# Patient Record
Sex: MALE | Race: BLACK OR AFRICAN AMERICAN | NOT HISPANIC OR LATINO | Employment: UNEMPLOYED | URBAN - METROPOLITAN AREA
[De-identification: names, ages, dates, MRNs, and addresses within clinical notes are randomized per-mention and may not be internally consistent; named-entity substitution may affect disease eponyms.]

---

## 2024-02-05 ENCOUNTER — OFFICE VISIT (OUTPATIENT)
Dept: PRIMARY CARE CLINIC | Facility: CLINIC | Age: 44
End: 2024-02-05
Payer: COMMERCIAL

## 2024-02-05 VITALS
HEIGHT: 69 IN | OXYGEN SATURATION: 98 % | DIASTOLIC BLOOD PRESSURE: 89 MMHG | BODY MASS INDEX: 30.36 KG/M2 | SYSTOLIC BLOOD PRESSURE: 131 MMHG | HEART RATE: 50 BPM | WEIGHT: 205 LBS

## 2024-02-05 DIAGNOSIS — I50.9 CONGESTIVE HEART FAILURE, UNSPECIFIED HF CHRONICITY, UNSPECIFIED HEART FAILURE TYPE: ICD-10-CM

## 2024-02-05 DIAGNOSIS — Z01.89 ROUTINE LAB DRAW: Primary | ICD-10-CM

## 2024-02-05 DIAGNOSIS — E83.42 HYPOMAGNESEMIA: Primary | ICD-10-CM

## 2024-02-05 LAB
ALBUMIN SERPL BCP-MCNC: 3.5 G/DL (ref 3.4–5)
ALP SERPL-CCNC: 88 U/L (ref 45–117)
ALT SERPL W P-5'-P-CCNC: 25 U/L (ref 16–61)
ANION GAP SERPL CALC-SCNC: 3 MMOL/L (ref 3–11)
AST SERPL-CCNC: 22 U/L (ref 15–37)
BILIRUB SERPL-MCNC: 0.4 MG/DL (ref 0.2–1)
BUN SERPL-MCNC: 11 MG/DL (ref 7–18)
BUN/CREAT SERPL: 6.17 RATIO
CALCIUM SERPL-MCNC: 8.6 MG/DL (ref 8.5–10.1)
CHLORIDE SERPL-SCNC: 110 MMOL/L (ref 98–107)
CO2 SERPL-SCNC: 27 MMOL/L (ref 21–32)
CREAT SERPL-MCNC: 1.78 MG/DL (ref 0.7–1.3)
GFR ESTIMATION: 51
GLUCOSE SERPL-MCNC: 93 MG/DL (ref 74–106)
POTASSIUM SERPL-SCNC: 4.3 MMOL/L (ref 3.5–5.1)
PROT SERPL-MCNC: 6.8 G/DL (ref 6.4–8.2)
SODIUM BLD-SCNC: 140 MMOL/L (ref 131–143)

## 2024-02-05 PROCEDURE — 3079F DIAST BP 80-89 MM HG: CPT | Mod: CPTII,S$GLB,, | Performed by: INTERNAL MEDICINE

## 2024-02-05 PROCEDURE — 3008F BODY MASS INDEX DOCD: CPT | Mod: CPTII,S$GLB,, | Performed by: INTERNAL MEDICINE

## 2024-02-05 PROCEDURE — 36415 COLL VENOUS BLD VENIPUNCTURE: CPT | Mod: S$GLB,,, | Performed by: INTERNAL MEDICINE

## 2024-02-05 PROCEDURE — 3075F SYST BP GE 130 - 139MM HG: CPT | Mod: CPTII,S$GLB,, | Performed by: INTERNAL MEDICINE

## 2024-02-05 PROCEDURE — 1159F MED LIST DOCD IN RCRD: CPT | Mod: CPTII,S$GLB,, | Performed by: INTERNAL MEDICINE

## 2024-02-05 PROCEDURE — 99204 OFFICE O/P NEW MOD 45 MIN: CPT | Mod: S$GLB,,, | Performed by: INTERNAL MEDICINE

## 2024-02-05 RX ORDER — HYDRALAZINE HYDROCHLORIDE 25 MG/1
25 TABLET, FILM COATED ORAL 2 TIMES DAILY
COMMUNITY
Start: 2024-01-31 | End: 2024-03-14 | Stop reason: ALTCHOICE

## 2024-02-05 RX ORDER — ASPIRIN 81 MG/1
81 TABLET ORAL DAILY
COMMUNITY
End: 2024-03-14 | Stop reason: ALTCHOICE

## 2024-02-05 RX ORDER — METOPROLOL SUCCINATE 25 MG/1
25 TABLET, EXTENDED RELEASE ORAL DAILY
COMMUNITY
Start: 2024-01-31 | End: 2024-03-14 | Stop reason: ALTCHOICE

## 2024-02-05 NOTE — PROGRESS NOTES
Subjective:      Patient ID: Ayad Edmond is a 43 y.o. male.    Chief Complaint: Establish Care and Follow-up ([facility] and [facility](01/29/24).)    HPI      Past Medical History:   Diagnosis Date    Acute heart failure     AUDELIA (acute kidney injury)     Cardiomyopathy     Elevated troponin     HTN (hypertension)     Hypokalemia     Hypomagnesemia     Renal insufficiency      Patient with above medical problems recently was in the hospital for SOB and was found to have an EF of 30-35%. He was diuresed with lasix and his lisinopril was discontinued due to AUDELIA perhaps. Pulse rate was high in the hospital so LHC was deferred. He is wearing a life vest and his appointment with Dr Garcia is on the 15th of Feb  He wants to be cleared for work. He does not have to climb any stairs and there is no exertion involved in his job either   He is a  and was taking an anabolic supplement with creatine which he has stopped 2 weeks ago    Hospitalization notes as below     History of Present Illness  43-year-old male with a history of hypertension with medication nonadherence presents as transfer from [facility] for cardiology services.  He presented to the facility on 1/29 with complaint of 3-day history of shortness of breath at rest and with minimal exertion.  He also has orthopnea and PND and abdominal fullness with early satiety.  He does not have pedal edema.  He denies chest pain, palpitations or postural dizziness.  Initial vitals at that facility significant for diastolic hypertension.  CBC without leukocytosis.  CMP with hypokalemia.  proBNP and troponin were elevated.  SARS-CoV-2 was negative.  D-dimer was high and follow-up CTA chest was negative for PE, there were findings of right-sided volume overloaded state and pulmonary edema.  He received Solu-Medrol, DuoNeb, Toradol, weight-based Lovenox, full dose aspirin, Lasix.  He also received Rocephin.      H/o smoking, no h/o drug or alcohol  "use           Review of Systems   Constitutional:  Negative for chills and fever.   HENT:  Negative for hearing loss.    Eyes:  Negative for blurred vision.   Respiratory:  Negative for cough, shortness of breath and wheezing.    Cardiovascular:  Negative for chest pain, palpitations and leg swelling.   Gastrointestinal:  Negative for abdominal pain, blood in stool, constipation, diarrhea, melena, nausea and vomiting.   Genitourinary:  Negative for dysuria, frequency and urgency.   Musculoskeletal:  Negative for falls.   Skin:  Negative for rash.   Neurological:  Negative for dizziness and headaches.   Endo/Heme/Allergies:  Does not bruise/bleed easily.   Psychiatric/Behavioral:  Negative for depression. The patient is not nervous/anxious.      Objective:     Physical Exam  Vitals reviewed.   Constitutional:       Appearance: Normal appearance.   HENT:      Head: Normocephalic.      Mouth/Throat:      Mouth: Mucous membranes are moist.      Pharynx: Oropharynx is clear.   Eyes:      Extraocular Movements: Extraocular movements intact.      Conjunctiva/sclera: Conjunctivae normal.      Pupils: Pupils are equal, round, and reactive to light.   Cardiovascular:      Rate and Rhythm: Normal rate and regular rhythm.   Pulmonary:      Effort: Pulmonary effort is normal.      Breath sounds: Normal breath sounds.   Abdominal:      General: Bowel sounds are normal.   Musculoskeletal:      Right lower leg: No edema.      Left lower leg: No edema.   Skin:     General: Skin is warm.      Capillary Refill: Capillary refill takes less than 2 seconds.   Neurological:      Mental Status: He is alert and oriented to person, place, and time.   Psychiatric:         Mood and Affect: Mood normal.       /89 (BP Location: Left arm, Patient Position: Sitting, BP Method: X-Large (Automatic))   Pulse (!) 50   Ht 5' 9" (1.753 m)   Wt 93 kg (205 lb)   SpO2 98%   BMI 30.27 kg/m²     Assessment:       ICD-10-CM ICD-9-CM   1. " Hypomagnesemia  E83.42 275.2   2. Congestive heart failure, unspecified HF chronicity, unspecified heart failure type  I50.9 428.0       Plan:     Medication List with Changes/Refills   Current Medications    ASPIRIN (ECOTRIN) 81 MG EC TABLET    Take 81 mg by mouth once daily.    HYDRALAZINE (APRESOLINE) 25 MG TABLET    Take 25 mg by mouth 2 (two) times a day.    METOPROLOL SUCCINATE (TOPROL-XL) 25 MG 24 HR TABLET    Take 25 mg by mouth once daily.        1. Hypomagnesemia  -     Magnesium; Future; Expected date: 02/05/2024    2. Congestive heart failure, unspecified HF chronicity, unspecified heart failure type  -     Comprehensive Metabolic Panel; Future; Expected date: 02/05/2024       Future Appointments   Date Time Provider Department Center   2/5/2024  1:45 PM LAB, [facility] OB [address] [facility] [id number] [facility]   5/6/2024  9:20 AM Zehra Davies MD [facility] PRICG5 [facility]         Clearance provided, he wants to be cleared to go to the gym but I advised he needs to discuss that with Dr Garcia     Lipid panel was done in the hospital

## 2024-02-05 NOTE — LETTER
February 5, 2024      [facility]  [address] [place] [address], [address]  [facility] [address]  Phone: [phone]  Fax: [phone]       Patient: Ayad Edmond   YOB: 1980  Date of Visit: 02/05/2024    To Whom It May Concern:    Riddhi Edmond  was at [facility] on 02/05/2024. The patient may return to work/school on 02/06/24. If you have any questions or concerns, or if I can be of further assistance, please do not hesitate to contact me.    Sincerely,  Zehra Davies MD

## 2024-02-26 ENCOUNTER — PATIENT MESSAGE (OUTPATIENT)
Dept: PRIMARY CARE CLINIC | Facility: CLINIC | Age: 44
End: 2024-02-26
Payer: COMMERCIAL

## 2024-03-14 ENCOUNTER — OFFICE VISIT (OUTPATIENT)
Dept: PRIMARY CARE CLINIC | Facility: CLINIC | Age: 44
End: 2024-03-14
Payer: COMMERCIAL

## 2024-03-14 VITALS
OXYGEN SATURATION: 97 % | DIASTOLIC BLOOD PRESSURE: 84 MMHG | WEIGHT: 188 LBS | HEIGHT: 69 IN | SYSTOLIC BLOOD PRESSURE: 114 MMHG | BODY MASS INDEX: 27.85 KG/M2 | HEART RATE: 102 BPM

## 2024-03-14 DIAGNOSIS — I50.9 CONGESTIVE HEART FAILURE, UNSPECIFIED HF CHRONICITY, UNSPECIFIED HEART FAILURE TYPE: ICD-10-CM

## 2024-03-14 DIAGNOSIS — R11.0 NAUSEA: Primary | ICD-10-CM

## 2024-03-14 PROCEDURE — 3074F SYST BP LT 130 MM HG: CPT | Mod: CPTII,S$GLB,, | Performed by: INTERNAL MEDICINE

## 2024-03-14 PROCEDURE — 99214 OFFICE O/P EST MOD 30 MIN: CPT | Mod: S$GLB,,, | Performed by: INTERNAL MEDICINE

## 2024-03-14 PROCEDURE — 1159F MED LIST DOCD IN RCRD: CPT | Mod: CPTII,S$GLB,, | Performed by: INTERNAL MEDICINE

## 2024-03-14 PROCEDURE — 3079F DIAST BP 80-89 MM HG: CPT | Mod: CPTII,S$GLB,, | Performed by: INTERNAL MEDICINE

## 2024-03-14 PROCEDURE — 3008F BODY MASS INDEX DOCD: CPT | Mod: CPTII,S$GLB,, | Performed by: INTERNAL MEDICINE

## 2024-03-14 RX ORDER — PROMETHAZINE HYDROCHLORIDE 25 MG/1
25 TABLET ORAL EVERY 8 HOURS PRN
Qty: 30 TABLET | Refills: 0 | Status: SHIPPED | OUTPATIENT
Start: 2024-03-14 | End: 2024-03-19 | Stop reason: ALTCHOICE

## 2024-03-14 RX ORDER — ALPRAZOLAM 0.25 MG/1
0.25 TABLET ORAL 2 TIMES DAILY PRN
Qty: 30 TABLET | Refills: 0 | Status: ON HOLD | OUTPATIENT
Start: 2024-03-14 | End: 2024-06-06 | Stop reason: HOSPADM

## 2024-03-14 RX ORDER — TORSEMIDE 20 MG/1
20 TABLET ORAL DAILY
Status: ON HOLD | COMMUNITY
Start: 2024-02-28 | End: 2024-06-06 | Stop reason: HOSPADM

## 2024-03-14 RX ORDER — CARVEDILOL 6.25 MG/1
6.25 TABLET ORAL 2 TIMES DAILY
Status: ON HOLD | COMMUNITY
Start: 2024-02-28 | End: 2024-05-15 | Stop reason: HOSPADM

## 2024-03-14 RX ORDER — POTASSIUM CHLORIDE 1500 MG/1
20 TABLET, EXTENDED RELEASE ORAL 2 TIMES DAILY
COMMUNITY
Start: 2024-02-28 | End: 2024-05-10

## 2024-03-14 RX ORDER — ATORVASTATIN CALCIUM 40 MG/1
40 TABLET, FILM COATED ORAL DAILY
COMMUNITY
Start: 2024-01-31 | End: 2024-03-14 | Stop reason: ALTCHOICE

## 2024-03-14 NOTE — PROGRESS NOTES
Subjective:      Patient ID: Ayad Edmond is a 43 y.o. male.    Chief Complaint: Follow-up    HPI    Past Medical History:   Diagnosis Date    Acute heart failure     AUDELIA (acute kidney injury)     Cardiomyopathy     Elevated troponin     HTN (hypertension)     Hypokalemia     Hypomagnesemia     Renal insufficiency          Patient here for hospital follow up    Hospital Course   43-year-old male with past medical history of HFrEF EF 10%, CKD, HTN who was discharged about 10 days ago where he was treated for CHF presented to [facility] after he passed out at home.  Patient stated his Coreg was increased was increasing his outpatient visit which made him nauseous dizzy and he passed out from a standing position.He denies shortness of breath, chest pain, PND, lower extremity swelling.In the ER patient was found to be hemodynamically stable.  CBC unremarkable.  CMP consistent with CKD.  BNP elevated at 1760.  Troponin 167.  EKG showed no acute ST segment ischemic changes.    Admitted to [facility] with syncope in the setting of HFrEF EF 10%.  Left heart cath done on prior admission was negative for obstructive CAD. Discharged with reduced dose of Coreg and continue his diuretic. Needs to be evaluated for transplant/CHF clinic. Patient refuses this at this time and also reufses ICD. Follow up in 1 week for labs and close monitoring with Dr Garcia.      Patient is here for nausea medication and anxiety medication    Review of Systems   Constitutional:  Negative for chills and fever.   HENT:  Negative for hearing loss.    Eyes:  Negative for blurred vision.   Respiratory:  Negative for cough, shortness of breath and wheezing.    Cardiovascular:  Negative for chest pain, palpitations and leg swelling.   Gastrointestinal:  Positive for nausea. Negative for abdominal pain, blood in stool, constipation, diarrhea, melena and vomiting.   Genitourinary:  Negative for dysuria, frequency and urgency.  "  Musculoskeletal:  Negative for falls.   Skin:  Negative for rash.   Neurological:  Negative for dizziness and headaches.   Endo/Heme/Allergies:  Does not bruise/bleed easily.   Psychiatric/Behavioral:  Negative for depression. The patient is nervous/anxious and has insomnia.      Objective:     Physical Exam  Vitals reviewed.   Constitutional:       Appearance: Normal appearance.   HENT:      Head: Normocephalic.      Mouth/Throat:      Mouth: Mucous membranes are moist.      Pharynx: Oropharynx is clear.   Eyes:      Extraocular Movements: Extraocular movements intact.      Conjunctiva/sclera: Conjunctivae normal.      Pupils: Pupils are equal, round, and reactive to light.   Cardiovascular:      Rate and Rhythm: Normal rate and regular rhythm.   Pulmonary:      Effort: Pulmonary effort is normal.      Breath sounds: Normal breath sounds.   Abdominal:      General: Bowel sounds are normal.   Musculoskeletal:      Right lower leg: No edema.      Left lower leg: No edema.   Skin:     General: Skin is warm.      Capillary Refill: Capillary refill takes less than 2 seconds.   Neurological:      General: No focal deficit present.      Mental Status: He is alert.   Psychiatric:         Mood and Affect: Mood normal.       /84 (BP Location: Left arm, Patient Position: Sitting, BP Method: X-Large (Automatic))   Pulse 102   Ht 5' 9" (1.753 m)   Wt 85.3 kg (188 lb)   SpO2 97%   BMI 27.76 kg/m²     Assessment:       ICD-10-CM ICD-9-CM   1. Nausea  R11.0 787.02   2. Congestive heart failure, unspecified HF chronicity, unspecified heart failure type  I50.9 428.0       Plan:     Medication List with Changes/Refills   New Medications    ALPRAZOLAM (XANAX) 0.25 MG TABLET    Take 1 tablet (0.25 mg total) by mouth 2 (two) times daily as needed for Anxiety.    PROMETHAZINE (PHENERGAN) 25 MG TABLET    Take 1 tablet (25 mg total) by mouth every 8 (eight) hours as needed for Nausea.   Current Medications    CARVEDILOL " (COREG) 6.25 MG TABLET    Take 6.25 mg by mouth 2 (two) times daily.    POTASSIUM CHLORIDE (K-TAB) 20 MEQ    Take 20 mEq by mouth 2 (two) times a day.    TORSEMIDE (DEMADEX) 20 MG TAB    Take 20 mg by mouth once daily.   Discontinued Medications    ASPIRIN (ECOTRIN) 81 MG EC TABLET    Take 81 mg by mouth once daily.    ATORVASTATIN (LIPITOR) 40 MG TABLET    Take 40 mg by mouth once daily.    HYDRALAZINE (APRESOLINE) 25 MG TABLET    Take 25 mg by mouth 2 (two) times a day.    METOPROLOL SUCCINATE (TOPROL-XL) 25 MG 24 HR TABLET    Take 25 mg by mouth once daily.        1. Nausea  -     promethazine (PHENERGAN) 25 MG tablet; Take 1 tablet (25 mg total) by mouth every 8 (eight) hours as needed for Nausea.  Dispense: 30 tablet; Refill: 0  -     ALPRAZolam (XANAX) 0.25 MG tablet; Take 1 tablet (0.25 mg total) by mouth 2 (two) times daily as needed for Anxiety.  Dispense: 30 tablet; Refill: 0    2. Congestive heart failure, unspecified HF chronicity, unspecified heart failure type  -     Basic Metabolic Panel; Future; Expected date: 03/14/2024       He has an upcoming f/u with Dr Garcia, he had labs at the ER yesterday, I have ordered another bmp, advised to get it done before he sees Dr Garcia         Future Appointments   Date Time Provider Department Center   5/6/2024  9:20 AM Zehra Daives MD [facility] [id number] [facility]

## 2024-03-18 ENCOUNTER — PATIENT MESSAGE (OUTPATIENT)
Dept: PRIMARY CARE CLINIC | Facility: CLINIC | Age: 44
End: 2024-03-18

## 2024-03-18 ENCOUNTER — TELEPHONE (OUTPATIENT)
Dept: PRIMARY CARE CLINIC | Facility: CLINIC | Age: 44
End: 2024-03-18

## 2024-03-18 NOTE — TELEPHONE ENCOUNTER
----- Message from Tatiana Dias sent at 3/18/2024  9:46 AM CDT -----  Contact: self  Patient requesting a call back regarding getting medication called out for nausea. Patient states that medication that was called out is not working. Please call back @ [phone]

## 2024-03-19 RX ORDER — DIMENHYDRINATE 25 MG
1 TABLET,CHEWABLE ORAL DAILY PRN
Qty: 30 TABLET | Refills: 0 | Status: ON HOLD | OUTPATIENT
Start: 2024-03-19 | End: 2024-06-06 | Stop reason: HOSPADM

## 2024-04-11 ENCOUNTER — OFFICE VISIT (OUTPATIENT)
Dept: PRIMARY CARE CLINIC | Facility: CLINIC | Age: 44
End: 2024-04-11
Payer: COMMERCIAL

## 2024-04-11 VITALS
HEART RATE: 54 BPM | WEIGHT: 189 LBS | DIASTOLIC BLOOD PRESSURE: 92 MMHG | HEIGHT: 69 IN | SYSTOLIC BLOOD PRESSURE: 132 MMHG | OXYGEN SATURATION: 97 % | BODY MASS INDEX: 27.99 KG/M2

## 2024-04-11 DIAGNOSIS — R11.0 NAUSEA: ICD-10-CM

## 2024-04-11 DIAGNOSIS — I50.9 CONGESTIVE HEART FAILURE, UNSPECIFIED HF CHRONICITY, UNSPECIFIED HEART FAILURE TYPE: ICD-10-CM

## 2024-04-11 DIAGNOSIS — R14.0 BLOATING: Primary | ICD-10-CM

## 2024-04-11 PROCEDURE — 99213 OFFICE O/P EST LOW 20 MIN: CPT | Mod: S$GLB,,, | Performed by: INTERNAL MEDICINE

## 2024-04-11 PROCEDURE — 3075F SYST BP GE 130 - 139MM HG: CPT | Mod: CPTII,S$GLB,, | Performed by: INTERNAL MEDICINE

## 2024-04-11 PROCEDURE — 3008F BODY MASS INDEX DOCD: CPT | Mod: CPTII,S$GLB,, | Performed by: INTERNAL MEDICINE

## 2024-04-11 PROCEDURE — 1159F MED LIST DOCD IN RCRD: CPT | Mod: CPTII,S$GLB,, | Performed by: INTERNAL MEDICINE

## 2024-04-11 PROCEDURE — 3080F DIAST BP >= 90 MM HG: CPT | Mod: CPTII,S$GLB,, | Performed by: INTERNAL MEDICINE

## 2024-04-11 RX ORDER — PANTOPRAZOLE SODIUM 20 MG/1
20 TABLET, DELAYED RELEASE ORAL DAILY
Qty: 90 TABLET | Refills: 3 | Status: SHIPPED | OUTPATIENT
Start: 2024-04-11 | End: 2025-04-11

## 2024-04-11 NOTE — PROGRESS NOTES
"Subjective:      Patient ID: Ayad Edmond is a 44 y.o. male.    Chief Complaint: Hospital Follow Up (The patient has not taken his meds this am. ER 04/08/24. Report under media.) and Bloated (He states to his abd. "Causes me to be short of breath and it starts at the start of the morning to the night." )    HPI    Past Medical History:   Diagnosis Date    Acute heart failure     AUDELIA (acute kidney injury)     Cardiomyopathy     Elevated troponin     HTN (hypertension)     Hypokalemia     Hypomagnesemia     Renal insufficiency        ER report as below    44-year-old male with history of severe nonischemic cardiomyopathy, EF 10%, CKD presents to the ED complaining of shortness of breath.  For the past several days he has had mild dyspnea on exertion and dyspnea when he lays on his side at night.  Complains of mild nausea.  Denies any chest pain, fevers, chills  Patient presents with shortness of breath.  On exam he is nontoxic-appearing but afebrile and vital stable.  Basic lab work significant for mild AUDELIA as well as elevated BNP.  Troponin normal EKG unremarkable and chest x-ray shows small pleural effusion.  Recommended admission for diuresis and cardiology consultation, however he is refusing admission at this time.  Will treat with low-dose IV Lasix, antiemetics and discharged home with cardiology follow-up strict return precautions were discussed    He has a follow up with Dr Garcia next month    He was prescribed phenergan for nausea which he stated didn't help, it was refilled by the ER, he was then sent Dramamine which it otc as he was concerned about zofran causing Qtc prolongation    He was also send xanax for anxiety and states it does not help with his sleep. He states he has problems with sleep because he has shortness of breath lying flat and I'm not sure how medications for sleep will help that and he needs treatment for his CHF    Today he reports bloating and states the Torsemide doesn't work. He " has had a CT abdomen pelvis which did not show any acute pathology         Review of Systems   Constitutional:  Negative for chills and fever.   HENT:  Negative for hearing loss.    Eyes:  Negative for blurred vision.   Respiratory:  Positive for shortness of breath. Negative for cough and wheezing.    Cardiovascular:  Positive for orthopnea. Negative for chest pain, palpitations and leg swelling.   Gastrointestinal:  Positive for nausea. Negative for abdominal pain, blood in stool, constipation, diarrhea, melena and vomiting.   Genitourinary:  Negative for dysuria, frequency and urgency.   Musculoskeletal:  Negative for falls.   Skin:  Negative for rash.   Neurological:  Negative for dizziness and headaches.   Endo/Heme/Allergies:  Does not bruise/bleed easily.   Psychiatric/Behavioral:  Negative for depression. The patient is nervous/anxious and has insomnia.      Objective:     Physical Exam  Vitals reviewed.   Constitutional:       Appearance: Normal appearance.   HENT:      Head: Normocephalic.      Mouth/Throat:      Mouth: Mucous membranes are moist.      Pharynx: Oropharynx is clear.   Eyes:      Extraocular Movements: Extraocular movements intact.      Conjunctiva/sclera: Conjunctivae normal.      Pupils: Pupils are equal, round, and reactive to light.   Cardiovascular:      Rate and Rhythm: Normal rate and regular rhythm.   Pulmonary:      Effort: Pulmonary effort is normal.      Breath sounds: Normal breath sounds.   Abdominal:      General: Bowel sounds are normal.   Musculoskeletal:      Right lower leg: No edema.      Left lower leg: No edema.   Skin:     General: Skin is warm.      Capillary Refill: Capillary refill takes less than 2 seconds.   Neurological:      Mental Status: He is alert and oriented to person, place, and time.   Psychiatric:         Mood and Affect: Mood normal.     BP (!) 132/92 (BP Location: Right arm, Patient Position: Sitting, BP Method: X-Large (Automatic))   Pulse (!) 54    "Ht 5' 9" (1.753 m)   Wt 85.7 kg (189 lb)   SpO2 97%   BMI 27.91 kg/m²     Assessment:       ICD-10-CM ICD-9-CM   1. Bloating  R14.0 787.3   2. Congestive heart failure, unspecified HF chronicity, unspecified heart failure type  I50.9 428.0   3. Nausea  R11.0 787.02       Plan:     Medication List with Changes/Refills   New Medications    PANTOPRAZOLE (PROTONIX) 20 MG TABLET    Take 1 tablet (20 mg total) by mouth once daily.   Current Medications    ALPRAZOLAM (XANAX) 0.25 MG TABLET    Take 1 tablet (0.25 mg total) by mouth 2 (two) times daily as needed for Anxiety.    CARVEDILOL (COREG) 6.25 MG TABLET    Take 6.25 mg by mouth 2 (two) times daily.    DIMENHYDRINATE (DRAMAMINE) 25 MG CHEW    Take 1 tablet by mouth daily as needed (nausea).    POTASSIUM CHLORIDE (K-TAB) 20 MEQ    Take 20 mEq by mouth 2 (two) times a day.    TORSEMIDE (DEMADEX) 20 MG TAB    Take 20 mg by mouth once daily.        1. Bloating  -     pantoprazole (PROTONIX) 20 MG tablet; Take 1 tablet (20 mg total) by mouth once daily.  Dispense: 90 tablet; Refill: 3    2. Congestive heart failure, unspecified HF chronicity, unspecified heart failure type    3. Nausea         Future Appointments   Date Time Provider Department Center   8/12/2024  9:20 AM Zehra Davies MD [facility] [id number] [facility]                    "

## 2024-04-12 ENCOUNTER — TELEPHONE (OUTPATIENT)
Dept: CARDIOTHORACIC SURGERY | Facility: CLINIC | Age: 44
End: 2024-04-12
Payer: MEDICAID

## 2024-04-12 NOTE — TELEPHONE ENCOUNTER
----- Message from Nery Moss sent at 4/12/2024 12:50 PM CDT -----  Regarding: Appt  Contact: pt @ [phone]  Pt has congestive heart failure, asking for a call back to make appt

## 2024-04-19 ENCOUNTER — TELEPHONE (OUTPATIENT)
Dept: TRANSPLANT | Facility: CLINIC | Age: 44
End: 2024-04-19
Payer: MEDICAID

## 2024-04-19 DIAGNOSIS — I50.9 CONGESTIVE HEART FAILURE, UNSPECIFIED HF CHRONICITY, UNSPECIFIED HEART FAILURE TYPE: Primary | ICD-10-CM

## 2024-04-22 ENCOUNTER — TELEPHONE (OUTPATIENT)
Dept: PRIMARY CARE CLINIC | Facility: CLINIC | Age: 44
End: 2024-04-22
Payer: MEDICAID

## 2024-04-22 ENCOUNTER — PATIENT MESSAGE (OUTPATIENT)
Dept: PRIMARY CARE CLINIC | Facility: CLINIC | Age: 44
End: 2024-04-22
Payer: MEDICAID

## 2024-04-22 NOTE — TELEPHONE ENCOUNTER
"[employer] billy has been sent to the patient to advise him. I do not have the "spouse" or anyone signed on an "involvement of care" form.     ----- Message from Angie Cosme sent at 4/22/2024  3:31 PM CDT -----  Contact: Sia (spouse)  Patient is requesting a call back regarding wanting to know  if paperwork he dropped off Friday is filled out and ready for pickup. Please call back at [phone]  "

## 2024-04-22 NOTE — TELEPHONE ENCOUNTER
----- Message from Zehra Davies MD sent at 4/22/2024 11:53 AM CDT -----  Regarding: RE: paperwork  Contact: pt  Not yet  ----- Message -----  From: Yaneth Rai MA  Sent: 4/22/2024  11:31 AM CDT  To: Zehra Davies MD  Subject: FW: paperwork                                      ----- Message -----  From: Tierra Menjivar  Sent: 4/22/2024  11:25 AM CDT  To: Samson Shahid Staff  Subject: paperwork                                        Pt calling about paper work wanted to know if it is ready for  and he can b  reached at [phone]

## 2024-04-23 ENCOUNTER — TELEPHONE (OUTPATIENT)
Dept: PRIMARY CARE CLINIC | Facility: CLINIC | Age: 44
End: 2024-04-23
Payer: MEDICAID

## 2024-04-23 NOTE — TELEPHONE ENCOUNTER
Gabriela says it has to be completed by this Friday.     ----- Message from Mansi Yoder sent at 4/23/2024  2:07 PM CDT -----  Contact: Sia/Spouse  Patient's spouse is calling to speak with a nurse regarding form. Patient's spouse request to confirm if form for patient has been completed. Please give Mrs. Stovall a call back at [phone] when possible.  Thank you,  GH

## 2024-04-24 ENCOUNTER — PATIENT MESSAGE (OUTPATIENT)
Dept: PRIMARY CARE CLINIC | Facility: CLINIC | Age: 44
End: 2024-04-24
Payer: MEDICAID

## 2024-05-10 ENCOUNTER — HOSPITAL ENCOUNTER (INPATIENT)
Facility: HOSPITAL | Age: 44
LOS: 8 days | Discharge: HOME | DRG: 286 | End: 2024-05-18
Attending: STUDENT IN AN ORGANIZED HEALTH CARE EDUCATION/TRAINING PROGRAM | Admitting: INTERNAL MEDICINE
Payer: MEDICAID

## 2024-05-10 ENCOUNTER — HOSPITAL ENCOUNTER (OUTPATIENT)
Dept: PULMONOLOGY | Facility: CLINIC | Age: 44
Discharge: HOME | DRG: 286 | End: 2024-05-10
Payer: MEDICAID

## 2024-05-10 ENCOUNTER — OFFICE VISIT (OUTPATIENT)
Dept: TRANSPLANT | Facility: CLINIC | Age: 44
DRG: 286 | End: 2024-05-10
Payer: MEDICAID

## 2024-05-10 VITALS — HEIGHT: 69 IN | WEIGHT: 190 LBS | BODY MASS INDEX: 28.14 KG/M2

## 2024-05-10 VITALS
BODY MASS INDEX: 28.71 KG/M2 | DIASTOLIC BLOOD PRESSURE: 85 MMHG | WEIGHT: 193.81 LBS | OXYGEN SATURATION: 100 % | SYSTOLIC BLOOD PRESSURE: 131 MMHG | HEIGHT: 69 IN | HEART RATE: 90 BPM

## 2024-05-10 DIAGNOSIS — I50.9 CONGESTIVE HEART FAILURE, UNSPECIFIED HF CHRONICITY, UNSPECIFIED HEART FAILURE TYPE: Primary | ICD-10-CM

## 2024-05-10 DIAGNOSIS — I50.9 ACUTE DECOMPENSATED HEART FAILURE: ICD-10-CM

## 2024-05-10 DIAGNOSIS — I50.43 ACUTE ON CHRONIC COMBINED SYSTOLIC AND DIASTOLIC HEART FAILURE: ICD-10-CM

## 2024-05-10 DIAGNOSIS — R06.02 SHORTNESS OF BREATH: ICD-10-CM

## 2024-05-10 DIAGNOSIS — Z91.89 AT RISK FOR PROLONGED QT INTERVAL SYNDROME: ICD-10-CM

## 2024-05-10 DIAGNOSIS — I50.9 CONGESTIVE HEART FAILURE, UNSPECIFIED HF CHRONICITY, UNSPECIFIED HEART FAILURE TYPE: ICD-10-CM

## 2024-05-10 DIAGNOSIS — R57.0 CARDIOGENIC SHOCK: Primary | ICD-10-CM

## 2024-05-10 DIAGNOSIS — R06.02 SOB (SHORTNESS OF BREATH): ICD-10-CM

## 2024-05-10 DIAGNOSIS — I10 PRIMARY HYPERTENSION: ICD-10-CM

## 2024-05-10 DIAGNOSIS — I50.9 DECOMPENSATED HEART FAILURE: ICD-10-CM

## 2024-05-10 DIAGNOSIS — N17.9 AKI (ACUTE KIDNEY INJURY): ICD-10-CM

## 2024-05-10 LAB
ALBUMIN SERPL BCP-MCNC: 3.2 G/DL (ref 3.5–5.2)
ALBUMIN SERPL BCP-MCNC: 3.5 G/DL (ref 3.5–5.2)
ALLENS TEST: ABNORMAL
ALLENS TEST: NORMAL
ALP SERPL-CCNC: 113 U/L (ref 55–135)
ALP SERPL-CCNC: 94 U/L (ref 55–135)
ALT SERPL W/O P-5'-P-CCNC: 58 U/L (ref 10–44)
ALT SERPL W/O P-5'-P-CCNC: 69 U/L (ref 10–44)
ANION GAP SERPL CALC-SCNC: 10 MMOL/L (ref 8–16)
ANION GAP SERPL CALC-SCNC: 11 MMOL/L (ref 8–16)
AST SERPL-CCNC: 45 U/L (ref 10–40)
AST SERPL-CCNC: 46 U/L (ref 10–40)
BASOPHILS # BLD AUTO: 0.05 K/UL (ref 0–0.2)
BASOPHILS NFR BLD: 0.9 % (ref 0–1.9)
BILIRUB SERPL-MCNC: 1 MG/DL (ref 0.1–1)
BILIRUB SERPL-MCNC: 1.1 MG/DL (ref 0.1–1)
BNP SERPL-MCNC: 1991 PG/ML (ref 0–99)
BUN SERPL-MCNC: 23 MG/DL (ref 6–20)
BUN SERPL-MCNC: 25 MG/DL (ref 6–20)
CALCIUM SERPL-MCNC: 8.9 MG/DL (ref 8.7–10.5)
CALCIUM SERPL-MCNC: 8.9 MG/DL (ref 8.7–10.5)
CHLORIDE SERPL-SCNC: 102 MMOL/L (ref 95–110)
CHLORIDE SERPL-SCNC: 105 MMOL/L (ref 95–110)
CO2 SERPL-SCNC: 21 MMOL/L (ref 23–29)
CO2 SERPL-SCNC: 25 MMOL/L (ref 23–29)
CREAT SERPL-MCNC: 2.1 MG/DL (ref 0.5–1.4)
CREAT SERPL-MCNC: 2.1 MG/DL (ref 0.5–1.4)
DELSYS: ABNORMAL
DELSYS: ABNORMAL
DIFFERENTIAL METHOD BLD: ABNORMAL
EOSINOPHIL # BLD AUTO: 0 K/UL (ref 0–0.5)
EOSINOPHIL NFR BLD: 0.4 % (ref 0–8)
ERYTHROCYTE [DISTWIDTH] IN BLOOD BY AUTOMATED COUNT: 16.9 % (ref 11.5–14.5)
EST. GFR  (NO RACE VARIABLE): 39.6 ML/MIN/1.73 M^2
EST. GFR  (NO RACE VARIABLE): 39.6 ML/MIN/1.73 M^2
GLUCOSE SERPL-MCNC: 109 MG/DL (ref 70–110)
GLUCOSE SERPL-MCNC: 85 MG/DL (ref 70–110)
HCO3 UR-SCNC: 24.6 MMOL/L (ref 24–28)
HCO3 UR-SCNC: 24.8 MMOL/L (ref 24–28)
HCO3 UR-SCNC: 26.6 MMOL/L (ref 24–28)
HCT VFR BLD AUTO: 37.3 % (ref 40–54)
HCV AB SERPL QL IA: NORMAL
HGB BLD-MCNC: 11 G/DL (ref 14–18)
HIV 1+2 AB+HIV1 P24 AG SERPL QL IA: NORMAL
IMM GRANULOCYTES # BLD AUTO: 0.01 K/UL (ref 0–0.04)
IMM GRANULOCYTES NFR BLD AUTO: 0.2 % (ref 0–0.5)
LACTATE SERPL-SCNC: 1.1 MMOL/L (ref 0.5–2.2)
LDH SERPL L TO P-CCNC: 1.29 MMOL/L (ref 0.5–2.2)
LYMPHOCYTES # BLD AUTO: 2.1 K/UL (ref 1–4.8)
LYMPHOCYTES NFR BLD: 39.2 % (ref 18–48)
MAGNESIUM SERPL-MCNC: 2.3 MG/DL (ref 1.6–2.6)
MCH RBC QN AUTO: 23.1 PG (ref 27–31)
MCHC RBC AUTO-ENTMCNC: 29.5 G/DL (ref 32–36)
MCV RBC AUTO: 78 FL (ref 82–98)
MONOCYTES # BLD AUTO: 0.4 K/UL (ref 0.3–1)
MONOCYTES NFR BLD: 8.3 % (ref 4–15)
NEUTROPHILS # BLD AUTO: 2.7 K/UL (ref 1.8–7.7)
NEUTROPHILS NFR BLD: 51 % (ref 38–73)
NRBC BLD-RTO: 0 /100 WBC
OHS QRS DURATION: 92 MS
OHS QTC CALCULATION: 462 MS
PCO2 BLDA: 40.3 MMHG (ref 35–45)
PCO2 BLDA: 42 MMHG (ref 35–45)
PCO2 BLDA: 44.3 MMHG (ref 35–45)
PH SMN: 7.38 [PH] (ref 7.35–7.45)
PH SMN: 7.39 [PH] (ref 7.35–7.45)
PH SMN: 7.39 [PH] (ref 7.35–7.45)
PHOSPHATE SERPL-MCNC: 4.8 MG/DL (ref 2.7–4.5)
PLATELET # BLD AUTO: 463 K/UL (ref 150–450)
PMV BLD AUTO: 8.9 FL (ref 9.2–12.9)
PO2 BLDA: 24 MMHG (ref 40–60)
PO2 BLDA: 27 MMHG (ref 40–60)
PO2 BLDA: 30 MMHG (ref 40–60)
POC BE: 0 MMOL/L
POC BE: 0 MMOL/L
POC BE: 2 MMOL/L
POC SATURATED O2: 41 % (ref 95–100)
POC SATURATED O2: 50 % (ref 95–100)
POC SATURATED O2: 56 % (ref 95–100)
POC TCO2: 26 MMOL/L (ref 24–29)
POC TCO2: 26 MMOL/L (ref 24–29)
POC TCO2: 28 MMOL/L (ref 24–29)
POTASSIUM SERPL-SCNC: 4.1 MMOL/L (ref 3.5–5.1)
POTASSIUM SERPL-SCNC: 4.7 MMOL/L (ref 3.5–5.1)
PROT SERPL-MCNC: 6.2 G/DL (ref 6–8.4)
PROT SERPL-MCNC: 7.1 G/DL (ref 6–8.4)
RBC # BLD AUTO: 4.76 M/UL (ref 4.6–6.2)
SAMPLE: ABNORMAL
SAMPLE: NORMAL
SITE: ABNORMAL
SITE: NORMAL
SODIUM SERPL-SCNC: 137 MMOL/L (ref 136–145)
SODIUM SERPL-SCNC: 137 MMOL/L (ref 136–145)
TROPONIN I SERPL DL<=0.01 NG/ML-MCNC: 0.03 NG/ML (ref 0–0.03)
WBC # BLD AUTO: 5.31 K/UL (ref 3.9–12.7)

## 2024-05-10 PROCEDURE — 85025 COMPLETE CBC W/AUTO DIFF WBC: CPT | Mod: 91,NTX | Performed by: EMERGENCY MEDICINE

## 2024-05-10 PROCEDURE — 94618 PULMONARY STRESS TESTING: CPT | Mod: PBBFAC,NTX | Performed by: INTERNAL MEDICINE

## 2024-05-10 PROCEDURE — 82803 BLOOD GASES ANY COMBINATION: CPT | Mod: NTX

## 2024-05-10 PROCEDURE — 83605 ASSAY OF LACTIC ACID: CPT | Mod: NTX

## 2024-05-10 PROCEDURE — 25000003 PHARM REV CODE 250: Mod: NTX | Performed by: STUDENT IN AN ORGANIZED HEALTH CARE EDUCATION/TRAINING PROGRAM

## 2024-05-10 PROCEDURE — 99213 OFFICE O/P EST LOW 20 MIN: CPT | Mod: PBBFAC,TXP,25 | Performed by: INTERNAL MEDICINE

## 2024-05-10 PROCEDURE — 20000000 HC ICU ROOM: Mod: NTX

## 2024-05-10 PROCEDURE — 99205 OFFICE O/P NEW HI 60 MIN: CPT | Mod: S$PBB,TXP,, | Performed by: INTERNAL MEDICINE

## 2024-05-10 PROCEDURE — 84100 ASSAY OF PHOSPHORUS: CPT | Mod: NTX | Performed by: EMERGENCY MEDICINE

## 2024-05-10 PROCEDURE — 84484 ASSAY OF TROPONIN QUANT: CPT | Mod: NTX | Performed by: EMERGENCY MEDICINE

## 2024-05-10 PROCEDURE — 83735 ASSAY OF MAGNESIUM: CPT | Mod: NTX | Performed by: EMERGENCY MEDICINE

## 2024-05-10 PROCEDURE — 99285 EMERGENCY DEPT VISIT HI MDM: CPT | Mod: 25,27,NTX

## 2024-05-10 PROCEDURE — 94761 N-INVAS EAR/PLS OXIMETRY MLT: CPT | Mod: NTX,XB

## 2024-05-10 PROCEDURE — 99291 CRITICAL CARE FIRST HOUR: CPT | Mod: NTX,,, | Performed by: INTERNAL MEDICINE

## 2024-05-10 PROCEDURE — 94618 PULMONARY STRESS TESTING: CPT | Mod: 26,S$PBB,NTX, | Performed by: INTERNAL MEDICINE

## 2024-05-10 PROCEDURE — 99900035 HC TECH TIME PER 15 MIN (STAT): Mod: NTX

## 2024-05-10 PROCEDURE — 80053 COMPREHEN METABOLIC PANEL: CPT | Mod: 91,NTX | Performed by: STUDENT IN AN ORGANIZED HEALTH CARE EDUCATION/TRAINING PROGRAM

## 2024-05-10 PROCEDURE — 02HV33Z INSERTION OF INFUSION DEVICE INTO SUPERIOR VENA CAVA, PERCUTANEOUS APPROACH: ICD-10-PCS | Performed by: INTERNAL MEDICINE

## 2024-05-10 PROCEDURE — 4010F ACE/ARB THERAPY RXD/TAKEN: CPT | Mod: TXP,,, | Performed by: INTERNAL MEDICINE

## 2024-05-10 PROCEDURE — 99999 PR PBB SHADOW E&M-EST. PATIENT-LVL III: CPT | Mod: PBBFAC,TXP,, | Performed by: INTERNAL MEDICINE

## 2024-05-10 PROCEDURE — 63600175 PHARM REV CODE 636 W HCPCS: Mod: NTX | Performed by: STUDENT IN AN ORGANIZED HEALTH CARE EDUCATION/TRAINING PROGRAM

## 2024-05-10 PROCEDURE — 80053 COMPREHEN METABOLIC PANEL: CPT | Mod: 91,NTX | Performed by: EMERGENCY MEDICINE

## 2024-05-10 PROCEDURE — 83605 ASSAY OF LACTIC ACID: CPT | Mod: NTX | Performed by: STUDENT IN AN ORGANIZED HEALTH CARE EDUCATION/TRAINING PROGRAM

## 2024-05-10 PROCEDURE — 87389 HIV-1 AG W/HIV-1&-2 AB AG IA: CPT | Mod: NTX | Performed by: PHYSICIAN ASSISTANT

## 2024-05-10 PROCEDURE — 25000003 PHARM REV CODE 250: Mod: NTX | Performed by: INTERNAL MEDICINE

## 2024-05-10 PROCEDURE — 96374 THER/PROPH/DIAG INJ IV PUSH: CPT | Mod: NTX

## 2024-05-10 PROCEDURE — C1751 CATH, INF, PER/CENT/MIDLINE: HCPCS | Mod: NTX

## 2024-05-10 PROCEDURE — 86803 HEPATITIS C AB TEST: CPT | Mod: NTX | Performed by: PHYSICIAN ASSISTANT

## 2024-05-10 PROCEDURE — 83880 ASSAY OF NATRIURETIC PEPTIDE: CPT | Mod: 91,NTX | Performed by: EMERGENCY MEDICINE

## 2024-05-10 RX ORDER — ISOSORBIDE DINITRATE 10 MG/1
10 TABLET ORAL 3 TIMES DAILY
Status: DISCONTINUED | OUTPATIENT
Start: 2024-05-10 | End: 2024-05-11

## 2024-05-10 RX ORDER — PANTOPRAZOLE SODIUM 20 MG/1
20 TABLET, DELAYED RELEASE ORAL DAILY
Status: DISCONTINUED | OUTPATIENT
Start: 2024-05-11 | End: 2024-05-18 | Stop reason: HOSPADM

## 2024-05-10 RX ORDER — LOSARTAN POTASSIUM 25 MG/1
25 TABLET ORAL DAILY
Status: DISCONTINUED | OUTPATIENT
Start: 2024-05-11 | End: 2024-05-13

## 2024-05-10 RX ORDER — ATORVASTATIN CALCIUM 40 MG/1
40 TABLET, FILM COATED ORAL DAILY
COMMUNITY
Start: 2024-04-16

## 2024-05-10 RX ORDER — ALUMINUM HYDROXIDE, MAGNESIUM HYDROXIDE, AND SIMETHICONE 1200; 120; 1200 MG/30ML; MG/30ML; MG/30ML
30 SUSPENSION ORAL
Status: DISCONTINUED | OUTPATIENT
Start: 2024-05-10 | End: 2024-05-14

## 2024-05-10 RX ORDER — HYDRALAZINE HYDROCHLORIDE 25 MG/1
25 TABLET, FILM COATED ORAL EVERY 8 HOURS
Status: DISCONTINUED | OUTPATIENT
Start: 2024-05-10 | End: 2024-05-11

## 2024-05-10 RX ORDER — FUROSEMIDE 10 MG/ML
80 INJECTION INTRAMUSCULAR; INTRAVENOUS
Status: COMPLETED | OUTPATIENT
Start: 2024-05-10 | End: 2024-05-10

## 2024-05-10 RX ORDER — MUPIROCIN 20 MG/G
OINTMENT TOPICAL 2 TIMES DAILY
Status: COMPLETED | OUTPATIENT
Start: 2024-05-10 | End: 2024-05-15

## 2024-05-10 RX ORDER — FUROSEMIDE 20 MG/1
20 TABLET ORAL EVERY MORNING
Status: ON HOLD | COMMUNITY
Start: 2024-02-23 | End: 2024-06-06 | Stop reason: HOSPADM

## 2024-05-10 RX ORDER — ATORVASTATIN CALCIUM 40 MG/1
40 TABLET, FILM COATED ORAL DAILY
Status: DISCONTINUED | OUTPATIENT
Start: 2024-05-11 | End: 2024-05-18 | Stop reason: HOSPADM

## 2024-05-10 RX ORDER — SUCRALFATE 1 G/10ML
1 SUSPENSION ORAL EVERY 6 HOURS
Status: DISCONTINUED | OUTPATIENT
Start: 2024-05-10 | End: 2024-05-14

## 2024-05-10 RX ORDER — SPIRONOLACTONE 25 MG/1
25 TABLET ORAL DAILY
COMMUNITY
Start: 2024-04-15

## 2024-05-10 RX ORDER — ALPRAZOLAM 0.5 MG/1
0.5 TABLET ORAL NIGHTLY PRN
Status: DISCONTINUED | OUTPATIENT
Start: 2024-05-10 | End: 2024-05-18 | Stop reason: HOSPADM

## 2024-05-10 RX ORDER — SODIUM CHLORIDE 0.9 % (FLUSH) 0.9 %
10 SYRINGE (ML) INJECTION
Status: DISCONTINUED | OUTPATIENT
Start: 2024-05-10 | End: 2024-05-18 | Stop reason: HOSPADM

## 2024-05-10 RX ORDER — SPIRONOLACTONE 25 MG/1
25 TABLET ORAL DAILY
Status: DISCONTINUED | OUTPATIENT
Start: 2024-05-11 | End: 2024-05-18 | Stop reason: HOSPADM

## 2024-05-10 RX ORDER — OXYCODONE AND ACETAMINOPHEN 5; 325 MG/1; MG/1
1 TABLET ORAL EVERY 4 HOURS PRN
Status: DISCONTINUED | OUTPATIENT
Start: 2024-05-10 | End: 2024-05-12

## 2024-05-10 RX ORDER — LOSARTAN POTASSIUM 25 MG/1
25 TABLET ORAL DAILY
Status: ON HOLD | COMMUNITY
Start: 2024-04-15 | End: 2024-05-15 | Stop reason: HOSPADM

## 2024-05-10 RX ORDER — TALC
3 POWDER (GRAM) TOPICAL NIGHTLY PRN
Status: DISCONTINUED | OUTPATIENT
Start: 2024-05-10 | End: 2024-05-18 | Stop reason: HOSPADM

## 2024-05-10 RX ADMIN — MUPIROCIN: 20 OINTMENT TOPICAL at 09:05

## 2024-05-10 RX ADMIN — HYDRALAZINE HYDROCHLORIDE 25 MG: 25 TABLET ORAL at 07:05

## 2024-05-10 RX ADMIN — SUCRALFATE 1 G: 1 SUSPENSION ORAL at 07:05

## 2024-05-10 RX ADMIN — FUROSEMIDE 20 MG/HR: 10 INJECTION, SOLUTION INTRAMUSCULAR; INTRAVENOUS at 05:05

## 2024-05-10 RX ADMIN — ALPRAZOLAM 0.5 MG: 0.5 TABLET ORAL at 10:05

## 2024-05-10 RX ADMIN — ISOSORBIDE DINITRATE 10 MG: 10 TABLET ORAL at 07:05

## 2024-05-10 RX ADMIN — OXYCODONE HYDROCHLORIDE AND ACETAMINOPHEN 1 TABLET: 5; 325 TABLET ORAL at 11:05

## 2024-05-10 RX ADMIN — ALUMINUM HYDROXIDE, MAGNESIUM HYDROXIDE, AND SIMETHICONE 30 ML: 1200; 120; 1200 SUSPENSION ORAL at 09:05

## 2024-05-10 RX ADMIN — OXYCODONE HYDROCHLORIDE AND ACETAMINOPHEN 1 TABLET: 5; 325 TABLET ORAL at 07:05

## 2024-05-10 RX ADMIN — SUCRALFATE 1 G: 1 SUSPENSION ORAL at 11:05

## 2024-05-10 RX ADMIN — FUROSEMIDE 80 MG: 10 INJECTION, SOLUTION INTRAVENOUS at 05:05

## 2024-05-10 NOTE — H&P
"[facility] - Emergency Dept  Heart Transplant  H&P    Patient Name: Ayad Edmond  MRN: [id number]  Admission Date: 5/10/2024  Attending Physician: Nicole Chua MD  Primary Care Provider: Zehra Davies MD  Principal Problem:<principal problem not specified>    Subjective:     History of Present Illness:  Mr. Ayad Edmond is a 44 year old male with past medical history of:  NICMP, EF 30-35% on prior Echo (thought to be 10-15% on LV gram during [facility])  HTN  CKD    Who presented to [facility] ER on 5/10/24 at the behest of his heart failure specialist Dr. Mack due to shortness of breath for the past several days and cool extremities on exam.     Per Dr. Mack's H&P: "When talking with him, he says that his cardiac history goes back to approximately 2012.  At that time he was told that he had hypertension, and he was on regular antihypertensive therapy.  However had no major cardiac issues until January of this year when he presented with shortness of breath and was found to have new heart failure with reduced ejection fraction.  As noted above, he had a coronary angiogram done which demonstrated nonobstructive coronary artery disease, but the LV gram demonstrated a further drop in his ejection fraction at 10%.  He had a combined left and right heart catheterization, number as noted below.     At present he has not employed, previously working as a   which was a job that demanded him to be quite active. Not working now due to CHF symptoms. At present unfortunately he is quite short of breath with even minimal amounts of exertion.  He says that he can walk approximately 15-20 feet before he has to pause because of shortness of breath.  Denies any chest discomfort, but has been noticing worsening leg swelling over the past few days to weeks.  Has 3-4 pillow PND/orthopnea which recently has progressed to where he can only sleep upright in his truck.  Has episodes of palpitations, did have an episode of " "syncope in March of this past year.  Endorses recent abdominal swelling associated with early satiety, and poor oral intake.  Reduced appetite."    On interview in the ER, the patient confirmed this history. In ER, he was  hemodynamically stable with normal lactic acid. Cr 2.1 (baseline unclear but prior value of 1.7), mild elevation of AST/ALT. CXR with cardiomegaly and mild bilateral interstitial infiltrates. Bedside echo showed dilated LV, EF 10-15%, trivial circumferential pericardial effusion, moderate-severe TR, mild-moderate MR, mildly reduced RV systolic function, CVP 15, PASP 48 mmHg.     Past Medical History:   Diagnosis Date    Acute heart failure     AUDELIA (acute kidney injury)     Cardiomyopathy     Elevated troponin     HTN (hypertension)     Hypokalemia     Hypomagnesemia     Renal insufficiency        Past Surgical History:   Procedure Laterality Date    broken foot Left        Review of patient's allergies indicates:  No Known Allergies    Current Facility-Administered Medications   Medication    furosemide (Lasix) 500 mg in 50 mL infusion (conc: 10 mg/mL)    furosemide injection 80 mg    sodium chloride 0.9% flush 10 mL     Current Outpatient Medications   Medication Sig    ALPRAZolam (XANAX) 0.25 MG tablet Take 1 tablet (0.25 mg total) by mouth 2 (two) times daily as needed for Anxiety.    atorvastatin (LIPITOR) 40 MG tablet Take 40 mg by mouth once daily.    carvediloL (COREG) 6.25 MG tablet Take 6.25 mg by mouth 2 (two) times daily.    dimenhyDRINATE (DRAMAMINE) 25 mg Chew Take 1 tablet by mouth daily as needed (nausea).    furosemide (LASIX) 20 MG tablet Take 20 mg by mouth every morning.    losartan (COZAAR) 25 MG tablet Take 25 mg by mouth once daily.    pantoprazole (PROTONIX) 20 MG tablet Take 1 tablet (20 mg total) by mouth once daily.    spironolactone (ALDACTONE) 25 MG tablet Take 25 mg by mouth once daily.    torsemide (DEMADEX) 20 MG Tab Take 20 mg by mouth once daily.     Family History " "      Problem Relation (Age of Onset)    Heart failure Mother    No Known Problems Father          Tobacco Use    Smoking status: Former     Types: Cigarettes     Start date: 2023     Quit date: 1998     Years since quittin.0     Passive exposure: Past    Smokeless tobacco: Never   Substance and Sexual Activity    Alcohol use: Yes     Comment: "very seldom."    Drug use: Never    Sexual activity: Not on file     Review of Systems   Respiratory:  Positive for shortness of breath.    Cardiovascular:  Positive for leg swelling.   All other systems reviewed and are negative.    Objective:     Vital Signs (Most Recent):  Temp: 98.4 °F (36.9 °C) (05/10/24 1347)  Pulse: 96 (05/10/24 1556)  Resp: 20 (05/10/24 1347)  BP: (!) 140/106 (05/10/24 1556)  SpO2: 100 % (05/10/24 1556) Vital Signs (24h Range):  Temp:  [98.4 °F (36.9 °C)] 98.4 °F (36.9 °C)  Pulse:  [] 96  Resp:  [20] 20  SpO2:  [99 %-100 %] 100 %  BP: (110-140)/() 140/106     Patient Vitals for the past 72 hrs (Last 3 readings):   Weight   05/10/24 1347 86.2 kg (190 lb)     Body mass index is 28.06 kg/m².    No intake or output data in the 24 hours ending 05/10/24 1628       Physical Exam  Vitals reviewed.   Constitutional:       Appearance: Normal appearance.   HENT:      Head: Normocephalic and atraumatic.   Eyes:      Extraocular Movements: Extraocular movements intact.      Pupils: Pupils are equal, round, and reactive to light.   Cardiovascular:      Rate and Rhythm: Normal rate and regular rhythm.      Pulses: Normal pulses.      Heart sounds: Normal heart sounds.      Comments: JVD to angle of the mandible with patient sitting at 45 degrees  Pulmonary:      Effort: Pulmonary effort is normal.      Breath sounds: Normal breath sounds.   Abdominal:      General: Abdomen is flat. Bowel sounds are normal. There is no distension.      Palpations: Abdomen is soft.   Musculoskeletal:         General: Normal range of motion.      Cervical back: " "Normal range of motion and neck supple.   Skin:     Comments: Cool extremities with 2+ pitting edema b/l   Neurological:      General: No focal deficit present.      Mental Status: He is alert and oriented to person, place, and time.            Significant Labs:  CBC:  Recent Labs   Lab 05/10/24  0957 05/10/24  1510   WBC 5.62 5.31   RBC 4.76 4.76   HGB 11.0* 11.0*   HCT 37.0* 37.3*   * 463*   MCV 78* 78*   MCH 23.1* 23.1*   MCHC 29.7* 29.5*     BNP:  Recent Labs   Lab 05/10/24  0957 05/10/24  1510   BNP 2,065* 1,991*     CMP:  Recent Labs   Lab 05/10/24  0957 05/10/24  1510   GLU 74 85   CALCIUM 9.2 8.9   ALBUMIN 3.4* 3.5   PROT 6.7 7.1    137   K 4.1 4.7   CO2 25 21*    105   BUN 25* 25*   CREATININE 2.2* 2.1*   ALKPHOS 104 113   ALT 66* 69*   AST 43* 46*   BILITOT 0.9 1.0      Coagulation:   No results for input(s): "PT", "INR", "APTT" in the last 168 hours.  LDH:  No results for input(s): "LDH" in the last 72 hours.  Microbiology:  Microbiology Results (last 7 days)       ** No results found for the last 168 hours. **              Assessment/Plan:     Acute on chronic combined systolic and diastolic heart failure    -Normal lactic but with cool extremities, AUDELIA, mildly elevated transaminases  -SCAI shock stage B, currently normotensive  -BNP 2,000  -Admit to ICU for central line and evaluation of hemodynamics  -Start IV Lasix 80 x1 then Lasix gtt at 20  -Strict intake/output  -Bedside echo with EF 10-15%, mod-severe TR, PASP 48 mmHg, CVP 15  -Formal echo pending  -Will need work-up for advance options    AUDELIA (acute kidney injury)  Likely cardiorenal  Diurese as above      Case discussed with on-call [facility] staff.     Gopi Quiroz MD  Heart Transplant  [facility] - Emergency Dept  "

## 2024-05-10 NOTE — PROCEDURES
05/10/2024  5:41 PM    Procedure:  Central line  Indication:  Hemodynamic monitoring      Risks, benefits, and indications for the procedure were reviewed with patient. Consent was signed and placed in chart. Pt prepped and draped in sterile fashion. The following sterile precautions were followed: cap and mask, hand hygiene, sterile gown and sterile gloves, large sterile sheet and sterile field and 2% Chlorhexidine for cutaneous antisepsis. Time out was performed with nursing staff. Lidocaine 1% injected at site. Ultrasound guidance utilized to visualize anatomy right IJ. Access obtained without difficulty and blood flow was non-pulsatile. Manometry showed an elevated CVP. Wire threaded smoothly and US confirmed appropriate venous placement of wire. Catheter advanced without resistance. Pt tolerated procedure well. No evidence of hematoma at vascular access site. CXR has been ordered to confirm appropriate placement.     Sandro Ramirez   Department of Cardiovascular Disease, PGY-6   [facility]

## 2024-05-10 NOTE — ED NOTES
Ayad Edmond, a 44 y.o. male presents to the ED w/ complaint of leg swelling and sob    Triage note:  Chief Complaint   Patient presents with    Shortness of Breath     Sent from cards clinic poss cardiogenic shock per nursing report     Review of patient's allergies indicates:  No Known Allergies  Past Medical History:   Diagnosis Date    Acute heart failure     AUDELIA (acute kidney injury)     Cardiomyopathy     Elevated troponin     HTN (hypertension)     Hypokalemia     Hypomagnesemia     Renal insufficiency      LOC: Patient name and date of birth verified. The patient is awake, alert and aware of environment with an appropriate affect, the patient is oriented x 3 and speaking appropriately.   APPEARANCE: Patient resting comfortably, patient is clean and well groomed, patient's clothing is properly fastened.  SKIN: The skin is warm and dry, color consistent with ethnicity, patient has normal skin turgor and moist mucus membranes, skin intact, no breakdown or bruising noted.  MUSCULOSKELETAL: Patient moving all extremities well, no obvious swelling or deformities noted.   RESPIRATORY: Respirations are spontaneous, patient has a normal effort and rate, no accessory muscle use noted.  CARDIAC: Patient has a normal rate and rhythm, no periphreal edema noted, capillary refill < 3 seconds.  ABDOMEN: Soft and non tender to palpation, no distention noted. Bowel sounds present in all four quadrants.  NEUROLOGIC: Eyes open spontaneously, behavior appropriate to situation, follows commands, facial expression symmetrical, bilateral hand grasp equal and even, purposeful motor response noted, normal sensation in all extremities when touched with a finger.

## 2024-05-10 NOTE — LETTER
May 10, 2024        Aba Garcia  [address]  [facility] [address]  Phone: [phone]  Fax: [phone]             [id number]  [address]  [facility] [address]  Phone: [phone]   Patient: Ayad Edmond   MR Number: [id number]   YOB: 1980   Date of Visit: 5/10/2024       Dear Dr. Aba Garcia    Thank you for referring Ayad Edmond to me for evaluation. Attached you will find relevant portions of my assessment and plan of care.    If you have questions, please do not hesitate to call me. I look forward to following Ayad Edmond along with you.    Sincerely,    Pradeep Mack MD    Enclosure    If you would like to receive this communication electronically, please contact [email or web address] or [phone] to request [employer] access.    [employer] is a tool which provides read-only access to select patient information with whom you have a relationship. Its easy to use and provides real time access to review your patients record including encounter summaries, notes, results, and demographic information.    If you feel you have received this communication in error or would no longer like to receive these types of communications, please e-mail [email or web address]

## 2024-05-10 NOTE — HPI
"Mr. Ayad Edmond is a 44 year old male with past medical history of:  NICMP, EF 30-35% on prior Echo (thought to be 10-15% on LV gram during [facility])  HTN  CKD    Who presented to [facility] ER on 5/10/24 at the behest of his heart failure specialist Dr. Mack due to shortness of breath for the past several days and cool extremities on exam.     Per Dr. Mack's H&P: "When talking with him, he says that his cardiac history goes back to approximately 2012.  At that time he was told that he had hypertension, and he was on regular antihypertensive therapy.  However had no major cardiac issues until January of this year when he presented with shortness of breath and was found to have new heart failure with reduced ejection fraction.  As noted above, he had a coronary angiogram done which demonstrated nonobstructive coronary artery disease, but the LV gram demonstrated a further drop in his ejection fraction at 10%.  He had a combined left and right heart catheterization, number as noted below.     At present he has not employed, previously working as a   which was a job that demanded him to be quite active. Not working now due to CHF symptoms. At present unfortunately he is quite short of breath with even minimal amounts of exertion.  He says that he can walk approximately 15-20 feet before he has to pause because of shortness of breath.  Denies any chest discomfort, but has been noticing worsening leg swelling over the past few days to weeks.  Has 3-4 pillow PND/orthopnea which recently has progressed to where he can only sleep upright in his truck.  Has episodes of palpitations, did have an episode of syncope in March of this past year.  Endorses recent abdominal swelling associated with early satiety, and poor oral intake.  Reduced appetite."    On interview in the ER, the patient confirmed this history. In ER, he was  hemodynamically stable with normal lactic acid. Cr 2.1 (baseline unclear but prior value of " 1.7), mild elevation of AST/ALT. CXR with cardiomegaly and mild bilateral interstitial infiltrates. Bedside echo showed dilated LV, EF 10-15%, trivial circumferential pericardial effusion, moderate-severe TR, mild-moderate MR, mildly reduced RV systolic function, CVP 15, PASP 48 mmHg.

## 2024-05-10 NOTE — H&P (VIEW-ONLY)
Subjective:   Initial evaluation of heart transplant candidacy.    HPI:  Mr. Edmond is a 44 y.o. year old Black or  male who has presents to be considered for advanced surgical options (LVAD/OHT).      45 YO M w/ NICM, HFrEF, LVEF 10%, HTN, CKD who presents for evaluation.    Sees Dr. Aba Garcia MD locally for his cardiomyopathy. Prior records are available under scanned media and reviewed prior to today's visit.    Initially diagnosed 1/2024 with HFrEF, LVEF 30-35%. During a hospitalization in February, found to have further drop in EF to 10% on LV gram as part of combined LHC/RHC.    When talking with him, he says that his cardiac history goes back to approximately 2012.  At that time he was told that he had hypertension, and he was on regular antihypertensive therapy.  However had no major cardiac issues until January of this year when he presented with shortness of breath and was found to have new heart failure with reduced ejection fraction.  As noted above, he had a coronary angiogram done which demonstrated nonobstructive coronary artery disease, but the LV gram demonstrated a further drop in his ejection fraction at 10%.  He had a combined left and right heart catheterization, number as noted below.    At present he has not employed, previously working as a   which was a job that demanded him to be quite active. Not working now due to CHF symptoms. At present unfortunately he is quite short of breath with even minimal amounts of exertion.  He says that he can walk approximately 15-20 feet before he has to pause because of shortness of breath.  Denies any chest discomfort, but has been noticing worsening leg swelling over the past few days to weeks.  Has 3-4 pillow PND/orthopnea which recently has progressed to where he can only sleep upright in his truck.  Has episodes of palpitations, did have an episode of syncope in March of this past year.  Endorses recent abdominal  swelling associated with early satiety, and poor oral intake.  Reduced appetite.    PMH/FH/SH  Past medical history significant for hypertension and cardiomyopathy as noted above.  No other significant medical issues.    He had a torn tendon in his left foot which required surgery in the past, but no other significant surgeries.    His family history is significant for cardiomyopathy multiple family members on his mother's side.  His mom has congestive heart failure and a defibrillator.  He also mentions that is maternal grandmother, maternal aunt, and multiple cousins on that side of the family also have heart failure, he thinks around 5-6 family members.    Denies tobacco use, alcohol use, or recreational drug use.    TTE 1/31/24 (External)  LVEF 30-35%    LHC/RHC 2/27/24 (External)  RA 15, PA 47/23, mean 35, PCWP 28  CI 2.1, CO 4.2  LVEF 10%  Normal coronary arteries    Past Medical History:   Diagnosis Date    Acute heart failure     AUDELIA (acute kidney injury)     Cardiomyopathy     Elevated troponin     HTN (hypertension)     Hypokalemia     Hypomagnesemia     Renal insufficiency      Past Surgical History:   Procedure Laterality Date    broken foot Left        Family History   Problem Relation Name Age of Onset    Heart failure Mother      No Known Problems Father         Review of Systems   Constitutional: Positive for decreased appetite, malaise/fatigue and weight gain. Negative for chills and fever.   HENT:  Negative for hearing loss.    Eyes:  Negative for visual disturbance.   Cardiovascular:  Positive for dyspnea on exertion, irregular heartbeat, leg swelling, orthopnea, palpitations, paroxysmal nocturnal dyspnea and syncope. Negative for chest pain.   Respiratory:  Positive for shortness of breath. Negative for cough.    Musculoskeletal:  Negative for muscle weakness.   Gastrointestinal:  Positive for bloating, abdominal pain, anorexia and nausea. Negative for diarrhea and vomiting.   Neurological:   "Negative for focal weakness.   Psychiatric/Behavioral:  Negative for memory loss.        Objective:   Blood pressure 131/85, pulse 90, height 5' 9" (1.753 m), weight 87.9 kg (193 lb 12.6 oz), SpO2 100%.body mass index is 28.62 kg/m².    Physical Exam  Constitutional:       Appearance: He is ill-appearing.      Comments: Sitting upright.   HENT:      Head: Normocephalic and atraumatic.   Eyes:      Extraocular Movements: Extraocular movements intact.   Cardiovascular:      Rate and Rhythm: Regular rhythm. Tachycardia present.      Pulses: Normal pulses.      Heart sounds: Normal heart sounds.      Comments: JVP to angle of jaw sitting upright.  Pulmonary:      Breath sounds: Normal breath sounds.   Abdominal:      General: There is distension.      Tenderness: There is abdominal tenderness (RUQ).   Musculoskeletal:      Right lower leg: Edema present.      Left lower leg: Edema present.      Comments: Upper and lower extremities cool to touch   Neurological:      General: No focal deficit present.      Mental Status: He is alert and oriented to person, place, and time.         Labs:      Chemistry        Component Value Date/Time     05/10/2024 0957    K 4.1 05/10/2024 0957     05/10/2024 0957    CO2 25 05/10/2024 0957    BUN 25 (H) 05/10/2024 0957    CREATININE 2.2 (H) 05/10/2024 0957    GLU 74 05/10/2024 0957        Component Value Date/Time    CALCIUM 9.2 05/10/2024 0957    ALKPHOS 104 05/10/2024 0957    AST 43 (H) 05/10/2024 0957    ALT 66 (H) 05/10/2024 0957    BILITOT 0.9 05/10/2024 0957          No results found for: "MG"  Lab Results   Component Value Date    WBC 5.62 05/10/2024    HGB 11.0 (L) 05/10/2024    HCT 37.0 (L) 05/10/2024    MCV 78 (L) 05/10/2024     (H) 05/10/2024     BNP   Date Value Ref Range Status   05/10/2024 2,065 (H) 0 - 99 pg/mL Final     Comment:     Values of less than 100 pg/ml are consistent with non-CHF populations.     No results found for this or any previous " visit.      Labs were reviewed with the patient.    Assessment:      1. Cardiogenic shock    2. Primary hypertension    3. Congestive heart failure, unspecified HF chronicity, unspecified heart failure type    4. Acute on chronic combined systolic and diastolic heart failure        Plan:     - presents today for evaluation of advanced heart failure therapies.  - he is only guideline directed medical therapy is Coreg 6.25 mg b.i.d..  Has been intolerant of other GDM T in the past.  - on exam, as noted above, he is volume up with elevated JVP, bilateral pitting pedal edema, abdominal distention, right upper quadrant tenderness to palpation, and has cool extremities.  While he has decompensated heart failure, I am also worried that he may be developing early (SCAI stage B to C) shock.  - upon completion of clinic visit, he was taken to the emergency room for further evaluation.  Inpatient team was also notified so they can evaluate him while he is there  - we will recommend getting a serum lactate, and if elevated we will need a central line for further hemodynamic assessment.  Would also recommend stat echocardiogram.  May need initiation of IV inotrope therapy and/or mechanical circulatory support.  Will need evaluation for advanced heart failure therapies once admitted.    UNOS Patient Status  Functional Status: 20% - Very sick, hospitalization necessary: active treatment necessary  Physical Capacity: No Limitations  Working for Income: No  If no, reason not working: Demands of Treatment    Advance Care Planning     Date: 05/10/2024    Power of   I initiated the process of voluntary advance care planning today and explained the importance of this process to the patient.  I introduced the concept of advance directives to the patient, as well. Then the patient received detailed information about the importance of designating a Health Care Power of  (HCPOA). He was also instructed to communicate with  this person about their wishes for future healthcare, should he become sick and lose decision-making capacity. The patient has not previously appointed a HCPOA. After our discussion, the patient has decided to complete a HCPOA. I spent a total time of 15 minutes discussing this issue with the patient.            Pradeep Mack MD

## 2024-05-10 NOTE — SUBJECTIVE & OBJECTIVE
"Past Medical History:   Diagnosis Date    Acute heart failure     AUDELIA (acute kidney injury)     Cardiomyopathy     Elevated troponin     HTN (hypertension)     Hypokalemia     Hypomagnesemia     Renal insufficiency        Past Surgical History:   Procedure Laterality Date    broken foot Left        Review of patient's allergies indicates:  No Known Allergies    Current Facility-Administered Medications   Medication    furosemide (Lasix) 500 mg in 50 mL infusion (conc: 10 mg/mL)    furosemide injection 80 mg    sodium chloride 0.9% flush 10 mL     Current Outpatient Medications   Medication Sig    ALPRAZolam (XANAX) 0.25 MG tablet Take 1 tablet (0.25 mg total) by mouth 2 (two) times daily as needed for Anxiety.    atorvastatin (LIPITOR) 40 MG tablet Take 40 mg by mouth once daily.    carvediloL (COREG) 6.25 MG tablet Take 6.25 mg by mouth 2 (two) times daily.    dimenhyDRINATE (DRAMAMINE) 25 mg Chew Take 1 tablet by mouth daily as needed (nausea).    furosemide (LASIX) 20 MG tablet Take 20 mg by mouth every morning.    losartan (COZAAR) 25 MG tablet Take 25 mg by mouth once daily.    pantoprazole (PROTONIX) 20 MG tablet Take 1 tablet (20 mg total) by mouth once daily.    spironolactone (ALDACTONE) 25 MG tablet Take 25 mg by mouth once daily.    torsemide (DEMADEX) 20 MG Tab Take 20 mg by mouth once daily.     Family History       Problem Relation (Age of Onset)    Heart failure Mother    No Known Problems Father          Tobacco Use    Smoking status: Former     Types: Cigarettes     Start date: 2023     Quit date: 1998     Years since quittin.0     Passive exposure: Past    Smokeless tobacco: Never   Substance and Sexual Activity    Alcohol use: Yes     Comment: "very seldom."    Drug use: Never    Sexual activity: Not on file     Review of Systems   Respiratory:  Positive for shortness of breath.    Cardiovascular:  Positive for leg swelling.   All other systems reviewed and are negative.    Objective: "     Vital Signs (Most Recent):  Temp: 98.4 °F (36.9 °C) (05/10/24 1347)  Pulse: 96 (05/10/24 1556)  Resp: 20 (05/10/24 1347)  BP: (!) 140/106 (05/10/24 1556)  SpO2: 100 % (05/10/24 1556) Vital Signs (24h Range):  Temp:  [98.4 °F (36.9 °C)] 98.4 °F (36.9 °C)  Pulse:  [] 96  Resp:  [20] 20  SpO2:  [99 %-100 %] 100 %  BP: (110-140)/() 140/106     Patient Vitals for the past 72 hrs (Last 3 readings):   Weight   05/10/24 1347 86.2 kg (190 lb)     Body mass index is 28.06 kg/m².    No intake or output data in the 24 hours ending 05/10/24 1628       Physical Exam  Vitals reviewed.   Constitutional:       Appearance: Normal appearance.   HENT:      Head: Normocephalic and atraumatic.   Eyes:      Extraocular Movements: Extraocular movements intact.      Pupils: Pupils are equal, round, and reactive to light.   Cardiovascular:      Rate and Rhythm: Normal rate and regular rhythm.      Pulses: Normal pulses.      Heart sounds: Normal heart sounds.      Comments: JVD to angle of the mandible with patient sitting at 45 degrees  Pulmonary:      Effort: Pulmonary effort is normal.      Breath sounds: Normal breath sounds.   Abdominal:      General: Abdomen is flat. Bowel sounds are normal. There is no distension.      Palpations: Abdomen is soft.   Musculoskeletal:         General: Normal range of motion.      Cervical back: Normal range of motion and neck supple.   Skin:     Comments: Cool extremities with 2+ pitting edema b/l   Neurological:      General: No focal deficit present.      Mental Status: He is alert and oriented to person, place, and time.            Significant Labs:  CBC:  Recent Labs   Lab 05/10/24  0957 05/10/24  1510   WBC 5.62 5.31   RBC 4.76 4.76   HGB 11.0* 11.0*   HCT 37.0* 37.3*   * 463*   MCV 78* 78*   MCH 23.1* 23.1*   MCHC 29.7* 29.5*     BNP:  Recent Labs   Lab 05/10/24  0957 05/10/24  1510   BNP 2,065* 1,991*     CMP:  Recent Labs   Lab 05/10/24  0957 05/10/24  1510   GLU 74 85  "  CALCIUM 9.2 8.9   ALBUMIN 3.4* 3.5   PROT 6.7 7.1    137   K 4.1 4.7   CO2 25 21*    105   BUN 25* 25*   CREATININE 2.2* 2.1*   ALKPHOS 104 113   ALT 66* 69*   AST 43* 46*   BILITOT 0.9 1.0      Coagulation:   No results for input(s): "PT", "INR", "APTT" in the last 168 hours.  LDH:  No results for input(s): "LDH" in the last 72 hours.  Microbiology:  Microbiology Results (last 7 days)       ** No results found for the last 168 hours. **              "

## 2024-05-10 NOTE — ASSESSMENT & PLAN NOTE
Likely cardiorenal  Diurese as above   (1) other risk factor (includes escalating BMI, pack-years of smoking, diabetes requiring insulin, chemotherapy, female gender and length of surgery)

## 2024-05-10 NOTE — Clinical Note
The PA catheter was repositioned to the main pulmonary artery. Hemodynamics were performed. O2 saturation was measured at 48%. CO = 3.45   CI = 1.80

## 2024-05-10 NOTE — PROCEDURES
Ayad Edmond is a 44 y.o.  male patient, who presents for a 6 minute walk test ordered by MD Frederick.  The diagnosis is Pre Heart Transplant; Cardiomyopathy.  The patient's BMI is 28 kg/m2.  Predicted distance (lower limit of normal) is 538.44 meters.      Test Results:    The test was completed with stops.  The patient stopped 1 time for a total of 60 seconds.  The total time walked was 300 seconds.  During walking, the patient reported:  Dyspnea.  The patient used no assistive devices during testing.     05/10/2024---------Distance: 243.84 meters (800 feet)     O2 Sat % Supplemental Oxygen Heart Rate Blood Pressure Katie Scale   Pre-exercise  (Resting) 99 % Room Air 51 bpm 111/79 mmHg 3   During Exercise 92 % Room Air 91 bpm 133/76 mmHg 5-6   Post-exercise  (Recovery) 98 % Room Air  48 bpm       Recovery Time: 122 seconds    Performing nurse/tech: [employer] POE      PREVIOUS STUDY:   The patient has not had a previous study.      CLINICAL INTERPRETATION:  Six minute walk distance is 243.84 meters (800 feet) with heavy dyspnea.  During exercise, there was significant desaturation while breathing room air.  Blood pressure remained stable and Heart rate increased significantly with walking.  Bradycardia was present prior to exercise.  The patient did not report non-pulmonary symptoms during exercise.  Significant exercise impairment is likely due to subjective symptoms.  The patient did complete the study, walking 300 seconds of the 360 second test.  No previous study performed.  Based upon age and body mass index, exercise capacity is less than predicted.

## 2024-05-10 NOTE — ASSESSMENT & PLAN NOTE
-Normal lactic but with cool extremities, AUDELIA, mildly elevated transaminases  -SCAI shock stage B, currently normotensive  -BNP 2,000  -Admit to ICU for central line and evaluation of hemodynamics  -Start IV Lasix 80 x1 then Lasix gtt at 20  -Strict intake/output  -Bedside echo with EF 10-15%, mod-severe TR, PASP 48 mmHg, CVP 15  -Formal echo pending  -Will need work-up for advance options

## 2024-05-10 NOTE — PLAN OF CARE
Heart Transplant Care Update Note:    Hemodynamics:  CVP:13  SVO2: 41  CO: 3.56  CI: 1.73  SVR: 2,314    Wt: 85.5kg, BSA: 2.04 , Hb:  85.5    Plan:  - Will start Hydralazine/Isordil 25/10 TID for afterload reduction  - Repeat Hemodynamics at 12    Discussed with HTS Attending      Radha Ledbetter MD  Cardiovascular Disease PGY IV  [facility]

## 2024-05-10 NOTE — ED PROVIDER NOTES
"Encounter Date: 5/10/2024       History     Chief Complaint   Patient presents with    Shortness of Breath     Sent from cards clinic poss cardiogenic shock per nursing report     HPI  Patient is a 44-year-old male with history of CHF with EF 10%, hypertension who was sent in from cardiology clinic for shortness of breath and concerns for decompensated heart failure.  Patient reports that over the last few weeks he has been more short of breath.  Symptoms have been progressively worsening.  Symptoms are worse with exertion.  He has difficulty even walking a few feet without becoming short of breath.  He also reports orthopnea and PND.  He states that he has been sleeping sitting up in his work truck with the air on to feel comfortable.  He reports increased swelling in his lower extremities over the last 10 days.  He states he has been compliant with all his home medications and taking his diuretics but is still having increased swelling.  No chest discomfort.  No abdominal pain.  No cough.  No fevers or chills.  No urinary symptoms.    Review of patient's allergies indicates:  No Known Allergies  Past Medical History:   Diagnosis Date    Acute heart failure     AUDELIA (acute kidney injury)     Cardiomyopathy     Elevated troponin     HTN (hypertension)     Hypokalemia     Hypomagnesemia     Renal insufficiency      Past Surgical History:   Procedure Laterality Date    broken foot Left      Family History   Problem Relation Name Age of Onset    Heart failure Mother      No Known Problems Father       Social History     Tobacco Use    Smoking status: Former     Types: Cigarettes     Start date: 2023     Quit date: 1998     Years since quittin.0     Passive exposure: Past    Smokeless tobacco: Never   Substance Use Topics    Alcohol use: Yes     Comment: "very seldom."    Drug use: Never     Review of Systems  A full review of systems was obtained, see HPI for pertinent positives.    Physical Exam "     Initial Vitals [05/10/24 1347]   BP Pulse Resp Temp SpO2   110/86 104 20 98.4 °F (36.9 °C) 99 %      MAP       --         Physical Exam  Constitutional: No acute distress, well-appearing, nontoxic  HENT: Normocephalic, atraumatic  Neck: Supple, normal range of motion  Respiratory: Non-labored, lungs clear  Cardiovascular: Well perfused, 2+ pitting edema in the lower extremities, normal rate, regular rhythm, positive JVD  Gastrointestinal: Soft, non-tender, non-distended  Integumentary: Warm and dry  Musculoskeletal: No deformity, no unilateral leg swelling, warmth or erythema  Neurological: Awake and alert  Psychiatric: Cooperative     ED Course   Procedures  Labs Reviewed   HIV 1 / 2 ANTIBODY   HEPATITIS C ANTIBODY   CBC W/ AUTO DIFFERENTIAL   COMPREHENSIVE METABOLIC PANEL   TROPONIN I   B-TYPE NATRIURETIC PEPTIDE   ISTAT LACTATE          Imaging Results    None          Medications   furosemide injection 80 mg (has no administration in time range)   furosemide (Lasix) 500 mg in 50 mL infusion (conc: 10 mg/mL) (has no administration in time range)     Medical Decision Making  Patient is a 44-year-old male established with Cardiology for heart failure with EF 10% and hypertension.  He was seen in clinic earlier today and there was concerns for decompensated heart failure and concerns for early cardiogenic shock.  He was sent to the emergency department for further evaluation.  Cardiac workup including point of care lactic acid ordered to evaluate for potential cardiogenic shock.  Patient was borderline tachycardic but otherwise hemodynamically stable here.  He was not in any respiratory distress.  Lungs clear but he does have evidence of volume overload with 2+ pitting edema in his lower extremities and JVD.  Cardiology consulted.  Plan to follow up labs and imaging and final Cardiology recommendations.    Lactate normal.  Cardiology able to evaluate the patient.  They plan to admit him to the CCU.  IV Lasix  ordered per recommendations.  Cardiology aware that the rest of the patient's labs and chest x-ray still pending at time of admission and they plan to follow up the rest of his workup.    Amount and/or Complexity of Data Reviewed  Labs:  Decision-making details documented in ED Course.    Risk  Prescription drug management.               ED Course as of 05/10/24 4615   Fri May 10, 2024   1445 TTE 1/31/24 (External)  LVEF 30-35%     LHC/RHC 2/27/24 (External)  RA 15, PA 47/23, mean 35, PCWP 28  CI 2.1, CO 4.2  LVEF 10%  Normal coronary arteries   [NN]   1455 EKG with normal sinus rhythm, rate 99, no STEMI [NN]   1523 POC Lactate: 1.29 [NN]   1554 Cardiology able to evaluate the patient at bedside.  They plan to admit him to the CCU.  They recommend going head and giving him 80 mg IV Lasix now in ordering a Lasix infusion at 20 milligrams/hour which was ordered.  Cardiology aware that x-ray and labs still pending at time of admission.  They plan to follow up his workup started here in the ED. [NN]      ED Course User Index  [NN] Kylee Colmenares MD                             Clinical Impression:  Final diagnoses:  [R06.02] SOB (shortness of breath)  [R06.02] Shortness of breath  [I50.9] Congestive heart failure, unspecified HF chronicity, unspecified heart failure type (Primary)  [I50.9] Decompensated heart failure                 Kylee Colmenares MD  05/10/24 1271

## 2024-05-11 LAB
ALLENS TEST: ABNORMAL
ANION GAP SERPL CALC-SCNC: 12 MMOL/L (ref 8–16)
ANION GAP SERPL CALC-SCNC: 13 MMOL/L (ref 8–16)
BASOPHILS # BLD AUTO: 0.04 K/UL (ref 0–0.2)
BASOPHILS NFR BLD: 0.7 % (ref 0–1.9)
BUN SERPL-MCNC: 17 MG/DL (ref 6–20)
BUN SERPL-MCNC: 17 MG/DL (ref 6–20)
BUN SERPL-MCNC: 19 MG/DL (ref 6–20)
BUN SERPL-MCNC: 20 MG/DL (ref 6–20)
CALCIUM SERPL-MCNC: 9 MG/DL (ref 8.7–10.5)
CALCIUM SERPL-MCNC: 9.4 MG/DL (ref 8.7–10.5)
CALCIUM SERPL-MCNC: 9.4 MG/DL (ref 8.7–10.5)
CALCIUM SERPL-MCNC: 9.5 MG/DL (ref 8.7–10.5)
CHLORIDE SERPL-SCNC: 102 MMOL/L (ref 95–110)
CHLORIDE SERPL-SCNC: 102 MMOL/L (ref 95–110)
CHLORIDE SERPL-SCNC: 104 MMOL/L (ref 95–110)
CHLORIDE SERPL-SCNC: 99 MMOL/L (ref 95–110)
CO2 SERPL-SCNC: 24 MMOL/L (ref 23–29)
CO2 SERPL-SCNC: 27 MMOL/L (ref 23–29)
CO2 SERPL-SCNC: 27 MMOL/L (ref 23–29)
CO2 SERPL-SCNC: 28 MMOL/L (ref 23–29)
CREAT SERPL-MCNC: 1.8 MG/DL (ref 0.5–1.4)
CREAT SERPL-MCNC: 1.9 MG/DL (ref 0.5–1.4)
CREAT SERPL-MCNC: 2 MG/DL (ref 0.5–1.4)
CREAT SERPL-MCNC: 2 MG/DL (ref 0.5–1.4)
DELSYS: ABNORMAL
DIFFERENTIAL METHOD BLD: ABNORMAL
EOSINOPHIL # BLD AUTO: 0.1 K/UL (ref 0–0.5)
EOSINOPHIL NFR BLD: 1.8 % (ref 0–8)
ERYTHROCYTE [DISTWIDTH] IN BLOOD BY AUTOMATED COUNT: 16.6 % (ref 11.5–14.5)
ERYTHROCYTE [SEDIMENTATION RATE] IN BLOOD BY WESTERGREN METHOD: 17 MM/H
ERYTHROCYTE [SEDIMENTATION RATE] IN BLOOD BY WESTERGREN METHOD: 17 MM/H
EST. GFR  (NO RACE VARIABLE): 41.9 ML/MIN/1.73 M^2
EST. GFR  (NO RACE VARIABLE): 41.9 ML/MIN/1.73 M^2
EST. GFR  (NO RACE VARIABLE): 44.6 ML/MIN/1.73 M^2
EST. GFR  (NO RACE VARIABLE): 47.6 ML/MIN/1.73 M^2
FIO2: 21
FIO2: 21
GLUCOSE SERPL-MCNC: 114 MG/DL (ref 70–110)
GLUCOSE SERPL-MCNC: 135 MG/DL (ref 70–110)
GLUCOSE SERPL-MCNC: 88 MG/DL (ref 70–110)
GLUCOSE SERPL-MCNC: 94 MG/DL (ref 70–110)
HCT VFR BLD AUTO: 32.3 % (ref 40–54)
HGB BLD-MCNC: 9.7 G/DL (ref 14–18)
IMM GRANULOCYTES # BLD AUTO: 0.01 K/UL (ref 0–0.04)
IMM GRANULOCYTES NFR BLD AUTO: 0.2 % (ref 0–0.5)
LYMPHOCYTES # BLD AUTO: 2.4 K/UL (ref 1–4.8)
LYMPHOCYTES NFR BLD: 40.5 % (ref 18–48)
MAGNESIUM SERPL-MCNC: 2 MG/DL (ref 1.6–2.6)
MAGNESIUM SERPL-MCNC: 2.2 MG/DL (ref 1.6–2.6)
MAGNESIUM SERPL-MCNC: 2.2 MG/DL (ref 1.6–2.6)
MCH RBC QN AUTO: 23.2 PG (ref 27–31)
MCHC RBC AUTO-ENTMCNC: 30 G/DL (ref 32–36)
MCV RBC AUTO: 77 FL (ref 82–98)
MODE: ABNORMAL
MODE: ABNORMAL
MONOCYTES # BLD AUTO: 0.4 K/UL (ref 0.3–1)
MONOCYTES NFR BLD: 7.4 % (ref 4–15)
NEUTROPHILS # BLD AUTO: 3 K/UL (ref 1.8–7.7)
NEUTROPHILS NFR BLD: 49.4 % (ref 38–73)
NRBC BLD-RTO: 0 /100 WBC
PHOSPHATE SERPL-MCNC: 4.5 MG/DL (ref 2.7–4.5)
PLATELET # BLD AUTO: 411 K/UL (ref 150–450)
PMV BLD AUTO: 8.7 FL (ref 9.2–12.9)
PO2 BLDA: 32 MMHG (ref 40–60)
PO2 BLDA: 32 MMHG (ref 40–60)
PO2 BLDA: 34 MMHG (ref 40–60)
POC SATURATED O2: 62 % (ref 95–100)
POC SATURATED O2: 63 % (ref 95–100)
POC SATURATED O2: 66 % (ref 95–100)
POTASSIUM SERPL-SCNC: 3.5 MMOL/L (ref 3.5–5.1)
POTASSIUM SERPL-SCNC: 3.6 MMOL/L (ref 3.5–5.1)
POTASSIUM SERPL-SCNC: 3.8 MMOL/L (ref 3.5–5.1)
POTASSIUM SERPL-SCNC: 3.8 MMOL/L (ref 3.5–5.1)
RBC # BLD AUTO: 4.18 M/UL (ref 4.6–6.2)
SAMPLE: ABNORMAL
SITE: ABNORMAL
SODIUM SERPL-SCNC: 138 MMOL/L (ref 136–145)
SODIUM SERPL-SCNC: 141 MMOL/L (ref 136–145)
SODIUM SERPL-SCNC: 141 MMOL/L (ref 136–145)
SODIUM SERPL-SCNC: 142 MMOL/L (ref 136–145)
SP02: 94
SP02: 99
WBC # BLD AUTO: 5.98 K/UL (ref 3.9–12.7)

## 2024-05-11 PROCEDURE — 99291 CRITICAL CARE FIRST HOUR: CPT | Mod: NTX,,, | Performed by: INTERNAL MEDICINE

## 2024-05-11 PROCEDURE — 25000003 PHARM REV CODE 250: Mod: NTX | Performed by: STUDENT IN AN ORGANIZED HEALTH CARE EDUCATION/TRAINING PROGRAM

## 2024-05-11 PROCEDURE — 84100 ASSAY OF PHOSPHORUS: CPT | Mod: NTX | Performed by: INTERNAL MEDICINE

## 2024-05-11 PROCEDURE — 63600175 PHARM REV CODE 636 W HCPCS: Mod: NTX | Performed by: STUDENT IN AN ORGANIZED HEALTH CARE EDUCATION/TRAINING PROGRAM

## 2024-05-11 PROCEDURE — 83735 ASSAY OF MAGNESIUM: CPT | Mod: 91,NTX | Performed by: STUDENT IN AN ORGANIZED HEALTH CARE EDUCATION/TRAINING PROGRAM

## 2024-05-11 PROCEDURE — 99900035 HC TECH TIME PER 15 MIN (STAT): Mod: NTX

## 2024-05-11 PROCEDURE — 82803 BLOOD GASES ANY COMBINATION: CPT | Mod: NTX

## 2024-05-11 PROCEDURE — 80048 BASIC METABOLIC PNL TOTAL CA: CPT | Mod: NTX | Performed by: STUDENT IN AN ORGANIZED HEALTH CARE EDUCATION/TRAINING PROGRAM

## 2024-05-11 PROCEDURE — 80048 BASIC METABOLIC PNL TOTAL CA: CPT | Mod: 91,NTX | Performed by: STUDENT IN AN ORGANIZED HEALTH CARE EDUCATION/TRAINING PROGRAM

## 2024-05-11 PROCEDURE — 83735 ASSAY OF MAGNESIUM: CPT | Mod: NTX | Performed by: INTERNAL MEDICINE

## 2024-05-11 PROCEDURE — 94761 N-INVAS EAR/PLS OXIMETRY MLT: CPT | Mod: NTX,XB

## 2024-05-11 PROCEDURE — 20000000 HC ICU ROOM: Mod: NTX

## 2024-05-11 PROCEDURE — 85025 COMPLETE CBC W/AUTO DIFF WBC: CPT | Mod: NTX | Performed by: INTERNAL MEDICINE

## 2024-05-11 RX ORDER — ACETAMINOPHEN 325 MG/1
650 TABLET ORAL EVERY 6 HOURS PRN
Status: DISCONTINUED | OUTPATIENT
Start: 2024-05-11 | End: 2024-05-12

## 2024-05-11 RX ORDER — POTASSIUM CHLORIDE 20 MEQ/1
40 TABLET, EXTENDED RELEASE ORAL ONCE
Status: COMPLETED | OUTPATIENT
Start: 2024-05-11 | End: 2024-05-11

## 2024-05-11 RX ORDER — HYDRALAZINE HYDROCHLORIDE 50 MG/1
50 TABLET, FILM COATED ORAL EVERY 8 HOURS
Status: DISCONTINUED | OUTPATIENT
Start: 2024-05-11 | End: 2024-05-15

## 2024-05-11 RX ORDER — ISOSORBIDE DINITRATE 20 MG/1
20 TABLET ORAL 3 TIMES DAILY
Status: DISCONTINUED | OUTPATIENT
Start: 2024-05-11 | End: 2024-05-18 | Stop reason: HOSPADM

## 2024-05-11 RX ORDER — POTASSIUM CHLORIDE 20 MEQ/1
20 TABLET, EXTENDED RELEASE ORAL ONCE
Status: COMPLETED | OUTPATIENT
Start: 2024-05-11 | End: 2024-05-11

## 2024-05-11 RX ADMIN — ISOSORBIDE DINITRATE 10 MG: 10 TABLET ORAL at 08:05

## 2024-05-11 RX ADMIN — OXYCODONE HYDROCHLORIDE AND ACETAMINOPHEN 1 TABLET: 5; 325 TABLET ORAL at 11:05

## 2024-05-11 RX ADMIN — SUCRALFATE 1 G: 1 SUSPENSION ORAL at 05:05

## 2024-05-11 RX ADMIN — SUCRALFATE 1 G: 1 SUSPENSION ORAL at 11:05

## 2024-05-11 RX ADMIN — ACETAMINOPHEN 650 MG: 325 TABLET ORAL at 06:05

## 2024-05-11 RX ADMIN — HYDRALAZINE HYDROCHLORIDE 25 MG: 25 TABLET ORAL at 05:05

## 2024-05-11 RX ADMIN — POTASSIUM CHLORIDE 20 MEQ: 1500 TABLET, EXTENDED RELEASE ORAL at 05:05

## 2024-05-11 RX ADMIN — ISOSORBIDE DINITRATE 20 MG: 20 TABLET ORAL at 09:05

## 2024-05-11 RX ADMIN — PANTOPRAZOLE SODIUM 20 MG: 20 TABLET, DELAYED RELEASE ORAL at 08:05

## 2024-05-11 RX ADMIN — MUPIROCIN: 20 OINTMENT TOPICAL at 09:05

## 2024-05-11 RX ADMIN — LOSARTAN POTASSIUM 25 MG: 25 TABLET, FILM COATED ORAL at 08:05

## 2024-05-11 RX ADMIN — ISOSORBIDE DINITRATE 20 MG: 20 TABLET ORAL at 03:05

## 2024-05-11 RX ADMIN — OXYCODONE HYDROCHLORIDE AND ACETAMINOPHEN 1 TABLET: 5; 325 TABLET ORAL at 07:05

## 2024-05-11 RX ADMIN — ALUMINUM HYDROXIDE, MAGNESIUM HYDROXIDE, AND SIMETHICONE 30 ML: 1200; 120; 1200 SUSPENSION ORAL at 11:05

## 2024-05-11 RX ADMIN — ALPRAZOLAM 0.5 MG: 0.5 TABLET ORAL at 09:05

## 2024-05-11 RX ADMIN — MUPIROCIN: 20 OINTMENT TOPICAL at 08:05

## 2024-05-11 RX ADMIN — HYDRALAZINE HYDROCHLORIDE 50 MG: 50 TABLET ORAL at 03:05

## 2024-05-11 RX ADMIN — ALUMINUM HYDROXIDE, MAGNESIUM HYDROXIDE, AND SIMETHICONE 30 ML: 1200; 120; 1200 SUSPENSION ORAL at 05:05

## 2024-05-11 RX ADMIN — HYDRALAZINE HYDROCHLORIDE 50 MG: 50 TABLET ORAL at 09:05

## 2024-05-11 RX ADMIN — POTASSIUM CHLORIDE 40 MEQ: 1500 TABLET, EXTENDED RELEASE ORAL at 03:05

## 2024-05-11 RX ADMIN — ATORVASTATIN CALCIUM 40 MG: 40 TABLET, FILM COATED ORAL at 08:05

## 2024-05-11 RX ADMIN — FUROSEMIDE 20 MG/HR: 10 INJECTION, SOLUTION INTRAMUSCULAR; INTRAVENOUS at 03:05

## 2024-05-11 RX ADMIN — ACETAMINOPHEN 650 MG: 325 TABLET ORAL at 09:05

## 2024-05-11 RX ADMIN — Medication 3 MG: at 09:05

## 2024-05-11 RX ADMIN — SPIRONOLACTONE 25 MG: 25 TABLET ORAL at 08:05

## 2024-05-11 NOTE — SUBJECTIVE & OBJECTIVE
Interval History:   No acute overnight events. Patient diuresed well overnight ~7.8L. Started on Hydralazine/Isordil. Will up-titrate this morning and continue with diuresis.     Hemodynamic Parameters:  CVP: 14, SVO2: 63, CO: 6.34, CI: 3.10, SVR: 1,186    Continuous Infusions:   furosemide (Lasix) 500 mg in 50 mL infusion (conc: 10 mg/mL)  20 mg/hr Intravenous Continuous 2 mL/hr at 05/11/24 0600 20 mg/hr at 05/11/24 0600     Scheduled Meds:   aluminum-magnesium hydroxide-simethicone  30 mL Oral QID (AC & HS)    atorvastatin  40 mg Oral Daily    hydrALAZINE  50 mg Oral Q8H    isosorbide dinitrate  20 mg Oral TID    losartan  25 mg Oral Daily    mupirocin   Nasal BID    pantoprazole  20 mg Oral Daily    spironolactone  25 mg Oral Daily    sucralfate  1 g Oral Q6H     PRN Meds:  Current Facility-Administered Medications:     acetaminophen, 650 mg, Oral, Q6H PRN    ALPRAZolam, 0.5 mg, Oral, Nightly PRN    melatonin, 3 mg, Oral, Nightly PRN    oxyCODONE-acetaminophen, 1 tablet, Oral, Q4H PRN    sodium chloride 0.9%, 10 mL, Intravenous, PRN    Review of patient's allergies indicates:  No Known Allergies  Objective:     Vital Signs (Most Recent):  Temp: 98 °F (36.7 °C) (05/11/24 1200)  Pulse: 100 (05/11/24 1200)  Resp: (!) 21 (05/11/24 1200)  BP: 122/83 (05/11/24 1200)  SpO2: 95 % (05/11/24 1200) Vital Signs (24h Range):  Temp:  [97.6 °F (36.4 °C)-98 °F (36.7 °C)] 98 °F (36.7 °C)  Pulse:  [] 100  Resp:  [12-34] 21  SpO2:  [84 %-100 %] 95 %  BP: (122-152)/() 122/83     Patient Vitals for the past 72 hrs (Last 3 readings):   Weight   05/10/24 1820 85.5 kg (188 lb 7.9 oz)   05/10/24 1347 86.2 kg (190 lb)     Body mass index is 27.84 kg/m².      Intake/Output Summary (Last 24 hours) at 5/11/2024 1436  Last data filed at 5/11/2024 0626  Gross per 24 hour   Intake 626 ml   Output 7800 ml   Net -7174 ml            Physical Exam  Constitutional:       Appearance: Normal appearance.   HENT:      Head: Atraumatic.  "  Eyes:      Conjunctiva/sclera: Conjunctivae normal.   Cardiovascular:      Rate and Rhythm: Normal rate and regular rhythm.      Heart sounds: No murmur heard.  Pulmonary:      Effort: Pulmonary effort is normal.      Breath sounds: Normal breath sounds. No wheezing.   Abdominal:      General: There is no distension.      Palpations: Abdomen is soft.      Tenderness: There is no abdominal tenderness.   Skin:     Comments: Cool lower extremities   Neurological:      General: No focal deficit present.      Mental Status: He is alert.            Significant Labs:  CBC:  Recent Labs   Lab 05/10/24  0957 05/10/24  1510 05/11/24  0406   WBC 5.62 5.31 5.98   RBC 4.76 4.76 4.18*   HGB 11.0* 11.0* 9.7*   HCT 37.0* 37.3* 32.3*   * 463* 411   MCV 78* 78* 77*   MCH 23.1* 23.1* 23.2*   MCHC 29.7* 29.5* 30.0*     BNP:  Recent Labs   Lab 05/10/24  0957 05/10/24  1510   BNP 2,065* 1,991*     CMP:  Recent Labs   Lab 05/10/24  0957 05/10/24  1510 05/10/24  2308 05/11/24  0406 05/11/24  1125   GLU 74 85 109 88 114*   CALCIUM 9.2 8.9 8.9 9.5 9.4   ALBUMIN 3.4* 3.5 3.2*  --   --    PROT 6.7 7.1 6.2  --   --     137 137 141 141   K 4.1 4.7 4.1 3.8 3.8   CO2 25 21* 25 24 27    105 102 104 102   BUN 25* 25* 23* 20 19   CREATININE 2.2* 2.1* 2.1* 2.0* 2.0*   ALKPHOS 104 113 94  --   --    ALT 66* 69* 58*  --   --    AST 43* 46* 45*  --   --    BILITOT 0.9 1.0 1.1*  --   --       Coagulation:   No results for input(s): "PT", "INR", "APTT" in the last 168 hours.  LDH:  No results for input(s): "LDH" in the last 72 hours.  Microbiology:  Microbiology Results (last 7 days)       ** No results found for the last 168 hours. **            I have reviewed all pertinent labs within the past 24 hours.    Estimated Creatinine Clearance: 52.1 mL/min (A) (based on SCr of 2 mg/dL (H)).    "

## 2024-05-11 NOTE — PROGRESS NOTES
[facility] - Cardiac Intensive Care  Heart Transplant  Progress Note    Patient Name: Ayad Edmond  MRN: [id number]  Admission Date: 5/10/2024  Hospital Length of Stay: 1 days  Attending Physician: Nicole Chua MD  Primary Care Provider: eZhra Davies MD  Principal Problem:Acute on chronic combined systolic and diastolic heart failure    Subjective:   Interval History:   No acute overnight events. Patient diuresed well overnight ~7.8L. Started on Hydralazine/Isordil. Will up-titrate this morning and continue with diuresis.     Hemodynamic Parameters:  CVP: 14, SVO2: 63, CO: 6.34, CI: 3.10, SVR: 1,186    Continuous Infusions:   furosemide (Lasix) 500 mg in 50 mL infusion (conc: 10 mg/mL)  20 mg/hr Intravenous Continuous 2 mL/hr at 05/11/24 0600 20 mg/hr at 05/11/24 0600     Scheduled Meds:   aluminum-magnesium hydroxide-simethicone  30 mL Oral QID (AC & HS)    atorvastatin  40 mg Oral Daily    hydrALAZINE  50 mg Oral Q8H    isosorbide dinitrate  20 mg Oral TID    losartan  25 mg Oral Daily    mupirocin   Nasal BID    pantoprazole  20 mg Oral Daily    spironolactone  25 mg Oral Daily    sucralfate  1 g Oral Q6H     PRN Meds:  Current Facility-Administered Medications:     acetaminophen, 650 mg, Oral, Q6H PRN    ALPRAZolam, 0.5 mg, Oral, Nightly PRN    melatonin, 3 mg, Oral, Nightly PRN    oxyCODONE-acetaminophen, 1 tablet, Oral, Q4H PRN    sodium chloride 0.9%, 10 mL, Intravenous, PRN    Review of patient's allergies indicates:  No Known Allergies  Objective:     Vital Signs (Most Recent):  Temp: 98 °F (36.7 °C) (05/11/24 1200)  Pulse: 100 (05/11/24 1200)  Resp: (!) 21 (05/11/24 1200)  BP: 122/83 (05/11/24 1200)  SpO2: 95 % (05/11/24 1200) Vital Signs (24h Range):  Temp:  [97.6 °F (36.4 °C)-98 °F (36.7 °C)] 98 °F (36.7 °C)  Pulse:  [] 100  Resp:  [12-34] 21  SpO2:  [84 %-100 %] 95 %  BP: (122-152)/() 122/83     Patient Vitals for the past 72 hrs (Last 3 readings):   Weight   05/10/24 1820 85.5 kg  "(188 lb 7.9 oz)   05/10/24 1347 86.2 kg (190 lb)     Body mass index is 27.84 kg/m².      Intake/Output Summary (Last 24 hours) at 5/11/2024 1436  Last data filed at 5/11/2024 0626  Gross per 24 hour   Intake 626 ml   Output 7800 ml   Net -7174 ml            Physical Exam  Constitutional:       Appearance: Normal appearance.   HENT:      Head: Atraumatic.   Eyes:      Conjunctiva/sclera: Conjunctivae normal.   Cardiovascular:      Rate and Rhythm: Normal rate and regular rhythm.      Heart sounds: No murmur heard.  Pulmonary:      Effort: Pulmonary effort is normal.      Breath sounds: Normal breath sounds. No wheezing.   Abdominal:      General: There is no distension.      Palpations: Abdomen is soft.      Tenderness: There is no abdominal tenderness.   Skin:     Comments: Cool lower extremities   Neurological:      General: No focal deficit present.      Mental Status: He is alert.            Significant Labs:  CBC:  Recent Labs   Lab 05/10/24  0957 05/10/24  1510 05/11/24  0406   WBC 5.62 5.31 5.98   RBC 4.76 4.76 4.18*   HGB 11.0* 11.0* 9.7*   HCT 37.0* 37.3* 32.3*   * 463* 411   MCV 78* 78* 77*   MCH 23.1* 23.1* 23.2*   MCHC 29.7* 29.5* 30.0*     BNP:  Recent Labs   Lab 05/10/24  0957 05/10/24  1510   BNP 2,065* 1,991*     CMP:  Recent Labs   Lab 05/10/24  0957 05/10/24  1510 05/10/24  2308 05/11/24  0406 05/11/24  1125   GLU 74 85 109 88 114*   CALCIUM 9.2 8.9 8.9 9.5 9.4   ALBUMIN 3.4* 3.5 3.2*  --   --    PROT 6.7 7.1 6.2  --   --     137 137 141 141   K 4.1 4.7 4.1 3.8 3.8   CO2 25 21* 25 24 27    105 102 104 102   BUN 25* 25* 23* 20 19   CREATININE 2.2* 2.1* 2.1* 2.0* 2.0*   ALKPHOS 104 113 94  --   --    ALT 66* 69* 58*  --   --    AST 43* 46* 45*  --   --    BILITOT 0.9 1.0 1.1*  --   --       Coagulation:   No results for input(s): "PT", "INR", "APTT" in the last 168 hours.  LDH:  No results for input(s): "LDH" in the last 72 hours.  Microbiology:  Microbiology Results (last 7 days)  "      ** No results found for the last 168 hours. **            I have reviewed all pertinent labs within the past 24 hours.    Estimated Creatinine Clearance: 52.1 mL/min (A) (based on SCr of 2 mg/dL (H)).    Assessment and Plan:       * Acute on chronic combined systolic and diastolic heart failure  -Normal lactic but with cool extremities, AUDELIA, mildly elevated transaminases  -SCAI shock stage B, currently hypertensive, ,will start afterload reduction with Bidil based on above hemodynamics  -BNP 2,000  -Continue wtih Lasix gtt at 20, will decrease to 10 today if CVP drops  -Strict intake/output  -Bedside echo with EF 10-15%, mod-severe TR, PASP 48 mmHg, CVP 15  -Formal echo pending  -Will need work-up for advance options    AUDELIA (acute kidney injury)  Likely cardiorenal  Diurese as above        Radha Ledbetter MD  Heart Transplant  [facility] - Cardiac Intensive Care

## 2024-05-11 NOTE — PLAN OF CARE
Problem: Adult Inpatient Plan of Care  Goal: Plan of Care Review  Outcome: Progressing  Goal: Patient-Specific Goal (Individualized)  Outcome: Progressing  Goal: Absence of Hospital-Acquired Illness or Injury  Outcome: Progressing  Goal: Optimal Comfort and Wellbeing  Outcome: Progressing  Goal: Readiness for Transition of Care  Outcome: Progressing     Problem: Acute Kidney Injury/Impairment  Goal: Fluid and Electrolyte Balance  Outcome: Progressing  Goal: Improved Oral Intake  Outcome: Progressing  Goal: Effective Renal Function  Outcome: Progressing     Problem: Infection  Goal: Absence of Infection Signs and Symptoms  Outcome: Progressing

## 2024-05-11 NOTE — PLAN OF CARE
Heart Transplant Care Update Note:    Hemodynamics: (Lasix 20 mg/hr)  CVP: 20  SVO2: 50  CO: 4.23  CI: 2.07  SVR: 1,565    Wt: 85.5kg, BSA: 2.04 , Hb:  85.5     I/O's (12h-shift):  - Total: 1100 (since 7 pm)    Plan:  - No urine output in the past hour will obtain bladder scan +/- [place]. Discussed with patient  - Renal function stable and Lactic normal  - Will repeat CVP    Discussed with HTS Attending      Radha Ledbetter MD  Cardiovascular Disease PGY IV  [facility]

## 2024-05-11 NOTE — EICU
Intervention Initiated From:  COR / [facility]    Donny intervened regarding:  Rounding (Video assessment)    Nurse Notified:  Yes    Doctor Notified:  No    Comments: During rounds, patient requesting med for anxiety. Message sent to  RN

## 2024-05-11 NOTE — PLAN OF CARE
"Cardiac ICU Care Plan    POC reviewed with Ayad Edmond and family.  See below and flowsheets for full assessment and VS info.   CVPs: 17, 20, 15, 14  Sv02: 56, 50  CI : 2.04  2L nc  Gtts: Lasix 20    Neuro:  Akhil Coma Scale  Best Eye Response: 4-->(E4) spontaneous  Best Motor Response: 6-->(M6) obeys commands  Best Verbal Response: 5-->(V5) oriented  Akhil Coma Scale Score: 15  Assessment Qualifiers: patient not sedated/intubated, no eye obstruction present  Pupil PERRLA: yes    24 hr Temp:  [97.7 °F (36.5 °C)-98.4 °F (36.9 °C)]      CV:  Rhythm: sinus tachycardia   DVT prophylaxis: VTE Required Core Measure: Pharmacological prophylaxis initiated/maintained    CVP (mean): 14 mmHg (05/11/24 0300)       SVO2 (%): 56 % (05/10/24 1901)               Pulses  Right Radial Pulse: 2+ (normal)  Left Radial Pulse: 2+ (normal)  Right Dorsalis Pedis Pulse: 2+ (normal)  Left Dorsalis Pedis Pulse: 2+ (normal)    Resp:  Flow (L/min) (Oxygen Therapy): 2       GI/:  GI prophylaxis: yes  Diet/Nutrition Received: low saturated fat/low cholesterol, 2 gram sodium, restrict fluids (1500cc FR)  Last Bowel Movement: 05/10/24      Intake/Output Summary (Last 24 hours) at 5/11/2024 0500  Last data filed at 5/11/2024 0400  Gross per 24 hour   Intake 22 ml   Output 5700 ml   Net -5678 ml        Nutritional Supplement Intake: Quantity 0, Type:  n/a    Labs/Accuchecks:  Recent Labs   Lab 05/10/24  0957 05/10/24  1510 05/11/24  0406   WBC 5.62 5.31 5.98   RBC 4.76 4.76 4.18*   HGB 11.0* 11.0* 9.7*   HCT 37.0* 37.3* 32.3*   * 463* 411    No results for input(s): "PT", "INR", "APTT" in the last 168 hours.   Recent Labs     05/10/24  2308      K 4.1   CO2 25      BUN 23*   CREATININE 2.1*   ALKPHOS 94   ALT 58*   AST 45*   BILITOT 1.1*       Recent Labs   Lab 05/10/24  1510   TROPONINI 0.032*      Recent Labs     05/10/24  1725 05/10/24  2008 05/10/24  2309   PH 7.380 7.392 7.386   PCO2 42.0 40.3 44.3   PO2 24* 30* 27* "   HCO3 24.8 24.6 26.6   POCSATURATED 41 56 50   BE 0 0 2       Electrolytes: N/A - electrolytes WDL  Accuchecks: none    Gtts/LDAs:   furosemide (Lasix) 500 mg in 50 mL infusion (conc: 10 mg/mL)  20 mg/hr Intravenous Continuous 2 mL/hr at 05/11/24 0400 20 mg/hr at 05/11/24 0400       Lines/Drains/Airways       Central Venous Catheter Line  Duration             Percutaneous Central Line - Triple Lumen  05/10/24 1700 Internal Jugular Right <1 day              Peripheral Intravenous Line  Duration                  Peripheral IV - Single Lumen 05/10/24 1511 20 G Right Antecubital <1 day                    Skin/Wounds     Wounds: No  Wound care consulted: No

## 2024-05-11 NOTE — ASSESSMENT & PLAN NOTE
-Normal lactic but with cool extremities, AUDELIA, mildly elevated transaminases  -SCAI shock stage B, currently hypertensive, ,will start afterload reduction with Bidil based on above hemodynamics  -BNP 2,000  -Continue wtih Lasix gtt at 20, will decrease to 10 today if CVP drops  -Strict intake/output  -Bedside echo with EF 10-15%, mod-severe TR, PASP 48 mmHg, CVP 15  -Formal echo pending  -Will need work-up for advance options

## 2024-05-12 LAB
ALLENS TEST: ABNORMAL
ALLENS TEST: ABNORMAL
ANION GAP SERPL CALC-SCNC: 10 MMOL/L (ref 8–16)
ANION GAP SERPL CALC-SCNC: 11 MMOL/L (ref 8–16)
BASOPHILS # BLD AUTO: 0.04 K/UL (ref 0–0.2)
BASOPHILS NFR BLD: 0.6 % (ref 0–1.9)
BUN SERPL-MCNC: 14 MG/DL (ref 6–20)
BUN SERPL-MCNC: 16 MG/DL (ref 6–20)
CALCIUM SERPL-MCNC: 9.4 MG/DL (ref 8.7–10.5)
CALCIUM SERPL-MCNC: 9.8 MG/DL (ref 8.7–10.5)
CHLORIDE SERPL-SCNC: 99 MMOL/L (ref 95–110)
CHLORIDE SERPL-SCNC: 99 MMOL/L (ref 95–110)
CO2 SERPL-SCNC: 29 MMOL/L (ref 23–29)
CO2 SERPL-SCNC: 29 MMOL/L (ref 23–29)
CREAT SERPL-MCNC: 1.8 MG/DL (ref 0.5–1.4)
CREAT SERPL-MCNC: 2 MG/DL (ref 0.5–1.4)
DELSYS: ABNORMAL
DELSYS: ABNORMAL
DIFFERENTIAL METHOD BLD: ABNORMAL
EOSINOPHIL # BLD AUTO: 0.1 K/UL (ref 0–0.5)
EOSINOPHIL NFR BLD: 1 % (ref 0–8)
ERYTHROCYTE [DISTWIDTH] IN BLOOD BY AUTOMATED COUNT: 16.6 % (ref 11.5–14.5)
ERYTHROCYTE [SEDIMENTATION RATE] IN BLOOD BY WESTERGREN METHOD: 14 MM/H
EST. GFR  (NO RACE VARIABLE): 41.9 ML/MIN/1.73 M^2
EST. GFR  (NO RACE VARIABLE): 47.6 ML/MIN/1.73 M^2
FIO2: 21
GLUCOSE SERPL-MCNC: 102 MG/DL (ref 70–110)
GLUCOSE SERPL-MCNC: 99 MG/DL (ref 70–110)
HCT VFR BLD AUTO: 35.8 % (ref 40–54)
HGB BLD-MCNC: 10.9 G/DL (ref 14–18)
IMM GRANULOCYTES # BLD AUTO: 0.01 K/UL (ref 0–0.04)
IMM GRANULOCYTES NFR BLD AUTO: 0.1 % (ref 0–0.5)
LYMPHOCYTES # BLD AUTO: 1.8 K/UL (ref 1–4.8)
LYMPHOCYTES NFR BLD: 26.9 % (ref 18–48)
MAGNESIUM SERPL-MCNC: 2 MG/DL (ref 1.6–2.6)
MAGNESIUM SERPL-MCNC: 2.1 MG/DL (ref 1.6–2.6)
MCH RBC QN AUTO: 23.2 PG (ref 27–31)
MCHC RBC AUTO-ENTMCNC: 30.4 G/DL (ref 32–36)
MCV RBC AUTO: 76 FL (ref 82–98)
MODE: ABNORMAL
MONOCYTES # BLD AUTO: 0.7 K/UL (ref 0.3–1)
MONOCYTES NFR BLD: 10.5 % (ref 4–15)
NEUTROPHILS # BLD AUTO: 4.1 K/UL (ref 1.8–7.7)
NEUTROPHILS NFR BLD: 60.9 % (ref 38–73)
NRBC BLD-RTO: 0 /100 WBC
OHS QRS DURATION: 96 MS
OHS QTC CALCULATION: 486 MS
PHOSPHATE SERPL-MCNC: 4.4 MG/DL (ref 2.7–4.5)
PLATELET # BLD AUTO: 437 K/UL (ref 150–450)
PMV BLD AUTO: 8.6 FL (ref 9.2–12.9)
PO2 BLDA: 32 MMHG (ref 40–60)
PO2 BLDA: 34 MMHG (ref 40–60)
POC SATURATED O2: 62 % (ref 95–100)
POC SATURATED O2: 66 % (ref 95–100)
POTASSIUM SERPL-SCNC: 3.6 MMOL/L (ref 3.5–5.1)
POTASSIUM SERPL-SCNC: 3.9 MMOL/L (ref 3.5–5.1)
RBC # BLD AUTO: 4.69 M/UL (ref 4.6–6.2)
SAMPLE: ABNORMAL
SAMPLE: ABNORMAL
SITE: ABNORMAL
SITE: ABNORMAL
SODIUM SERPL-SCNC: 138 MMOL/L (ref 136–145)
SODIUM SERPL-SCNC: 139 MMOL/L (ref 136–145)
SP02: 95
WBC # BLD AUTO: 6.76 K/UL (ref 3.9–12.7)

## 2024-05-12 PROCEDURE — 25000003 PHARM REV CODE 250: Mod: NTX | Performed by: STUDENT IN AN ORGANIZED HEALTH CARE EDUCATION/TRAINING PROGRAM

## 2024-05-12 PROCEDURE — 84100 ASSAY OF PHOSPHORUS: CPT | Mod: NTX | Performed by: INTERNAL MEDICINE

## 2024-05-12 PROCEDURE — 20000000 HC ICU ROOM: Mod: NTX

## 2024-05-12 PROCEDURE — 99900035 HC TECH TIME PER 15 MIN (STAT): Mod: NTX

## 2024-05-12 PROCEDURE — 85025 COMPLETE CBC W/AUTO DIFF WBC: CPT | Mod: NTX | Performed by: INTERNAL MEDICINE

## 2024-05-12 PROCEDURE — 80048 BASIC METABOLIC PNL TOTAL CA: CPT | Mod: 91,NTX | Performed by: STUDENT IN AN ORGANIZED HEALTH CARE EDUCATION/TRAINING PROGRAM

## 2024-05-12 PROCEDURE — 80048 BASIC METABOLIC PNL TOTAL CA: CPT | Mod: NTX | Performed by: STUDENT IN AN ORGANIZED HEALTH CARE EDUCATION/TRAINING PROGRAM

## 2024-05-12 PROCEDURE — 25000003 PHARM REV CODE 250: Mod: NTX | Performed by: INTERNAL MEDICINE

## 2024-05-12 PROCEDURE — 93010 ELECTROCARDIOGRAM REPORT: CPT | Mod: NTX,,, | Performed by: INTERNAL MEDICINE

## 2024-05-12 PROCEDURE — 99233 SBSQ HOSP IP/OBS HIGH 50: CPT | Mod: NTX,,, | Performed by: INTERNAL MEDICINE

## 2024-05-12 PROCEDURE — 83735 ASSAY OF MAGNESIUM: CPT | Mod: 91,NTX | Performed by: STUDENT IN AN ORGANIZED HEALTH CARE EDUCATION/TRAINING PROGRAM

## 2024-05-12 PROCEDURE — 63600175 PHARM REV CODE 636 W HCPCS: Mod: NTX

## 2024-05-12 PROCEDURE — 83735 ASSAY OF MAGNESIUM: CPT | Mod: NTX | Performed by: INTERNAL MEDICINE

## 2024-05-12 PROCEDURE — 82803 BLOOD GASES ANY COMBINATION: CPT | Mod: NTX

## 2024-05-12 PROCEDURE — 93005 ELECTROCARDIOGRAM TRACING: CPT | Mod: NTX

## 2024-05-12 PROCEDURE — 94761 N-INVAS EAR/PLS OXIMETRY MLT: CPT | Mod: NTX,XB

## 2024-05-12 RX ORDER — POTASSIUM CHLORIDE 20 MEQ/1
40 TABLET, EXTENDED RELEASE ORAL EVERY 4 HOURS
Status: DISCONTINUED | OUTPATIENT
Start: 2024-05-12 | End: 2024-05-12

## 2024-05-12 RX ORDER — OXYCODONE HYDROCHLORIDE 10 MG/1
10 TABLET ORAL EVERY 6 HOURS PRN
Status: DISCONTINUED | OUTPATIENT
Start: 2024-05-12 | End: 2024-05-18 | Stop reason: HOSPADM

## 2024-05-12 RX ORDER — POTASSIUM CHLORIDE 20 MEQ/1
40 TABLET, EXTENDED RELEASE ORAL ONCE
Status: COMPLETED | OUTPATIENT
Start: 2024-05-12 | End: 2024-05-12

## 2024-05-12 RX ORDER — BUTALBITAL, ACETAMINOPHEN AND CAFFEINE 50; 325; 40 MG/1; MG/1; MG/1
1 TABLET ORAL EVERY 4 HOURS PRN
Status: DISCONTINUED | OUTPATIENT
Start: 2024-05-12 | End: 2024-05-18 | Stop reason: HOSPADM

## 2024-05-12 RX ORDER — ONDANSETRON HYDROCHLORIDE 2 MG/ML
INJECTION, SOLUTION INTRAVENOUS
Status: COMPLETED
Start: 2024-05-12 | End: 2024-05-12

## 2024-05-12 RX ORDER — METHOCARBAMOL 500 MG/1
500 TABLET, FILM COATED ORAL 3 TIMES DAILY
Status: DISCONTINUED | OUTPATIENT
Start: 2024-05-12 | End: 2024-05-14

## 2024-05-12 RX ORDER — POTASSIUM CHLORIDE 20 MEQ/1
40 TABLET, EXTENDED RELEASE ORAL 3 TIMES DAILY
Status: DISCONTINUED | OUTPATIENT
Start: 2024-05-12 | End: 2024-05-14

## 2024-05-12 RX ADMIN — SPIRONOLACTONE 25 MG: 25 TABLET ORAL at 08:05

## 2024-05-12 RX ADMIN — OXYCODONE HYDROCHLORIDE 10 MG: 10 TABLET ORAL at 03:05

## 2024-05-12 RX ADMIN — SUCRALFATE 1 G: 1 SUSPENSION ORAL at 05:05

## 2024-05-12 RX ADMIN — POTASSIUM CHLORIDE 40 MEQ: 1500 TABLET, EXTENDED RELEASE ORAL at 09:05

## 2024-05-12 RX ADMIN — ALUMINUM HYDROXIDE, MAGNESIUM HYDROXIDE, AND SIMETHICONE 30 ML: 1200; 120; 1200 SUSPENSION ORAL at 05:05

## 2024-05-12 RX ADMIN — HYDRALAZINE HYDROCHLORIDE 50 MG: 50 TABLET ORAL at 03:05

## 2024-05-12 RX ADMIN — LOSARTAN POTASSIUM 25 MG: 25 TABLET, FILM COATED ORAL at 08:05

## 2024-05-12 RX ADMIN — POTASSIUM CHLORIDE 40 MEQ: 1500 TABLET, EXTENDED RELEASE ORAL at 12:05

## 2024-05-12 RX ADMIN — BUTALBITAL, ACETAMINOPHEN, AND CAFFEINE 1 TABLET: 50; 325; 40 TABLET ORAL at 10:05

## 2024-05-12 RX ADMIN — METHOCARBAMOL 500 MG: 500 TABLET ORAL at 09:05

## 2024-05-12 RX ADMIN — PANTOPRAZOLE SODIUM 20 MG: 20 TABLET, DELAYED RELEASE ORAL at 08:05

## 2024-05-12 RX ADMIN — ALUMINUM HYDROXIDE, MAGNESIUM HYDROXIDE, AND SIMETHICONE 30 ML: 1200; 120; 1200 SUSPENSION ORAL at 11:05

## 2024-05-12 RX ADMIN — OXYCODONE HYDROCHLORIDE AND ACETAMINOPHEN 1 TABLET: 5; 325 TABLET ORAL at 05:05

## 2024-05-12 RX ADMIN — ISOSORBIDE DINITRATE 20 MG: 20 TABLET ORAL at 08:05

## 2024-05-12 RX ADMIN — METHOCARBAMOL 500 MG: 500 TABLET ORAL at 03:05

## 2024-05-12 RX ADMIN — SUCRALFATE 1 G: 1 SUSPENSION ORAL at 11:05

## 2024-05-12 RX ADMIN — MUPIROCIN: 20 OINTMENT TOPICAL at 09:05

## 2024-05-12 RX ADMIN — ISOSORBIDE DINITRATE 20 MG: 20 TABLET ORAL at 09:05

## 2024-05-12 RX ADMIN — POTASSIUM CHLORIDE 40 MEQ: 1500 TABLET, EXTENDED RELEASE ORAL at 03:05

## 2024-05-12 RX ADMIN — MUPIROCIN: 20 OINTMENT TOPICAL at 08:05

## 2024-05-12 RX ADMIN — ONDANSETRON 4 MG: 2 INJECTION INTRAMUSCULAR; INTRAVENOUS at 09:05

## 2024-05-12 RX ADMIN — ISOSORBIDE DINITRATE 20 MG: 20 TABLET ORAL at 03:05

## 2024-05-12 RX ADMIN — ALUMINUM HYDROXIDE, MAGNESIUM HYDROXIDE, AND SIMETHICONE 30 ML: 1200; 120; 1200 SUSPENSION ORAL at 09:05

## 2024-05-12 RX ADMIN — ALPRAZOLAM 0.5 MG: 0.5 TABLET ORAL at 09:05

## 2024-05-12 RX ADMIN — HYDRALAZINE HYDROCHLORIDE 50 MG: 50 TABLET ORAL at 05:05

## 2024-05-12 RX ADMIN — BUTALBITAL, ACETAMINOPHEN, AND CAFFEINE 1 TABLET: 50; 325; 40 TABLET ORAL at 03:05

## 2024-05-12 RX ADMIN — HYDRALAZINE HYDROCHLORIDE 50 MG: 50 TABLET ORAL at 09:05

## 2024-05-12 RX ADMIN — ATORVASTATIN CALCIUM 40 MG: 40 TABLET, FILM COATED ORAL at 08:05

## 2024-05-12 RX ADMIN — BUTALBITAL, ACETAMINOPHEN, AND CAFFEINE 1 TABLET: 50; 325; 40 TABLET ORAL at 09:05

## 2024-05-12 NOTE — PLAN OF CARE
ICU Daily Nursing Care Plan    Patient's Room: [id number]/[id number] A  ICU Length of Stay: 1d 21h  Primary Diagnosis: Acute on chronic combined systolic and diastolic heart failure    SHIFT EVENTS:   Lasix gtt off. Migraine and severe cramps, treated with pain medications and robaxin. Plan of care reviewed with Ayad Edmond. Questions/concerns addressed. See flowsheet documentation for details.    NEURO:  AAOx4, Follows commands , and Moves all extremities spontaneously     CARDIOVASCULAR:  Rhythm: sinus tachycardia  Pulse Readings from Last 1 Encounters:   05/12/24 103      BP Readings from Last 1 Encounters:   05/12/24 109/80     RESPIRATORY:  SpO2 Readings from Last 1 Encounters:   05/12/24 99%   Room Air    GI/:  Last Bowel Movement: 05/10/24  Fluid restriction 1500ml, cardiac diet   I/O this shift:  In: 732.3 [P.O.:720; I.V.:12.3]  Out: 1800 [Urine:1800]    LABS:       Lab   05/12/24  1103   NA   138   K   3.9   CO2   29   CL   99   BUN   14   CREATININE   1.8*     ELECTROLYTES: Electrolytes replaced, K replacements as scheduled     GTTS:  lasix gtt off @ 1500

## 2024-05-12 NOTE — ASSESSMENT & PLAN NOTE
- Unclear baseline renal function  - Has not improved with diuresis  - Continue to avoid any nephrotoxic agents  - Monitor urine output and renal function  - Has been on lasix gtt may be difficult to interpert urine studies. Check renal U/S if no improvement

## 2024-05-12 NOTE — SUBJECTIVE & OBJECTIVE
Interval History:   No acute overnight events. Patient diuresed well overnight ~ 8L, Net: -7L on Lasix 20 mg/hr. Cr remains stable at 2.0; unclear baseline. This morning was experiencing cramps. Electrolytes were replaced and started on Fioricet for migraine headache.  Will decrease to Lasix 10 mg/hr. Formal Echo pending.      Hemodynamic Parameters: (Lasix 20 mg/hr)  CVP: 7, SVO2: 66, CO: 6.17, CI: 3.02, SVR: 1,089    Telemetry:  Sinus tachycardia, ~110; No events overnight or ectopy noted.     Lines:  - R.IJ (5/10--)    Continuous Infusions:   furosemide (Lasix) 500 mg in 50 mL infusion (conc: 10 mg/mL)  10 mg/hr Intravenous Continuous 1 mL/hr at 05/12/24 1200 10 mg/hr at 05/12/24 1200     Scheduled Meds:   aluminum-magnesium hydroxide-simethicone  30 mL Oral QID (AC & HS)    atorvastatin  40 mg Oral Daily    hydrALAZINE  50 mg Oral Q8H    isosorbide dinitrate  20 mg Oral TID    losartan  25 mg Oral Daily    methocarbamoL  500 mg Oral TID    mupirocin   Nasal BID    pantoprazole  20 mg Oral Daily    potassium chloride  40 mEq Oral TID    spironolactone  25 mg Oral Daily    sucralfate  1 g Oral Q6H     PRN Meds:  Current Facility-Administered Medications:     ALPRAZolam, 0.5 mg, Oral, Nightly PRN    butalbital-acetaminophen-caffeine -40 mg, 1 tablet, Oral, Q4H PRN    melatonin, 3 mg, Oral, Nightly PRN    oxyCODONE-acetaminophen, 1 tablet, Oral, Q4H PRN    sodium chloride 0.9%, 10 mL, Intravenous, PRN    Review of patient's allergies indicates:  No Known Allergies  Objective:     Vital Signs (Most Recent):  Temp: 98 °F (36.7 °C) (05/12/24 1145)  Pulse: 105 (05/12/24 1215)  Resp: (!) 22 (05/12/24 1215)  BP: 113/74 (05/12/24 1200)  SpO2: 97 % (05/12/24 1215) Vital Signs (24h Range):  Temp:  [98 °F (36.7 °C)-98.5 °F (36.9 °C)] 98 °F (36.7 °C)  Pulse:  [] 105  Resp:  [10-36] 22  SpO2:  [88 %-99 %] 97 %  BP: (110-150)/(64-94) 113/74     Patient Vitals for the past 72 hrs (Last 3 readings):   Weight   05/10/24  1820 85.5 kg (188 lb 7.9 oz)   05/10/24 1347 86.2 kg (190 lb)     Body mass index is 27.84 kg/m².      Intake/Output Summary (Last 24 hours) at 5/12/2024 1345  Last data filed at 5/12/2024 1200  Gross per 24 hour   Intake 1409.36 ml   Output 6905 ml   Net -5495.64 ml            Physical Exam  Constitutional:       Appearance: Normal appearance.   HENT:      Head: Atraumatic.   Eyes:      Conjunctiva/sclera: Conjunctivae normal.   Cardiovascular:      Rate and Rhythm: Normal rate and regular rhythm.      Heart sounds: No murmur heard.  Pulmonary:      Effort: Pulmonary effort is normal.      Breath sounds: Normal breath sounds. No wheezing.   Abdominal:      General: There is no distension.      Palpations: Abdomen is soft.      Tenderness: There is no abdominal tenderness.   Skin:     General: Skin is warm.   Neurological:      General: No focal deficit present.      Mental Status: He is alert.            Significant Labs:  CBC:  Recent Labs   Lab 05/10/24  1510 05/11/24  0406 05/12/24  0140   WBC 5.31 5.98 6.76   RBC 4.76 4.18* 4.69   HGB 11.0* 9.7* 10.9*   HCT 37.3* 32.3* 35.8*   * 411 437   MCV 78* 77* 76*   MCH 23.1* 23.2* 23.2*   MCHC 29.5* 30.0* 30.4*     BNP:  Recent Labs   Lab 05/10/24  0957 05/10/24  1510   BNP 2,065* 1,991*     CMP:  Recent Labs   Lab 05/10/24  0957 05/10/24  1510 05/10/24  2308 05/11/24  0406 05/11/24  1810 05/11/24  2108 05/12/24  0140   GLU 74 85 109   < > 94 135* 102   CALCIUM 9.2 8.9 8.9   < > 9.4 9.0 9.4   ALBUMIN 3.4* 3.5 3.2*  --   --   --   --    PROT 6.7 7.1 6.2  --   --   --   --     137 137   < > 142 138 139   K 4.1 4.7 4.1   < > 3.5 3.6 3.6   CO2 25 21* 25   < > 28 27 29    105 102   < > 102 99 99   BUN 25* 25* 23*   < > 17 17 16   CREATININE 2.2* 2.1* 2.1*   < > 1.8* 1.9* 2.0*   ALKPHOS 104 113 94  --   --   --   --    ALT 66* 69* 58*  --   --   --   --    AST 43* 46* 45*  --   --   --   --    BILITOT 0.9 1.0 1.1*  --   --   --   --     < > = values in this  "interval not displayed.      Coagulation:   No results for input(s): "PT", "INR", "APTT" in the last 168 hours.  LDH:  No results for input(s): "LDH" in the last 72 hours.  Microbiology:  Microbiology Results (last 7 days)       ** No results found for the last 168 hours. **            I have reviewed all pertinent labs within the past 24 hours.    Estimated Creatinine Clearance: 52.1 mL/min (A) (based on SCr of 2 mg/dL (H)).    "

## 2024-05-12 NOTE — CARE UPDATE
Heart Transplant Care Update Note:    Hemodynamics:  CVP:6  SVO2:68  CO: 6.1  CI: 3.0  SVR: 1281        Continuous Infusions:   furosemide (Lasix) 500 mg in 50 mL infusion (conc: 10 mg/mL)  20 mg/hr Intravenous Continuous 2 mL/hr at 05/12/24 0001 20 mg/hr at 05/12/24 0001       Intake/Output Summary (Last 24 hours) at 5/12/2024 0006  Last data filed at 5/12/2024 0001  Gross per 24 hour   Intake 1318.02 ml   Output 31079 ml   Net -9471.98 ml           Plan:  Continue current management       Discussed with HTS Attending      Monica GARDNER MD  Cardiovascular Disease PGY IV  [facility]

## 2024-05-12 NOTE — PROGRESS NOTES
Admit Note     SW spoke with patient to assess needs. Patient is a 44 y.o.  male, admitted for for heart failure.      Patient admitted from ED on 5/10/2024 .  At this time, patient presents as alert and oriented x 4, pleasant, average intelligence, calm, communicative, and cooperative.  At this time, patients caregiver is in room with pt.    Household/Family Systems     Patient resides with patient's wife and her 14 yr old son.    Address:  [address] [place] [address].      Support system includes wife, son, and family.    Wife's on being cared for by family.     Patients primary caregiver is self with his wife's assistance.    Patients wife (Sia Gutierrez) phone number [phone].    Pt reported they are legally .     Confirmed patients contact information is [phone].    During admission, patient's caregiver plans to stay  in patient's room.      Confirmed patient and patients caregivers do have access to reliable transportation.    Cognitive Status/Learning     Patient reports reading ability as 11th grade and states patient does not have difficulty with reading, writing, seeing, hearing, comprehension, learning, and memory.       Needed: No.   Highest education level: High School (9-12) or GED (11th grade)    Vocation/Disability   .  Working for Income: No  If no, reason not working: Demands of Treatment  Patient reported that he was  laid off on 2/23/2024 due to his medical issues.  Pt was working as an  in a shop.    Pt reported his wife worked as a nurse in [place], but is not working as a nurse in the [place].     Adherence     Patient reports a high level of adherence to patients health care regimen.  Adherence counseling and education provided. Patient verbalizes understanding.    Substance Use    Patient reports the following substance usage.    Tobacco:  Pt reported he quit 1 yr ago. At that time he was smoking 1ppd .  Alcohol:  Pt reported  occasional use .  Illicit Drugs/Non-prescribed Medications: none, patient denies any use.  Patient states clear understanding of the potential impact of substance use.  Substance abstinence/cessation counseling, education and resources provided and reviewed.     Services Utilizing/ADLS    Infusion Service: Prior to admission, patient utilizing? no  Home Health: Prior to admission, patient utilizing? no  DME: Prior to admission, no  Pulmonary/Cardiac Rehab: Prior to admission, no  Dialysis:  Prior to admission, no  Transplant Specialty Pharmacy:  Prior to admission, no.    Prior to admission, patient reports patient was independent with ADLS most of the time, but his wife does assist him at times.  Pt reported he was driving very little.  Patient reports patient is not able to care for self at this time due to compromised medical condition (as documented in medical record) and physical weakness..  Patient indicates a willingness to care for self once medically cleared to do so.    Insurance/Medications    Insured by   Payer/Plan Subscr  Sex Relation Sub. Ins. ID Effective Group Num   1. MEDICAID - * VINAY TAVERAS 1980 Male Self [id number]* 24                                    [address] [address]      Primary Insurance (for UNOS reporting): Public Insurance - Medicaid  Secondary Insurance (for UNOS reporting): None    Patient reports patient is able to obtain and afford medications at this time and at time of discharge.    Living Will/Healthcare Power of     Patient states patient has a HCPA which he completed this admit and it is scanned into medical record. Pt's wife (Sia Gutierrez) is listed as his HCPA.    Coping/Mental Health    Patient is coping adequately with the aid of  family members.   Patient indicates mental health issues related to his medical problems. Pt reported having trouble sleeping and takes sleep meds.    Discharge Planning    At time of discharge, patient plans to return to  patient's home under the care of his wife.  Patients wife will transport patient.  Per rounds today, expected discharge date has not been medically determined at this time. Patient and patients caregiver  verbalize understanding and are involved in treatment planning and discharge process.    Additional Concerns    Patient is being followed for needs, education, resources, information, emotional support, supportive counseling, and for supportive and skilled discharge plan of care.  providing ongoing psychosocial support, education, resources and d/c planning as needed.  SW remains available. Patient denies additional needs and/or concerns at this time. Patient verbalizes understanding and agreement with information reviewed, social work availability, and how to access available resources as needed.

## 2024-05-12 NOTE — ASSESSMENT & PLAN NOTE
-Non-ischemic cardiomyopathy; Last [facility] under media with non-obstructive cardiomyopathy  -Last [facility] (2/27/2024): RA:15, RV:45/8, PA:47/23 (35), PWP:28, CO:4.2, CI: 2.1, SVR 1570  -Last [facility] (2/27/2024): Non-obstructive   -Started on Lasix and afterload reduction with Hydralazine/Isordil which appear to have improved hemodynamics.   -Bedside echo with EF 10-15%, mod-severe TR, PASP 48 mmHg, CVP 15  -Formal echo pending  -Plan to start evaluation for advanced options this week. CT once closer to euvolemic

## 2024-05-12 NOTE — PROGRESS NOTES
[facility] - Cardiac Intensive Care  Heart Transplant  Progress Note    Patient Name: Ayad Edmond  MRN: [id number]  Admission Date: 5/10/2024  Hospital Length of Stay: 2 days  Attending Physician: Nicole Chua MD  Primary Care Provider: Zehra Davies MD  Principal Problem:Acute on chronic combined systolic and diastolic heart failure    Subjective:   Interval History:   No acute overnight events. Patient diuresed well overnight ~ 8L, Net: -7L on Lasix 20 mg/hr. Cr remains stable at 2.0; unclear baseline. This morning was experiencing cramps. Electrolytes were replaced and started on Fioricet for migraine headache.  Will decrease to Lasix 10 mg/hr. Formal Echo pending.      Hemodynamic Parameters: (Lasix 20 mg/hr)  CVP: 7, SVO2: 66, CO: 6.17, CI: 3.02, SVR: 1,089    Telemetry:  Sinus tachycardia, ~110; No events overnight or ectopy noted.     Lines:  - R.IJ (5/10--)    Continuous Infusions:   furosemide (Lasix) 500 mg in 50 mL infusion (conc: 10 mg/mL)  10 mg/hr Intravenous Continuous 1 mL/hr at 05/12/24 1200 10 mg/hr at 05/12/24 1200     Scheduled Meds:   aluminum-magnesium hydroxide-simethicone  30 mL Oral QID (AC & HS)    atorvastatin  40 mg Oral Daily    hydrALAZINE  50 mg Oral Q8H    isosorbide dinitrate  20 mg Oral TID    losartan  25 mg Oral Daily    methocarbamoL  500 mg Oral TID    mupirocin   Nasal BID    pantoprazole  20 mg Oral Daily    potassium chloride  40 mEq Oral TID    spironolactone  25 mg Oral Daily    sucralfate  1 g Oral Q6H     PRN Meds:  Current Facility-Administered Medications:     ALPRAZolam, 0.5 mg, Oral, Nightly PRN    butalbital-acetaminophen-caffeine -40 mg, 1 tablet, Oral, Q4H PRN    melatonin, 3 mg, Oral, Nightly PRN    oxyCODONE-acetaminophen, 1 tablet, Oral, Q4H PRN    sodium chloride 0.9%, 10 mL, Intravenous, PRN    Review of patient's allergies indicates:  No Known Allergies  Objective:     Vital Signs (Most Recent):  Temp: 98 °F (36.7 °C) (05/12/24 1145)  Pulse: 105  (05/12/24 1215)  Resp: (!) 22 (05/12/24 1215)  BP: 113/74 (05/12/24 1200)  SpO2: 97 % (05/12/24 1215) Vital Signs (24h Range):  Temp:  [98 °F (36.7 °C)-98.5 °F (36.9 °C)] 98 °F (36.7 °C)  Pulse:  [] 105  Resp:  [10-36] 22  SpO2:  [88 %-99 %] 97 %  BP: (110-150)/(64-94) 113/74     Patient Vitals for the past 72 hrs (Last 3 readings):   Weight   05/10/24 1820 85.5 kg (188 lb 7.9 oz)   05/10/24 1347 86.2 kg (190 lb)     Body mass index is 27.84 kg/m².      Intake/Output Summary (Last 24 hours) at 5/12/2024 1345  Last data filed at 5/12/2024 1200  Gross per 24 hour   Intake 1409.36 ml   Output 6905 ml   Net -5495.64 ml            Physical Exam  Constitutional:       Appearance: Normal appearance.   HENT:      Head: Atraumatic.   Eyes:      Conjunctiva/sclera: Conjunctivae normal.   Cardiovascular:      Rate and Rhythm: Normal rate and regular rhythm.      Heart sounds: No murmur heard.  Pulmonary:      Effort: Pulmonary effort is normal.      Breath sounds: Normal breath sounds. No wheezing.   Abdominal:      General: There is no distension.      Palpations: Abdomen is soft.      Tenderness: There is no abdominal tenderness.   Skin:     General: Skin is warm.   Neurological:      General: No focal deficit present.      Mental Status: He is alert.            Significant Labs:  CBC:  Recent Labs   Lab 05/10/24  1510 05/11/24  0406 05/12/24  0140   WBC 5.31 5.98 6.76   RBC 4.76 4.18* 4.69   HGB 11.0* 9.7* 10.9*   HCT 37.3* 32.3* 35.8*   * 411 437   MCV 78* 77* 76*   MCH 23.1* 23.2* 23.2*   MCHC 29.5* 30.0* 30.4*     BNP:  Recent Labs   Lab 05/10/24  0957 05/10/24  1510   BNP 2,065* 1,991*     CMP:  Recent Labs   Lab 05/10/24  0957 05/10/24  1510 05/10/24  2308 05/11/24  0406 05/11/24  1810 05/11/24  2108 05/12/24  0140   GLU 74 85 109   < > 94 135* 102   CALCIUM 9.2 8.9 8.9   < > 9.4 9.0 9.4   ALBUMIN 3.4* 3.5 3.2*  --   --   --   --    PROT 6.7 7.1 6.2  --   --   --   --     137 137   < > 142 138 139  "  K 4.1 4.7 4.1   < > 3.5 3.6 3.6   CO2 25 21* 25   < > 28 27 29    105 102   < > 102 99 99   BUN 25* 25* 23*   < > 17 17 16   CREATININE 2.2* 2.1* 2.1*   < > 1.8* 1.9* 2.0*   ALKPHOS 104 113 94  --   --   --   --    ALT 66* 69* 58*  --   --   --   --    AST 43* 46* 45*  --   --   --   --    BILITOT 0.9 1.0 1.1*  --   --   --   --     < > = values in this interval not displayed.      Coagulation:   No results for input(s): "PT", "INR", "APTT" in the last 168 hours.  LDH:  No results for input(s): "LDH" in the last 72 hours.  Microbiology:  Microbiology Results (last 7 days)       ** No results found for the last 168 hours. **            I have reviewed all pertinent labs within the past 24 hours.    Estimated Creatinine Clearance: 52.1 mL/min (A) (based on SCr of 2 mg/dL (H)).    Assessment and Plan:     * Acute on chronic combined systolic and diastolic heart failure  -Non-ischemic cardiomyopathy; Last [facility] under media with non-obstructive cardiomyopathy  -Last [facility] (2/27/2024): RA:15, RV:45/8, PA:47/23 (35), PWP:28, CO:4.2, CI: 2.1, SVR 1570  -Last [facility] (2/27/2024): Non-obstructive   -Started on Lasix and afterload reduction with Hydralazine/Isordil which appear to have improved hemodynamics.   -Bedside echo with EF 10-15%, mod-severe TR, PASP 48 mmHg, CVP 15  -Formal echo pending  -Plan to start evaluation for advanced options this week. CT once closer to euvolemic    AUDELIA (acute kidney injury)  - Unclear baseline renal function  - Has not improved with diuresis  - Continue to avoid any nephrotoxic agents  - Monitor urine output and renal function  - Has been on lasix gtt may be difficult to interpert urine studies. Check renal U/S if no improvement    Primary hypertension  - Continue with Hydralazine 50 mg, TID and Isordil 20 mg, TID  - Continue to monitor BP        Radha Ledbetter MD  Heart Transplant  [facility] - Cardiac Intensive Care    "

## 2024-05-13 LAB
ABO + RH BLD: NORMAL
ALBUMIN SERPL BCP-MCNC: 3.3 G/DL (ref 3.5–5.2)
ALLENS TEST: ABNORMAL
ALLENS TEST: ABNORMAL
ALP SERPL-CCNC: 133 U/L (ref 55–135)
ALT SERPL W/O P-5'-P-CCNC: 42 U/L (ref 10–44)
ANION GAP SERPL CALC-SCNC: 11 MMOL/L (ref 8–16)
ANION GAP SERPL CALC-SCNC: 6 MMOL/L (ref 8–16)
ASCENDING AORTA: 3.4 CM
AST SERPL-CCNC: 31 U/L (ref 10–40)
AV INDEX (PROSTH): 0.64
AV MEAN GRADIENT: 2 MMHG
AV PEAK GRADIENT: 4 MMHG
AV VALVE AREA BY VELOCITY RATIO: 3.07 CM²
AV VALVE AREA: 2.95 CM²
AV VELOCITY RATIO: 0.66
BASOPHILS # BLD AUTO: 0.04 K/UL (ref 0–0.2)
BASOPHILS NFR BLD: 0.5 % (ref 0–1.9)
BILIRUB SERPL-MCNC: 1.1 MG/DL (ref 0.1–1)
BLD GP AB SCN CELLS X3 SERPL QL: NORMAL
BSA FOR ECHO PROCEDURE: 1.92 M2
BUN SERPL-MCNC: 16 MG/DL (ref 6–20)
BUN SERPL-MCNC: 16 MG/DL (ref 6–20)
CALCIUM SERPL-MCNC: 9.2 MG/DL (ref 8.7–10.5)
CALCIUM SERPL-MCNC: 9.4 MG/DL (ref 8.7–10.5)
CHLORIDE SERPL-SCNC: 100 MMOL/L (ref 95–110)
CHLORIDE SERPL-SCNC: 107 MMOL/L (ref 95–110)
CO2 SERPL-SCNC: 23 MMOL/L (ref 23–29)
CO2 SERPL-SCNC: 25 MMOL/L (ref 23–29)
CREAT SERPL-MCNC: 1.9 MG/DL (ref 0.5–1.4)
CREAT SERPL-MCNC: 2 MG/DL (ref 0.5–1.4)
CV ECHO LV RWT: 0.26 CM
DELSYS: ABNORMAL
DIFFERENTIAL METHOD BLD: ABNORMAL
DOP CALC AO PEAK VEL: 0.98 M/S
DOP CALC AO VTI: 14.85 CM
DOP CALC LVOT AREA: 4.6 CM2
DOP CALC LVOT DIAMETER: 2.43 CM
DOP CALC LVOT PEAK VEL: 0.65 M/S
DOP CALC LVOT STROKE VOLUME: 43.8 CM3
DOP CALCLVOT PEAK VEL VTI: 9.45 CM
E WAVE DECELERATION TIME: 167.23 MSEC
E/A RATIO: 1.47
E/E' RATIO: 12.5 M/S
ECHO LV POSTERIOR WALL: 0.98 CM (ref 0.6–1.1)
EOSINOPHIL # BLD AUTO: 0 K/UL (ref 0–0.5)
EOSINOPHIL NFR BLD: 0.3 % (ref 0–8)
ERYTHROCYTE [DISTWIDTH] IN BLOOD BY AUTOMATED COUNT: 16.9 % (ref 11.5–14.5)
ERYTHROCYTE [SEDIMENTATION RATE] IN BLOOD BY WESTERGREN METHOD: 16 MM/H
EST. GFR  (NO RACE VARIABLE): 41.9 ML/MIN/1.73 M^2
EST. GFR  (NO RACE VARIABLE): 44.6 ML/MIN/1.73 M^2
ESTIMATED AVG GLUCOSE: 128 MG/DL (ref 68–131)
FIO2: 21
FRACTIONAL SHORTENING: 5 % (ref 28–44)
GLUCOSE SERPL-MCNC: 103 MG/DL (ref 70–110)
GLUCOSE SERPL-MCNC: 112 MG/DL (ref 70–110)
HBA1C MFR BLD: 6.1 % (ref 4–5.6)
HCT VFR BLD AUTO: 39.4 % (ref 40–54)
HGB BLD-MCNC: 11.8 G/DL (ref 14–18)
IMM GRANULOCYTES # BLD AUTO: 0.02 K/UL (ref 0–0.04)
IMM GRANULOCYTES NFR BLD AUTO: 0.2 % (ref 0–0.5)
INTERVENTRICULAR SEPTUM: 0.72 CM (ref 0.6–1.1)
LA MAJOR: 6.84 CM
LA MINOR: 7.77 CM
LA WIDTH: 6 CM
LEFT ATRIUM SIZE: 4.62 CM
LEFT ATRIUM VOLUME INDEX MOD: 66.8 ML/M2
LEFT ATRIUM VOLUME INDEX: 89.3 ML/M2
LEFT ATRIUM VOLUME MOD: 128.32 CM3
LEFT ATRIUM VOLUME: 171.42 CM3
LEFT INTERNAL DIMENSION IN SYSTOLE: 7.12 CM (ref 2.1–4)
LEFT VENTRICLE DIASTOLIC VOLUME INDEX: 153.71 ML/M2
LEFT VENTRICLE DIASTOLIC VOLUME: 295.13 ML
LEFT VENTRICLE MASS INDEX: 153 G/M2
LEFT VENTRICLE SYSTOLIC VOLUME INDEX: 138.1 ML/M2
LEFT VENTRICLE SYSTOLIC VOLUME: 265.11 ML
LEFT VENTRICULAR INTERNAL DIMENSION IN DIASTOLE: 7.46 CM (ref 3.5–6)
LEFT VENTRICULAR MASS: 294.64 G
LV LATERAL E/E' RATIO: 12.5 M/S
LV SEPTAL E/E' RATIO: 12.5 M/S
LYMPHOCYTES # BLD AUTO: 2.5 K/UL (ref 1–4.8)
LYMPHOCYTES NFR BLD: 27.6 % (ref 18–48)
MAGNESIUM SERPL-MCNC: 2.3 MG/DL (ref 1.6–2.6)
MCH RBC QN AUTO: 23 PG (ref 27–31)
MCHC RBC AUTO-ENTMCNC: 29.9 G/DL (ref 32–36)
MCV RBC AUTO: 77 FL (ref 82–98)
MODE: ABNORMAL
MONOCYTES # BLD AUTO: 1.3 K/UL (ref 0.3–1)
MONOCYTES NFR BLD: 14.3 % (ref 4–15)
MV PEAK A VEL: 0.51 M/S
MV PEAK E VEL: 0.75 M/S
MV STENOSIS PRESSURE HALF TIME: 48.5 MS
MV VALVE AREA P 1/2 METHOD: 4.54 CM2
NEUTROPHILS # BLD AUTO: 5.1 K/UL (ref 1.8–7.7)
NEUTROPHILS NFR BLD: 57.1 % (ref 38–73)
NRBC BLD-RTO: 0 /100 WBC
OHS CV RV/LV RATIO: 0.64 CM
OHS LV EJECTION FRACTION SIMPSONS BIPLANE MOD: 15 %
PHOSPHATE SERPL-MCNC: 4.7 MG/DL (ref 2.7–4.5)
PISA MRMAX VEL: 0.05 M/S
PISA TR MAX VEL: 2.97 M/S
PLATELET # BLD AUTO: 457 K/UL (ref 150–450)
PMV BLD AUTO: 8.4 FL (ref 9.2–12.9)
PO2 BLDA: 29 MMHG (ref 40–60)
PO2 BLDA: 35 MMHG (ref 40–60)
POC SATURATED O2: 55 % (ref 95–100)
POC SATURATED O2: 69 % (ref 95–100)
POTASSIUM SERPL-SCNC: 4.3 MMOL/L (ref 3.5–5.1)
POTASSIUM SERPL-SCNC: 5.1 MMOL/L (ref 3.5–5.1)
PROT SERPL-MCNC: 7.3 G/DL (ref 6–8.4)
RA MAJOR: 6.28 CM
RA PRESSURE ESTIMATED: 8 MMHG
RA WIDTH: 5.28 CM
RBC # BLD AUTO: 5.13 M/UL (ref 4.6–6.2)
RIGHT VENTRICLE FREE WALL STRAIN: 12 %
RIGHT VENTRICULAR END-DIASTOLIC DIMENSION: 4.78 CM
RV TB RVSP: 11 MMHG
SAMPLE: ABNORMAL
SAMPLE: ABNORMAL
SINUS: 3.21 CM
SITE: ABNORMAL
SITE: ABNORMAL
SODIUM SERPL-SCNC: 136 MMOL/L (ref 136–145)
SODIUM SERPL-SCNC: 136 MMOL/L (ref 136–145)
SP02: 96
SPECIMEN OUTDATE: NORMAL
STJ: 2.53 CM
TDI LATERAL: 0.06 M/S
TDI SEPTAL: 0.06 M/S
TDI: 0.06 M/S
TR MAX PG: 35 MMHG
TRICUSPID ANNULAR PLANE SYSTOLIC EXCURSION: 1 CM
TV REST PULMONARY ARTERY PRESSURE: 43 MMHG
WBC # BLD AUTO: 8.88 K/UL (ref 3.9–12.7)
Z-SCORE OF LEFT VENTRICULAR DIMENSION IN END DIASTOLE: 3.2
Z-SCORE OF LEFT VENTRICULAR DIMENSION IN END SYSTOLE: 5.99

## 2024-05-13 PROCEDURE — 25000003 PHARM REV CODE 250: Mod: NTX | Performed by: STUDENT IN AN ORGANIZED HEALTH CARE EDUCATION/TRAINING PROGRAM

## 2024-05-13 PROCEDURE — 25000003 PHARM REV CODE 250: Mod: NTX | Performed by: REGISTERED NURSE

## 2024-05-13 PROCEDURE — 99900035 HC TECH TIME PER 15 MIN (STAT): Mod: NTX

## 2024-05-13 PROCEDURE — 25000003 PHARM REV CODE 250: Mod: NTX | Performed by: INTERNAL MEDICINE

## 2024-05-13 PROCEDURE — C1751 CATH, INF, PER/CENT/MIDLINE: HCPCS | Mod: NTX | Performed by: INTERNAL MEDICINE

## 2024-05-13 PROCEDURE — 93451 RIGHT HEART CATH: CPT | Mod: 26,NTX,, | Performed by: INTERNAL MEDICINE

## 2024-05-13 PROCEDURE — 80048 BASIC METABOLIC PNL TOTAL CA: CPT | Mod: NTX,XB | Performed by: STUDENT IN AN ORGANIZED HEALTH CARE EDUCATION/TRAINING PROGRAM

## 2024-05-13 PROCEDURE — C1894 INTRO/SHEATH, NON-LASER: HCPCS | Mod: NTX | Performed by: INTERNAL MEDICINE

## 2024-05-13 PROCEDURE — 83735 ASSAY OF MAGNESIUM: CPT | Mod: NTX | Performed by: INTERNAL MEDICINE

## 2024-05-13 PROCEDURE — 82803 BLOOD GASES ANY COMBINATION: CPT | Mod: NTX

## 2024-05-13 PROCEDURE — 86850 RBC ANTIBODY SCREEN: CPT | Mod: NTX | Performed by: REGISTERED NURSE

## 2024-05-13 PROCEDURE — 83036 HEMOGLOBIN GLYCOSYLATED A1C: CPT | Mod: NTX | Performed by: STUDENT IN AN ORGANIZED HEALTH CARE EDUCATION/TRAINING PROGRAM

## 2024-05-13 PROCEDURE — 93451 RIGHT HEART CATH: CPT | Mod: NTX | Performed by: INTERNAL MEDICINE

## 2024-05-13 PROCEDURE — 20600001 HC STEP DOWN PRIVATE ROOM: Mod: NTX

## 2024-05-13 PROCEDURE — 85025 COMPLETE CBC W/AUTO DIFF WBC: CPT | Mod: NTX | Performed by: INTERNAL MEDICINE

## 2024-05-13 PROCEDURE — 84100 ASSAY OF PHOSPHORUS: CPT | Mod: NTX | Performed by: INTERNAL MEDICINE

## 2024-05-13 PROCEDURE — 36415 COLL VENOUS BLD VENIPUNCTURE: CPT | Mod: NTX | Performed by: INTERNAL MEDICINE

## 2024-05-13 PROCEDURE — 80053 COMPREHEN METABOLIC PANEL: CPT | Mod: NTX | Performed by: REGISTERED NURSE

## 2024-05-13 PROCEDURE — 94761 N-INVAS EAR/PLS OXIMETRY MLT: CPT | Mod: NTX

## 2024-05-13 PROCEDURE — 4A023N6 MEASUREMENT OF CARDIAC SAMPLING AND PRESSURE, RIGHT HEART, PERCUTANEOUS APPROACH: ICD-10-PCS | Performed by: INTERNAL MEDICINE

## 2024-05-13 PROCEDURE — 63600175 PHARM REV CODE 636 W HCPCS: Mod: NTX | Performed by: STUDENT IN AN ORGANIZED HEALTH CARE EDUCATION/TRAINING PROGRAM

## 2024-05-13 RX ORDER — LIDOCAINE HYDROCHLORIDE 20 MG/ML
INJECTION, SOLUTION INFILTRATION; PERINEURAL
Status: DISCONTINUED | OUTPATIENT
Start: 2024-05-13 | End: 2024-05-13 | Stop reason: HOSPADM

## 2024-05-13 RX ORDER — DOBUTAMINE HYDROCHLORIDE 400 MG/100ML
5 INJECTION INTRAVENOUS CONTINUOUS
Status: DISCONTINUED | OUTPATIENT
Start: 2024-05-13 | End: 2024-05-18 | Stop reason: HOSPADM

## 2024-05-13 RX ADMIN — SUCRALFATE 1 G: 1 SUSPENSION ORAL at 05:05

## 2024-05-13 RX ADMIN — BUTALBITAL, ACETAMINOPHEN, AND CAFFEINE 1 TABLET: 50; 325; 40 TABLET ORAL at 09:05

## 2024-05-13 RX ADMIN — ISOSORBIDE DINITRATE 20 MG: 20 TABLET ORAL at 10:05

## 2024-05-13 RX ADMIN — ALUMINUM HYDROXIDE, MAGNESIUM HYDROXIDE, AND SIMETHICONE 30 ML: 1200; 120; 1200 SUSPENSION ORAL at 09:05

## 2024-05-13 RX ADMIN — ALUMINUM HYDROXIDE, MAGNESIUM HYDROXIDE, AND SIMETHICONE 30 ML: 1200; 120; 1200 SUSPENSION ORAL at 05:05

## 2024-05-13 RX ADMIN — ALUMINUM HYDROXIDE, MAGNESIUM HYDROXIDE, AND SIMETHICONE 30 ML: 1200; 120; 1200 SUSPENSION ORAL at 11:05

## 2024-05-13 RX ADMIN — ATORVASTATIN CALCIUM 40 MG: 40 TABLET, FILM COATED ORAL at 09:05

## 2024-05-13 RX ADMIN — DOBUTAMINE HYDROCHLORIDE 2.5 MCG/KG/MIN: 400 INJECTION INTRAVENOUS at 09:05

## 2024-05-13 RX ADMIN — POTASSIUM CHLORIDE 40 MEQ: 1500 TABLET, EXTENDED RELEASE ORAL at 02:05

## 2024-05-13 RX ADMIN — MUPIROCIN: 20 OINTMENT TOPICAL at 09:05

## 2024-05-13 RX ADMIN — VALSARTAN 20 MG: 40 TABLET, FILM COATED ORAL at 10:05

## 2024-05-13 RX ADMIN — HYDRALAZINE HYDROCHLORIDE 50 MG: 50 TABLET ORAL at 05:05

## 2024-05-13 RX ADMIN — ISOSORBIDE DINITRATE 20 MG: 20 TABLET ORAL at 02:05

## 2024-05-13 RX ADMIN — SUCRALFATE 1 G: 1 SUSPENSION ORAL at 11:05

## 2024-05-13 RX ADMIN — METHOCARBAMOL 500 MG: 500 TABLET ORAL at 02:05

## 2024-05-13 RX ADMIN — POTASSIUM CHLORIDE 40 MEQ: 1500 TABLET, EXTENDED RELEASE ORAL at 09:05

## 2024-05-13 RX ADMIN — METHOCARBAMOL 500 MG: 500 TABLET ORAL at 09:05

## 2024-05-13 RX ADMIN — HYDRALAZINE HYDROCHLORIDE 50 MG: 50 TABLET ORAL at 02:05

## 2024-05-13 RX ADMIN — OXYCODONE HYDROCHLORIDE 10 MG: 10 TABLET ORAL at 03:05

## 2024-05-13 RX ADMIN — SPIRONOLACTONE 25 MG: 25 TABLET ORAL at 09:05

## 2024-05-13 RX ADMIN — ALPRAZOLAM 0.5 MG: 0.5 TABLET ORAL at 10:05

## 2024-05-13 RX ADMIN — ISOSORBIDE DINITRATE 20 MG: 20 TABLET ORAL at 09:05

## 2024-05-13 RX ADMIN — HYDRALAZINE HYDROCHLORIDE 50 MG: 50 TABLET ORAL at 10:05

## 2024-05-13 RX ADMIN — PANTOPRAZOLE SODIUM 20 MG: 20 TABLET, DELAYED RELEASE ORAL at 09:05

## 2024-05-13 RX ADMIN — LOSARTAN POTASSIUM 25 MG: 25 TABLET, FILM COATED ORAL at 09:05

## 2024-05-13 RX ADMIN — BUTALBITAL, ACETAMINOPHEN, AND CAFFEINE 1 TABLET: 50; 325; 40 TABLET ORAL at 03:05

## 2024-05-13 NOTE — ASSESSMENT & PLAN NOTE
- Unclear baseline renal function  - Has not improved with diuresis  - Continue to avoid any nephrotoxic agents  - Monitor urine output and renal function  -Check renal U/S if no improvement  -BMPs daily

## 2024-05-13 NOTE — SUBJECTIVE & OBJECTIVE
Interval History:   No acute overnight events. RHC today showed RAP, 6, PA 40/20, PCWP 18, CO/CI 3.5/1.8. SVR 2097. ECHO with EF 15 %, mild RV dysfxn. Mod MR/TR, LVEDD 7.4cm. Will continue titration of GDMT as BP allows and start advance options pathway.       Lines:  - R.IJ (5/10--)    Scheduled Meds:   aluminum-magnesium hydroxide-simethicone  30 mL Oral QID (AC & HS)    atorvastatin  40 mg Oral Daily    hydrALAZINE  50 mg Oral Q8H    isosorbide dinitrate  20 mg Oral TID    methocarbamoL  500 mg Oral TID    mupirocin   Nasal BID    pantoprazole  20 mg Oral Daily    potassium chloride  40 mEq Oral TID    spironolactone  25 mg Oral Daily    sucralfate  1 g Oral Q6H     PRN Meds:  Current Facility-Administered Medications:     ALPRAZolam, 0.5 mg, Oral, Nightly PRN    butalbital-acetaminophen-caffeine -40 mg, 1 tablet, Oral, Q4H PRN    melatonin, 3 mg, Oral, Nightly PRN    oxyCODONE, 10 mg, Oral, Q6H PRN    sodium chloride 0.9%, 10 mL, Intravenous, PRN    Review of patient's allergies indicates:  No Known Allergies  Objective:     Vital Signs (Most Recent):  Temp: 98.4 °F (36.9 °C) (05/13/24 1200)  Pulse: (!) 111 (05/13/24 1416)  Resp: 18 (05/13/24 1200)  BP: (!) 99/50 (05/13/24 1200)  SpO2: 98 % (05/13/24 1416) Vital Signs (24h Range):  Temp:  [98 °F (36.7 °C)-98.8 °F (37.1 °C)] 98.4 °F (36.9 °C)  Pulse:  [100-125] 111  Resp:  [12-27] 18  SpO2:  [90 %-99 %] 98 %  BP: ()/(50-80) 99/50     Patient Vitals for the past 72 hrs (Last 3 readings):   Weight   05/13/24 0900 76 kg (167 lb 8.8 oz)   05/13/24 0400 76.2 kg (168 lb 1.6 oz)   05/10/24 1820 85.5 kg (188 lb 7.9 oz)     Body mass index is 24.74 kg/m².      Intake/Output Summary (Last 24 hours) at 5/13/2024 1421  Last data filed at 5/13/2024 1314  Gross per 24 hour   Intake 1256.88 ml   Output 975 ml   Net 281.88 ml            Physical Exam  Constitutional:       Appearance: Normal appearance.   HENT:      Head: Atraumatic.   Eyes:      Conjunctiva/sclera:  "Conjunctivae normal.   Cardiovascular:      Rate and Rhythm: Normal rate and regular rhythm.      Heart sounds: No murmur heard.  Pulmonary:      Effort: Pulmonary effort is normal.      Breath sounds: Normal breath sounds. No wheezing.   Abdominal:      General: There is no distension.      Palpations: Abdomen is soft.      Tenderness: There is no abdominal tenderness.   Skin:     General: Skin is warm.   Neurological:      General: No focal deficit present.      Mental Status: He is alert.            Significant Labs:  CBC:  Recent Labs   Lab 05/11/24  0406 05/12/24  0140 05/13/24  0255   WBC 5.98 6.76 8.88   RBC 4.18* 4.69 5.13   HGB 9.7* 10.9* 11.8*   HCT 32.3* 35.8* 39.4*    437 457*   MCV 77* 76* 77*   MCH 23.2* 23.2* 23.0*   MCHC 30.0* 30.4* 29.9*     BNP:  Recent Labs   Lab 05/10/24  0957 05/10/24  1510   BNP 2,065* 1,991*     CMP:  Recent Labs   Lab 05/10/24  0957 05/10/24  1510 05/10/24  2308 05/11/24  0406 05/12/24  0140 05/12/24  1103 05/13/24  0255   GLU 74 85 109   < > 102 99 103   CALCIUM 9.2 8.9 8.9   < > 9.4 9.8 9.4   ALBUMIN 3.4* 3.5 3.2*  --   --   --   --    PROT 6.7 7.1 6.2  --   --   --   --     137 137   < > 139 138 136   K 4.1 4.7 4.1   < > 3.6 3.9 4.3   CO2 25 21* 25   < > 29 29 25    105 102   < > 99 99 100   BUN 25* 25* 23*   < > 16 14 16   CREATININE 2.2* 2.1* 2.1*   < > 2.0* 1.8* 2.0*   ALKPHOS 104 113 94  --   --   --   --    ALT 66* 69* 58*  --   --   --   --    AST 43* 46* 45*  --   --   --   --    BILITOT 0.9 1.0 1.1*  --   --   --   --     < > = values in this interval not displayed.      Coagulation:   No results for input(s): "PT", "INR", "APTT" in the last 168 hours.  LDH:  No results for input(s): "LDH" in the last 72 hours.  Microbiology:  Microbiology Results (last 7 days)       ** No results found for the last 168 hours. **            I have reviewed all pertinent labs within the past 24 hours.    Estimated Creatinine Clearance: 48.1 mL/min (A) (based on " SCr of 2 mg/dL (H)).

## 2024-05-13 NOTE — CARE UPDATE
Hemodynamics:    CVP 1  SVO2 62  CO 6.0  CI 3.0      Drips: none      Plan: Continue same plan, off diuresis.               Discussed with staff,       Jack Santiago MD  Cardiology fellow

## 2024-05-13 NOTE — PLAN OF CARE
"Cardiac ICU Care Plan    POC reviewed with Ayad Edmond and family. Questions and concerns addressed. No acute events today. Pt progressing toward goals. Will continue to monitor. See below and flowsheets for full assessment and VS info.     CVP: 1,4,4  Svo2: 62      Neuro:  Wren Coma Scale  Best Eye Response: 4-->(E4) spontaneous  Best Motor Response: 6-->(M6) obeys commands  Best Verbal Response: 5-->(V5) oriented  Wren Coma Scale Score: 15  Assessment Qualifiers: patient not sedated/intubated  Pupil PERRLA: yes    24 hr Temp:  [98 °F (36.7 °C)-98.8 °F (37.1 °C)]      CV:  Rhythm: sinus tachycardia   DVT prophylaxis: VTE Required Core Measure: Pharmacological prophylaxis initiated/maintained    CVP (mean): 4 mmHg (05/12/24 2301)       SVO2 (%): (S) 66 % (05/12/24 0830)               Pulses  Right Radial Pulse: 2+ (normal)  Left Radial Pulse: 2+ (normal)  Right Dorsalis Pedis Pulse: 2+ (normal)  Left Dorsalis Pedis Pulse: 2+ (normal)  Right Posterior Tibial Pulse: 2+ (normal)  Left Posterior Tibial Pulse: 2+ (normal)    Resp:  Flow (L/min) (Oxygen Therapy): 2       GI/:  GI prophylaxis: yes  Diet/Nutrition Received: consistent carb/diabetic diet, restrict fluids  Last Bowel Movement: 05/10/24      Intake/Output Summary (Last 24 hours) at 5/12/2024 2313  Last data filed at 5/12/2024 2301  Gross per 24 hour   Intake 1527.22 ml   Output 3745 ml   Net -2217.78 ml          Labs/Accuchecks:  Recent Labs   Lab 05/10/24  1510 05/11/24  0406 05/12/24  0140   WBC 5.31 5.98 6.76   RBC 4.76 4.18* 4.69   HGB 11.0* 9.7* 10.9*   HCT 37.3* 32.3* 35.8*   * 411 437    No results for input(s): "PT", "INR", "APTT" in the last 168 hours.   Recent Labs     05/10/24  2308 05/11/24  0406 05/12/24  1103      < > 138   K 4.1   < > 3.9   CO2 25   < > 29      < > 99   BUN 23*   < > 14   CREATININE 2.1*   < > 1.8*   ALKPHOS 94  --   --    ALT 58*  --   --    AST 45*  --   --    BILITOT 1.1*  --   --     < > = " values in this interval not displayed.       Recent Labs   Lab 05/10/24  1510   TROPONINI 0.032*      Recent Labs     05/10/24  1725 05/10/24  2008 05/10/24  2309 05/11/24  0856 05/11/24 2017 05/12/24  0836 05/12/24  1954   PH 7.380 7.392 7.386  --   --   --   --    PCO2 42.0 40.3 44.3  --   --   --   --    PO2 24* 30* 27*   < > 32* 34* 32*   HCO3 24.8 24.6 26.6  --   --   --   --    POCSATURATED 41 56 50   < > 62 66 62   BE 0 0 2  --   --   --   --     < > = values in this interval not displayed.       Electrolytes: N/A - electrolytes WDL  Accuchecks: none    Gtts/LDAs:      Lines/Drains/Airways       Central Venous Catheter Line  Duration             Percutaneous Central Line - Triple Lumen  05/10/24 1700 Internal Jugular Right 2 days              Peripheral Intravenous Line  Duration                  Peripheral IV - Single Lumen 05/10/24 1511 20 G Right Antecubital 2 days                    Skin/Wounds  Bathing/Skin Care: electrode patches/site rotation (05/12/24 0301)  Wounds: No  Wound care consulted: No   Problem: Infection  Goal: Absence of Infection Signs and Symptoms  Outcome: Progressing     Problem: Adult Inpatient Plan of Care  Goal: Absence of Hospital-Acquired Illness or Injury  Outcome: Progressing

## 2024-05-13 NOTE — PROGRESS NOTES
[facility] - Cardiac Intensive Care  Heart Transplant  Progress Note    Patient Name: Ayad Edmond  MRN: [id number]  Admission Date: 5/10/2024  Hospital Length of Stay: 3 days  Attending Physician: Nicole Chua MD  Primary Care Provider: Zehra Davies MD  Principal Problem:Acute on chronic combined systolic and diastolic heart failure    Subjective:   Interval History:   No acute overnight events. RHC today showed RAP, 6, PA 40/20, PCWP 18, CO/CI 3.5/1.8. SVR 2097. ECHO with EF 15 %, mild RV dysfxn. Mod MR/TR, LVEDD 7.4cm. Will continue titration of GDMT as BP allows and start advance options pathway.       Lines:  - R.IJ (5/10--)    Scheduled Meds:   aluminum-magnesium hydroxide-simethicone  30 mL Oral QID (AC & HS)    atorvastatin  40 mg Oral Daily    hydrALAZINE  50 mg Oral Q8H    isosorbide dinitrate  20 mg Oral TID    methocarbamoL  500 mg Oral TID    mupirocin   Nasal BID    pantoprazole  20 mg Oral Daily    potassium chloride  40 mEq Oral TID    spironolactone  25 mg Oral Daily    sucralfate  1 g Oral Q6H     PRN Meds:  Current Facility-Administered Medications:     ALPRAZolam, 0.5 mg, Oral, Nightly PRN    butalbital-acetaminophen-caffeine -40 mg, 1 tablet, Oral, Q4H PRN    melatonin, 3 mg, Oral, Nightly PRN    oxyCODONE, 10 mg, Oral, Q6H PRN    sodium chloride 0.9%, 10 mL, Intravenous, PRN    Review of patient's allergies indicates:  No Known Allergies  Objective:     Vital Signs (Most Recent):  Temp: 98.4 °F (36.9 °C) (05/13/24 1200)  Pulse: (!) 111 (05/13/24 1416)  Resp: 18 (05/13/24 1200)  BP: (!) 99/50 (05/13/24 1200)  SpO2: 98 % (05/13/24 1416) Vital Signs (24h Range):  Temp:  [98 °F (36.7 °C)-98.8 °F (37.1 °C)] 98.4 °F (36.9 °C)  Pulse:  [100-125] 111  Resp:  [12-27] 18  SpO2:  [90 %-99 %] 98 %  BP: ()/(50-80) 99/50     Patient Vitals for the past 72 hrs (Last 3 readings):   Weight   05/13/24 0900 76 kg (167 lb 8.8 oz)   05/13/24 0400 76.2 kg (168 lb 1.6 oz)   05/10/24 1820 85.5 kg  (188 lb 7.9 oz)     Body mass index is 24.74 kg/m².      Intake/Output Summary (Last 24 hours) at 5/13/2024 1421  Last data filed at 5/13/2024 1314  Gross per 24 hour   Intake 1256.88 ml   Output 975 ml   Net 281.88 ml            Physical Exam  Constitutional:       Appearance: Normal appearance.   HENT:      Head: Atraumatic.   Eyes:      Conjunctiva/sclera: Conjunctivae normal.   Cardiovascular:      Rate and Rhythm: Normal rate and regular rhythm.      Heart sounds: No murmur heard.  Pulmonary:      Effort: Pulmonary effort is normal.      Breath sounds: Normal breath sounds. No wheezing.   Abdominal:      General: There is no distension.      Palpations: Abdomen is soft.      Tenderness: There is no abdominal tenderness.   Skin:     General: Skin is warm.   Neurological:      General: No focal deficit present.      Mental Status: He is alert.            Significant Labs:  CBC:  Recent Labs   Lab 05/11/24  0406 05/12/24  0140 05/13/24  0255   WBC 5.98 6.76 8.88   RBC 4.18* 4.69 5.13   HGB 9.7* 10.9* 11.8*   HCT 32.3* 35.8* 39.4*    437 457*   MCV 77* 76* 77*   MCH 23.2* 23.2* 23.0*   MCHC 30.0* 30.4* 29.9*     BNP:  Recent Labs   Lab 05/10/24  0957 05/10/24  1510   BNP 2,065* 1,991*     CMP:  Recent Labs   Lab 05/10/24  0957 05/10/24  1510 05/10/24  2308 05/11/24  0406 05/12/24  0140 05/12/24  1103 05/13/24  0255   GLU 74 85 109   < > 102 99 103   CALCIUM 9.2 8.9 8.9   < > 9.4 9.8 9.4   ALBUMIN 3.4* 3.5 3.2*  --   --   --   --    PROT 6.7 7.1 6.2  --   --   --   --     137 137   < > 139 138 136   K 4.1 4.7 4.1   < > 3.6 3.9 4.3   CO2 25 21* 25   < > 29 29 25    105 102   < > 99 99 100   BUN 25* 25* 23*   < > 16 14 16   CREATININE 2.2* 2.1* 2.1*   < > 2.0* 1.8* 2.0*   ALKPHOS 104 113 94  --   --   --   --    ALT 66* 69* 58*  --   --   --   --    AST 43* 46* 45*  --   --   --   --    BILITOT 0.9 1.0 1.1*  --   --   --   --     < > = values in this interval not displayed.      Coagulation:   No  "results for input(s): "PT", "INR", "APTT" in the last 168 hours.  LDH:  No results for input(s): "LDH" in the last 72 hours.  Microbiology:  Microbiology Results (last 7 days)       ** No results found for the last 168 hours. **            I have reviewed all pertinent labs within the past 24 hours.    Estimated Creatinine Clearance: 48.1 mL/min (A) (based on SCr of 2 mg/dL (H)).    Assessment and Plan:     Mr. Ayad Edmond is a 44 year old male with past medical history of:  NICMP, EF 30-35% on prior Echo (thought to be 10-15% on LV gram during [facility])  HTN  CKD    Who presented to [facility] ER on 5/10/24 at the behest of his heart failure specialist Dr. Mack due to shortness of breath for the past several days and cool extremities on exam.     Per Dr. Mack's H&P: "When talking with him, he says that his cardiac history goes back to approximately 2012.  At that time he was told that he had hypertension, and he was on regular antihypertensive therapy.  However had no major cardiac issues until January of this year when he presented with shortness of breath and was found to have new heart failure with reduced ejection fraction.  As noted above, he had a coronary angiogram done which demonstrated nonobstructive coronary artery disease, but the LV gram demonstrated a further drop in his ejection fraction at 10%.  He had a combined left and right heart catheterization, number as noted below.     At present he has not employed, previously working as a   which was a job that demanded him to be quite active. Not working now due to CHF symptoms. At present unfortunately he is quite short of breath with even minimal amounts of exertion.  He says that he can walk approximately 15-20 feet before he has to pause because of shortness of breath.  Denies any chest discomfort, but has been noticing worsening leg swelling over the past few days to weeks.  Has 3-4 pillow PND/orthopnea which recently has progressed to where " "he can only sleep upright in his truck.  Has episodes of palpitations, did have an episode of syncope in March of this past year.  Endorses recent abdominal swelling associated with early satiety, and poor oral intake.  Reduced appetite."    On interview in the ER, the patient confirmed this history. In ER, he was  hemodynamically stable with normal lactic acid. Cr 2.1 (baseline unclear but prior value of 1.7), mild elevation of AST/ALT. CXR with cardiomegaly and mild bilateral interstitial infiltrates. Bedside echo showed dilated LV, EF 10-15%, trivial circumferential pericardial effusion, moderate-severe TR, mild-moderate MR, mildly reduced RV systolic function, CVP 15, PASP 48 mmHg.     * Acute on chronic combined systolic and diastolic heart failure  -Non-ischemic cardiomyopathy; Last [facility] under media with non-obstructive cardiomyopathy  -Last [facility] (2/27/2024): RA:15, RV:45/8, PA:47/23 (35), PWP:28, CO:4.2, CI: 2.1, SVR 1570  -Last [facility] (2/27/2024): Non-obstructive   -Afterload reduction with Hydralazine/Isordil. Switch Losartan to Valsartan 20mg BID.   -CVP 6. Holding diuretics.   -RHC 5/13 showed RAP 6, PA 40/20, PCWP 18, CO/CI 3.5/1.8. SVR 2097.   -ECHO 5/13 showed  ECHO with EF 15 %, mild RV dysfxn. Mod MR/TR, LVEDD 7.4cm.  -Pathway started     AUDELIA (acute kidney injury)  - Unclear baseline renal function  - Has not improved with diuresis  - Continue to avoid any nephrotoxic agents  - Monitor urine output and renal function  -Check renal U/S if no improvement  -BMPs daily     Primary hypertension  - Continue with Hydralazine 50 mg, TID and Isordil 20 mg, TID. Start Valsartan 20mg BID   - Continue to monitor BP      .Uninterrupted Critical Care/Counseling Time (not including procedures): 60 mins     Holland Rodriguez NP  Heart Transplant  [facility] - Cardiac Intensive Care    "

## 2024-05-13 NOTE — INTERVAL H&P NOTE
The patient has been examined and the H&P has been reviewed:    I concur with the findings and no changes have occurred since H&P was written.        Active Hospital Problems    Diagnosis  POA    *Acute on chronic combined systolic and diastolic heart failure [I50.43]  Yes    AUDELIA (acute kidney injury) [N17.9]  Unknown    Primary hypertension [I10]  Yes      Resolved Hospital Problems   No resolved problems to display.

## 2024-05-13 NOTE — OP NOTE
Patient was brought to cath lab, draped, and prepped in the usual sterile fashion. Left IJ was accessed via modified seldinger technique with ultrasound guidance and guidewire was introduced into the IJ. Sequential dilation with micropuncture kit and sheath was performed. Wingate Bertin catheter was introduced via the sheath. Measurements were obtained, and sheath was removed. Patient tolerated the procedure well and discharged in stable condition.

## 2024-05-13 NOTE — ASSESSMENT & PLAN NOTE
-Non-ischemic cardiomyopathy; Last [facility] under media with non-obstructive cardiomyopathy  -Last RHC (2/27/2024): RA:15, RV:45/8, PA:47/23 (35), PWP:28, CO:4.2, CI: 2.1, SVR 1570  -Last [facility] (2/27/2024): Non-obstructive   -Afterload reduction with Hydralazine/Isordil. Switch Losartan to Valsartan 20mg BID.   -CVP 6. Holding diuretics.   -RHC 5/13 showed RAP 6, PA 40/20, PCWP 18, CO/CI 3.5/1.8. SVR 2097.   -ECHO 5/13 showed  ECHO with EF 15 %, mild RV dysfxn. Mod MR/TR, LVEDD 7.4cm.  -Pathway started

## 2024-05-13 NOTE — ASSESSMENT & PLAN NOTE
- Continue with Hydralazine 50 mg, TID and Isordil 20 mg, TID. Start Valsartan 20mg BID   - Continue to monitor BP

## 2024-05-13 NOTE — PT/OT/SLP PROGRESS
Physical Therapy      Patient Name:  Ayad Edmond   MRN:  [id number]    Patient not seen today secondary to Other (Comment) (on first attempt pt w/ 7/10 HA requesting to rest and allow pain meds to kick in; attempted an hour later w/ pt about to be transported [facility] to cath lab). Will follow-up as appropriate.  Kendra Wells, PT

## 2024-05-14 ENCOUNTER — TELEPHONE (OUTPATIENT)
Dept: TRANSPLANT | Facility: CLINIC | Age: 44
End: 2024-05-14
Payer: MEDICAID

## 2024-05-14 LAB
ALLENS TEST: ABNORMAL
ANION GAP SERPL CALC-SCNC: 11 MMOL/L (ref 8–16)
BASOPHILS # BLD AUTO: 0.06 K/UL (ref 0–0.2)
BASOPHILS NFR BLD: 0.7 % (ref 0–1.9)
BUN SERPL-MCNC: 14 MG/DL (ref 6–20)
CALCIUM SERPL-MCNC: 8.9 MG/DL (ref 8.7–10.5)
CHLORIDE SERPL-SCNC: 102 MMOL/L (ref 95–110)
CO2 SERPL-SCNC: 22 MMOL/L (ref 23–29)
CREAT SERPL-MCNC: 1.7 MG/DL (ref 0.5–1.4)
DIFFERENTIAL METHOD BLD: ABNORMAL
EOSINOPHIL # BLD AUTO: 0.1 K/UL (ref 0–0.5)
EOSINOPHIL NFR BLD: 1.1 % (ref 0–8)
ERYTHROCYTE [DISTWIDTH] IN BLOOD BY AUTOMATED COUNT: 16.8 % (ref 11.5–14.5)
EST. GFR  (NO RACE VARIABLE): 51 ML/MIN/1.73 M^2
GLUCOSE SERPL-MCNC: 79 MG/DL (ref 70–110)
HCT VFR BLD AUTO: 38.2 % (ref 40–54)
HGB BLD-MCNC: 11.6 G/DL (ref 14–18)
IMM GRANULOCYTES # BLD AUTO: 0.03 K/UL (ref 0–0.04)
IMM GRANULOCYTES NFR BLD AUTO: 0.4 % (ref 0–0.5)
LYMPHOCYTES # BLD AUTO: 2.3 K/UL (ref 1–4.8)
LYMPHOCYTES NFR BLD: 27 % (ref 18–48)
MAGNESIUM SERPL-MCNC: 2.4 MG/DL (ref 1.6–2.6)
MCH RBC QN AUTO: 23.1 PG (ref 27–31)
MCHC RBC AUTO-ENTMCNC: 30.4 G/DL (ref 32–36)
MCV RBC AUTO: 76 FL (ref 82–98)
MONOCYTES # BLD AUTO: 1.5 K/UL (ref 0.3–1)
MONOCYTES NFR BLD: 17.4 % (ref 4–15)
NEUTROPHILS # BLD AUTO: 4.5 K/UL (ref 1.8–7.7)
NEUTROPHILS NFR BLD: 53.4 % (ref 38–73)
NRBC BLD-RTO: 0 /100 WBC
PHOSPHATE SERPL-MCNC: 3.5 MG/DL (ref 2.7–4.5)
PLATELET # BLD AUTO: 437 K/UL (ref 150–450)
PMV BLD AUTO: 8.4 FL (ref 9.2–12.9)
PO2 BLDA: 39 MMHG (ref 40–60)
POC SATURATED O2: 74 % (ref 95–100)
POTASSIUM SERPL-SCNC: 4.4 MMOL/L (ref 3.5–5.1)
RBC # BLD AUTO: 5.03 M/UL (ref 4.6–6.2)
SAMPLE: ABNORMAL
SITE: ABNORMAL
SODIUM SERPL-SCNC: 135 MMOL/L (ref 136–145)
WBC # BLD AUTO: 8.37 K/UL (ref 3.9–12.7)

## 2024-05-14 PROCEDURE — 25000003 PHARM REV CODE 250: Mod: NTX | Performed by: REGISTERED NURSE

## 2024-05-14 PROCEDURE — 36573 INSJ PICC RS&I 5 YR+: CPT | Mod: NTX

## 2024-05-14 PROCEDURE — 85025 COMPLETE CBC W/AUTO DIFF WBC: CPT | Mod: NTX | Performed by: INTERNAL MEDICINE

## 2024-05-14 PROCEDURE — 97161 PT EVAL LOW COMPLEX 20 MIN: CPT | Mod: NTX

## 2024-05-14 PROCEDURE — 99233 SBSQ HOSP IP/OBS HIGH 50: CPT | Mod: NTX,,, | Performed by: REGISTERED NURSE

## 2024-05-14 PROCEDURE — 94761 N-INVAS EAR/PLS OXIMETRY MLT: CPT | Mod: NTX

## 2024-05-14 PROCEDURE — 99233 SBSQ HOSP IP/OBS HIGH 50: CPT | Mod: NTX,,, | Performed by: THORACIC SURGERY (CARDIOTHORACIC VASCULAR SURGERY)

## 2024-05-14 PROCEDURE — 76937 US GUIDE VASCULAR ACCESS: CPT | Mod: NTX

## 2024-05-14 PROCEDURE — 02HV33Z INSERTION OF INFUSION DEVICE INTO SUPERIOR VENA CAVA, PERCUTANEOUS APPROACH: ICD-10-PCS | Performed by: INTERNAL MEDICINE

## 2024-05-14 PROCEDURE — 84100 ASSAY OF PHOSPHORUS: CPT | Mod: NTX | Performed by: INTERNAL MEDICINE

## 2024-05-14 PROCEDURE — 99900035 HC TECH TIME PER 15 MIN (STAT): Mod: NTX

## 2024-05-14 PROCEDURE — 97165 OT EVAL LOW COMPLEX 30 MIN: CPT | Mod: NTX

## 2024-05-14 PROCEDURE — 25000003 PHARM REV CODE 250: Mod: NTX | Performed by: STUDENT IN AN ORGANIZED HEALTH CARE EDUCATION/TRAINING PROGRAM

## 2024-05-14 PROCEDURE — 83735 ASSAY OF MAGNESIUM: CPT | Mod: NTX | Performed by: INTERNAL MEDICINE

## 2024-05-14 PROCEDURE — 20600001 HC STEP DOWN PRIVATE ROOM: Mod: NTX

## 2024-05-14 PROCEDURE — C1751 CATH, INF, PER/CENT/MIDLINE: HCPCS | Mod: NTX

## 2024-05-14 PROCEDURE — 80048 BASIC METABOLIC PNL TOTAL CA: CPT | Mod: NTX | Performed by: INTERNAL MEDICINE

## 2024-05-14 PROCEDURE — 25000003 PHARM REV CODE 250: Mod: NTX | Performed by: INTERNAL MEDICINE

## 2024-05-14 RX ORDER — SODIUM CHLORIDE 0.9 % (FLUSH) 0.9 %
10 SYRINGE (ML) INJECTION
Status: DISCONTINUED | OUTPATIENT
Start: 2024-05-14 | End: 2024-05-18 | Stop reason: HOSPADM

## 2024-05-14 RX ORDER — SODIUM CHLORIDE 0.9 % (FLUSH) 0.9 %
10 SYRINGE (ML) INJECTION EVERY 6 HOURS
Status: DISCONTINUED | OUTPATIENT
Start: 2024-05-14 | End: 2024-05-18 | Stop reason: HOSPADM

## 2024-05-14 RX ORDER — POTASSIUM CHLORIDE 20 MEQ/1
40 TABLET, EXTENDED RELEASE ORAL DAILY
Status: DISCONTINUED | OUTPATIENT
Start: 2024-05-15 | End: 2024-05-18 | Stop reason: HOSPADM

## 2024-05-14 RX ADMIN — BUTALBITAL, ACETAMINOPHEN, AND CAFFEINE 1 TABLET: 50; 325; 40 TABLET ORAL at 10:05

## 2024-05-14 RX ADMIN — ATORVASTATIN CALCIUM 40 MG: 40 TABLET, FILM COATED ORAL at 09:05

## 2024-05-14 RX ADMIN — MUPIROCIN: 20 OINTMENT TOPICAL at 09:05

## 2024-05-14 RX ADMIN — ISOSORBIDE DINITRATE 20 MG: 20 TABLET ORAL at 08:05

## 2024-05-14 RX ADMIN — METHOCARBAMOL 500 MG: 500 TABLET ORAL at 09:05

## 2024-05-14 RX ADMIN — SUCRALFATE 1 G: 1 SUSPENSION ORAL at 05:05

## 2024-05-14 RX ADMIN — VALSARTAN 20 MG: 40 TABLET, FILM COATED ORAL at 09:05

## 2024-05-14 RX ADMIN — PANTOPRAZOLE SODIUM 20 MG: 20 TABLET, DELAYED RELEASE ORAL at 09:05

## 2024-05-14 RX ADMIN — BUTALBITAL, ACETAMINOPHEN, AND CAFFEINE 1 TABLET: 50; 325; 40 TABLET ORAL at 03:05

## 2024-05-14 RX ADMIN — OXYCODONE HYDROCHLORIDE 10 MG: 10 TABLET ORAL at 03:05

## 2024-05-14 RX ADMIN — HYDRALAZINE HYDROCHLORIDE 50 MG: 50 TABLET ORAL at 03:05

## 2024-05-14 RX ADMIN — POTASSIUM CHLORIDE 40 MEQ: 1500 TABLET, EXTENDED RELEASE ORAL at 09:05

## 2024-05-14 RX ADMIN — SPIRONOLACTONE 25 MG: 25 TABLET ORAL at 09:05

## 2024-05-14 RX ADMIN — HYDRALAZINE HYDROCHLORIDE 50 MG: 50 TABLET ORAL at 10:05

## 2024-05-14 RX ADMIN — ISOSORBIDE DINITRATE 20 MG: 20 TABLET ORAL at 05:05

## 2024-05-14 RX ADMIN — ALPRAZOLAM 0.5 MG: 0.5 TABLET ORAL at 10:05

## 2024-05-14 RX ADMIN — ISOSORBIDE DINITRATE 20 MG: 20 TABLET ORAL at 09:05

## 2024-05-14 NOTE — PROGRESS NOTES
[facility] - Cardiology Stepdown  Heart Transplant  Progress Note    Patient Name: Ayad Edmond  MRN: [id number]  Admission Date: 5/10/2024  Hospital Length of Stay: 4 days  Attending Physician: Samm Rhoades MD  Primary Care Provider: Zehra Davies MD  Principal Problem:Acute on chronic combined systolic and diastolic heart failure    Subjective:   Interval History:  started yesterday at 2.5 and then increased to 5. Hydralazine held overnight for soft BP.  Hemodynamics this AM: CVP 1, Sv02 74, CO/CI 6.8/3.5, . Will discontinue Valsartan today. Plan for PICC line placement.         Lines:  - R.IJ (5/10--)     DOBUTamine IV infusion (non-titrating)  5 mcg/kg/min Intravenous Continuous 5.7 mL/hr at 05/13/24 2152 5 mcg/kg/min at 05/13/24 2152     Scheduled Meds:   atorvastatin  40 mg Oral Daily    hydrALAZINE  50 mg Oral Q8H    isosorbide dinitrate  20 mg Oral TID    mupirocin   Nasal BID    pantoprazole  20 mg Oral Daily    [START ON 5/15/2024] potassium chloride  40 mEq Oral Daily    spironolactone  25 mg Oral Daily     PRN Meds:  Current Facility-Administered Medications:     ALPRAZolam, 0.5 mg, Oral, Nightly PRN    butalbital-acetaminophen-caffeine -40 mg, 1 tablet, Oral, Q4H PRN    melatonin, 3 mg, Oral, Nightly PRN    oxyCODONE, 10 mg, Oral, Q6H PRN    sodium chloride 0.9%, 10 mL, Intravenous, PRN    Review of patient's allergies indicates:  No Known Allergies  Objective:     Vital Signs (Most Recent):  Temp: 98.2 °F (36.8 °C) (05/14/24 1111)  Pulse: 96 (05/14/24 1125)  Resp: 18 (05/14/24 1111)  BP: (!) 88/55 (05/14/24 1111)  SpO2: 97 % (05/14/24 1111) Vital Signs (24h Range):  Temp:  [97.8 °F (36.6 °C)-98.3 °F (36.8 °C)] 98.2 °F (36.8 °C)  Pulse:  [] 96  Resp:  [16-20] 18  SpO2:  [95 %-99 %] 97 %  BP: ()/(53-78) 88/55     Patient Vitals for the past 72 hrs (Last 3 readings):   Weight   05/13/24 0900 76 kg (167 lb 8.8 oz)   05/13/24 0400 76.2 kg (168 lb 1.6 oz)     Body mass index  "is 24.74 kg/m².      Intake/Output Summary (Last 24 hours) at 5/14/2024 1225  Last data filed at 5/14/2024 1153  Gross per 24 hour   Intake 1508 ml   Output 1525 ml   Net -17 ml            Physical Exam  Constitutional:       Appearance: Normal appearance.   HENT:      Head: Atraumatic.   Eyes:      Conjunctiva/sclera: Conjunctivae normal.   Cardiovascular:      Rate and Rhythm: Normal rate and regular rhythm.      Heart sounds: No murmur heard.  Pulmonary:      Effort: Pulmonary effort is normal.      Breath sounds: Normal breath sounds. No wheezing.   Abdominal:      General: There is no distension.      Palpations: Abdomen is soft.      Tenderness: There is no abdominal tenderness.   Skin:     General: Skin is warm.   Neurological:      General: No focal deficit present.      Mental Status: He is alert.            Significant Labs:  CBC:  Recent Labs   Lab 05/12/24  0140 05/13/24  0255 05/14/24  0410   WBC 6.76 8.88 8.37   RBC 4.69 5.13 5.03   HGB 10.9* 11.8* 11.6*   HCT 35.8* 39.4* 38.2*    457* 437   MCV 76* 77* 76*   MCH 23.2* 23.0* 23.1*   MCHC 30.4* 29.9* 30.4*     BNP:  Recent Labs   Lab 05/10/24  0957 05/10/24  1510   BNP 2,065* 1,991*     CMP:  Recent Labs   Lab 05/10/24  1510 05/10/24  2308 05/11/24  0406 05/13/24  0255 05/13/24  1434 05/14/24  0410   GLU 85 109   < > 103 112* 79   CALCIUM 8.9 8.9   < > 9.4 9.2 8.9   ALBUMIN 3.5 3.2*  --   --  3.3*  --    PROT 7.1 6.2  --   --  7.3  --     137   < > 136 136 135*   K 4.7 4.1   < > 4.3 5.1 4.4   CO2 21* 25   < > 25 23 22*    102   < > 100 107 102   BUN 25* 23*   < > 16 16 14   CREATININE 2.1* 2.1*   < > 2.0* 1.9* 1.7*   ALKPHOS 113 94  --   --  133  --    ALT 69* 58*  --   --  42  --    AST 46* 45*  --   --  31  --    BILITOT 1.0 1.1*  --   --  1.1*  --     < > = values in this interval not displayed.      Coagulation:   No results for input(s): "PT", "INR", "APTT" in the last 168 hours.  LDH:  No results for input(s): "LDH" in the last " "72 hours.  Microbiology:  Microbiology Results (last 7 days)       ** No results found for the last 168 hours. **            I have reviewed all pertinent labs within the past 24 hours.    Estimated Creatinine Clearance: 56.6 mL/min (A) (based on SCr of 1.7 mg/dL (H)).    Assessment and Plan:     Mr. Ayad Edmond is a 44 year old male with past medical history of:  NICMP, EF 30-35% on prior Echo (thought to be 10-15% on LV gram during [facility])  HTN  CKD    Who [place] [facility] on [place]10/24 at the behest of his heart failure specialist Dr. Mack due to shortness of breath for the past several days and cool extremities on exam.     Per Dr. Mack's H&P: "When talking with him, he says that his cardiac history goes back to approximately 2012.  At that time he was told that he had hypertension, and he was on regular antihypertensive therapy.  However had no major cardiac issues until January of this year when he presented with shortness of breath and was found to have new heart failure with reduced ejection fraction.  As noted above, he had a coronary angiogram done which demonstrated nonobstructive coronary artery disease, but the LV gram demonstrated a further drop in his ejection fraction at 10%.  He had a combined left and right heart catheterization, number as noted below.     At present he has not employed, previously working as a   which was a job that demanded him to be quite active. Not working now due to CHF symptoms. At present unfortunately he is quite short of breath with even minimal amounts of exertion.  He says that he can walk approximately 15-20 feet before he has to pause because of shortness of breath.  Denies any chest discomfort, but has been noticing worsening leg swelling over the past few days to weeks.  Has 3-4 pillow PND/orthopnea which recently has progressed to where he can only sleep upright in his truck.  Has episodes of palpitations, did have an episode of syncope in March " "of this past year.  Endorses recent abdominal swelling associated with early satiety, and poor oral intake.  Reduced appetite."    On interview in the ER, the patient confirmed this history. In ER, he was  hemodynamically stable with normal lactic acid. Cr 2.1 (baseline unclear but prior value of 1.7), mild elevation of AST/ALT. CXR with cardiomegaly and mild bilateral interstitial infiltrates. Bedside echo showed dilated LV, EF 10-15%, trivial circumferential pericardial effusion, moderate-severe TR, mild-moderate MR, mildly reduced RV systolic function, CVP 15, PASP 48 mmHg.     * Acute on chronic combined systolic and diastolic heart failure  -Non-ischemic cardiomyopathy; Last LHC under media with non-obstructive cardiomyopathy  -Last RHC (2/27/2024): RA:15, RV:45/8, PA:47/23 (35), PWP:28, CO:4.2, CI: 2.1, SVR 1570  -Last C (2/27/2024): Non-obstructive   -Inotrope: Continue  5   -Afterload reduction with Hydralazine/Isordil. Stopped Valsartan for low BP. Will monitor closely.   -CVP 1. Holding diuretics.   -RHC 5/13 showed RAP 6, PA 40/20, PCWP 18, CO/CI 3.5/1.8. SVR 2097.   -ECHO 5/13 showed  ECHO with EF 15 %, mild RV dysfxn. Mod MR/TR, LVEDD 7.4cm.  -Pathway started-awaiting CTS eval.     AUDELIA (acute kidney injury)  - Unclear baseline renal function  - Has not improved with diuresis  - Continue to avoid any nephrotoxic agents  - Monitor urine output and renal function  -Check renal U/S if no improvement  -BMPs daily     Primary hypertension  - Continue with Hydralazine 50 mg, TID and Isordil 20 mg, TID.  - Continue to monitor BP        Holland Rodriguez NP  Heart Transplant  [facility] - Cardiology Stepdown    "

## 2024-05-14 NOTE — PROGRESS NOTES
Update:    SW received home health orders for home Dobutamine.  SW contacted [facility] and faxed orders. [phone] fax: [phone]  SW contacted [facility] and faxed referral.  [phone] Fax: [phone]  Pending acceptance with both agencies at this time.    SW did call referrals to [facility], [facility], [facility], [facility], and [facility].  None of these agencies could accept pt.    SW attempted to meet with pt to discuss dc planning, but he was having PICC placed at the time. SW will follow up at a later time.     Addendum 3:45pm  SW spoke with pt's spouse and updated on dc plans and need for home .  Updated on referrals made.  Pt's wife voiced understanding and agreement with dc plans. Possible dc tomorrow or Thursday per team in rounds. Wife heading home at this time but will return tomorrow.     SW will follow up tomorrow.

## 2024-05-14 NOTE — SUBJECTIVE & OBJECTIVE
"No current facility-administered medications on file prior to encounter.     Current Outpatient Medications on File Prior to Encounter   Medication Sig    ALPRAZolam (XANAX) 0.25 MG tablet Take 1 tablet (0.25 mg total) by mouth 2 (two) times daily as needed for Anxiety.    atorvastatin (LIPITOR) 40 MG tablet Take 40 mg by mouth once daily.    carvediloL (COREG) 6.25 MG tablet Take 6.25 mg by mouth 2 (two) times daily.    dimenhyDRINATE (DRAMAMINE) 25 mg Chew Take 1 tablet by mouth daily as needed (nausea).    furosemide (LASIX) 20 MG tablet Take 20 mg by mouth every morning.    losartan (COZAAR) 25 MG tablet Take 25 mg by mouth once daily.    pantoprazole (PROTONIX) 20 MG tablet Take 1 tablet (20 mg total) by mouth once daily.    spironolactone (ALDACTONE) 25 MG tablet Take 25 mg by mouth once daily.    torsemide (DEMADEX) 20 MG Tab Take 20 mg by mouth once daily.       Review of patient's allergies indicates:  No Known Allergies    Past Medical History:   Diagnosis Date    Acute heart failure     AUDELIA (acute kidney injury)     Cardiomyopathy     Elevated troponin     HTN (hypertension)     Hypokalemia     Hypomagnesemia     Renal insufficiency      Past Surgical History:   Procedure Laterality Date    broken foot Left     RIGHT HEART CATHETERIZATION Right 2024    Procedure: INSERTION, CATHETER, RIGHT HEART;  Surgeon: Pradeep Mack MD;  Location: [facility] CATH LAB;  Service: Cardiology;  Laterality: Right;     Family History       Problem Relation (Age of Onset)    Heart failure Mother    No Known Problems Father          Tobacco Use    Smoking status: Former     Types: Cigarettes     Start date: 2023     Quit date: 1998     Years since quittin.0     Passive exposure: Past    Smokeless tobacco: Never   Substance and Sexual Activity    Alcohol use: Yes     Comment: "very seldom."    Drug use: Never    Sexual activity: Not on file     Review of Systems   Constitutional:  Positive for fatigue. Negative " for activity change, appetite change and fever.   HENT:  Negative for nosebleeds.    Respiratory:  Positive for shortness of breath (improve). Negative for cough.    Cardiovascular:  Negative for chest pain, palpitations and leg swelling.   Gastrointestinal:  Negative for abdominal distention, abdominal pain and nausea.   Genitourinary:  Negative for frequency.   Musculoskeletal:  Negative for arthralgias and myalgias.   Skin:  Negative for rash.   Neurological:  Negative for dizziness and numbness.   Hematological:  Does not bruise/bleed easily.     Objective:     Vital Signs (Most Recent):  Temp: 98.3 °F (36.8 °C) (05/14/24 0724)  Pulse: 96 (05/14/24 0724)  Resp: 16 (05/14/24 0724)  BP: 99/63 (05/14/24 0724)  SpO2: 97 % (05/14/24 0724) Vital Signs (24h Range):  Temp:  [97.8 °F (36.6 °C)-98.4 °F (36.9 °C)] 98.3 °F (36.8 °C)  Pulse:  [] 96  Resp:  [16-22] 16  SpO2:  [92 %-99 %] 97 %  BP: ()/(50-78) 99/63     Weight: 76 kg (167 lb 8.8 oz)  Body mass index is 24.74 kg/m².    SpO2: 97 %        Intake/Output - Last 3 Shifts         05/12 0700  05/13 0659 05/13 0700  05/14 0659 05/14 0700  05/15 0659    P.O. 1735 1588     I.V. (mL/kg) 14.2 (0.2)      Total Intake(mL/kg) 1749.2 (23) 1588 (20.9)     Urine (mL/kg/hr) 2125 (1.2) 1050 (0.6)     Total Output 2125 1050     Net -375.8 +538                     Lines/Drains/Airways       Central Venous Catheter Line  Duration             Percutaneous Central Line - Triple Lumen  05/10/24 1700 Internal Jugular Right 3 days              Peripheral Intravenous Line  Duration                  Peripheral IV - Single Lumen 05/10/24 1511 20 G Right Antecubital 3 days                          Physical Exam  Constitutional:       Appearance: Normal appearance.   HENT:      Head: Normocephalic and atraumatic.   Eyes:      Extraocular Movements: Extraocular movements intact.   Cardiovascular:      Rate and Rhythm: Normal rate.   Pulmonary:      Effort: Pulmonary effort is normal.  "  Abdominal:      General: Abdomen is flat.      Palpations: Abdomen is soft.   Musculoskeletal:         General: Normal range of motion.      Cervical back: Normal range of motion.   Skin:     General: Skin is warm and dry.      Capillary Refill: Capillary refill takes less than 2 seconds.      Coloration: Skin is not pale.   Neurological:      General: No focal deficit present.      Mental Status: He is alert.            Significant Labs:  ABGs:   Recent Labs   Lab 05/14/24  0500   PO2 39*   POCSATURATED 74     Amylase: No results for input(s): "AMYLASE" in the last 48 hours.  BMP:   Recent Labs   Lab 05/14/24  0410   GLU 79   *   K 4.4      CO2 22*   BUN 14   CREATININE 1.7*   CALCIUM 8.9   MG 2.4     Cardiac markers: No results for input(s): "CKMB", "CPKMB", "TROPONINT", "TROPONINI", "MYOGLOBIN" in the last 48 hours.  CBC:   Recent Labs   Lab 05/14/24  0410   WBC 8.37   RBC 5.03   HGB 11.6*   HCT 38.2*      MCV 76*   MCH 23.1*   MCHC 30.4*     CMP:   Recent Labs   Lab 05/13/24  1434 05/14/24  0410   * 79   CALCIUM 9.2 8.9   ALBUMIN 3.3*  --    PROT 7.3  --     135*   K 5.1 4.4   CO2 23 22*    102   BUN 16 14   CREATININE 1.9* 1.7*   ALKPHOS 133  --    ALT 42  --    AST 31  --    BILITOT 1.1*  --      Coagulation: No results for input(s): "PT", "INR", "APTT" in the last 48 hours.  Lactic Acid: No results for input(s): "LACTATE" in the last 48 hours.  LFTs:   Recent Labs   Lab 05/13/24  1434   ALT 42   AST 31   ALKPHOS 133   BILITOT 1.1*   PROT 7.3   ALBUMIN 3.3*     Lipase: No results for input(s): "LIPASE" in the last 48 hours.    Significant Diagnostics:  I have reviewed all pertinent imaging results/findings within the past 24 hours.    [facility]  5/13/24  Findings:     BP: 110/72 (82)  RA: 6  RV: 40/5, EDP 7  PA: 40/20, mean 28  PCWP: 18     Saturation:  Arterial: 94 %  PA: 48 %     Dennise CI/CO: 1.8/3.5  TD CI/CO: 1.5/2.9     SVR: 2097 dynes/sec/cm -5  PVR: 3.4 Wood Units   "   Conclusions:  Normal right and mildly elevated left sided filling pressures  Mild pulmonary hypertension  Low CI/CO    TTE 5/13      Left Ventricle: The left ventricle is severely dilated. There is eccentric hypertrophy. Severe global hypokinesis present. There is severely reduced systolic function with a visually estimated ejection fraction of 15 - 20%. Biplane (2D) method of discs ejection fraction is 15%. There is diastolic dysfunction. Elevated left ventricular filling pressure.    Right Ventricle: Right ventricle was not well visualized due to poor acoustic window. Mild right ventricular enlargement. There is mild hypertrophy. Systolic function is mildly reduced.    Left Atrium: Left atrium is severely dilated. Atrial septum is bulging to the right.    Mitral Valve: There is moderate regurgitation.    Tricuspid Valve: There is mild to moderate regurgitation.    Pulmonary Artery: The estimated pulmonary artery systolic pressure is 43 mmHg.    IVC/SVC: Intermediate venous pressure at 8 mmHg.    Pericardium: There is a small anterior effusion.  LVIDD 7.46; TAPSE 1    OSH TTE 1/31/24   LVEF 30-35%     OSH LHC/RHC 2/27/24   RA 15, PA 47/23, mean 35, PCWP 28  CI 2.1, CO 4.2  LVEF 10%  Normal coronary arteries

## 2024-05-14 NOTE — SUBJECTIVE & OBJECTIVE
Interval History:  started yesterday at 2.5 and then increased to 5. Hydralazine held overnight for soft BP.  Hemodynamics this AM: CVP 1, Sv02 74, CO/CI 6.8/3.5, . Will discontinue Valsartan today. Plan for PICC line placement.         Lines:  - R.IJ (5/10--)     DOBUTamine IV infusion (non-titrating)  5 mcg/kg/min Intravenous Continuous 5.7 mL/hr at 05/13/24 2152 5 mcg/kg/min at 05/13/24 2152     Scheduled Meds:   atorvastatin  40 mg Oral Daily    hydrALAZINE  50 mg Oral Q8H    isosorbide dinitrate  20 mg Oral TID    mupirocin   Nasal BID    pantoprazole  20 mg Oral Daily    [START ON 5/15/2024] potassium chloride  40 mEq Oral Daily    spironolactone  25 mg Oral Daily     PRN Meds:  Current Facility-Administered Medications:     ALPRAZolam, 0.5 mg, Oral, Nightly PRN    butalbital-acetaminophen-caffeine -40 mg, 1 tablet, Oral, Q4H PRN    melatonin, 3 mg, Oral, Nightly PRN    oxyCODONE, 10 mg, Oral, Q6H PRN    sodium chloride 0.9%, 10 mL, Intravenous, PRN    Review of patient's allergies indicates:  No Known Allergies  Objective:     Vital Signs (Most Recent):  Temp: 98.2 °F (36.8 °C) (05/14/24 1111)  Pulse: 96 (05/14/24 1125)  Resp: 18 (05/14/24 1111)  BP: (!) 88/55 (05/14/24 1111)  SpO2: 97 % (05/14/24 1111) Vital Signs (24h Range):  Temp:  [97.8 °F (36.6 °C)-98.3 °F (36.8 °C)] 98.2 °F (36.8 °C)  Pulse:  [] 96  Resp:  [16-20] 18  SpO2:  [95 %-99 %] 97 %  BP: ()/(53-78) 88/55     Patient Vitals for the past 72 hrs (Last 3 readings):   Weight   05/13/24 0900 76 kg (167 lb 8.8 oz)   05/13/24 0400 76.2 kg (168 lb 1.6 oz)     Body mass index is 24.74 kg/m².      Intake/Output Summary (Last 24 hours) at 5/14/2024 1225  Last data filed at 5/14/2024 1153  Gross per 24 hour   Intake 1508 ml   Output 1525 ml   Net -17 ml            Physical Exam  Constitutional:       Appearance: Normal appearance.   HENT:      Head: Atraumatic.   Eyes:      Conjunctiva/sclera: Conjunctivae normal.  "  Cardiovascular:      Rate and Rhythm: Normal rate and regular rhythm.      Heart sounds: No murmur heard.  Pulmonary:      Effort: Pulmonary effort is normal.      Breath sounds: Normal breath sounds. No wheezing.   Abdominal:      General: There is no distension.      Palpations: Abdomen is soft.      Tenderness: There is no abdominal tenderness.   Skin:     General: Skin is warm.   Neurological:      General: No focal deficit present.      Mental Status: He is alert.            Significant Labs:  CBC:  Recent Labs   Lab 05/12/24  0140 05/13/24  0255 05/14/24  0410   WBC 6.76 8.88 8.37   RBC 4.69 5.13 5.03   HGB 10.9* 11.8* 11.6*   HCT 35.8* 39.4* 38.2*    457* 437   MCV 76* 77* 76*   MCH 23.2* 23.0* 23.1*   MCHC 30.4* 29.9* 30.4*     BNP:  Recent Labs   Lab 05/10/24  0957 05/10/24  1510   BNP 2,065* 1,991*     CMP:  Recent Labs   Lab 05/10/24  1510 05/10/24  2308 05/11/24  0406 05/13/24  0255 05/13/24  1434 05/14/24  0410   GLU 85 109   < > 103 112* 79   CALCIUM 8.9 8.9   < > 9.4 9.2 8.9   ALBUMIN 3.5 3.2*  --   --  3.3*  --    PROT 7.1 6.2  --   --  7.3  --     137   < > 136 136 135*   K 4.7 4.1   < > 4.3 5.1 4.4   CO2 21* 25   < > 25 23 22*    102   < > 100 107 102   BUN 25* 23*   < > 16 16 14   CREATININE 2.1* 2.1*   < > 2.0* 1.9* 1.7*   ALKPHOS 113 94  --   --  133  --    ALT 69* 58*  --   --  42  --    AST 46* 45*  --   --  31  --    BILITOT 1.0 1.1*  --   --  1.1*  --     < > = values in this interval not displayed.      Coagulation:   No results for input(s): "PT", "INR", "APTT" in the last 168 hours.  LDH:  No results for input(s): "LDH" in the last 72 hours.  Microbiology:  Microbiology Results (last 7 days)       ** No results found for the last 168 hours. **            I have reviewed all pertinent labs within the past 24 hours.    Estimated Creatinine Clearance: 56.6 mL/min (A) (based on SCr of 1.7 mg/dL (H)).    "

## 2024-05-14 NOTE — PT/OT/SLP EVAL
Occupational Therapy   Co-Evaluation and Discharge Note    Name: Ayad Edmond  MRN: [id number]  Admitting Diagnosis: Acute on chronic combined systolic and diastolic heart failure  Recent Surgery: Procedure(s) (LRB):  INSERTION, CATHETER, RIGHT HEART (Right) 1 Day Post-Op    Recommendations:     Discharge Recommendations: No Therapy Indicated  Discharge Equipment Recommendations: none  Barriers to discharge:       Assessment:     Ayad Edmond is a 44 y.o. male with a medical diagnosis of Acute on chronic combined systolic and diastolic heart failure. At this time, patient is functioning at their prior level of function and does not require further acute OT services.     Plan:     During this hospitalization, patient does not require further acute OT services.  Please re-consult if situation changes.    Plan of Care Reviewed with: patient, spouse    Subjective     Chief Complaint: pt wishing to sleep/rest   Patient/Family Comments/goals: return home     Occupational Profile:  Living Environment: Pt lives with spouse and 14 yr old son in a [facility] with no DAY[place].   Previous level of function: I with ADLs and mobility  Roles and Routines: Pt currently on disability/not working.   Equipment Used at home: none  Assistance upon Discharge: spouse     Pain/Comfort:  Pain Rating 1: 0/10  Pain Rating Post-Intervention 1: 0/10    Patients cultural, spiritual, Congregation conflicts given the current situation: no    Objective:     Communicated with: RN prior to session.  Patient found HOB elevated with peripheral IV, telemetry ([facility]) upon OT entry to room.    General Precautions: Standard, fall  Orthopedic Precautions: N/A  Braces: N/A  Respiratory Status: Room air     Occupational Performance:    Bed Mobility:    Patient completed Rolling/Turning to Left with  independence  Patient completed Scooting/Bridging with independence  Patient completed Supine to Sit with independence  Patient completed Sit to Supine with  independence    Functional Mobility/Transfers:  Patient completed Sit <> Stand Transfer with independence  with  no assistive device   Functional Mobility: Pt demonstrated functional mobility training to simulate household and community environment gait training during session. Pt tolerated5~8 minutes total using no AD with no LOB and good visual search and navigation strategies.       Activities of Daily Living:  Lower Body Dressing: independence donning shoes EOB     Cognitive/Visual Perceptual:  Cognitive/Psychosocial Skills:     -       Oriented to: Person, Place, Time, and Situation   -       Follows Commands/attention:Follows multistep  commands  -       Communication: clear/fluent  -       Memory: No Deficits noted  -       Safety awareness/insight to disability: intact   -       Mood/Affect/Coping skills/emotional control: Appropriate to situation    Physical Exam:  Balance:    -       demo good sitting and standing balance with no AD  Upper Extremity Range of Motion:     -       Right Upper Extremity: WNL  -       Left Upper Extremity: WNL  Upper Extremity Strength:    -       Right Upper Extremity: WNL  -       Left Upper Extremity: WNL   Strength:    -       Right Upper Extremity: WNL  -       Left Upper Extremity: WNL    AMPAC 6 Click ADL:  AMPAC Total Score: 24    Treatment & Education:  Pt educated on role of occupational therapy, POC, and safety during ADLs and functional mobility. Pt and OT discussed importance of safe, continued mobility to optimize daily living skills. Pt verbalized understanding.     White board updated during session. Pt given instruction to call for medical staff/nurse for assistance.       Patient left up in chair with all lines intact, call button in reach, and rn notified    GOALS:   Multidisciplinary Problems       Occupational Therapy Goals       Not on file                    History:     Past Medical History:   Diagnosis Date    Acute heart failure     AUDELIA (acute  kidney injury)     Cardiomyopathy     Elevated troponin     HTN (hypertension)     Hypokalemia     Hypomagnesemia     Renal insufficiency          Past Surgical History:   Procedure Laterality Date    broken foot Left     RIGHT HEART CATHETERIZATION Right 5/13/2024    Procedure: INSERTION, CATHETER, RIGHT HEART;  Surgeon: Pradeep Mack MD;  Location: [facility] CATH LAB;  Service: Cardiology;  Laterality: Right;       Time Tracking:     OT Date of Treatment: 05/14/24  OT Start Time: 0813  OT Stop Time: 0823  OT Total Time (min): 10 min    Billable Minutes:Evaluation 10 min    5/14/2024

## 2024-05-14 NOTE — CONSULTS
"[facility] - Cardiology Stepdown  Cardiothoracic Surgery  Consult Note    Patient Name: Ayad Edmond  MRN: [id number]  Admission Date: 5/10/2024  Attending Physician: Samm Rhoades MD  Referring Provider: Self, Aaareferral    Patient information was obtained from patient, spouse/SO, ER records, and primary team.     Inpatient consult to Cardiothoracic Surgery  Consult performed by: Sunita Sellers PA-C  Consult ordered by: Holland Rodriguez NP        Subjective:     Principal Problem: Acute on chronic combined systolic and diastolic heart failure    History of Present Illness: Mr. Edmond is a 44 y.o. year old male with NICM, HFrEF, LVEF 10%, HTN, CKD, former smoker.    direct admitted 5/10 for ACDHF under the guidance of Dr. Mack. Presented with worsening SOB and cool extremities on exam.     Pertinent PMH taken from Dr. aMck HPI:   "Initially diagnosed 1/2024 with HFrEF, LVEF 30-35%. During a hospitalization in February, found to have further drop in EF to 10% on LV gram as part of combined LHC/RHC.     When talking with him, he says that his cardiac history goes back to approximately 2012.  At that time he was told that he had hypertension, and he was on regular antihypertensive therapy.  However had no major cardiac issues until January of this year when he presented with shortness of breath and was found to have new heart failure with reduced ejection fraction.  As noted above, he had a coronary angiogram done which demonstrated nonobstructive coronary artery disease, but the LV gram demonstrated a further drop in his ejection fraction at 10%.  He had a combined left and right heart catheterization, number as noted below.     At present he has not employed, previously working as a   which was a job that demanded him to be quite active. Not working now due to CHF symptoms. At present unfortunately he is quite short of breath with even minimal amounts of exertion.  He says that he can walk " "approximately 15-20 feet before he has to pause because of shortness of breath.  Denies any chest discomfort, but has been noticing worsening leg swelling over the past few days to weeks.  Has 3-4 pillow PND/orthopnea which recently has progressed to where he can only sleep upright in his truck.  Has episodes of palpitations, did have an episode of syncope in March of this past year.  Endorses recent abdominal swelling associated with early satiety, and poor oral intake.  Reduced appetite."         Today, patient reports improvement in sob since  and diuresis. Denies chest pain, orthopnea, PND, palpitations, LE edema.     No AD for walking   No prior strokes, seizures, stents, sternotomies.     Former smoker, quit 1 year ago. Prior, smoked 1ppd x 24 years   No illicit drug use   Occasional ETOH     RHC 5/13 showed RAP, 6, PA 40/20, PCWP 18, CO/CI 3.5/1.8. SVR 2097.   5 mcg/kg/min 2/2 to low output state. Echo 5/13 significant for LVEF 15%, moderate MR, mild-mod TR, PAP 43, CVP 8, LVIDD 7.46, TAPSE 1. ABO RH O positive. CTS consulted for advance options surgical evaluation.          No current facility-administered medications on file prior to encounter.     Current Outpatient Medications on File Prior to Encounter   Medication Sig    ALPRAZolam (XANAX) 0.25 MG tablet Take 1 tablet (0.25 mg total) by mouth 2 (two) times daily as needed for Anxiety.    atorvastatin (LIPITOR) 40 MG tablet Take 40 mg by mouth once daily.    carvediloL (COREG) 6.25 MG tablet Take 6.25 mg by mouth 2 (two) times daily.    dimenhyDRINATE (DRAMAMINE) 25 mg Chew Take 1 tablet by mouth daily as needed (nausea).    furosemide (LASIX) 20 MG tablet Take 20 mg by mouth every morning.    losartan (COZAAR) 25 MG tablet Take 25 mg by mouth once daily.    pantoprazole (PROTONIX) 20 MG tablet Take 1 tablet (20 mg total) by mouth once daily.    spironolactone (ALDACTONE) 25 MG tablet Take 25 mg by mouth once daily.    torsemide (DEMADEX) 20 MG Tab " "Take 20 mg by mouth once daily.       Review of patient's allergies indicates:  No Known Allergies    Past Medical History:   Diagnosis Date    Acute heart failure     AUDELIA (acute kidney injury)     Cardiomyopathy     Elevated troponin     HTN (hypertension)     Hypokalemia     Hypomagnesemia     Renal insufficiency      Past Surgical History:   Procedure Laterality Date    broken foot Left     RIGHT HEART CATHETERIZATION Right 2024    Procedure: INSERTION, CATHETER, RIGHT HEART;  Surgeon: Pradeep Mack MD;  Location: [facility] CATH LAB;  Service: Cardiology;  Laterality: Right;     Family History       Problem Relation (Age of Onset)    Heart failure Mother    No Known Problems Father          Tobacco Use    Smoking status: Former     Types: Cigarettes     Start date: 2023     Quit date: 1998     Years since quittin.0     Passive exposure: Past    Smokeless tobacco: Never   Substance and Sexual Activity    Alcohol use: Yes     Comment: "very seldom."    Drug use: Never    Sexual activity: Not on file     Review of Systems   Constitutional:  Positive for fatigue. Negative for activity change, appetite change and fever.   HENT:  Negative for nosebleeds.    Respiratory:  Positive for shortness of breath (improve). Negative for cough.    Cardiovascular:  Negative for chest pain, palpitations and leg swelling.   Gastrointestinal:  Negative for abdominal distention, abdominal pain and nausea.   Genitourinary:  Negative for frequency.   Musculoskeletal:  Negative for arthralgias and myalgias.   Skin:  Negative for rash.   Neurological:  Negative for dizziness and numbness.   Hematological:  Does not bruise/bleed easily.     Objective:     Vital Signs (Most Recent):  Temp: 98.3 °F (36.8 °C) (24)  Pulse: 96 (24)  Resp: 16 (24)  BP: 99/63 (24)  SpO2: 97 % (24) Vital Signs (24h Range):  Temp:  [97.8 °F (36.6 °C)-98.4 °F (36.9 °C)] 98.3 °F (36.8 °C)  Pulse:  " "[] 96  Resp:  [16-22] 16  SpO2:  [92 %-99 %] 97 %  BP: ()/(50-78) 99/63     Weight: 76 kg (167 lb 8.8 oz)  Body mass index is 24.74 kg/m².    SpO2: 97 %        Intake/Output - Last 3 Shifts         05/12 0700  05/13 0659 05/13 0700  05/14 0659 05/14 0700  05/15 0659    P.O. 1735 1588     I.V. (mL/kg) 14.2 (0.2)      Total Intake(mL/kg) 1749.2 (23) 1588 (20.9)     Urine (mL/kg/hr) 2125 (1.2) 1050 (0.6)     Total Output 2125 1050     Net -375.8 +538                     Lines/Drains/Airways       Central Venous Catheter Line  Duration             Percutaneous Central Line - Triple Lumen  05/10/24 1700 Internal Jugular Right 3 days              Peripheral Intravenous Line  Duration                  Peripheral IV - Single Lumen 05/10/24 1511 20 G Right Antecubital 3 days                          Physical Exam  Constitutional:       Appearance: Normal appearance.   HENT:      Head: Normocephalic and atraumatic.   Eyes:      Extraocular Movements: Extraocular movements intact.   Cardiovascular:      Rate and Rhythm: Normal rate.   Pulmonary:      Effort: Pulmonary effort is normal.   Abdominal:      General: Abdomen is flat.      Palpations: Abdomen is soft.   Musculoskeletal:         General: Normal range of motion.      Cervical back: Normal range of motion.   Skin:     General: Skin is warm and dry.      Capillary Refill: Capillary refill takes less than 2 seconds.      Coloration: Skin is not pale.   Neurological:      General: No focal deficit present.      Mental Status: He is alert.            Significant Labs:  ABGs:   Recent Labs   Lab 05/14/24  0500   PO2 39*   POCSATURATED 74     Amylase: No results for input(s): "AMYLASE" in the last 48 hours.  BMP:   Recent Labs   Lab 05/14/24  0410   GLU 79   *   K 4.4      CO2 22*   BUN 14   CREATININE 1.7*   CALCIUM 8.9   MG 2.4     Cardiac markers: No results for input(s): "CKMB", "CPKMB", "TROPONINT", "TROPONINI", "MYOGLOBIN" in the last 48 " "hours.  CBC:   Recent Labs   Lab 05/14/24  0410   WBC 8.37   RBC 5.03   HGB 11.6*   HCT 38.2*      MCV 76*   MCH 23.1*   MCHC 30.4*     CMP:   Recent Labs   Lab 05/13/24  1434 05/14/24  0410   * 79   CALCIUM 9.2 8.9   ALBUMIN 3.3*  --    PROT 7.3  --     135*   K 5.1 4.4   CO2 23 22*    102   BUN 16 14   CREATININE 1.9* 1.7*   ALKPHOS 133  --    ALT 42  --    AST 31  --    BILITOT 1.1*  --      Coagulation: No results for input(s): "PT", "INR", "APTT" in the last 48 hours.  Lactic Acid: No results for input(s): "LACTATE" in the last 48 hours.  LFTs:   Recent Labs   Lab 05/13/24  1434   ALT 42   AST 31   ALKPHOS 133   BILITOT 1.1*   PROT 7.3   ALBUMIN 3.3*     Lipase: No results for input(s): "LIPASE" in the last 48 hours.    Significant Diagnostics:  I have reviewed all pertinent imaging results/findings within the past 24 hours.    RHC  5/13/24  Findings:     BP: 110/72 (82)  RA: 6  RV: 40/5, EDP 7  PA: 40/20, mean 28  PCWP: 18     Saturation:  Arterial: 94 %  PA: 48 %     Dennise CI/CO: 1.8/3.5  TD CI/CO: 1.5/2.9     SVR: 2097 dynes/sec/cm -5  PVR: 3.4 Wood Units     Conclusions:  Normal right and mildly elevated left sided filling pressures  Mild pulmonary hypertension  Low CI/CO    TTE 5/13      Left Ventricle: The left ventricle is severely dilated. There is eccentric hypertrophy. Severe global hypokinesis present. There is severely reduced systolic function with a visually estimated ejection fraction of 15 - 20%. Biplane (2D) method of discs ejection fraction is 15%. There is diastolic dysfunction. Elevated left ventricular filling pressure.    Right Ventricle: Right ventricle was not well visualized due to poor acoustic window. Mild right ventricular enlargement. There is mild hypertrophy. Systolic function is mildly reduced.    Left Atrium: Left atrium is severely dilated. Atrial septum is bulging to the right.    Mitral Valve: There is moderate regurgitation.    Tricuspid Valve: There " is mild to moderate regurgitation.    Pulmonary Artery: The estimated pulmonary artery systolic pressure is 43 mmHg.    IVC/SVC: Intermediate venous pressure at 8 mmHg.    Pericardium: There is a small anterior effusion.  LVIDD 7.46; TAPSE 1    OSH TTE 1/31/24   LVEF 30-35%     OSH LHC/RHC 2/27/24   RA 15, PA 47/23, mean 35, PCWP 28  CI 2.1, CO 4.2  LVEF 10%  Normal coronary arteries  Assessment/Plan:     NYHA Score: NYHA III: marked limitation of physical activity, comfortable at rest    * Acute on chronic combined systolic and diastolic heart failure  CTS consulted for advance options surgical evaluation.  Patient is 44 y.o. year old male with NICM, HFrEF, LVEF 10%, HTN, CKD, former smoker who was admitted for ADHF.  Presented with complaints of worsening NYHA 3 sx. ABO/RH O positive.  Currently on  5 mcg/kg/min 2/2 to low output state shown on RHC. Echo significant for LVEF 15%, LVIDD 7.46, TAPSE 1. CT CAP does not preclude patient for advance options. Continue work up, will discuss at multidisciplinary meeting. Dr. Bro to review and staff.        Thank you for your consult. Please contact us if you have any additional questions.    Sunita Sellers PA-C  Cardiothoracic Surgery  [facility] - [facility] Step[facility]    Patient seen and pertinent studies were reviewed.  CT scan does not preclude the patient from advanced options.  Would recommend aggressive diuresis and reassessing RP function along with the LV size.  Currently RV function not fully appreciated due to poor acoustic windows.  We will discuss candidacy at the multidisciplinary selection meeting.

## 2024-05-14 NOTE — PT/OT/SLP EVAL
Physical Therapy Co-Evaluation and Discharge Note    Patient Name:  Ayad Edmond   MRN:  [id number]    Recommendations:     Discharge Recommendations: No Therapy Indicated  Discharge Equipment Recommendations: none   Barriers to discharge: None    Assessment:     Ayad Edmond is a 44 y.o. male admitted with a medical diagnosis of Acute on chronic combined systolic and diastolic heart failure. .  At this time, patient is functioning at their prior level of function and does not require further acute PT services.     Recent Surgery: Procedure(s) (LRB):  INSERTION, CATHETER, RIGHT HEART (Right) 1 Day Post-Op    Plan:     During this hospitalization, patient does not require further acute PT services.  Please re-consult if situation changes.      Subjective     Chief Complaint: tired  Patient/Family Comments/goals: pt reports he has been up and ambulating independently  Pain/Comfort:  Pain Rating 1: 0/10  Pain Rating Post-Intervention 1: 0/10    Patients cultural, spiritual, [employer] conflicts given the current situation: no    Living Environment:  Pt lives w/ wife and 15yo son in a mobile home w/ [address].  Prior to admission, patients level of function was independent.  Equipment used at home: none.  DME owned (not currently used): none.  Upon discharge, patient will have assistance from family.    Objective:     Communicated with RN prior to session.  Patient found HOB elevated with telemetry, peripheral IV upon PT entry to room.    General Precautions: Standard, fall    Orthopedic Precautions:N/A   Braces: N/A  Respiratory Status: Room air    Exams:  Cognitive Exam:  Patient is oriented to Person, Place, Time, and Situation  Fine Motor Coordination:    -       Intact  Sensation:    -       Intact  RLE ROM: WFL  RLE Strength: WFL  LLE ROM: WFL  LLE Strength: WFL    Functional Mobility:  Bed Mobility:     Supine to Sit: independence  Transfers:     Sit to Stand:  independence with no AD  Gait: 125' w/  independence  Tinetti Total Score: 28  < 18 = High Risk of Falls, 19-23 = Moderate Risk of Falls, > 24 = Low Risk of Falls    AM-PAC 6 CLICK MOBILITY  Total Score:24       Treatment and Education:  Pt and family educated on PT assessment and plan to d/c orders 2/2 no current needs for skilled PT. Pt instructed to slowly progress mobility each day towards PLOF without attempting to jump right into previous daily routine immediately upon d/c 2/2 general deconditioning from being in hospital. Pt and family in agreement w/ POC and verbalized understanding w/ plan to slowly progress to normal daily activities at home and inform MD team if pt experiences any difficulty progressing back to PLOF while here or upon d/c in order for PT to be re-consulted/orders placed for OP PT as needed.     AM-PAC 6 CLICK MOBILITY  Total Score:24     Patient left sitting edge of bed with all lines intact, call button in reach, RN notified, and wife present.    GOALS:   Multidisciplinary Problems       Physical Therapy Goals       Not on file                    History:     Past Medical History:   Diagnosis Date    Acute heart failure     AUDELIA (acute kidney injury)     Cardiomyopathy     Elevated troponin     HTN (hypertension)     Hypokalemia     Hypomagnesemia     Renal insufficiency        Past Surgical History:   Procedure Laterality Date    broken foot Left     RIGHT HEART CATHETERIZATION Right 5/13/2024    Procedure: INSERTION, CATHETER, RIGHT HEART;  Surgeon: Pradeep Mack MD;  Location: [facility] CATH LAB;  Service: Cardiology;  Laterality: Right;       Time Tracking:     PT Received On: 05/14/24  PT Start Time: 0813     PT Stop Time: 0823  PT Total Time (min): 10 min     Billable Minutes: Evaluation 10      05/14/2024

## 2024-05-14 NOTE — PLAN OF CARE
Problem: Heart Failure  Goal: Optimal Coping  5/14/2024 0328 by Vida Cordova RN  Outcome: Progressing  5/14/2024 0328 by Vida Cordova RN  Outcome: Progressing  5/14/2024 0309 by Vida Cordova RN  Outcome: Progressing  Goal: Optimal Cardiac Output  5/14/2024 0328 by Vida Cordova, RN  Outcome: Progressing  5/14/2024 0328 by Vida Cordova RN  Outcome: Progressing  5/14/2024 0309 by Vida Cordova RN  Outcome: Progressing  Goal: Stable Heart Rate and Rhythm  5/14/2024 0328 by Vida Cordova, RN  Outcome: Progressing  5/14/2024 0328 by Vida Cordova RN  Outcome: Progressing  5/14/2024 0309 by Vida Cordova RN  Outcome: Progressing  Goal: Optimal Functional Ability  5/14/2024 0328 by Vida Cordova, RN  Outcome: Progressing  5/14/2024 0328 by Vida Cordova RN  Outcome: Progressing  5/14/2024 0309 by Vida Cordova RN  Outcome: Progressing  Goal: Fluid and Electrolyte Balance  5/14/2024 0328 by Vida Cordova, RN  Outcome: Progressing  5/14/2024 0328 by Vida Cordova RN  Outcome: Progressing  5/14/2024 0309 by Vida Cordova RN  Outcome: Progressing  Goal: Improved Oral Intake  5/14/2024 0328 by Vida Cordova, RN  Outcome: Progressing  5/14/2024 0328 by Vida Cordova RN  Outcome: Progressing  5/14/2024 0309 by Vida Cordova RN  Outcome: Progressing  Goal: Effective Oxygenation and Ventilation  5/14/2024 0328 by Vida Cordova, RN  Outcome: Progressing  5/14/2024 0328 by Vida Cordova RN  Outcome: Progressing  5/14/2024 0309 by Vida Cordova RN  Outcome: Progressing  Goal: Effective Breathing Pattern During Sleep  5/14/2024 0328 by Vida Cordova RN  Outcome: Progressing  5/14/2024 0328 by Vida Cordova RN  Outcome: Progressing  5/14/2024 0309 by Vida Cordova RN  Outcome: Progressing

## 2024-05-14 NOTE — ASSESSMENT & PLAN NOTE
CTS consulted for advance options surgical evaluation.  Patient is 44 y.o. year old male with NICM, HFrEF, LVEF 10%, HTN, CKD, former smoker who was admitted for ADHF.  Presented with complaints of NYHA 3 sx. ABO/RH O positive.  Currently on  5 mcg/kg/min 2/2 to low output state shown on RHC. Echo significant for LVEF 15%, LVIDD 7.46, TAPSE 1. CT CAP does not preclude patient for advance options. Continue work up, will discuss at multidisciplinary meeting. Dr. Bro to review and staff.

## 2024-05-14 NOTE — PLAN OF CARE
.[facility]   Heart Transplant Clinic  [address] [place] [address]   [phone] after hours        HOME  HEALTH ORDERS      Admit to Home Health    Diagnosis:   Patient Active Problem List   Diagnosis    Congestive heart failure    Acute on chronic combined systolic and diastolic heart failure    Cardiogenic shock    Primary hypertension    AUDELIA (acute kidney injury)       Patient is homebound due to:   Diet: Low Sodium  Acitivities: As Tolerated    Nursing:   SN to complete comprehensive assessment including routine vital signs. Instruct on disease process and s/s of complications to report to MD. Review/verify medication list sent home with the patient at time of discharge  and instruct patient/caregiver as needed. Frequency may be adjusted depending on start of care date.    Notify MD if SBP > 160 or < 90; DBP > 90 or < 50; HR > 120 or < 50; Temp > 101; Weight gain >3lbs in 1 day or 5lbs in 1 week.    LABS:  SN to perform labs: CBC, CMP, BNP weekly     HOME INFUSION THERAPY:   SN to perform Infusion Therapy/Central Line Care.  Review Central Line Care & Central Line Flush with patient.    Administer (drug and dose): Dobutamine 5mcg/kg/min. Dosing weight 76kg.     **For questions or concerns, please call [phone] and ask for Pre-Heart transplant clinic, M-F 8-5. After hours, weekends, call [phone] and ask for the Heart Transplant Cardiologist on call.**    Central line care:  Scrub the Hub: Prior to accessing the line, always perform a 30 second alcohol scrub  Each lumen of the central line is to be flushed at least daily with 10 mL Normal Saline and 3 mL Heparin flush (100 units/mL)  Skilled Nurse (SN) may draw blood from IV access  Blood Draw Procedure:   - Aspirate at least 5 mL of blood   - Discard   - Obtain specimen   - Change posiflow cap   - Flush with 20 mL Normal Saline followed by a                 3-5 mL Heparin flush (100  units/mL)  :   - Sterile dressing changes are done weekly and as needed.   - Use chlor-hexadine scrub to cleanse site, apply Biopatch to insertion site,       apply securement device dressing   - Posi-flow caps are changed weekly and after EVERY lab draw.   - If sterile gauze is under dressing to control oozing,                 dressing change must be performed every 24 hours until gauze is not needed.      Send initial Home Health orders to HTS attending physician on call.  Send follow up questions to [phone] or fax:                     Pre Transplant:   [phone]        Post Transplant: [phone]        Heart Failure:      [phone]

## 2024-05-14 NOTE — PROCEDURES
"Ayad Edmond is a 44 y.o. male patient.    Temp: 98.2 °F (36.8 °C) (05/14/24 1111)  Pulse: 96 (05/14/24 1125)  Resp: 18 (05/14/24 1111)  BP: (!) 88/55 (05/14/24 1111)  SpO2: 97 % (05/14/24 1111)  Weight: 76 kg (167 lb 8.8 oz) (05/13/24 0900)  Height: 5' 9" (175.3 cm) (05/13/24 0900)    PICC  Date/Time: 5/14/2024 2:44 PM  Location procedure was performed: [facility] PICC LINE PLACEMENT  Performed by: Joanna Bella, RN  Assisting provider: Chantell Yang LPN  Consent Done: Yes  Time out: Immediately prior to procedure a time out was called to verify the correct patient, procedure, equipment, support staff and site/side marked as required  Indications: med administration  Anesthesia: local infiltration  Local anesthetic: lidocaine 1% without epinephrine  Anesthetic Total (mL): 3  Preparation: skin prepped with ChloraPrep  Skin prep agent dried: skin prep agent completely dried prior to procedure  Sterile barriers: all five maximum sterile barriers used - cap, mask, sterile gown, sterile gloves, and large sterile sheet  Hand hygiene: hand hygiene performed prior to central venous catheter insertion  Location details: right brachial  Catheter type: double lumen  Catheter size: 5 Fr  Catheter Length: 43cm    Ultrasound guidance: yes  Vessel Caliber: large and patent, compressibility normal  Vascular Doppler: not done  Needle advanced into vessel with real time Ultrasound guidance.  Guidewire confirmed in vessel.  Image recorded and saved.  Sterile sheath used.  no esophageal manometryNumber of attempts: 1  Post-procedure: blood return through all ports, sterile dressing applied and chlorhexidine patch  Technical procedures used: 3cg  Specimens: No  Implants: No  Assessment: placement verified by x-ray  Complications: none          Name Chantell Yang LPN   5/14/2024    "

## 2024-05-14 NOTE — HPI
"Mr. Edmond is a 44 y.o. year old male with NICM, HFrEF, LVEF 10%, HTN, CKD, former smoker.    direct admitted 5/10 for ACDHF under the guidance of Dr. Mack. Presented with worsening SOB and cool extremities on exam.     Pertinent PMH taken from Dr. Mack HPI:   "Initially diagnosed 1/2024 with HFrEF, LVEF 30-35%. During a hospitalization in February, found to have further drop in EF to 10% on LV gram as part of combined LHC/RHC.     When talking with him, he says that his cardiac history goes back to approximately 2012.  At that time he was told that he had hypertension, and he was on regular antihypertensive therapy.  However had no major cardiac issues until January of this year when he presented with shortness of breath and was found to have new heart failure with reduced ejection fraction.  As noted above, he had a coronary angiogram done which demonstrated nonobstructive coronary artery disease, but the LV gram demonstrated a further drop in his ejection fraction at 10%.  He had a combined left and right heart catheterization, number as noted below.     At present he has not employed, previously working as a   which was a job that demanded him to be quite active. Not working now due to CHF symptoms. At present unfortunately he is quite short of breath with even minimal amounts of exertion.  He says that he can walk approximately 15-20 feet before he has to pause because of shortness of breath.  Denies any chest discomfort, but has been noticing worsening leg swelling over the past few days to weeks.  Has 3-4 pillow PND/orthopnea which recently has progressed to where he can only sleep upright in his truck.  Has episodes of palpitations, did have an episode of syncope in March of this past year.  Endorses recent abdominal swelling associated with early satiety, and poor oral intake.  Reduced appetite."         Today, patient reports improvement in sob since  and diuresis. Denies chest pain, " orthopnea, PND, palpitations, LE edema.     No AD for walking   No prior strokes, seizures, stents, sternotomies.     Former smoker, quit 1 year ago. Prior, smoked 1ppd x 24 years   No illicit drug use   Occasional ETOH     RHC 5/13 showed RAP, 6, PA 40/20, PCWP 18, CO/CI 3.5/1.8. SVR 2097.   5 mcg/kg/min 2/2 to low output state. Echo 5/13 significant for LVEF 15%, moderate MR, mild-mod TR, PAP 43, CVP 8, LVIDD 7.46, TAPSE 1. ABO RH O positive. CTS consulted for advance options surgical evaluation.

## 2024-05-14 NOTE — CONSULTS
D/L PICC placed in right brachial vein, 43 cm in length with 0 cm exposed. Arm circumference 34 cm. Lot#[id number]

## 2024-05-14 NOTE — ASSESSMENT & PLAN NOTE
-Non-ischemic cardiomyopathy; Last LHC under media with non-obstructive cardiomyopathy  -Last RHC (2/27/2024): RA:15, RV:45/8, PA:47/23 (35), PWP:28, CO:4.2, CI: 2.1, SVR 1570  -Last LHC (2/27/2024): Non-obstructive   -Inotrope: Continue  5   -Afterload reduction with Hydralazine/Isordil. Stopped Valsartan for low BP. Will monitor closely.   -CVP 1. Holding diuretics.   -RHC 5/13 showed RAP 6, PA 40/20, PCWP 18, CO/CI 3.5/1.8. SVR 2097.   -ECHO 5/13 showed  ECHO with EF 15 %, mild RV dysfxn. Mod MR/TR, LVEDD 7.4cm.  -Pathway started-awaiting CTS eval.

## 2024-05-15 ENCOUNTER — EDUCATION (OUTPATIENT)
Dept: TRANSPLANT | Facility: CLINIC | Age: 44
End: 2024-05-15
Payer: MEDICAID

## 2024-05-15 ENCOUNTER — TELEPHONE (OUTPATIENT)
Dept: TRANSPLANT | Facility: CLINIC | Age: 44
End: 2024-05-15
Payer: MEDICAID

## 2024-05-15 ENCOUNTER — EPISODE CHANGES (OUTPATIENT)
Dept: TRANSPLANT | Facility: CLINIC | Age: 44
End: 2024-05-15

## 2024-05-15 LAB
ALLENS TEST: ABNORMAL
ALLENS TEST: NORMAL
BASOPHILS # BLD AUTO: 0.05 K/UL (ref 0–0.2)
BASOPHILS NFR BLD: 0.7 % (ref 0–1.9)
DELSYS: ABNORMAL
DELSYS: NORMAL
DIFFERENTIAL METHOD BLD: ABNORMAL
EOSINOPHIL # BLD AUTO: 0.2 K/UL (ref 0–0.5)
EOSINOPHIL NFR BLD: 3.4 % (ref 0–8)
ERYTHROCYTE [DISTWIDTH] IN BLOOD BY AUTOMATED COUNT: 16.9 % (ref 11.5–14.5)
FIO2: 21
HCO3 UR-SCNC: 21.8 MMOL/L (ref 24–28)
HCT VFR BLD AUTO: 37.3 % (ref 40–54)
HCT VFR BLD CALC: 38 %PCV (ref 36–54)
HGB BLD-MCNC: 11.1 G/DL (ref 14–18)
IMM GRANULOCYTES # BLD AUTO: 0.02 K/UL (ref 0–0.04)
IMM GRANULOCYTES NFR BLD AUTO: 0.3 % (ref 0–0.5)
LYMPHOCYTES # BLD AUTO: 2 K/UL (ref 1–4.8)
LYMPHOCYTES NFR BLD: 28.7 % (ref 18–48)
MAGNESIUM SERPL-MCNC: 2.4 MG/DL (ref 1.6–2.6)
MCH RBC QN AUTO: 22.9 PG (ref 27–31)
MCHC RBC AUTO-ENTMCNC: 29.8 G/DL (ref 32–36)
MCV RBC AUTO: 77 FL (ref 82–98)
MODE: NORMAL
MONOCYTES # BLD AUTO: 1.2 K/UL (ref 0.3–1)
MONOCYTES NFR BLD: 16.9 % (ref 4–15)
NEUTROPHILS # BLD AUTO: 3.4 K/UL (ref 1.8–7.7)
NEUTROPHILS NFR BLD: 50 % (ref 38–73)
NRBC BLD-RTO: 0 /100 WBC
PCO2 BLDA: 37.4 MMHG (ref 35–45)
PH SMN: 7.37 [PH] (ref 7.35–7.45)
PHOSPHATE SERPL-MCNC: 4.4 MG/DL (ref 2.7–4.5)
PLATELET # BLD AUTO: 376 K/UL (ref 150–450)
PMV BLD AUTO: 8.3 FL (ref 9.2–12.9)
PO2 BLDA: 36 MMHG (ref 40–60)
PO2 BLDA: 41 MMHG (ref 40–60)
POC BE: -3 MMOL/L
POC SATURATED O2: 69 % (ref 95–100)
POC SATURATED O2: 76 % (ref 95–100)
POC TCO2: 23 MMOL/L (ref 24–29)
RBC # BLD AUTO: 4.84 M/UL (ref 4.6–6.2)
SAMPLE: ABNORMAL
SAMPLE: NORMAL
SITE: ABNORMAL
SITE: NORMAL
WBC # BLD AUTO: 6.86 K/UL (ref 3.9–12.7)

## 2024-05-15 PROCEDURE — 20600001 HC STEP DOWN PRIVATE ROOM: Mod: NTX

## 2024-05-15 PROCEDURE — 25000003 PHARM REV CODE 250: Mod: NTX | Performed by: STUDENT IN AN ORGANIZED HEALTH CARE EDUCATION/TRAINING PROGRAM

## 2024-05-15 PROCEDURE — 83735 ASSAY OF MAGNESIUM: CPT | Mod: NTX | Performed by: INTERNAL MEDICINE

## 2024-05-15 PROCEDURE — 84100 ASSAY OF PHOSPHORUS: CPT | Mod: NTX | Performed by: INTERNAL MEDICINE

## 2024-05-15 PROCEDURE — 99900035 HC TECH TIME PER 15 MIN (STAT): Mod: NTX

## 2024-05-15 PROCEDURE — A4216 STERILE WATER/SALINE, 10 ML: HCPCS | Mod: NTX | Performed by: INTERNAL MEDICINE

## 2024-05-15 PROCEDURE — 85025 COMPLETE CBC W/AUTO DIFF WBC: CPT | Mod: NTX | Performed by: INTERNAL MEDICINE

## 2024-05-15 PROCEDURE — 82800 BLOOD PH: CPT | Mod: NTX

## 2024-05-15 PROCEDURE — 25000003 PHARM REV CODE 250: Mod: NTX | Performed by: NURSE PRACTITIONER

## 2024-05-15 PROCEDURE — 99233 SBSQ HOSP IP/OBS HIGH 50: CPT | Mod: NTX,,, | Performed by: NURSE PRACTITIONER

## 2024-05-15 PROCEDURE — 94761 N-INVAS EAR/PLS OXIMETRY MLT: CPT | Mod: NTX,XB

## 2024-05-15 PROCEDURE — 85347 COAGULATION TIME ACTIVATED: CPT | Mod: NTX

## 2024-05-15 PROCEDURE — 63600175 PHARM REV CODE 636 W HCPCS: Mod: NTX | Performed by: NURSE PRACTITIONER

## 2024-05-15 PROCEDURE — 82803 BLOOD GASES ANY COMBINATION: CPT | Mod: NTX

## 2024-05-15 PROCEDURE — 25000003 PHARM REV CODE 250: Mod: NTX | Performed by: INTERNAL MEDICINE

## 2024-05-15 RX ORDER — DOBUTAMINE HYDROCHLORIDE 400 MG/100ML
5 INJECTION INTRAVENOUS CONTINUOUS
Start: 2024-05-15

## 2024-05-15 RX ORDER — ISOSORBIDE DINITRATE 20 MG/1
20 TABLET ORAL 3 TIMES DAILY
Qty: 90 TABLET | Refills: 11 | Status: ON HOLD | OUTPATIENT
Start: 2024-05-15 | End: 2024-06-06

## 2024-05-15 RX ORDER — DAPAGLIFLOZIN 10 MG/1
10 TABLET, FILM COATED ORAL DAILY
Status: DISCONTINUED | OUTPATIENT
Start: 2024-05-16 | End: 2024-05-18 | Stop reason: HOSPADM

## 2024-05-15 RX ORDER — POTASSIUM CHLORIDE 20 MEQ/1
40 TABLET, EXTENDED RELEASE ORAL DAILY
Qty: 90 TABLET | Refills: 11 | Status: ON HOLD | OUTPATIENT
Start: 2024-05-16 | End: 2024-06-06 | Stop reason: HOSPADM

## 2024-05-15 RX ORDER — HYDRALAZINE HYDROCHLORIDE 50 MG/1
50 TABLET, FILM COATED ORAL EVERY 8 HOURS
Qty: 90 TABLET | Refills: 11 | Status: ON HOLD | OUTPATIENT
Start: 2024-05-15 | End: 2024-06-06

## 2024-05-15 RX ADMIN — HYDRALAZINE HYDROCHLORIDE 50 MG: 50 TABLET ORAL at 05:05

## 2024-05-15 RX ADMIN — Medication 10 ML: at 12:05

## 2024-05-15 RX ADMIN — ISOSORBIDE DINITRATE 20 MG: 20 TABLET ORAL at 02:05

## 2024-05-15 RX ADMIN — POTASSIUM CHLORIDE 40 MEQ: 1500 TABLET, EXTENDED RELEASE ORAL at 08:05

## 2024-05-15 RX ADMIN — ALPRAZOLAM 0.5 MG: 0.5 TABLET ORAL at 10:05

## 2024-05-15 RX ADMIN — ATORVASTATIN CALCIUM 40 MG: 40 TABLET, FILM COATED ORAL at 08:05

## 2024-05-15 RX ADMIN — ISOSORBIDE DINITRATE 20 MG: 20 TABLET ORAL at 08:05

## 2024-05-15 RX ADMIN — BUTALBITAL, ACETAMINOPHEN, AND CAFFEINE 1 TABLET: 50; 325; 40 TABLET ORAL at 10:05

## 2024-05-15 RX ADMIN — Medication 10 ML: at 05:05

## 2024-05-15 RX ADMIN — DOBUTAMINE HYDROCHLORIDE 2.5 MCG/KG/MIN: 400 INJECTION INTRAVENOUS at 05:05

## 2024-05-15 RX ADMIN — PANTOPRAZOLE SODIUM 20 MG: 20 TABLET, DELAYED RELEASE ORAL at 08:05

## 2024-05-15 RX ADMIN — HYDRALAZINE HYDROCHLORIDE 50 MG: 50 TABLET ORAL at 02:05

## 2024-05-15 RX ADMIN — MUPIROCIN: 20 OINTMENT TOPICAL at 08:05

## 2024-05-15 RX ADMIN — HYDRALAZINE HYDROCHLORIDE 75 MG: 50 TABLET ORAL at 10:05

## 2024-05-15 RX ADMIN — SPIRONOLACTONE 25 MG: 25 TABLET ORAL at 08:05

## 2024-05-15 RX ADMIN — OXYCODONE HYDROCHLORIDE 10 MG: 10 TABLET ORAL at 10:05

## 2024-05-15 NOTE — NURSING
Patient refused zoll life vest from representative. [employer] representative left nurse with number in case patient changed his mind. [phone]. Joseph Montgomery NP notified of patients decision.

## 2024-05-15 NOTE — ASSESSMENT & PLAN NOTE
-Non-ischemic cardiomyopathy; Last LHC under media with non-obstructive cardiomyopathy  -Last RHC (2/27/2024): RA:15, RV:45/8, PA:47/23 (35), PWP:28, CO:4.2, CI: 2.1, SVR 1570  -Last LHC (2/27/2024): Non-obstructive   -Inotrope: Continue  5. PICC in place.   -Afterload reduction with Hydralazine/Isordil. Stopped Valsartan for low BP. Will monitor closely.   -CVP 1. Holding diuretics. Will resume low dose at DC.   -RHC 5/13 showed RAP 6, PA 40/20, PCWP 18, CO/CI 3.5/1.8. SVR 2097.   -ECHO 5/13 showed  ECHO with EF 15 %, mild RV dysfxn. Mod MR/TR, LVEDD 7.4cm.  -Pathway started-

## 2024-05-15 NOTE — PLAN OF CARE
Problem: Adult Inpatient Plan of Care  Goal: Plan of Care Review  Outcome: Progressing  Flowsheets (Taken 5/15/2024 8562)  Plan of Care Reviewed With: patient  Goal: Patient-Specific Goal (Individualized)  Outcome: Progressing  Goal: Absence of Hospital-Acquired Illness or Injury  Outcome: Progressing     Problem: Acute Kidney Injury/Impairment  Goal: Fluid and Electrolyte Balance  Outcome: Progressing  Goal: Improved Oral Intake  Outcome: Progressing  Goal: Effective Renal Function  Outcome: Progressing     Problem: Infection  Goal: Absence of Infection Signs and Symptoms  Outcome: Progressing     Problem: Heart Failure  Goal: Optimal Coping  Outcome: Progressing  Goal: Optimal Cardiac Output  Outcome: Progressing  Goal: Stable Heart Rate and Rhythm  Outcome: Progressing  Goal: Optimal Functional Ability  Outcome: Progressing

## 2024-05-15 NOTE — NURSING
At the request of Dr. Rhoades, I have been asked to meet patient and provide VAD education. Introduced self and reason for visit. Patient AAAO.  Provided phase 1 written VAD education. Included in Phase 1 folder is the following:     Evaluation Eval for [facility]  Dattch Flyer  [employer] Text Message Education Videos  Pictures of examples of VADs     Explained the work up process including possible outcomes.     Explained to look over the entire contents and read Evaluation Eval for [facility] acknowledgement form. Encouraged patient to partake in [employer] Text Message Education Videos. Also explained that they should bring this folder with them to all clinic visits and if they are admitted to the hospital so that we can continue education as needed. Should there be any questions, please write them down and bring with you or feel free to call and we can talk on the phone. All questions answered to patient's satisfaction as evidence by verbal acknowledgement.

## 2024-05-15 NOTE — SUBJECTIVE & OBJECTIVE
Interval History:  PICC line placed yesterday without incident. Plan for possible DC home today with IV inotrope. No complaints or overnight events. .        DOBUTamine IV infusion (non-titrating)  5 mcg/kg/min Intravenous Continuous 5.7 mL/hr at 05/13/24 2152 5 mcg/kg/min at 05/13/24 2152     Scheduled Meds:   atorvastatin  40 mg Oral Daily    hydrALAZINE  50 mg Oral Q8H    isosorbide dinitrate  20 mg Oral TID    pantoprazole  20 mg Oral Daily    potassium chloride  40 mEq Oral Daily    sodium chloride 0.9%  10 mL Intravenous Q6H    spironolactone  25 mg Oral Daily     PRN Meds:  Current Facility-Administered Medications:     ALPRAZolam, 0.5 mg, Oral, Nightly PRN    butalbital-acetaminophen-caffeine -40 mg, 1 tablet, Oral, Q4H PRN    melatonin, 3 mg, Oral, Nightly PRN    oxyCODONE, 10 mg, Oral, Q6H PRN    sodium chloride 0.9%, 10 mL, Intravenous, PRN    Flushing PICC/Midline Protocol, , , Until Discontinued **AND** sodium chloride 0.9%, 10 mL, Intravenous, Q6H **AND** sodium chloride 0.9%, 10 mL, Intravenous, PRN    Review of patient's allergies indicates:  No Known Allergies  Objective:     Vital Signs (Most Recent):  Temp: 98.5 °F (36.9 °C) (05/15/24 0744)  Pulse: 106 (05/15/24 0744)  Resp: 18 (05/15/24 0744)  BP: 110/77 (05/15/24 0744)  SpO2: 96 % (05/15/24 0744) Vital Signs (24h Range):  Temp:  [98 °F (36.7 °C)-98.6 °F (37 °C)] 98.5 °F (36.9 °C)  Pulse:  [] 106  Resp:  [18] 18  SpO2:  [95 %-98 %] 96 %  BP: ()/(55-78) 110/77     Patient Vitals for the past 72 hrs (Last 3 readings):   Weight   05/15/24 0747 78.5 kg (173 lb)   05/13/24 0900 76 kg (167 lb 8.8 oz)   05/13/24 0400 76.2 kg (168 lb 1.6 oz)     Body mass index is 25.55 kg/m².      Intake/Output Summary (Last 24 hours) at 5/15/2024 0853  Last data filed at 5/15/2024 0534  Gross per 24 hour   Intake 1542 ml   Output 2225 ml   Net -683 ml            Physical Exam  Constitutional:       Appearance: Normal appearance.   HENT:      Head:  "Atraumatic.   Eyes:      Conjunctiva/sclera: Conjunctivae normal.   Cardiovascular:      Rate and Rhythm: Normal rate and regular rhythm.      Heart sounds: Murmur heard.   Pulmonary:      Effort: Pulmonary effort is normal.      Breath sounds: Normal breath sounds. No wheezing.   Abdominal:      General: There is no distension.      Palpations: Abdomen is soft.      Tenderness: There is no abdominal tenderness.   Skin:     General: Skin is warm.   Neurological:      General: No focal deficit present.      Mental Status: He is alert.            Significant Labs:  CBC:  Recent Labs   Lab 05/13/24  0255 05/14/24  0410 05/15/24  0505   WBC 8.88 8.37 6.86   RBC 5.13 5.03 4.84   HGB 11.8* 11.6* 11.1*   HCT 39.4* 38.2* 37.3*   * 437 376   MCV 77* 76* 77*   MCH 23.0* 23.1* 22.9*   MCHC 29.9* 30.4* 29.8*     BNP:  Recent Labs   Lab 05/10/24  0957 05/10/24  1510   BNP 2,065* 1,991*     CMP:  Recent Labs   Lab 05/10/24  1510 05/10/24  2308 05/11/24  0406 05/13/24  0255 05/13/24  1434 05/14/24  0410   GLU 85 109   < > 103 112* 79   CALCIUM 8.9 8.9   < > 9.4 9.2 8.9   ALBUMIN 3.5 3.2*  --   --  3.3*  --    PROT 7.1 6.2  --   --  7.3  --     137   < > 136 136 135*   K 4.7 4.1   < > 4.3 5.1 4.4   CO2 21* 25   < > 25 23 22*    102   < > 100 107 102   BUN 25* 23*   < > 16 16 14   CREATININE 2.1* 2.1*   < > 2.0* 1.9* 1.7*   ALKPHOS 113 94  --   --  133  --    ALT 69* 58*  --   --  42  --    AST 46* 45*  --   --  31  --    BILITOT 1.0 1.1*  --   --  1.1*  --     < > = values in this interval not displayed.      Coagulation:   No results for input(s): "PT", "INR", "APTT" in the last 168 hours.  LDH:  No results for input(s): "LDH" in the last 72 hours.  Microbiology:  Microbiology Results (last 7 days)       ** No results found for the last 168 hours. **            I have reviewed all pertinent labs within the past 24 hours.    Estimated Creatinine Clearance: 56.6 mL/min (A) (based on SCr of 1.7 mg/dL (H)).    "

## 2024-05-15 NOTE — NURSING
Plan of care reviewed with patient. Patient is AOX4 and VS stable. Patient remained free of falls and trauma, fall precautions are in place. Patient is ambulating independently. Patient has no complaints of pain. Wheels are locked and the bed is in lowest position. The call bell is within reach. Telemetry is on.   Patient had  gtt at 2.5 mcg/kg/min.

## 2024-05-15 NOTE — PROGRESS NOTES
Update:    SAADIA has been working on home dobutamine for pt's dc home.  SAADIA received word from [facility] that due to  shortage, they cannot provide services for this pt.  SAADIA contacted [facility] and [facility], and both are out of network.  SW also called [facility], and they are also out of network and have  shortage.  SW called [facility], and advised they do not provide .   SAADIA called [facility] who advised that they also have a  shortage.    SAADIA discussed with team.  [facility] will try to seek a single case agreement with pt's insurance.  If this is possible, pt will still need infusion suite for PICC change and labs.     Also, team may consider a change to Primacor if this can be provided by [facility]. SAADIA contacted [facility] and waiting on call back.  Call back received and they cannot provide Primacor due to national shortage.    SAADIA met with pt and updated pt on dc planning and issues surrounding getting home dobutamine set up. Pt voiced understanding and agreement.      SAADIA remains available.

## 2024-05-15 NOTE — NURSING
Right IJ central line removed per [place] D[place] order. Patient tolerated procedure well; denies SOB during and post procedure. Patient to remain flat until 1100, patient confirmed an understanding.

## 2024-05-15 NOTE — PROGRESS NOTES
Advanced Heart Failure Options: Transplant/LVAD  PRE-EDUCATION BOOKLET NOTE:    Met with Ayad Edmond and had a brief discussion regarding the advanced heart failure evaluation process including options for heart transplantation and/or left ventricular assist device (LVAD) implantation.     Heart Transplant Educational Booklet reviewed and given to patient, which included the following handouts:  My Advanced Heart Failure Treatment Guide: Module 1  Pre Heart Transplant Coordinator Team Contact Information   Recipient Informed Consent   HIV Testing Information  Wellness Contract  [employer]OS Multiple Listing and Waiting Time Transfer  [employer] Heart Allocation   [employer] Toll Free Phone numbers    Significant History:  Tobacco history:Yes soked from 2003 , quit 2022  Stroke history:  No  Thromboembolism history:  No  Prior sternotomies: No  ICD (if yes, indicate brand of device): No  Angiography (if yes, enter date and location): Yes: 2024 [place]  Blood transfusions (if yes, enter date and location): No  Cancer screenings -   Colonoscopy (if yes, enter date and location): No   Pap smear (if yes, enter date and location): N/A   Mammogram (if yes, enter date and location): N/A  COVID Vaccination: Yes  Dentist visit within the last year: Yes  Local Cardiologist:Dr Garcia    Question and answer session was conducted.    Pt notified that Testing for communicable diseases will be completed as part of the Advanced Options evaluation.   Patient notified that HIV Testing is required for transplant evaluation and repeated at scheduled intervals before and after transplant.   Explained that education will be provided, results will be discussed, and the appropriate consults will be scheduled if needed.    Patient was advised to bring the educational packet to future appointments and/or admissions.   Patient was encouraged to contact the coordinator team with any questions or concerns.    Understanding verbalized.

## 2024-05-15 NOTE — PROGRESS NOTES
[facility] - Cardiology Stepdown  Heart Transplant  Progress Note    Patient Name: Ayad Edmond  MRN: [id number]  Admission Date: 5/10/2024  Hospital Length of Stay: 5 days  Attending Physician: Samm Rhoades MD  Primary Care Provider: Zehra Davies MD  Principal Problem:Acute on chronic combined systolic and diastolic heart failure    Subjective:   Interval History:  PICC line placed yesterday without incident. Plan for possible DC home today with IV inotrope. No complaints or overnight events. .        DOBUTamine IV infusion (non-titrating)  5 mcg/kg/min Intravenous Continuous 5.7 mL/hr at 05/13/24 2152 5 mcg/kg/min at 05/13/24 2152     Scheduled Meds:   atorvastatin  40 mg Oral Daily    hydrALAZINE  50 mg Oral Q8H    isosorbide dinitrate  20 mg Oral TID    pantoprazole  20 mg Oral Daily    potassium chloride  40 mEq Oral Daily    sodium chloride 0.9%  10 mL Intravenous Q6H    spironolactone  25 mg Oral Daily     PRN Meds:  Current Facility-Administered Medications:     ALPRAZolam, 0.5 mg, Oral, Nightly PRN    butalbital-acetaminophen-caffeine -40 mg, 1 tablet, Oral, Q4H PRN    melatonin, 3 mg, Oral, Nightly PRN    oxyCODONE, 10 mg, Oral, Q6H PRN    sodium chloride 0.9%, 10 mL, Intravenous, PRN    Flushing PICC/Midline Protocol, , , Until Discontinued **AND** sodium chloride 0.9%, 10 mL, Intravenous, Q6H **AND** sodium chloride 0.9%, 10 mL, Intravenous, PRN    Review of patient's allergies indicates:  No Known Allergies  Objective:     Vital Signs (Most Recent):  Temp: 98.5 °F (36.9 °C) (05/15/24 0744)  Pulse: 106 (05/15/24 0744)  Resp: 18 (05/15/24 0744)  BP: 110/77 (05/15/24 0744)  SpO2: 96 % (05/15/24 0744) Vital Signs (24h Range):  Temp:  [98 °F (36.7 °C)-98.6 °F (37 °C)] 98.5 °F (36.9 °C)  Pulse:  [] 106  Resp:  [18] 18  SpO2:  [95 %-98 %] 96 %  BP: ()/(55-78) 110/77     Patient Vitals for the past 72 hrs (Last 3 readings):   Weight   05/15/24 0747 78.5 kg (173 lb)   05/13/24 0900 76 kg  "(167 lb 8.8 oz)   05/13/24 0400 76.2 kg (168 lb 1.6 oz)     Body mass index is 25.55 kg/m².      Intake/Output Summary (Last 24 hours) at 5/15/2024 0853  Last data filed at 5/15/2024 0534  Gross per 24 hour   Intake 1542 ml   Output 2225 ml   Net -683 ml            Physical Exam  Constitutional:       Appearance: Normal appearance.   HENT:      Head: Atraumatic.   Eyes:      Conjunctiva/sclera: Conjunctivae normal.   Cardiovascular:      Rate and Rhythm: Normal rate and regular rhythm.      Heart sounds: Murmur heard.   Pulmonary:      Effort: Pulmonary effort is normal.      Breath sounds: Normal breath sounds. No wheezing.   Abdominal:      General: There is no distension.      Palpations: Abdomen is soft.      Tenderness: There is no abdominal tenderness.   Skin:     General: Skin is warm.   Neurological:      General: No focal deficit present.      Mental Status: He is alert.            Significant Labs:  CBC:  Recent Labs   Lab 05/13/24  0255 05/14/24  0410 05/15/24  0505   WBC 8.88 8.37 6.86   RBC 5.13 5.03 4.84   HGB 11.8* 11.6* 11.1*   HCT 39.4* 38.2* 37.3*   * 437 376   MCV 77* 76* 77*   MCH 23.0* 23.1* 22.9*   MCHC 29.9* 30.4* 29.8*     BNP:  Recent Labs   Lab 05/10/24  0957 05/10/24  1510   BNP 2,065* 1,991*     CMP:  Recent Labs   Lab 05/10/24  1510 05/10/24  2308 05/11/24  0406 05/13/24  0255 05/13/24  1434 05/14/24  0410   GLU 85 109   < > 103 112* 79   CALCIUM 8.9 8.9   < > 9.4 9.2 8.9   ALBUMIN 3.5 3.2*  --   --  3.3*  --    PROT 7.1 6.2  --   --  7.3  --     137   < > 136 136 135*   K 4.7 4.1   < > 4.3 5.1 4.4   CO2 21* 25   < > 25 23 22*    102   < > 100 107 102   BUN 25* 23*   < > 16 16 14   CREATININE 2.1* 2.1*   < > 2.0* 1.9* 1.7*   ALKPHOS 113 94  --   --  133  --    ALT 69* 58*  --   --  42  --    AST 46* 45*  --   --  31  --    BILITOT 1.0 1.1*  --   --  1.1*  --     < > = values in this interval not displayed.      Coagulation:   No results for input(s): "PT", "INR", " ""APTT" in the last 168 hours.  LDH:  No results for input(s): "LDH" in the last 72 hours.  Microbiology:  Microbiology Results (last 7 days)       ** No results found for the last 168 hours. **            I have reviewed all pertinent labs within the past 24 hours.    Estimated Creatinine Clearance: 56.6 mL/min (A) (based on SCr of 1.7 mg/dL (H)).    Assessment and Plan:     Mr. Ayad Edmond is a 44 year old male with past medical history of:  NICMP, EF 30-35% on prior Echo (thought to be 10-15% on LV gram during [facility])  HTN  CKD    Who presented to [facility] ER on 5/10/24 at the behest of his heart failure specialist Dr. Mack due to shortness of breath for the past several days and cool extremities on exam.     Per Dr. Mack's H&P: "When talking with him, he says that his cardiac history goes back to approximately 2012.  At that time he was told that he had hypertension, and he was on regular antihypertensive therapy.  However had no major cardiac issues until January of this year when he presented with shortness of breath and was found to have new heart failure with reduced ejection fraction.  As noted above, he had a coronary angiogram done which demonstrated nonobstructive coronary artery disease, but the LV gram demonstrated a further drop in his ejection fraction at 10%.  He had a combined left and right heart catheterization, number as noted below.     At present he has not employed, previously working as a   which was a job that demanded him to be quite active. Not working now due to CHF symptoms. At present unfortunately he is quite short of breath with even minimal amounts of exertion.  He says that he can walk approximately 15-20 feet before he has to pause because of shortness of breath.  Denies any chest discomfort, but has been noticing worsening leg swelling over the past few days to weeks.  Has 3-4 pillow PND/orthopnea which recently has progressed to where he can only sleep upright in his " "urban.  Has episodes of palpitations, did have an episode of syncope in March of this past year.  Endorses recent abdominal swelling associated with early satiety, and poor oral intake.  Reduced appetite."    On interview in the ER, the patient confirmed this history. In ER, he was  hemodynamically stable with normal lactic acid. Cr 2.1 (baseline unclear but prior value of 1.7), mild elevation of AST/ALT. CXR with cardiomegaly and mild bilateral interstitial infiltrates. Bedside echo showed dilated LV, EF 10-15%, trivial circumferential pericardial effusion, moderate-severe TR, mild-moderate MR, mildly reduced RV systolic function, CVP 15, PASP 48 mmHg.     * Acute on chronic combined systolic and diastolic heart failure  -Non-ischemic cardiomyopathy; Last [facility] under media with non-obstructive cardiomyopathy  -Last [facility] (2/27/2024): RA:15, RV:45/8, PA:47/23 (35), PWP:28, CO:4.2, CI: 2.1, SVR 1570  -Last [facility] (2/27/2024): Non-obstructive   -Inotrope: Continue  5. PICC in place.   -Afterload reduction with Hydralazine/Isordil. Stopped Valsartan for low BP. Will monitor closely.   -CVP 1. Holding diuretics. Will resume low dose at DC.   -RHC 5/13 showed RAP 6, PA 40/20, PCWP 18, CO/CI 3.5/1.8. SVR 2097.   -ECHO 5/13 showed  ECHO with EF 15 %, mild RV dysfxn. Mod MR/TR, LVEDD 7.4cm.  -Pathway started-    AUDELIA (acute kidney injury)  - Unclear baseline renal function  - Has not improved with diuresis  - Continue to avoid any nephrotoxic agents  - Monitor urine output and renal function  -Check renal U/S if no improvement  -BMPs daily     Primary hypertension  - Continue with Hydralazine 50 mg, TID and Isordil 20 mg, TID.  - Continue to monitor BP        Jsoeph Montgomery NP  Heart Transplant  [facility] - Cardiology Stepdown    "

## 2024-05-16 LAB
ALBUMIN SERPL BCP-MCNC: 3 G/DL (ref 3.5–5.2)
ALLENS TEST: ABNORMAL
ALLENS TEST: ABNORMAL
ALP SERPL-CCNC: 139 U/L (ref 55–135)
ALT SERPL W/O P-5'-P-CCNC: 48 U/L (ref 10–44)
ANION GAP SERPL CALC-SCNC: 8 MMOL/L (ref 8–16)
AST SERPL-CCNC: 55 U/L (ref 10–40)
BASOPHILS # BLD AUTO: 0.06 K/UL (ref 0–0.2)
BASOPHILS NFR BLD: 1 % (ref 0–1.9)
BILIRUB SERPL-MCNC: 0.6 MG/DL (ref 0.1–1)
BUN SERPL-MCNC: 7 MG/DL (ref 6–20)
CALCIUM SERPL-MCNC: 9 MG/DL (ref 8.7–10.5)
CHLORIDE SERPL-SCNC: 113 MMOL/L (ref 95–110)
CO2 SERPL-SCNC: 17 MMOL/L (ref 23–29)
CREAT SERPL-MCNC: 1.2 MG/DL (ref 0.5–1.4)
DIFFERENTIAL METHOD BLD: ABNORMAL
EOSINOPHIL # BLD AUTO: 0.2 K/UL (ref 0–0.5)
EOSINOPHIL NFR BLD: 3.8 % (ref 0–8)
ERYTHROCYTE [DISTWIDTH] IN BLOOD BY AUTOMATED COUNT: 17.2 % (ref 11.5–14.5)
EST. GFR  (NO RACE VARIABLE): >60 ML/MIN/1.73 M^2
GLUCOSE SERPL-MCNC: 120 MG/DL (ref 70–110)
HCO3 UR-SCNC: 21.7 MMOL/L (ref 24–28)
HCO3 UR-SCNC: 22.9 MMOL/L (ref 24–28)
HCT VFR BLD AUTO: 35.3 % (ref 40–54)
HCT VFR BLD CALC: 39 %PCV (ref 36–54)
HGB BLD-MCNC: 10.7 G/DL (ref 14–18)
IMM GRANULOCYTES # BLD AUTO: 0.02 K/UL (ref 0–0.04)
IMM GRANULOCYTES NFR BLD AUTO: 0.3 % (ref 0–0.5)
LYMPHOCYTES # BLD AUTO: 2.3 K/UL (ref 1–4.8)
LYMPHOCYTES NFR BLD: 38.2 % (ref 18–48)
MAGNESIUM SERPL-MCNC: 2.3 MG/DL (ref 1.6–2.6)
MCH RBC QN AUTO: 22.8 PG (ref 27–31)
MCHC RBC AUTO-ENTMCNC: 30.3 G/DL (ref 32–36)
MCV RBC AUTO: 75 FL (ref 82–98)
MONOCYTES # BLD AUTO: 0.8 K/UL (ref 0.3–1)
MONOCYTES NFR BLD: 12.7 % (ref 4–15)
NEUTROPHILS # BLD AUTO: 2.6 K/UL (ref 1.8–7.7)
NEUTROPHILS NFR BLD: 44 % (ref 38–73)
NRBC BLD-RTO: 0 /100 WBC
PCO2 BLDA: 37.3 MMHG (ref 35–45)
PCO2 BLDA: 40.4 MMHG (ref 35–45)
PH SMN: 7.36 [PH] (ref 7.35–7.45)
PH SMN: 7.37 [PH] (ref 7.35–7.45)
PHOSPHATE SERPL-MCNC: 4.4 MG/DL (ref 2.7–4.5)
PLATELET # BLD AUTO: 361 K/UL (ref 150–450)
PMV BLD AUTO: 8.3 FL (ref 9.2–12.9)
PO2 BLDA: 34 MMHG (ref 40–60)
PO2 BLDA: 43 MMHG (ref 40–60)
POC BE: -3 MMOL/L
POC BE: -4 MMOL/L
POC IONIZED CALCIUM: 1.24 MMOL/L (ref 1.06–1.42)
POC SATURATED O2: 64 % (ref 95–100)
POC SATURATED O2: 77 % (ref 95–100)
POC TCO2: 23 MMOL/L (ref 24–29)
POC TCO2: 24 MMOL/L (ref 24–29)
POTASSIUM BLD-SCNC: 4.5 MMOL/L (ref 3.5–5.1)
POTASSIUM SERPL-SCNC: 4.6 MMOL/L (ref 3.5–5.1)
PROT SERPL-MCNC: 6.6 G/DL (ref 6–8.4)
RBC # BLD AUTO: 4.69 M/UL (ref 4.6–6.2)
SAMPLE: ABNORMAL
SAMPLE: ABNORMAL
SITE: ABNORMAL
SITE: ABNORMAL
SODIUM BLD-SCNC: 142 MMOL/L (ref 136–145)
SODIUM SERPL-SCNC: 138 MMOL/L (ref 136–145)
WBC # BLD AUTO: 6 K/UL (ref 3.9–12.7)

## 2024-05-16 PROCEDURE — A4216 STERILE WATER/SALINE, 10 ML: HCPCS | Mod: NTX | Performed by: INTERNAL MEDICINE

## 2024-05-16 PROCEDURE — 82803 BLOOD GASES ANY COMBINATION: CPT | Mod: NTX

## 2024-05-16 PROCEDURE — 99233 SBSQ HOSP IP/OBS HIGH 50: CPT | Mod: NTX,,, | Performed by: NURSE PRACTITIONER

## 2024-05-16 PROCEDURE — 94761 N-INVAS EAR/PLS OXIMETRY MLT: CPT | Mod: NTX,XB

## 2024-05-16 PROCEDURE — 25000003 PHARM REV CODE 250: Mod: NTX | Performed by: STUDENT IN AN ORGANIZED HEALTH CARE EDUCATION/TRAINING PROGRAM

## 2024-05-16 PROCEDURE — 80053 COMPREHEN METABOLIC PANEL: CPT | Mod: NTX | Performed by: NURSE PRACTITIONER

## 2024-05-16 PROCEDURE — 20600001 HC STEP DOWN PRIVATE ROOM: Mod: NTX

## 2024-05-16 PROCEDURE — 25000003 PHARM REV CODE 250: Mod: NTX | Performed by: NURSE PRACTITIONER

## 2024-05-16 PROCEDURE — 25000003 PHARM REV CODE 250: Mod: NTX | Performed by: INTERNAL MEDICINE

## 2024-05-16 PROCEDURE — 84100 ASSAY OF PHOSPHORUS: CPT | Mod: NTX | Performed by: INTERNAL MEDICINE

## 2024-05-16 PROCEDURE — 83735 ASSAY OF MAGNESIUM: CPT | Mod: NTX | Performed by: INTERNAL MEDICINE

## 2024-05-16 PROCEDURE — 99900035 HC TECH TIME PER 15 MIN (STAT): Mod: NTX

## 2024-05-16 PROCEDURE — 85025 COMPLETE CBC W/AUTO DIFF WBC: CPT | Mod: NTX | Performed by: INTERNAL MEDICINE

## 2024-05-16 RX ORDER — AMOXICILLIN 250 MG
2 CAPSULE ORAL 2 TIMES DAILY
Status: DISCONTINUED | OUTPATIENT
Start: 2024-05-16 | End: 2024-05-18 | Stop reason: HOSPADM

## 2024-05-16 RX ORDER — POLYETHYLENE GLYCOL 3350 17 G/17G
17 POWDER, FOR SOLUTION ORAL DAILY
Status: DISCONTINUED | OUTPATIENT
Start: 2024-05-16 | End: 2024-05-18 | Stop reason: HOSPADM

## 2024-05-16 RX ADMIN — Medication 10 ML: at 05:05

## 2024-05-16 RX ADMIN — DAPAGLIFLOZIN 10 MG: 10 TABLET, FILM COATED ORAL at 09:05

## 2024-05-16 RX ADMIN — ISOSORBIDE DINITRATE 20 MG: 20 TABLET ORAL at 09:05

## 2024-05-16 RX ADMIN — Medication 10 ML: at 06:05

## 2024-05-16 RX ADMIN — VALSARTAN 20 MG: 40 TABLET, FILM COATED ORAL at 08:05

## 2024-05-16 RX ADMIN — ATORVASTATIN CALCIUM 40 MG: 40 TABLET, FILM COATED ORAL at 09:05

## 2024-05-16 RX ADMIN — SENNOSIDES AND DOCUSATE SODIUM 2 TABLET: 50; 8.6 TABLET ORAL at 06:05

## 2024-05-16 RX ADMIN — BUTALBITAL, ACETAMINOPHEN, AND CAFFEINE 1 TABLET: 50; 325; 40 TABLET ORAL at 11:05

## 2024-05-16 RX ADMIN — HYDRALAZINE HYDROCHLORIDE 75 MG: 50 TABLET ORAL at 05:05

## 2024-05-16 RX ADMIN — HYDRALAZINE HYDROCHLORIDE 75 MG: 50 TABLET ORAL at 10:05

## 2024-05-16 RX ADMIN — PANTOPRAZOLE SODIUM 20 MG: 20 TABLET, DELAYED RELEASE ORAL at 09:05

## 2024-05-16 RX ADMIN — HYDRALAZINE HYDROCHLORIDE 75 MG: 50 TABLET ORAL at 02:05

## 2024-05-16 RX ADMIN — ALPRAZOLAM 0.5 MG: 0.5 TABLET ORAL at 10:05

## 2024-05-16 RX ADMIN — VALSARTAN 20 MG: 40 TABLET, FILM COATED ORAL at 11:05

## 2024-05-16 RX ADMIN — ISOSORBIDE DINITRATE 20 MG: 20 TABLET ORAL at 08:05

## 2024-05-16 RX ADMIN — SPIRONOLACTONE 25 MG: 25 TABLET ORAL at 09:05

## 2024-05-16 RX ADMIN — ISOSORBIDE DINITRATE 20 MG: 20 TABLET ORAL at 02:05

## 2024-05-16 RX ADMIN — POTASSIUM CHLORIDE 40 MEQ: 1500 TABLET, EXTENDED RELEASE ORAL at 09:05

## 2024-05-16 RX ADMIN — Medication 10 ML: at 11:05

## 2024-05-16 RX ADMIN — Medication 10 ML: at 12:05

## 2024-05-16 NOTE — PROGRESS NOTES
Update:  SW sent referral to [facility] for PICC care and labs.  Pending decision from admin at this time.  [phone] Fax: [phone]    SW has been in contact with [facility].  Single case agreement with pt's insurance is still pending at this time.     Updated teams at MDT rounds.    Addendum 2:30pm  SW received message that [facility] cannot accept pt due to staffing.[phone]    SW contacted [facility].  They are not able to accept pt for services since pt must be receiving infusion through the facility. [phone]    SW contacted [facility] and left voicemail. [phone]. Waiting on call back.      SAADIA sent referrals through [facility].    Pt was declined by the following agencies:  [facility], [facility], [facility] , [facility] , [facility], [facility], [facility], [facility].      3:30pm  Single Case agreement with [facility] still pending.    4:20pm  SW spoke with pt and updated on progress of dc planning.

## 2024-05-16 NOTE — PROGRESS NOTES
[facility] - [facility]iology Step[facility]  Heart Transplant  Progress Note    Patient Name: Ayad Edmond  MRN: [id number]  Admission Date: 5/10/2024  Hospital Length of Stay: 6 days  Attending Physician: Samm Rhoades MD  Primary Care Provider: Zehra Davies MD  Principal Problem:Acute on chronic combined systolic and diastolic heart failure    Subjective:   Interval History:  Awaiting insurance approval for home . Did not want lifevest after long convo. Otherwise just frustrated being stuck here.        DOBUTamine IV infusion (non-titrating)  2.5 mcg/kg/min Intravenous Continuous 2.9 mL/hr at 05/15/24 1756 2.5 mcg/kg/min at 05/15/24 1756     Scheduled Meds:   atorvastatin  40 mg Oral Daily    dapagliflozin propanediol  10 mg Oral Daily    hydrALAZINE  75 mg Oral Q8H    isosorbide dinitrate  20 mg Oral TID    pantoprazole  20 mg Oral Daily    potassium chloride  40 mEq Oral Daily    sodium chloride 0.9%  10 mL Intravenous Q6H    spironolactone  25 mg Oral Daily     PRN Meds:  Current Facility-Administered Medications:     ALPRAZolam, 0.5 mg, Oral, Nightly PRN    butalbital-acetaminophen-caffeine -40 mg, 1 tablet, Oral, Q4H PRN    melatonin, 3 mg, Oral, Nightly PRN    oxyCODONE, 10 mg, Oral, Q6H PRN    sodium chloride 0.9%, 10 mL, Intravenous, PRN    Flushing PICC/Midline Protocol, , , Until Discontinued **AND** sodium chloride 0.9%, 10 mL, Intravenous, Q6H **AND** sodium chloride 0.9%, 10 mL, Intravenous, PRN    Review of patient's allergies indicates:  No Known Allergies  Objective:     Vital Signs (Most Recent):  Temp: 98.8 °F (37.1 °C) (05/16/24 0810)  Pulse: 109 (05/16/24 0835)  Resp: 18 (05/16/24 0810)  BP: 111/85 (05/16/24 0810)  SpO2: 97 % (05/16/24 0810) Vital Signs (24h Range):  Temp:  [97.5 °F (36.4 °C)-98.8 °F (37.1 °C)] 98.8 °F (37.1 °C)  Pulse:  [] 109  Resp:  [16-18] 18  SpO2:  [95 %-98 %] 97 %  BP: (107-123)/(64-85) 111/85     Patient Vitals for the past 72 hrs (Last 3 readings):   Weight  "  05/15/24 0747 78.5 kg (173 lb)     Body mass index is 25.55 kg/m².      Intake/Output Summary (Last 24 hours) at 5/16/2024 1017  Last data filed at 5/16/2024 0951  Gross per 24 hour   Intake 814.57 ml   Output 1900 ml   Net -1085.43 ml            Physical Exam  Constitutional:       Appearance: Normal appearance.   HENT:      Head: Atraumatic.   Eyes:      Conjunctiva/sclera: Conjunctivae normal.   Cardiovascular:      Rate and Rhythm: Normal rate and regular rhythm.      Heart sounds: Murmur heard.   Pulmonary:      Effort: Pulmonary effort is normal.      Breath sounds: Normal breath sounds. No wheezing.   Abdominal:      General: There is no distension.      Palpations: Abdomen is soft.      Tenderness: There is no abdominal tenderness.   Skin:     General: Skin is warm.   Neurological:      General: No focal deficit present.      Mental Status: He is alert.            Significant Labs:  CBC:  Recent Labs   Lab 05/14/24  0410 05/15/24  0505 05/15/24  1755 05/16/24  0343   WBC 8.37 6.86  --  6.00   RBC 5.03 4.84  --  4.69   HGB 11.6* 11.1*  --  10.7*   HCT 38.2* 37.3* 38 35.3*    376  --  361   MCV 76* 77*  --  75*   MCH 23.1* 22.9*  --  22.8*   MCHC 30.4* 29.8*  --  30.3*     BNP:  Recent Labs   Lab 05/10/24  0957 05/10/24  1510   BNP 2,065* 1,991*     CMP:  Recent Labs   Lab 05/10/24  2308 05/11/24  0406 05/13/24  1434 05/14/24  0410 05/16/24  0343      < > 112* 79 120*   CALCIUM 8.9   < > 9.2 8.9 9.0   ALBUMIN 3.2*  --  3.3*  --  3.0*   PROT 6.2  --  7.3  --  6.6      < > 136 135* 138   K 4.1   < > 5.1 4.4 4.6   CO2 25   < > 23 22* 17*      < > 107 102 113*   BUN 23*   < > 16 14 7   CREATININE 2.1*   < > 1.9* 1.7* 1.2   ALKPHOS 94  --  133  --  139*   ALT 58*  --  42  --  48*   AST 45*  --  31  --  55*   BILITOT 1.1*  --  1.1*  --  0.6    < > = values in this interval not displayed.      Coagulation:   No results for input(s): "PT", "INR", "APTT" in the last 168 hours.  LDH:  No " "results for input(s): "LDH" in the last 72 hours.  Microbiology:  Microbiology Results (last 7 days)       ** No results found for the last 168 hours. **            I have reviewed all pertinent labs within the past 24 hours.    Estimated Creatinine Clearance: 80.2 mL/min (based on SCr of 1.2 mg/dL).    Assessment and Plan:     Mr. Ayad Edmond is a 44 year old male with past medical history of:  NICMP, EF 30-35% on prior Echo (thought to be 10-15% on LV gram during [facility])  HTN  CKD    Who presented to [facility] [place] [facility] on 5/10/24 at the behest of his heart failure specialist Dr. Mack due to shortness of breath for the past several days and cool extremities on exam.     Per Dr. Mack's H&P: "When talking with him, he says that his cardiac history goes back to approximately 2012.  At that time he was told that he had hypertension, and he was on regular antihypertensive therapy.  However had no major cardiac issues until January of this year when he presented with shortness of breath and was found to have new heart failure with reduced ejection fraction.  As noted above, he had a coronary angiogram done which demonstrated nonobstructive coronary artery disease, but the LV gram demonstrated a further drop in his ejection fraction at 10%.  He had a combined left and right heart catheterization, number as noted below.     At present he has not employed, previously working as a   which was a job that demanded him to be quite active. Not working now due to CHF symptoms. At present unfortunately he is quite short of breath with even minimal amounts of exertion.  He says that he can walk approximately 15-20 feet before he has to pause because of shortness of breath.  Denies any chest discomfort, but has been noticing worsening leg swelling over the past few days to weeks.  Has 3-4 pillow PND/orthopnea which recently has progressed to where he can only sleep upright in his truck.  Has episodes of palpitations, did have " "an episode of syncope in March of this past year.  Endorses recent abdominal swelling associated with early satiety, and poor oral intake.  Reduced appetite."    On interview in the ER, the patient confirmed this history. In ER, he was  hemodynamically stable with normal lactic acid. Cr 2.1 (baseline unclear but prior value of 1.7), mild elevation of AST/ALT. CXR with cardiomegaly and mild bilateral interstitial infiltrates. Bedside echo showed dilated LV, EF 10-15%, trivial circumferential pericardial effusion, moderate-severe TR, mild-moderate MR, mildly reduced RV systolic function, CVP 15, PASP 48 mmHg.     * Acute on chronic combined systolic and diastolic heart failure  -Non-ischemic cardiomyopathy; Last LHC under media with non-obstructive cardiomyopathy  -Last RHC (2/27/2024): RA:15, RV:45/8, PA:47/23 (35), PWP:28, CO:4.2, CI: 2.1, SVR 1570  -Last LHC (2/27/2024): Non-obstructive   -Inotrope: Continue  5. PICC in place.   -Afterload reduction with Hydralazine/Isordil. Stopped Valsartan for low BP. Will  restart today.  - Will resume low dose at DC.   -RHC 5/13 showed RAP 6, PA 40/20, PCWP 18, CO/CI 3.5/1.8. SVR 2097.   -ECHO 5/13 showed  ECHO with EF 15 %, mild RV dysfxn. Mod MR/TR, LVEDD 7.4cm.  -Pathway started-    AUDELIA (acute kidney injury)  - Unclear baseline renal function  - Has not improved with diuresis  - Continue to avoid any nephrotoxic agents  - Monitor urine output and renal function  -Check renal U/S if no improvement  -BMPs daily     Primary hypertension  - Continue with Hydralazine 50 mg, TID and Isordil 20 mg, TID.  - Continue to monitor BP        Joseph Montgomery NP  Heart Transplant  [facility] - [facility]    "

## 2024-05-16 NOTE — SUBJECTIVE & OBJECTIVE
Interval History:  Awaiting insurance approval for home . Did not want lifevest after long convo. Otherwise just frustrated being stuck here.        DOBUTamine IV infusion (non-titrating)  2.5 mcg/kg/min Intravenous Continuous 2.9 mL/hr at 05/15/24 1756 2.5 mcg/kg/min at 05/15/24 1756     Scheduled Meds:   atorvastatin  40 mg Oral Daily    dapagliflozin propanediol  10 mg Oral Daily    hydrALAZINE  75 mg Oral Q8H    isosorbide dinitrate  20 mg Oral TID    pantoprazole  20 mg Oral Daily    potassium chloride  40 mEq Oral Daily    sodium chloride 0.9%  10 mL Intravenous Q6H    spironolactone  25 mg Oral Daily     PRN Meds:  Current Facility-Administered Medications:     ALPRAZolam, 0.5 mg, Oral, Nightly PRN    butalbital-acetaminophen-caffeine -40 mg, 1 tablet, Oral, Q4H PRN    melatonin, 3 mg, Oral, Nightly PRN    oxyCODONE, 10 mg, Oral, Q6H PRN    sodium chloride 0.9%, 10 mL, Intravenous, PRN    Flushing PICC/Midline Protocol, , , Until Discontinued **AND** sodium chloride 0.9%, 10 mL, Intravenous, Q6H **AND** sodium chloride 0.9%, 10 mL, Intravenous, PRN    Review of patient's allergies indicates:  No Known Allergies  Objective:     Vital Signs (Most Recent):  Temp: 98.8 °F (37.1 °C) (05/16/24 0810)  Pulse: 109 (05/16/24 0835)  Resp: 18 (05/16/24 0810)  BP: 111/85 (05/16/24 0810)  SpO2: 97 % (05/16/24 0810) Vital Signs (24h Range):  Temp:  [97.5 °F (36.4 °C)-98.8 °F (37.1 °C)] 98.8 °F (37.1 °C)  Pulse:  [] 109  Resp:  [16-18] 18  SpO2:  [95 %-98 %] 97 %  BP: (107-123)/(64-85) 111/85     Patient Vitals for the past 72 hrs (Last 3 readings):   Weight   05/15/24 0747 78.5 kg (173 lb)     Body mass index is 25.55 kg/m².      Intake/Output Summary (Last 24 hours) at 5/16/2024 1017  Last data filed at 5/16/2024 0951  Gross per 24 hour   Intake 814.57 ml   Output 1900 ml   Net -1085.43 ml            Physical Exam  Constitutional:       Appearance: Normal appearance.   HENT:      Head: Atraumatic.   Eyes:     "  Conjunctiva/sclera: Conjunctivae normal.   Cardiovascular:      Rate and Rhythm: Normal rate and regular rhythm.      Heart sounds: Murmur heard.   Pulmonary:      Effort: Pulmonary effort is normal.      Breath sounds: Normal breath sounds. No wheezing.   Abdominal:      General: There is no distension.      Palpations: Abdomen is soft.      Tenderness: There is no abdominal tenderness.   Skin:     General: Skin is warm.   Neurological:      General: No focal deficit present.      Mental Status: He is alert.            Significant Labs:  CBC:  Recent Labs   Lab 05/14/24  0410 05/15/24  0505 05/15/24  1755 05/16/24  0343   WBC 8.37 6.86  --  6.00   RBC 5.03 4.84  --  4.69   HGB 11.6* 11.1*  --  10.7*   HCT 38.2* 37.3* 38 35.3*    376  --  361   MCV 76* 77*  --  75*   MCH 23.1* 22.9*  --  22.8*   MCHC 30.4* 29.8*  --  30.3*     BNP:  Recent Labs   Lab 05/10/24  0957 05/10/24  1510   BNP 2,065* 1,991*     CMP:  Recent Labs   Lab 05/10/24  2308 05/11/24  0406 05/13/24  1434 05/14/24  0410 05/16/24  0343      < > 112* 79 120*   CALCIUM 8.9   < > 9.2 8.9 9.0   ALBUMIN 3.2*  --  3.3*  --  3.0*   PROT 6.2  --  7.3  --  6.6      < > 136 135* 138   K 4.1   < > 5.1 4.4 4.6   CO2 25   < > 23 22* 17*      < > 107 102 113*   BUN 23*   < > 16 14 7   CREATININE 2.1*   < > 1.9* 1.7* 1.2   ALKPHOS 94  --  133  --  139*   ALT 58*  --  42  --  48*   AST 45*  --  31  --  55*   BILITOT 1.1*  --  1.1*  --  0.6    < > = values in this interval not displayed.      Coagulation:   No results for input(s): "PT", "INR", "APTT" in the last 168 hours.  LDH:  No results for input(s): "LDH" in the last 72 hours.  Microbiology:  Microbiology Results (last 7 days)       ** No results found for the last 168 hours. **            I have reviewed all pertinent labs within the past 24 hours.    Estimated Creatinine Clearance: 80.2 mL/min (based on SCr of 1.2 mg/dL).    "

## 2024-05-16 NOTE — ASSESSMENT & PLAN NOTE
-Non-ischemic cardiomyopathy; Last LHC under media with non-obstructive cardiomyopathy  -Last RHC (2/27/2024): RA:15, RV:45/8, PA:47/23 (35), PWP:28, CO:4.2, CI: 2.1, SVR 1570  -Last LHC (2/27/2024): Non-obstructive   -Inotrope: Continue  5. PICC in place.   -Afterload reduction with Hydralazine/Isordil. Stopped Valsartan for low BP. Will  restart today.  - Will resume low dose at DC.   -RHC 5/13 showed RAP 6, PA 40/20, PCWP 18, CO/CI 3.5/1.8. SVR 2097.   -ECHO 5/13 showed  ECHO with EF 15 %, mild RV dysfxn. Mod MR/TR, LVEDD 7.4cm.  -Pathway started-

## 2024-05-16 NOTE — PLAN OF CARE
Problem: Adult Inpatient Plan of Care  Goal: Plan of Care Review  Outcome: Progressing  Flowsheets (Taken 5/16/2024 0537)  Plan of Care Reviewed With: patient  Goal: Patient-Specific Goal (Individualized)  Outcome: Progressing  Goal: Absence of Hospital-Acquired Illness or Injury  Outcome: Progressing  Goal: Optimal Comfort and Wellbeing  Outcome: Progressing  Goal: Readiness for Transition of Care  Outcome: Progressing     Problem: Acute Kidney Injury/Impairment  Goal: Fluid and Electrolyte Balance  Outcome: Progressing     Problem: Heart Failure  Goal: Optimal Coping  Outcome: Progressing

## 2024-05-17 ENCOUNTER — TELEPHONE (OUTPATIENT)
Dept: TRANSPLANT | Facility: CLINIC | Age: 44
End: 2024-05-17
Payer: MEDICAID

## 2024-05-17 DIAGNOSIS — R79.9 ABNORMAL BLOOD CHEMISTRY: ICD-10-CM

## 2024-05-17 DIAGNOSIS — I50.9 CONGESTIVE HEART FAILURE, UNSPECIFIED HF CHRONICITY, UNSPECIFIED HEART FAILURE TYPE: Primary | ICD-10-CM

## 2024-05-17 LAB
ALBUMIN SERPL BCP-MCNC: 3.1 G/DL (ref 3.5–5.2)
ALLENS TEST: ABNORMAL
ALP SERPL-CCNC: 151 U/L (ref 55–135)
ALT SERPL W/O P-5'-P-CCNC: 56 U/L (ref 10–44)
ANION GAP SERPL CALC-SCNC: 11 MMOL/L (ref 8–16)
AST SERPL-CCNC: 66 U/L (ref 10–40)
BASOPHILS # BLD AUTO: 0.06 K/UL (ref 0–0.2)
BASOPHILS NFR BLD: 1 % (ref 0–1.9)
BILIRUB SERPL-MCNC: 0.6 MG/DL (ref 0.1–1)
BUN SERPL-MCNC: 9 MG/DL (ref 6–20)
CALCIUM SERPL-MCNC: 9.1 MG/DL (ref 8.7–10.5)
CHLORIDE SERPL-SCNC: 108 MMOL/L (ref 95–110)
CO2 SERPL-SCNC: 20 MMOL/L (ref 23–29)
CREAT SERPL-MCNC: 1.2 MG/DL (ref 0.5–1.4)
DELSYS: ABNORMAL
DIFFERENTIAL METHOD BLD: ABNORMAL
EOSINOPHIL # BLD AUTO: 0.2 K/UL (ref 0–0.5)
EOSINOPHIL NFR BLD: 3.1 % (ref 0–8)
ERYTHROCYTE [DISTWIDTH] IN BLOOD BY AUTOMATED COUNT: 16.8 % (ref 11.5–14.5)
EST. GFR  (NO RACE VARIABLE): >60 ML/MIN/1.73 M^2
GLUCOSE SERPL-MCNC: 90 MG/DL (ref 70–110)
HCT VFR BLD AUTO: 34.7 % (ref 40–54)
HGB BLD-MCNC: 10.6 G/DL (ref 14–18)
IMM GRANULOCYTES # BLD AUTO: 0.01 K/UL (ref 0–0.04)
IMM GRANULOCYTES NFR BLD AUTO: 0.2 % (ref 0–0.5)
LYMPHOCYTES # BLD AUTO: 2.4 K/UL (ref 1–4.8)
LYMPHOCYTES NFR BLD: 39 % (ref 18–48)
MAGNESIUM SERPL-MCNC: 2.1 MG/DL (ref 1.6–2.6)
MCH RBC QN AUTO: 23.1 PG (ref 27–31)
MCHC RBC AUTO-ENTMCNC: 30.5 G/DL (ref 32–36)
MCV RBC AUTO: 76 FL (ref 82–98)
MODE: ABNORMAL
MONOCYTES # BLD AUTO: 0.6 K/UL (ref 0.3–1)
MONOCYTES NFR BLD: 9.7 % (ref 4–15)
NEUTROPHILS # BLD AUTO: 2.9 K/UL (ref 1.8–7.7)
NEUTROPHILS NFR BLD: 47 % (ref 38–73)
NRBC BLD-RTO: 0 /100 WBC
PHOSPHATE SERPL-MCNC: 4.8 MG/DL (ref 2.7–4.5)
PLATELET # BLD AUTO: 351 K/UL (ref 150–450)
PMV BLD AUTO: 8.5 FL (ref 9.2–12.9)
PO2 BLDA: 37 MMHG (ref 40–60)
POC SATURATED O2: 69 % (ref 95–100)
POTASSIUM SERPL-SCNC: 4.3 MMOL/L (ref 3.5–5.1)
PROT SERPL-MCNC: 6.6 G/DL (ref 6–8.4)
RBC # BLD AUTO: 4.59 M/UL (ref 4.6–6.2)
SAMPLE: ABNORMAL
SITE: ABNORMAL
SODIUM SERPL-SCNC: 139 MMOL/L (ref 136–145)
WBC # BLD AUTO: 6.2 K/UL (ref 3.9–12.7)

## 2024-05-17 PROCEDURE — 83735 ASSAY OF MAGNESIUM: CPT | Mod: NTX | Performed by: INTERNAL MEDICINE

## 2024-05-17 PROCEDURE — 25000003 PHARM REV CODE 250: Mod: NTX | Performed by: INTERNAL MEDICINE

## 2024-05-17 PROCEDURE — 20600001 HC STEP DOWN PRIVATE ROOM: Mod: NTX

## 2024-05-17 PROCEDURE — A4216 STERILE WATER/SALINE, 10 ML: HCPCS | Mod: NTX | Performed by: INTERNAL MEDICINE

## 2024-05-17 PROCEDURE — 25000003 PHARM REV CODE 250: Mod: NTX | Performed by: NURSE PRACTITIONER

## 2024-05-17 PROCEDURE — 82803 BLOOD GASES ANY COMBINATION: CPT | Mod: NTX

## 2024-05-17 PROCEDURE — 99233 SBSQ HOSP IP/OBS HIGH 50: CPT | Mod: NTX,,, | Performed by: NURSE PRACTITIONER

## 2024-05-17 PROCEDURE — 25000003 PHARM REV CODE 250: Mod: NTX | Performed by: STUDENT IN AN ORGANIZED HEALTH CARE EDUCATION/TRAINING PROGRAM

## 2024-05-17 PROCEDURE — 84100 ASSAY OF PHOSPHORUS: CPT | Mod: NTX | Performed by: INTERNAL MEDICINE

## 2024-05-17 PROCEDURE — 80053 COMPREHEN METABOLIC PANEL: CPT | Mod: NTX | Performed by: NURSE PRACTITIONER

## 2024-05-17 PROCEDURE — 99900035 HC TECH TIME PER 15 MIN (STAT): Mod: NTX

## 2024-05-17 PROCEDURE — 85025 COMPLETE CBC W/AUTO DIFF WBC: CPT | Mod: NTX | Performed by: INTERNAL MEDICINE

## 2024-05-17 RX ORDER — VALSARTAN 40 MG/1
20 TABLET ORAL 2 TIMES DAILY
Qty: 90 TABLET | Refills: 3 | Status: SHIPPED | OUTPATIENT
Start: 2024-05-17 | End: 2025-05-17

## 2024-05-17 RX ORDER — DAPAGLIFLOZIN 10 MG/1
10 TABLET, FILM COATED ORAL DAILY
Qty: 90 TABLET | Refills: 3 | Status: SHIPPED | OUTPATIENT
Start: 2024-05-18

## 2024-05-17 RX ORDER — HYDROCORTISONE ACETATE 25 MG/1
25 SUPPOSITORY RECTAL 2 TIMES DAILY PRN
Status: DISCONTINUED | OUTPATIENT
Start: 2024-05-17 | End: 2024-05-18 | Stop reason: HOSPADM

## 2024-05-17 RX ADMIN — ISOSORBIDE DINITRATE 20 MG: 20 TABLET ORAL at 02:05

## 2024-05-17 RX ADMIN — ISOSORBIDE DINITRATE 20 MG: 20 TABLET ORAL at 08:05

## 2024-05-17 RX ADMIN — Medication 10 ML: at 12:05

## 2024-05-17 RX ADMIN — Medication 10 ML: at 06:05

## 2024-05-17 RX ADMIN — PANTOPRAZOLE SODIUM 20 MG: 20 TABLET, DELAYED RELEASE ORAL at 09:05

## 2024-05-17 RX ADMIN — HYDROCORTISONE ACETATE 25 MG: 25 SUPPOSITORY RECTAL at 12:05

## 2024-05-17 RX ADMIN — SENNOSIDES AND DOCUSATE SODIUM 2 TABLET: 50; 8.6 TABLET ORAL at 09:05

## 2024-05-17 RX ADMIN — MINERAL OIL, PETROLATUM, PHENYLEPHRINE HCI 1 APPLICATOR: .25; 14; 74.9 OINTMENT TOPICAL at 03:05

## 2024-05-17 RX ADMIN — HYDRALAZINE HYDROCHLORIDE 75 MG: 50 TABLET ORAL at 02:05

## 2024-05-17 RX ADMIN — POLYETHYLENE GLYCOL 3350 17 G: 17 POWDER, FOR SOLUTION ORAL at 09:05

## 2024-05-17 RX ADMIN — POTASSIUM CHLORIDE 40 MEQ: 1500 TABLET, EXTENDED RELEASE ORAL at 09:05

## 2024-05-17 RX ADMIN — DAPAGLIFLOZIN 10 MG: 10 TABLET, FILM COATED ORAL at 09:05

## 2024-05-17 RX ADMIN — HYDRALAZINE HYDROCHLORIDE 75 MG: 50 TABLET ORAL at 08:05

## 2024-05-17 RX ADMIN — SENNOSIDES AND DOCUSATE SODIUM 2 TABLET: 50; 8.6 TABLET ORAL at 08:05

## 2024-05-17 RX ADMIN — HYDRALAZINE HYDROCHLORIDE 75 MG: 50 TABLET ORAL at 06:05

## 2024-05-17 RX ADMIN — BUTALBITAL, ACETAMINOPHEN, AND CAFFEINE 1 TABLET: 50; 325; 40 TABLET ORAL at 02:05

## 2024-05-17 RX ADMIN — ATORVASTATIN CALCIUM 40 MG: 40 TABLET, FILM COATED ORAL at 09:05

## 2024-05-17 RX ADMIN — VALSARTAN 20 MG: 40 TABLET, FILM COATED ORAL at 09:05

## 2024-05-17 RX ADMIN — OXYCODONE HYDROCHLORIDE 10 MG: 10 TABLET ORAL at 12:05

## 2024-05-17 RX ADMIN — ALPRAZOLAM 0.5 MG: 0.5 TABLET ORAL at 08:05

## 2024-05-17 RX ADMIN — VALSARTAN 20 MG: 40 TABLET, FILM COATED ORAL at 08:05

## 2024-05-17 RX ADMIN — SPIRONOLACTONE 25 MG: 25 TABLET ORAL at 09:05

## 2024-05-17 RX ADMIN — ISOSORBIDE DINITRATE 20 MG: 20 TABLET ORAL at 09:05

## 2024-05-17 NOTE — PROGRESS NOTES
[facility] - [facility]iology Step[facility]  Heart Transplant  Progress Note    Patient Name: Ayad Edmond  MRN: [id number]  Admission Date: 5/10/2024  Hospital Length of Stay: 7 days  Attending Physician: Samm Rhoades MD  Primary Care Provider: Zehra Davies MD  Principal Problem:Acute on chronic combined systolic and diastolic heart failure    Subjective:   Interval History:  Awaiting insurance approval for home . Did not want lifevest after long convo. Otherwise just frustrated being stuck here. Says that he is willing to do whatever it takes to get inotrope approved as the medication has helped him feel so much better.        DOBUTamine IV infusion (non-titrating)  2.5 mcg/kg/min Intravenous Continuous 2.9 mL/hr at 05/15/24 1756 2.5 mcg/kg/min at 05/15/24 1756     Scheduled Meds:   atorvastatin  40 mg Oral Daily    dapagliflozin propanediol  10 mg Oral Daily    hydrALAZINE  75 mg Oral Q8H    isosorbide dinitrate  20 mg Oral TID    pantoprazole  20 mg Oral Daily    polyethylene glycol  17 g Oral Daily    potassium chloride  40 mEq Oral Daily    senna-docusate 8.6-50 mg  2 tablet Oral BID    sodium chloride 0.9%  10 mL Intravenous Q6H    spironolactone  25 mg Oral Daily    valsartan  20 mg Oral BID     PRN Meds:  Current Facility-Administered Medications:     ALPRAZolam, 0.5 mg, Oral, Nightly PRN    butalbital-acetaminophen-caffeine -40 mg, 1 tablet, Oral, Q4H PRN    hydrocortisone, 25 mg, Rectal, BID PRN    melatonin, 3 mg, Oral, Nightly PRN    oxyCODONE, 10 mg, Oral, Q6H PRN    sodium chloride 0.9%, 10 mL, Intravenous, PRN    Flushing PICC/Midline Protocol, , , Until Discontinued **AND** sodium chloride 0.9%, 10 mL, Intravenous, Q6H **AND** sodium chloride 0.9%, 10 mL, Intravenous, PRN    Review of patient's allergies indicates:  No Known Allergies  Objective:     Vital Signs (Most Recent):  Temp: 97.9 °F (36.6 °C) (05/17/24 0806)  Pulse: (!) 59 (05/17/24 0806)  Resp: 20 (05/17/24 0806)  BP: (!) 133/91  (05/17/24 0806)  SpO2: 98 % (05/17/24 0806) Vital Signs (24h Range):  Temp:  [97.9 °F (36.6 °C)-98.7 °F (37.1 °C)] 97.9 °F (36.6 °C)  Pulse:  [] 59  Resp:  [16-20] 20  SpO2:  [97 %-100 %] 98 %  BP: ()/(64-91) 133/91     Patient Vitals for the past 72 hrs (Last 3 readings):   Weight   05/17/24 0806 78.9 kg (173 lb 15.1 oz)   05/15/24 0747 78.5 kg (173 lb)     Body mass index is 25.69 kg/m².      Intake/Output Summary (Last 24 hours) at 5/17/2024 1032  Last data filed at 5/17/2024 0922  Gross per 24 hour   Intake 1320 ml   Output 1900 ml   Net -580 ml            Physical Exam  Constitutional:       Appearance: Normal appearance.   HENT:      Head: Atraumatic.   Eyes:      Conjunctiva/sclera: Conjunctivae normal.   Cardiovascular:      Rate and Rhythm: Normal rate and regular rhythm.      Heart sounds: Murmur heard.   Pulmonary:      Effort: Pulmonary effort is normal.      Breath sounds: Normal breath sounds. No wheezing.   Abdominal:      General: There is no distension.      Palpations: Abdomen is soft.      Tenderness: There is no abdominal tenderness.   Skin:     General: Skin is warm.   Neurological:      General: No focal deficit present.      Mental Status: He is alert.            Significant Labs:  CBC:  Recent Labs   Lab 05/15/24  0505 05/15/24  1755 05/16/24  0343 05/16/24  1818 05/17/24  0440   WBC 6.86  --  6.00  --  6.20   RBC 4.84  --  4.69  --  4.59*   HGB 11.1*  --  10.7*  --  10.6*   HCT 37.3*   < > 35.3* 39 34.7*     --  361  --  351   MCV 77*  --  75*  --  76*   MCH 22.9*  --  22.8*  --  23.1*   MCHC 29.8*  --  30.3*  --  30.5*    < > = values in this interval not displayed.     BNP:  Recent Labs   Lab 05/10/24  1510   BNP 1,991*     CMP:  Recent Labs   Lab 05/13/24  1434 05/14/24  0410 05/16/24  0343 05/17/24  0440   * 79 120* 90   CALCIUM 9.2 8.9 9.0 9.1   ALBUMIN 3.3*  --  3.0* 3.1*   PROT 7.3  --  6.6 6.6    135* 138 139   K 5.1 4.4 4.6 4.3   CO2 23 22* 17* 20*  "   102 113* 108   BUN 16 14 7 9   CREATININE 1.9* 1.7* 1.2 1.2   ALKPHOS 133  --  139* 151*   ALT 42  --  48* 56*   AST 31  --  55* 66*   BILITOT 1.1*  --  0.6 0.6      Coagulation:   No results for input(s): "PT", "INR", "APTT" in the last 168 hours.  LDH:  No results for input(s): "LDH" in the last 72 hours.  Microbiology:  Microbiology Results (last 7 days)       ** No results found for the last 168 hours. **            I have reviewed all pertinent labs within the past 24 hours.    Estimated Creatinine Clearance: 80.2 mL/min (based on SCr of 1.2 mg/dL).    Assessment and Plan:     Mr. Ayad Edmond is a 44 year old male with past medical history of:  NICMP, EF 30-35% on prior Echo (thought to be 10-15% on LV gram during [facility])  HTN  CKD    Who presented to [facility] [place] on 5/10/24 at the behest of his heart failure specialist Dr. Mack due to shortness of breath for the past several days and cool extremities on exam.     Per Dr. Mack's H&P: "When talking with him, he says that his cardiac history goes back to approximately 2012.  At that time he was told that he had hypertension, and he was on regular antihypertensive therapy.  However had no major cardiac issues until January of this year when he presented with shortness of breath and was found to have new heart failure with reduced ejection fraction.  As noted above, he had a coronary angiogram done which demonstrated nonobstructive coronary artery disease, but the LV gram demonstrated a further drop in his ejection fraction at 10%.  He had a combined left and right heart catheterization, number as noted below.     At present he has not employed, previously working as a   which was a job that demanded him to be quite active. Not working now due to CHF symptoms. At present unfortunately he is quite short of breath with even minimal amounts of exertion.  He says that he can walk approximately 15-20 feet before he has to pause because of shortness " "of breath.  Denies any chest discomfort, but has been noticing worsening leg swelling over the past few days to weeks.  Has 3-4 pillow PND/orthopnea which recently has progressed to where he can only sleep upright in his truck.  Has episodes of palpitations, did have an episode of syncope in March of this past year.  Endorses recent abdominal swelling associated with early satiety, and poor oral intake.  Reduced appetite."    On interview in the ER, the patient confirmed this history. In ER, he was  hemodynamically stable with normal lactic acid. Cr 2.1 (baseline unclear but prior value of 1.7), mild elevation of AST/ALT. CXR with cardiomegaly and mild bilateral interstitial infiltrates. Bedside echo showed dilated LV, EF 10-15%, trivial circumferential pericardial effusion, moderate-severe TR, mild-moderate MR, mildly reduced RV systolic function, CVP 15, PASP 48 mmHg.     * Acute on chronic combined systolic and diastolic heart failure  -Non-ischemic cardiomyopathy; Last LHC under media with non-obstructive cardiomyopathy  -Last RHC (2/27/2024): RA:15, RV:45/8, PA:47/23 (35), PWP:28, CO:4.2, CI: 2.1, SVR 1570  -Last LHC (2/27/2024): Non-obstructive   -Inotrope: Continue  5. PICC in place.   -GDMT: Cont Hydralazine/Isordil and Valsartan.  -RHC 5/13 showed RAP 6, PA 40/20, PCWP 18, CO/CI 3.5/1.8. SVR 2097.   -ECHO 5/13 showed  ECHO with EF 15 %, mild RV dysfxn. Mod MR/TR, LVEDD 7.4cm.  -Pathway started-    AUDELIA (acute kidney injury)  - Unclear baseline renal function  - Has not improved with diuresis  - Continue to avoid any nephrotoxic agents  - Monitor urine output and renal function  -Check renal U/S if no improvement  -BMPs daily     Primary hypertension  - Continue with Hydralazine 50 mg, TID and Isordil 20 mg, TID.  - Continue to monitor BP        Joseph Montgomery NP  Heart Transplant  [facility] - [facility] Step[facility]    "

## 2024-05-17 NOTE — ASSESSMENT & PLAN NOTE
-Non-ischemic cardiomyopathy; Last LHC under media with non-obstructive cardiomyopathy  -Last RHC (2/27/2024): RA:15, RV:45/8, PA:47/23 (35), PWP:28, CO:4.2, CI: 2.1, SVR 1570  -Last LHC (2/27/2024): Non-obstructive   -Inotrope: Continue  5. PICC in place.   -GDMT: Cont Hydralazine/Isordil and Valsartan.  -RHC 5/13 showed RAP 6, PA 40/20, PCWP 18, CO/CI 3.5/1.8. SVR 2097.   -ECHO 5/13 showed  ECHO with EF 15 %, mild RV dysfxn. Mod MR/TR, LVEDD 7.4cm.  -Pathway started-

## 2024-05-17 NOTE — NURSING
Rounded on patient at bedside. Patient is sitting up in bed, AAOx4, no family/caregiver present. Attempting to f/u on VAD education packet that patient received. Patient states he has a terrible headache and is feeling very overwhelmed, not appropriate for education at this time. Will follow up with patient at a later time.

## 2024-05-17 NOTE — TELEPHONE ENCOUNTER
I spoke with Mr Edmond today, he is not ready to proceed with Transplant Evaluation at this time.  Offered support and encouraged him to take booklet home and red information at home,  encouraged to bring to his upcoming clinic appointments to discuss further.  Verbalized understanding of instructions.     Pt is financially cleared for both inpatient and outpatient OHT and VAD evaluations. Orders placed.  Plan to proceed with eval when pt is ready and is clinically suitable.

## 2024-05-17 NOTE — PROGRESS NOTES
Update/Discharge Note    Pt was lying in bed, alert and oriented. Pt appeared to not be feeling well at this time.     SW attempted to complete psychosocial today, but pt c/o migraine and stated he cannot do this at this time. SW advised pt that it can be done in the outpt setting since he is going to be dc tomorrow.  Pt instructed to bring his 2 caregivers for clinic.  Pt voiced understanding and agreement.    SW received word from [facility] that they got approval to proceed with pt's dc and provide pt with  pending single case agreement.  [facility] has worked out an arrangement with [facility] to provide PICC care and labs.      SW provided update to pt on dc planning.  Pt expressed gratitude that this has been worked out.  Pt reported that his wife is at home, and she cannot drive at night. He stated that he does not feel well enough to drive.  They reside 3-4 hours from [facility].  SW updated team and [facility].     [facility] to provide beside education today, and med/pump will be delivered to bedside tomorrow.    Pt voiced understanding and agreement with dc plans.     Nursing has been updated.    No other needs reported at this time.  SW remains available.       [facility] [phone]    [facility]  [address] [place] [address].

## 2024-05-17 NOTE — PROGRESS NOTES
[facility] educator met with Patient discussed discharge plan for home IVABX. Ayad Edmond will dc home with family support. Patient will infuse medication via CADD Pump. Patient educated on S.A.S.H procedure. S.A.S.H mat provided.  Patient education checklist reviewed and acknowledged by above person(s) above and are agreeable to discharge with home infusion plan of care. IV administration process using aspetic technique was reviewed with successful return demonstration. Patient feels comfortable with infusion. Patient will dc home with Dobutamine 5mcg/kg/min continuous.  Extension placed to double lumen picc. Will come to [facility] for weekly dressing changes and lab draws. Time allotted for questions. Patients nurse and case management team notified teaching has been completed.     Medication delivery will be made to bedside. Will continue to follow until patient dcs.

## 2024-05-17 NOTE — SUBJECTIVE & OBJECTIVE
Interval History:  Awaiting insurance approval for home . Did not want lifevest after long convo. Otherwise just frustrated being stuck here. Says that he is willing to do whatever it takes to get inotrope approved as the medication has helped him feel so much better.        DOBUTamine IV infusion (non-titrating)  2.5 mcg/kg/min Intravenous Continuous 2.9 mL/hr at 05/15/24 1756 2.5 mcg/kg/min at 05/15/24 1756     Scheduled Meds:   atorvastatin  40 mg Oral Daily    dapagliflozin propanediol  10 mg Oral Daily    hydrALAZINE  75 mg Oral Q8H    isosorbide dinitrate  20 mg Oral TID    pantoprazole  20 mg Oral Daily    polyethylene glycol  17 g Oral Daily    potassium chloride  40 mEq Oral Daily    senna-docusate 8.6-50 mg  2 tablet Oral BID    sodium chloride 0.9%  10 mL Intravenous Q6H    spironolactone  25 mg Oral Daily    valsartan  20 mg Oral BID     PRN Meds:  Current Facility-Administered Medications:     ALPRAZolam, 0.5 mg, Oral, Nightly PRN    butalbital-acetaminophen-caffeine -40 mg, 1 tablet, Oral, Q4H PRN    hydrocortisone, 25 mg, Rectal, BID PRN    melatonin, 3 mg, Oral, Nightly PRN    oxyCODONE, 10 mg, Oral, Q6H PRN    sodium chloride 0.9%, 10 mL, Intravenous, PRN    Flushing PICC/Midline Protocol, , , Until Discontinued **AND** sodium chloride 0.9%, 10 mL, Intravenous, Q6H **AND** sodium chloride 0.9%, 10 mL, Intravenous, PRN    Review of patient's allergies indicates:  No Known Allergies  Objective:     Vital Signs (Most Recent):  Temp: 97.9 °F (36.6 °C) (05/17/24 0806)  Pulse: (!) 59 (05/17/24 0806)  Resp: 20 (05/17/24 0806)  BP: (!) 133/91 (05/17/24 0806)  SpO2: 98 % (05/17/24 0806) Vital Signs (24h Range):  Temp:  [97.9 °F (36.6 °C)-98.7 °F (37.1 °C)] 97.9 °F (36.6 °C)  Pulse:  [] 59  Resp:  [16-20] 20  SpO2:  [97 %-100 %] 98 %  BP: ()/(64-91) 133/91     Patient Vitals for the past 72 hrs (Last 3 readings):   Weight   05/17/24 0806 78.9 kg (173 lb 15.1 oz)   05/15/24 0747 78.5 kg  "(173 lb)     Body mass index is 25.69 kg/m².      Intake/Output Summary (Last 24 hours) at 5/17/2024 1032  Last data filed at 5/17/2024 0922  Gross per 24 hour   Intake 1320 ml   Output 1900 ml   Net -580 ml            Physical Exam  Constitutional:       Appearance: Normal appearance.   HENT:      Head: Atraumatic.   Eyes:      Conjunctiva/sclera: Conjunctivae normal.   Cardiovascular:      Rate and Rhythm: Normal rate and regular rhythm.      Heart sounds: Murmur heard.   Pulmonary:      Effort: Pulmonary effort is normal.      Breath sounds: Normal breath sounds. No wheezing.   Abdominal:      General: There is no distension.      Palpations: Abdomen is soft.      Tenderness: There is no abdominal tenderness.   Skin:     General: Skin is warm.   Neurological:      General: No focal deficit present.      Mental Status: He is alert.            Significant Labs:  CBC:  Recent Labs   Lab 05/15/24  0505 05/15/24  1755 05/16/24  0343 05/16/24  1818 05/17/24  0440   WBC 6.86  --  6.00  --  6.20   RBC 4.84  --  4.69  --  4.59*   HGB 11.1*  --  10.7*  --  10.6*   HCT 37.3*   < > 35.3* 39 34.7*     --  361  --  351   MCV 77*  --  75*  --  76*   MCH 22.9*  --  22.8*  --  23.1*   MCHC 29.8*  --  30.3*  --  30.5*    < > = values in this interval not displayed.     BNP:  Recent Labs   Lab 05/10/24  1510   BNP 1,991*     CMP:  Recent Labs   Lab 05/13/24  1434 05/14/24  0410 05/16/24  0343 05/17/24  0440   * 79 120* 90   CALCIUM 9.2 8.9 9.0 9.1   ALBUMIN 3.3*  --  3.0* 3.1*   PROT 7.3  --  6.6 6.6    135* 138 139   K 5.1 4.4 4.6 4.3   CO2 23 22* 17* 20*    102 113* 108   BUN 16 14 7 9   CREATININE 1.9* 1.7* 1.2 1.2   ALKPHOS 133  --  139* 151*   ALT 42  --  48* 56*   AST 31  --  55* 66*   BILITOT 1.1*  --  0.6 0.6      Coagulation:   No results for input(s): "PT", "INR", "APTT" in the last 168 hours.  LDH:  No results for input(s): "LDH" in the last 72 hours.  Microbiology:  Microbiology Results (last 7 " days)       ** No results found for the last 168 hours. **            I have reviewed all pertinent labs within the past 24 hours.    Estimated Creatinine Clearance: 80.2 mL/min (based on SCr of 1.2 mg/dL).

## 2024-05-17 NOTE — PLAN OF CARE
Problem: Adult Inpatient Plan of Care  Goal: Plan of Care Review  Outcome: Progressing  Goal: Patient-Specific Goal (Individualized)  Outcome: Progressing  Goal: Absence of Hospital-Acquired Illness or Injury  Outcome: Progressing  Goal: Optimal Comfort and Wellbeing  Outcome: Progressing  Goal: Readiness for Transition of Care  Outcome: Progressing     Problem: Infection  Goal: Absence of Infection Signs and Symptoms  Outcome: Progressing     Problem: Heart Failure  Goal: Optimal Coping  Outcome: Progressing  Goal: Optimal Cardiac Output  Outcome: Progressing  Goal: Optimal Functional Ability  Outcome: Progressing

## 2024-05-17 NOTE — PLAN OF CARE
AAOX4,VSS,O2 sats>97% on RA..Plan of care discussed with patient.Patient has no complaints of pain/SOB. Discussed medications and care. Patient has no questions at this time.Pt visualised and stable.Call light within reach.Pt resting,bed at lowest position.
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
## 2024-05-17 NOTE — PROGRESS NOTES
Update:    SW received call back from [facility] supervisor.  They are not able to assist with PICC care/labs due to pt's PCP not having privileges there.    SAADIA contacted pt's PCP yesterday for assistance, Dr. Zehra Davies.  Dr Davies had her nurse navigator contact me this morning (Fatimah Pawelthierno [phone]).  SAADIA spoke with Fatimah.  Discussed what agencies were contacted and denials.  Fatimah had no other options to offer at this time.  Pt is not able to go to Dr. Davies's clinic for PICC care/labs.  They cannot offer PICC care.    SAADIA will follow.     Addendum 11am:  [facility] was able to locate another option for  in pt's area.  SAADIA faxed referral to [facility] [phone] Fax: [phone]    Addendum 2:15pm  SAADIA spoke with Ezequiel Briseno, Arturo with [facility].  Ezequiel advised that they cannot accept pt due to being out of network.

## 2024-05-18 VITALS
BODY MASS INDEX: 25.5 KG/M2 | RESPIRATION RATE: 20 BRPM | SYSTOLIC BLOOD PRESSURE: 100 MMHG | OXYGEN SATURATION: 98 % | HEIGHT: 69 IN | WEIGHT: 172.19 LBS | HEART RATE: 58 BPM | TEMPERATURE: 99 F | DIASTOLIC BLOOD PRESSURE: 58 MMHG

## 2024-05-18 LAB
ALBUMIN SERPL BCP-MCNC: 3.1 G/DL (ref 3.5–5.2)
ALLENS TEST: ABNORMAL
ALP SERPL-CCNC: 140 U/L (ref 55–135)
ALT SERPL W/O P-5'-P-CCNC: 52 U/L (ref 10–44)
ANION GAP SERPL CALC-SCNC: 9 MMOL/L (ref 8–16)
AST SERPL-CCNC: 56 U/L (ref 10–40)
BASOPHILS # BLD AUTO: 0.05 K/UL (ref 0–0.2)
BASOPHILS NFR BLD: 1 % (ref 0–1.9)
BILIRUB SERPL-MCNC: 0.5 MG/DL (ref 0.1–1)
BUN SERPL-MCNC: 8 MG/DL (ref 6–20)
CALCIUM SERPL-MCNC: 9.1 MG/DL (ref 8.7–10.5)
CHLORIDE SERPL-SCNC: 109 MMOL/L (ref 95–110)
CO2 SERPL-SCNC: 20 MMOL/L (ref 23–29)
CREAT SERPL-MCNC: 1.2 MG/DL (ref 0.5–1.4)
DIFFERENTIAL METHOD BLD: ABNORMAL
EOSINOPHIL # BLD AUTO: 0.2 K/UL (ref 0–0.5)
EOSINOPHIL NFR BLD: 3.1 % (ref 0–8)
ERYTHROCYTE [DISTWIDTH] IN BLOOD BY AUTOMATED COUNT: 16.8 % (ref 11.5–14.5)
EST. GFR  (NO RACE VARIABLE): >60 ML/MIN/1.73 M^2
GLUCOSE SERPL-MCNC: 85 MG/DL (ref 70–110)
HCT VFR BLD AUTO: 35.6 % (ref 40–54)
HGB BLD-MCNC: 10.5 G/DL (ref 14–18)
IMM GRANULOCYTES # BLD AUTO: 0.01 K/UL (ref 0–0.04)
IMM GRANULOCYTES NFR BLD AUTO: 0.2 % (ref 0–0.5)
LYMPHOCYTES # BLD AUTO: 2.1 K/UL (ref 1–4.8)
LYMPHOCYTES NFR BLD: 41.7 % (ref 18–48)
MAGNESIUM SERPL-MCNC: 2.2 MG/DL (ref 1.6–2.6)
MCH RBC QN AUTO: 22.3 PG (ref 27–31)
MCHC RBC AUTO-ENTMCNC: 29.5 G/DL (ref 32–36)
MCV RBC AUTO: 76 FL (ref 82–98)
MONOCYTES # BLD AUTO: 0.5 K/UL (ref 0.3–1)
MONOCYTES NFR BLD: 10.2 % (ref 4–15)
NEUTROPHILS # BLD AUTO: 2.2 K/UL (ref 1.8–7.7)
NEUTROPHILS NFR BLD: 43.8 % (ref 38–73)
NRBC BLD-RTO: 0 /100 WBC
PHOSPHATE SERPL-MCNC: 5.1 MG/DL (ref 2.7–4.5)
PLATELET # BLD AUTO: 358 K/UL (ref 150–450)
PMV BLD AUTO: 8.3 FL (ref 9.2–12.9)
PO2 BLDA: 39 MMHG (ref 40–60)
POC SATURATED O2: 72 % (ref 95–100)
POTASSIUM SERPL-SCNC: 4.1 MMOL/L (ref 3.5–5.1)
PROT SERPL-MCNC: 6.4 G/DL (ref 6–8.4)
RBC # BLD AUTO: 4.7 M/UL (ref 4.6–6.2)
SAMPLE: ABNORMAL
SITE: ABNORMAL
SODIUM SERPL-SCNC: 138 MMOL/L (ref 136–145)
WBC # BLD AUTO: 5.09 K/UL (ref 3.9–12.7)

## 2024-05-18 PROCEDURE — 85025 COMPLETE CBC W/AUTO DIFF WBC: CPT | Mod: NTX | Performed by: INTERNAL MEDICINE

## 2024-05-18 PROCEDURE — 25000003 PHARM REV CODE 250: Mod: NTX | Performed by: STUDENT IN AN ORGANIZED HEALTH CARE EDUCATION/TRAINING PROGRAM

## 2024-05-18 PROCEDURE — A4216 STERILE WATER/SALINE, 10 ML: HCPCS | Mod: NTX | Performed by: INTERNAL MEDICINE

## 2024-05-18 PROCEDURE — 80053 COMPREHEN METABOLIC PANEL: CPT | Mod: NTX | Performed by: NURSE PRACTITIONER

## 2024-05-18 PROCEDURE — 25000003 PHARM REV CODE 250: Mod: NTX | Performed by: NURSE PRACTITIONER

## 2024-05-18 PROCEDURE — 99233 SBSQ HOSP IP/OBS HIGH 50: CPT | Mod: NTX,,, | Performed by: PHYSICIAN ASSISTANT

## 2024-05-18 PROCEDURE — 25000003 PHARM REV CODE 250: Mod: NTX | Performed by: INTERNAL MEDICINE

## 2024-05-18 PROCEDURE — 83735 ASSAY OF MAGNESIUM: CPT | Mod: NTX | Performed by: INTERNAL MEDICINE

## 2024-05-18 PROCEDURE — 99900035 HC TECH TIME PER 15 MIN (STAT): Mod: NTX

## 2024-05-18 PROCEDURE — 84100 ASSAY OF PHOSPHORUS: CPT | Mod: NTX | Performed by: INTERNAL MEDICINE

## 2024-05-18 RX ADMIN — Medication 10 ML: at 12:05

## 2024-05-18 RX ADMIN — DAPAGLIFLOZIN 10 MG: 10 TABLET, FILM COATED ORAL at 09:05

## 2024-05-18 RX ADMIN — HYDRALAZINE HYDROCHLORIDE 75 MG: 50 TABLET ORAL at 02:05

## 2024-05-18 RX ADMIN — ATORVASTATIN CALCIUM 40 MG: 40 TABLET, FILM COATED ORAL at 09:05

## 2024-05-18 RX ADMIN — Medication 10 ML: at 05:05

## 2024-05-18 RX ADMIN — ISOSORBIDE DINITRATE 20 MG: 20 TABLET ORAL at 02:05

## 2024-05-18 RX ADMIN — SENNOSIDES AND DOCUSATE SODIUM 2 TABLET: 50; 8.6 TABLET ORAL at 09:05

## 2024-05-18 RX ADMIN — POLYETHYLENE GLYCOL 3350 17 G: 17 POWDER, FOR SOLUTION ORAL at 09:05

## 2024-05-18 RX ADMIN — SPIRONOLACTONE 25 MG: 25 TABLET ORAL at 09:05

## 2024-05-18 RX ADMIN — HYDRALAZINE HYDROCHLORIDE 75 MG: 50 TABLET ORAL at 04:05

## 2024-05-18 RX ADMIN — Medication 10 ML: at 04:05

## 2024-05-18 RX ADMIN — PANTOPRAZOLE SODIUM 20 MG: 20 TABLET, DELAYED RELEASE ORAL at 09:05

## 2024-05-18 RX ADMIN — POTASSIUM CHLORIDE 40 MEQ: 1500 TABLET, EXTENDED RELEASE ORAL at 09:05

## 2024-05-18 RX ADMIN — ISOSORBIDE DINITRATE 20 MG: 20 TABLET ORAL at 09:05

## 2024-05-18 RX ADMIN — VALSARTAN 20 MG: 40 TABLET, FILM COATED ORAL at 09:05

## 2024-05-18 NOTE — SUBJECTIVE & OBJECTIVE
Interval History: No overnight events or complaints this AM. Got insurance approval for home  so will be discharging home around mid day today on  5. Did not want lifevest after long convo yesterday.      DOBUTamine IV infusion (non-titrating)  5 mcg/kg/min Intravenous Continuous 5.7 mL/hr at 05/17/24 1434 5 mcg/kg/min at 05/17/24 1434     Scheduled Meds:   atorvastatin  40 mg Oral Daily    dapagliflozin propanediol  10 mg Oral Daily    hydrALAZINE  75 mg Oral Q8H    isosorbide dinitrate  20 mg Oral TID    pantoprazole  20 mg Oral Daily    polyethylene glycol  17 g Oral Daily    potassium chloride  40 mEq Oral Daily    senna-docusate 8.6-50 mg  2 tablet Oral BID    sodium chloride 0.9%  10 mL Intravenous Q6H    spironolactone  25 mg Oral Daily    valsartan  20 mg Oral BID     PRN Meds:  Current Facility-Administered Medications:     ALPRAZolam, 0.5 mg, Oral, Nightly PRN    butalbital-acetaminophen-caffeine -40 mg, 1 tablet, Oral, Q4H PRN    hydrocortisone, 25 mg, Rectal, BID PRN    melatonin, 3 mg, Oral, Nightly PRN    oxyCODONE, 10 mg, Oral, Q6H PRN    phenyleph-min oil-petrolatum, 1 applicator, Rectal, QID PRN    sodium chloride 0.9%, 10 mL, Intravenous, PRN    Flushing PICC/Midline Protocol, , , Until Discontinued **AND** sodium chloride 0.9%, 10 mL, Intravenous, Q6H **AND** sodium chloride 0.9%, 10 mL, Intravenous, PRN    Review of patient's allergies indicates:  No Known Allergies  Objective:     Vital Signs (Most Recent):  Temp: 98.3 °F (36.8 °C) (05/18/24 0803)  Pulse: (!) 57 (05/18/24 0803)  Resp: 20 (05/18/24 0803)  BP: 119/75 (05/18/24 0803)  SpO2: 99 % (05/18/24 0803) Vital Signs (24h Range):  Temp:  [98.1 °F (36.7 °C)-98.4 °F (36.9 °C)] 98.3 °F (36.8 °C)  Pulse:  [] 57  Resp:  [17-20] 20  SpO2:  [94 %-99 %] 99 %  BP: (100-119)/(61-78) 119/75     Patient Vitals for the past 72 hrs (Last 3 readings):   Weight   05/18/24 0803 78.1 kg (172 lb 2.9 oz)   05/17/24 0806 78.9 kg (173 lb 15.1 oz)  "    Body mass index is 25.43 kg/m².      Intake/Output Summary (Last 24 hours) at 5/18/2024 0948  Last data filed at 5/18/2024 0400  Gross per 24 hour   Intake 1062 ml   Output 2140 ml   Net -1078 ml            Physical Exam  Constitutional:       Appearance: Normal appearance.   HENT:      Head: Atraumatic.   Eyes:      Conjunctiva/sclera: Conjunctivae normal.   Cardiovascular:      Rate and Rhythm: Normal rate and regular rhythm.      Heart sounds: Murmur heard.   Pulmonary:      Effort: Pulmonary effort is normal.      Breath sounds: Normal breath sounds. No wheezing.   Abdominal:      General: There is no distension.      Palpations: Abdomen is soft.      Tenderness: There is no abdominal tenderness.   Skin:     General: Skin is warm.   Neurological:      General: No focal deficit present.      Mental Status: He is alert.            Significant Labs:  CBC:  Recent Labs   Lab 05/16/24 0343 05/16/24 1818 05/17/24 0440 05/18/24  0457   WBC 6.00  --  6.20 5.09   RBC 4.69  --  4.59* 4.70   HGB 10.7*  --  10.6* 10.5*   HCT 35.3* 39 34.7* 35.6*     --  351 358   MCV 75*  --  76* 76*   MCH 22.8*  --  23.1* 22.3*   MCHC 30.3*  --  30.5* 29.5*     BNP:  No results for input(s): "BNP" in the last 168 hours.    Invalid input(s): "BNPTRIAGELBLO"    CMP:  Recent Labs   Lab 05/16/24  0343 05/17/24  0440 05/18/24  0457   * 90 85   CALCIUM 9.0 9.1 9.1   ALBUMIN 3.0* 3.1* 3.1*   PROT 6.6 6.6 6.4    139 138   K 4.6 4.3 4.1   CO2 17* 20* 20*   * 108 109   BUN 7 9 8   CREATININE 1.2 1.2 1.2   ALKPHOS 139* 151* 140*   ALT 48* 56* 52*   AST 55* 66* 56*   BILITOT 0.6 0.6 0.5      Coagulation:   No results for input(s): "PT", "INR", "APTT" in the last 168 hours.  LDH:  No results for input(s): "LDH" in the last 72 hours.  Microbiology:  Microbiology Results (last 7 days)       ** No results found for the last 168 hours. **            I have reviewed all pertinent labs within the past 24 hours.    Estimated " Creatinine Clearance: 80.2 mL/min (based on SCr of 1.2 mg/dL).
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
## 2024-05-18 NOTE — PROGRESS NOTES
Patient discharge in good condition. Discharge instructions provided to patient and family. Home dobutamine infusing before discharge.

## 2024-05-18 NOTE — NURSING
Picc line dressing change. Picc line was confirm via chest xray in the right place. Okay to discharge per Edgar MAURO.

## 2024-05-18 NOTE — PLAN OF CARE
Problem: Adult Inpatient Plan of Care  Goal: Plan of Care Review  Outcome: Progressing  Goal: Patient-Specific Goal (Individualized)  Outcome: Progressing  Goal: Absence of Hospital-Acquired Illness or Injury  Outcome: Progressing  Goal: Optimal Comfort and Wellbeing  Outcome: Progressing  Goal: Readiness for Transition of Care  Outcome: Progressing     Problem: Acute Kidney Injury/Impairment  Goal: Fluid and Electrolyte Balance  Outcome: Progressing  Goal: Improved Oral Intake  Outcome: Progressing  Goal: Effective Renal Function  Outcome: Progressing     Problem: Infection  Goal: Absence of Infection Signs and Symptoms  Outcome: Progressing     Problem: Heart Failure  Goal: Optimal Coping  Outcome: Progressing  Goal: Optimal Cardiac Output  Outcome: Progressing  Goal: Stable Heart Rate and Rhythm  Outcome: Progressing  Goal: Optimal Functional Ability  Outcome: Progressing  Goal: Fluid and Electrolyte Balance  Outcome: Progressing  Goal: Improved Oral Intake  Outcome: Progressing  Goal: Effective Oxygenation and Ventilation  Outcome: Progressing  Goal: Effective Breathing Pattern During Sleep  Outcome: Progressing     Problem: Cardiac Output Decreased  Goal: Effective Cardiac Output  Outcome: Progressing     Problem: Fall Injury Risk  Goal: Absence of Fall and Fall-Related Injury  Outcome: Progressing

## 2024-05-18 NOTE — PROGRESS NOTES
[facility] Discharge Planning/Quick Note:      Referral previously received for home IV Dobutamine. Patient was educated by [facility] RN Educator Zehra CORTES. Please see her note for full education details. Prior to discharge on 5/18/2024, I met with the patient and his wife and they both verbalized to additional education was needed. Patient independently and successfully connected himself to his home IV Dobutamine CADD pump. He also flushed second lumen of DL PICC to confirm that both lumens of his PICC functioned properly prior to discharge. Patient also aware that he will need to report to [facility] infusion suite at the scheduled appointment times and dates for his PICC line care and lab draws; this service was previously set up via [facility]'s pharmacy director to provide a safe plan for weekly labs and PICC line care ahead of discharge. Via secure chat with the PA and bedside nurse, I requested that patient's PICC dressing be changed prior to his discharge. Patient and his wife were also made aware of this request. Additional questions addressed appropriately.     Patient's home IV Dobutamine supplies & doses were delivered to bedside. Patient is ready for discharge home from [facility]'s perspective. Patient's discharge planning team, JOHN Roche, and bedside nurse all updated with the above information.     Please do not hesitate to reach out for any additional needs.     Nicolasa Velazquez MS, RDN, LDN  Clinical Liaison & Dietitian  [facility]   Phone: [phone]  [email or web address]

## 2024-05-18 NOTE — PROGRESS NOTES
[facility] - Cardiology Stepdown  Heart Transplant  Progress Note    Patient Name: Ayad Edmond  MRN: [id number]  Admission Date: 5/10/2024  Hospital Length of Stay: 8 days  Attending Physician: Samm Rhoades MD  Primary Care Provider: Zehra Davies MD  Principal Problem:Acute on chronic combined systolic and diastolic heart failure    Subjective:   Interval History: No overnight events or complaints this AM. Got insurance approval for home  so will be discharging home around mid day today on  5. Did not want [facility] after long convo yesterday.      DOBUTamine IV infusion (non-titrating)  5 mcg/kg/min Intravenous Continuous 5.7 mL/hr at 05/17/24 1434 5 mcg/kg/min at 05/17/24 1434     Scheduled Meds:   atorvastatin  40 mg Oral Daily    dapagliflozin propanediol  10 mg Oral Daily    hydrALAZINE  75 mg Oral Q8H    isosorbide dinitrate  20 mg Oral TID    pantoprazole  20 mg Oral Daily    polyethylene glycol  17 g Oral Daily    potassium chloride  40 mEq Oral Daily    senna-docusate 8.6-50 mg  2 tablet Oral BID    sodium chloride 0.9%  10 mL Intravenous Q6H    spironolactone  25 mg Oral Daily    valsartan  20 mg Oral BID     PRN Meds:  Current Facility-Administered Medications:     ALPRAZolam, 0.5 mg, Oral, Nightly PRN    butalbital-acetaminophen-caffeine -40 mg, 1 tablet, Oral, Q4H PRN    hydrocortisone, 25 mg, Rectal, BID PRN    melatonin, 3 mg, Oral, Nightly PRN    oxyCODONE, 10 mg, Oral, Q6H PRN    phenyleph-min oil-petrolatum, 1 applicator, Rectal, QID PRN    sodium chloride 0.9%, 10 mL, Intravenous, PRN    Flushing PICC/Midline Protocol, , , Until Discontinued **AND** sodium chloride 0.9%, 10 mL, Intravenous, Q6H **AND** sodium chloride 0.9%, 10 mL, Intravenous, PRN    Review of patient's allergies indicates:  No Known Allergies  Objective:     Vital Signs (Most Recent):  Temp: 98.3 °F (36.8 °C) (05/18/24 0803)  Pulse: (!) 57 (05/18/24 0803)  Resp: 20 (05/18/24 0803)  BP: 119/75 (05/18/24  "0803)  SpO2: 99 % (05/18/24 0803) Vital Signs (24h Range):  Temp:  [98.1 °F (36.7 °C)-98.4 °F (36.9 °C)] 98.3 °F (36.8 °C)  Pulse:  [] 57  Resp:  [17-20] 20  SpO2:  [94 %-99 %] 99 %  BP: (100-119)/(61-78) 119/75     Patient Vitals for the past 72 hrs (Last 3 readings):   Weight   05/18/24 0803 78.1 kg (172 lb 2.9 oz)   05/17/24 0806 78.9 kg (173 lb 15.1 oz)     Body mass index is 25.43 kg/m².      Intake/Output Summary (Last 24 hours) at 5/18/2024 0948  Last data filed at 5/18/2024 0400  Gross per 24 hour   Intake 1062 ml   Output 2140 ml   Net -1078 ml            Physical Exam  Constitutional:       Appearance: Normal appearance.   HENT:      Head: Atraumatic.   Eyes:      Conjunctiva/sclera: Conjunctivae normal.   Cardiovascular:      Rate and Rhythm: Normal rate and regular rhythm.      Heart sounds: Murmur heard.   Pulmonary:      Effort: Pulmonary effort is normal.      Breath sounds: Normal breath sounds. No wheezing.   Abdominal:      General: There is no distension.      Palpations: Abdomen is soft.      Tenderness: There is no abdominal tenderness.   Skin:     General: Skin is warm.   Neurological:      General: No focal deficit present.      Mental Status: He is alert.            Significant Labs:  CBC:  Recent Labs   Lab 05/16/24  0343 05/16/24  1818 05/17/24  0440 05/18/24  0457   WBC 6.00  --  6.20 5.09   RBC 4.69  --  4.59* 4.70   HGB 10.7*  --  10.6* 10.5*   HCT 35.3* 39 34.7* 35.6*     --  351 358   MCV 75*  --  76* 76*   MCH 22.8*  --  23.1* 22.3*   MCHC 30.3*  --  30.5* 29.5*     BNP:  No results for input(s): "BNP" in the last 168 hours.    Invalid input(s): "BNPTRIAGELBLO"    CMP:  Recent Labs   Lab 05/16/24  0343 05/17/24  0440 05/18/24  0457   * 90 85   CALCIUM 9.0 9.1 9.1   ALBUMIN 3.0* 3.1* 3.1*   PROT 6.6 6.6 6.4    139 138   K 4.6 4.3 4.1   CO2 17* 20* 20*   * 108 109   BUN 7 9 8   CREATININE 1.2 1.2 1.2   ALKPHOS 139* 151* 140*   ALT 48* 56* 52*   AST 55* " "66* 56*   BILITOT 0.6 0.6 0.5      Coagulation:   No results for input(s): "PT", "INR", "APTT" in the last 168 hours.  LDH:  No results for input(s): "LDH" in the last 72 hours.  Microbiology:  Microbiology Results (last 7 days)       ** No results found for the last 168 hours. **            I have reviewed all pertinent labs within the past 24 hours.    Estimated Creatinine Clearance: 80.2 mL/min (based on SCr of 1.2 mg/dL).    Assessment and Plan:     Mr. Ayad Edmond is a 44 year old male with past medical history of:  NICMP, EF 30-35% on prior Echo (thought to be 10-15% on LV gram during [facility])  HTN  CKD    Who presented to [facility] ER on 5/10/24 at the behest of his heart failure specialist Dr. Mack due to shortness of breath for the past several days and cool extremities on exam.     Per Dr. Mack's H&P: "When talking with him, he says that his cardiac history goes back to approximately 2012.  At that time he was told that he had hypertension, and he was on regular antihypertensive therapy.  However had no major cardiac issues until January of this year when he presented with shortness of breath and was found to have new heart failure with reduced ejection fraction.  As noted above, he had a coronary angiogram done which demonstrated nonobstructive coronary artery disease, but the LV gram demonstrated a further drop in his ejection fraction at 10%.  He had a combined left and right heart catheterization, number as noted below.     At present he has not employed, previously working as a   which was a job that demanded him to be quite active. Not working now due to CHF symptoms. At present unfortunately he is quite short of breath with even minimal amounts of exertion.  He says that he can walk approximately 15-20 feet before he has to pause because of shortness of breath.  Denies any chest discomfort, but has been noticing worsening leg swelling over the past few days to weeks.  Has 3-4 pillow " "PND/orthopnea which recently has progressed to where he can only sleep upright in his truck.  Has episodes of palpitations, did have an episode of syncope in March of this past year.  Endorses recent abdominal swelling associated with early satiety, and poor oral intake.  Reduced appetite."    On interview in the ER, the patient confirmed this history. In ER, he was  hemodynamically stable with normal lactic acid. Cr 2.1 (baseline unclear but prior value of 1.7), mild elevation of AST/ALT. CXR with cardiomegaly and mild bilateral interstitial infiltrates. Bedside echo showed dilated LV, EF 10-15%, trivial circumferential pericardial effusion, moderate-severe TR, mild-moderate MR, mildly reduced RV systolic function, CVP 15, PASP 48 mmHg.     * Acute on chronic combined systolic and diastolic heart failure  -Non-ischemic cardiomyopathy; Last C under media with non-obstructive cardiomyopathy  -Last RHC (2/27/2024): RA:15, RV:45/8, PA:47/23 (35), PWP:28, CO:4.2, CI: 2.1, SVR 1570  -Last LHC (2/27/2024): Non-obstructive   -Inotrope: Continue  5. PICC in place.   -GDMT: Cont Hydralazine/Isordil and Valsartan.  -RHC 5/13 showed RAP 6, PA 40/20, PCWP 18, CO/CI 3.5/1.8. SVR 2097.   -ECHO 5/13 showed  ECHO with EF 15 %, mild RV dysfxn. Mod MR/TR, LVEDD 7.4cm.  -Pathway started-    AUDELIA (acute kidney injury)  - Unclear baseline renal function  - Has not improved with diuresis  - Continue to avoid any nephrotoxic agents  - Monitor urine output and renal function  -Check renal U/S if no improvement  -BMPs daily     Primary hypertension  - Continue with Hydralazine 50 mg, TID and Isordil 20 mg, TID.  - Continue to monitor BP        Edgar Roche PA-C  Heart Transplant  [facility] - Cardiology Stepdown    "

## 2024-05-18 NOTE — PROGRESS NOTES
DISCHARGE    SW to pt's room for discharge today.  Pt presents as AAO x4, calm, cooperative, and asking and answering questions appropriately, wife at bedside.  Pt verbalizes understanding and agreement with plan for d/c to home today with  and PICC care at [facility].  Pt reports family will transport him home today.  Pt reports coping adequately at this time, and denies any needs or concerns to SW.    SW notified [facility] [phone] of d/c today and confirmed they have Epic access.   SW providing ongoing psychosocial and counseling support, education, resources, assistance, and discharge planning as indicated. LCSW spoke to bedside RN. SW remains available.

## 2024-05-20 ENCOUNTER — TELEPHONE (OUTPATIENT)
Dept: ADMINISTRATIVE | Facility: HOSPITAL | Age: 44
End: 2024-05-20
Payer: MEDICAID

## 2024-05-21 ENCOUNTER — PATIENT MESSAGE (OUTPATIENT)
Dept: TRANSPLANT | Facility: CLINIC | Age: 44
End: 2024-05-21
Payer: MEDICAID

## 2024-05-21 ENCOUNTER — TELEPHONE (OUTPATIENT)
Dept: TRANSPLANT | Facility: CLINIC | Age: 44
End: 2024-05-21
Payer: MEDICAID

## 2024-05-21 NOTE — TELEPHONE ENCOUNTER
I spoke with Mr Edmond today- he is hesitant to return to [facility] next week as he lives very far away.  Reports he feels very good he has made an appointment with his local Cardiologist, Dr Ajay Garcia, and will see him on May 29 (next Wednesday) .  I will fax him records from his recent hospitalization. Today's phone call consisted of education on the plan of care going forward:  1) daily weights and when to call us  2) measuring weight in am  3) signs and symptoms of fluid overload  4) weekly labs  5) contacting the team via phone number and my chart  6) Follow up with us on June 12, plan to start evaluation at that time.      Pt agrees to read educational materials and will be ready for consent and to begin evaluation at next visit.    Sent scheduling sheet to .  Faxed Dr Garcia records and notified Dr Rhoades.

## 2024-05-24 ENCOUNTER — DOCUMENTATION ONLY (OUTPATIENT)
Dept: HEMATOLOGY/ONCOLOGY | Facility: CLINIC | Age: 44
End: 2024-05-24

## 2024-05-24 DIAGNOSIS — I50.9 CONGESTIVE HEART FAILURE, UNSPECIFIED HF CHRONICITY, UNSPECIFIED HEART FAILURE TYPE: Primary | ICD-10-CM

## 2024-05-24 DIAGNOSIS — R79.9 ABNORMAL BLOOD CHEMISTRY: ICD-10-CM

## 2024-05-29 ENCOUNTER — TELEPHONE (OUTPATIENT)
Dept: TRANSPLANT | Facility: CLINIC | Age: 44
End: 2024-05-29
Payer: MEDICAID

## 2024-05-29 ENCOUNTER — HOSPITAL ENCOUNTER (INPATIENT)
Facility: HOSPITAL | Age: 44
LOS: 9 days | Discharge: HOME | DRG: 286 | End: 2024-06-07
Attending: STUDENT IN AN ORGANIZED HEALTH CARE EDUCATION/TRAINING PROGRAM | Admitting: INTERNAL MEDICINE
Payer: MEDICAID

## 2024-05-29 DIAGNOSIS — R07.9 CHEST PAIN: ICD-10-CM

## 2024-05-29 DIAGNOSIS — R07.89 CHEST DISCOMFORT: ICD-10-CM

## 2024-05-29 DIAGNOSIS — Z01.818 PRE-TRANSPLANT EVALUATION FOR HEART TRANSPLANT: ICD-10-CM

## 2024-05-29 DIAGNOSIS — I50.43 ACUTE ON CHRONIC COMBINED SYSTOLIC AND DIASTOLIC HEART FAILURE: Primary | ICD-10-CM

## 2024-05-29 LAB
ALBUMIN SERPL BCP-MCNC: 3.3 G/DL (ref 3.5–5.2)
ALP SERPL-CCNC: 101 U/L (ref 55–135)
ALT SERPL W/O P-5'-P-CCNC: 81 U/L (ref 10–44)
ANION GAP SERPL CALC-SCNC: 12 MMOL/L (ref 8–16)
AST SERPL-CCNC: 134 U/L (ref 10–40)
BASOPHILS # BLD AUTO: 0.03 K/UL (ref 0–0.2)
BASOPHILS NFR BLD: 0.4 % (ref 0–1.9)
BILIRUB SERPL-MCNC: 0.8 MG/DL (ref 0.1–1)
BNP SERPL-MCNC: 3707 PG/ML (ref 0–99)
BUN SERPL-MCNC: 33 MG/DL (ref 6–20)
CALCIUM SERPL-MCNC: 9.2 MG/DL (ref 8.7–10.5)
CHLORIDE SERPL-SCNC: 100 MMOL/L (ref 95–110)
CO2 SERPL-SCNC: 19 MMOL/L (ref 23–29)
CREAT SERPL-MCNC: 2.5 MG/DL (ref 0.5–1.4)
DIFFERENTIAL METHOD BLD: ABNORMAL
EOSINOPHIL # BLD AUTO: 0 K/UL (ref 0–0.5)
EOSINOPHIL NFR BLD: 0.1 % (ref 0–8)
ERYTHROCYTE [DISTWIDTH] IN BLOOD BY AUTOMATED COUNT: 18.4 % (ref 11.5–14.5)
EST. GFR  (NO RACE VARIABLE): 32.1 ML/MIN/1.73 M^2
EXT ALBUMIN: 3.4
EXT ALT: 68
EXT AST: 46
EXT BUN: 10
EXT CALCIUM: 8.6
EXT CHLORIDE: 104
EXT CREATININE: 1.36 MG/DL
EXT GLUCOSE: 77
EXT HEMATOCRIT: 37.5
EXT HEMOGLOBIN: 11.5
EXT NT PROBNP: 1654
EXT PLATELETS: 491
EXT POTASSIUM: 4.3
EXT PROTEIN TOTAL: 7.2
EXT SODIUM: 142 MMOL/L
EXT WBC: 7.6
GLUCOSE SERPL-MCNC: 103 MG/DL (ref 70–110)
HCT VFR BLD AUTO: 29.6 % (ref 40–54)
HGB BLD-MCNC: 9.1 G/DL (ref 14–18)
IMM GRANULOCYTES # BLD AUTO: 0.02 K/UL (ref 0–0.04)
IMM GRANULOCYTES NFR BLD AUTO: 0.3 % (ref 0–0.5)
LACTATE SERPL-SCNC: 1.4 MMOL/L (ref 0.5–2.2)
LYMPHOCYTES # BLD AUTO: 2.2 K/UL (ref 1–4.8)
LYMPHOCYTES NFR BLD: 28.1 % (ref 18–48)
MAGNESIUM SERPL-MCNC: 2.4 MG/DL (ref 1.6–2.6)
MCH RBC QN AUTO: 22.8 PG (ref 27–31)
MCHC RBC AUTO-ENTMCNC: 30.7 G/DL (ref 32–36)
MCV RBC AUTO: 74 FL (ref 82–98)
MONOCYTES # BLD AUTO: 0.9 K/UL (ref 0.3–1)
MONOCYTES NFR BLD: 11.4 % (ref 4–15)
NEUTROPHILS # BLD AUTO: 4.6 K/UL (ref 1.8–7.7)
NEUTROPHILS NFR BLD: 59.7 % (ref 38–73)
NRBC BLD-RTO: 0 /100 WBC
OHS QRS DURATION: 100 MS
OHS QTC CALCULATION: 465 MS
PHOSPHATE SERPL-MCNC: 4.9 MG/DL (ref 2.7–4.5)
PLATELET # BLD AUTO: 373 K/UL (ref 150–450)
PMV BLD AUTO: 9.4 FL (ref 9.2–12.9)
POTASSIUM SERPL-SCNC: 4.7 MMOL/L (ref 3.5–5.1)
PROT SERPL-MCNC: 7.3 G/DL (ref 6–8.4)
RBC # BLD AUTO: 4 M/UL (ref 4.6–6.2)
SODIUM SERPL-SCNC: 131 MMOL/L (ref 136–145)
TROPONIN I SERPL DL<=0.01 NG/ML-MCNC: 0.61 NG/ML (ref 0–0.03)
TROPONIN I SERPL DL<=0.01 NG/ML-MCNC: 0.61 NG/ML (ref 0–0.03)
WBC # BLD AUTO: 7.69 K/UL (ref 3.9–12.7)

## 2024-05-29 PROCEDURE — 83605 ASSAY OF LACTIC ACID: CPT | Mod: NTX | Performed by: STUDENT IN AN ORGANIZED HEALTH CARE EDUCATION/TRAINING PROGRAM

## 2024-05-29 PROCEDURE — 84484 ASSAY OF TROPONIN QUANT: CPT | Mod: NTX | Performed by: PHYSICIAN ASSISTANT

## 2024-05-29 PROCEDURE — 84100 ASSAY OF PHOSPHORUS: CPT | Mod: NTX | Performed by: PHYSICIAN ASSISTANT

## 2024-05-29 PROCEDURE — 85025 COMPLETE CBC W/AUTO DIFF WBC: CPT | Mod: NTX | Performed by: PHYSICIAN ASSISTANT

## 2024-05-29 PROCEDURE — 80053 COMPREHEN METABOLIC PANEL: CPT | Mod: NTX | Performed by: PHYSICIAN ASSISTANT

## 2024-05-29 PROCEDURE — 93010 ELECTROCARDIOGRAM REPORT: CPT | Mod: NTX,,, | Performed by: INTERNAL MEDICINE

## 2024-05-29 PROCEDURE — 83735 ASSAY OF MAGNESIUM: CPT | Mod: NTX | Performed by: PHYSICIAN ASSISTANT

## 2024-05-29 PROCEDURE — 93005 ELECTROCARDIOGRAM TRACING: CPT | Mod: NTX

## 2024-05-29 PROCEDURE — 20600001 HC STEP DOWN PRIVATE ROOM: Mod: NTX

## 2024-05-29 PROCEDURE — 83880 ASSAY OF NATRIURETIC PEPTIDE: CPT | Mod: NTX | Performed by: PHYSICIAN ASSISTANT

## 2024-05-29 PROCEDURE — 25000003 PHARM REV CODE 250: Mod: NTX | Performed by: STUDENT IN AN ORGANIZED HEALTH CARE EDUCATION/TRAINING PROGRAM

## 2024-05-29 PROCEDURE — 63600175 PHARM REV CODE 636 W HCPCS: Mod: NTX | Performed by: STUDENT IN AN ORGANIZED HEALTH CARE EDUCATION/TRAINING PROGRAM

## 2024-05-29 PROCEDURE — 99285 EMERGENCY DEPT VISIT HI MDM: CPT | Mod: 25,NTX

## 2024-05-29 PROCEDURE — 99223 1ST HOSP IP/OBS HIGH 75: CPT | Mod: NTX,,, | Performed by: INTERNAL MEDICINE

## 2024-05-29 RX ORDER — ONDANSETRON 4 MG/1
4 TABLET, ORALLY DISINTEGRATING ORAL EVERY 6 HOURS PRN
Status: DISCONTINUED | OUTPATIENT
Start: 2024-05-29 | End: 2024-06-07 | Stop reason: HOSPADM

## 2024-05-29 RX ORDER — SPIRONOLACTONE 25 MG/1
25 TABLET ORAL DAILY
Status: DISCONTINUED | OUTPATIENT
Start: 2024-05-30 | End: 2024-05-29

## 2024-05-29 RX ORDER — ATORVASTATIN CALCIUM 40 MG/1
40 TABLET, FILM COATED ORAL DAILY
Status: DISCONTINUED | OUTPATIENT
Start: 2024-05-30 | End: 2024-06-07 | Stop reason: HOSPADM

## 2024-05-29 RX ORDER — PANTOPRAZOLE SODIUM 20 MG/1
20 TABLET, DELAYED RELEASE ORAL DAILY
Status: DISCONTINUED | OUTPATIENT
Start: 2024-05-30 | End: 2024-06-07 | Stop reason: HOSPADM

## 2024-05-29 RX ORDER — AMOXICILLIN 250 MG
1 CAPSULE ORAL DAILY PRN
Status: DISCONTINUED | OUTPATIENT
Start: 2024-05-29 | End: 2024-06-07 | Stop reason: HOSPADM

## 2024-05-29 RX ORDER — HEPARIN SODIUM 5000 [USP'U]/ML
5000 INJECTION, SOLUTION INTRAVENOUS; SUBCUTANEOUS EVERY 8 HOURS
Status: DISCONTINUED | OUTPATIENT
Start: 2024-05-29 | End: 2024-06-07 | Stop reason: HOSPADM

## 2024-05-29 RX ORDER — DOBUTAMINE HYDROCHLORIDE 400 MG/100ML
5 INJECTION INTRAVENOUS CONTINUOUS
Status: DISCONTINUED | OUTPATIENT
Start: 2024-05-29 | End: 2024-06-07 | Stop reason: HOSPADM

## 2024-05-29 RX ORDER — POTASSIUM CHLORIDE 20 MEQ/1
40 TABLET, EXTENDED RELEASE ORAL DAILY
Status: DISCONTINUED | OUTPATIENT
Start: 2024-05-30 | End: 2024-05-30

## 2024-05-29 RX ORDER — HYDRALAZINE HYDROCHLORIDE 50 MG/1
50 TABLET, FILM COATED ORAL EVERY 8 HOURS
Status: DISCONTINUED | OUTPATIENT
Start: 2024-05-29 | End: 2024-06-02

## 2024-05-29 RX ORDER — ACETAMINOPHEN 325 MG/1
650 TABLET ORAL EVERY 6 HOURS PRN
Status: DISCONTINUED | OUTPATIENT
Start: 2024-05-29 | End: 2024-06-07 | Stop reason: HOSPADM

## 2024-05-29 RX ORDER — SODIUM CHLORIDE 0.9 % (FLUSH) 0.9 %
10 SYRINGE (ML) INJECTION
Status: DISCONTINUED | OUTPATIENT
Start: 2024-05-29 | End: 2024-06-07 | Stop reason: HOSPADM

## 2024-05-29 RX ORDER — LORAZEPAM 2 MG/ML
0.5 INJECTION INTRAMUSCULAR
Status: COMPLETED | OUTPATIENT
Start: 2024-05-29 | End: 2024-05-29

## 2024-05-29 RX ORDER — FUROSEMIDE 10 MG/ML
80 INJECTION INTRAMUSCULAR; INTRAVENOUS ONCE
Status: COMPLETED | OUTPATIENT
Start: 2024-05-29 | End: 2024-05-29

## 2024-05-29 RX ORDER — ISOSORBIDE DINITRATE 20 MG/1
20 TABLET ORAL 3 TIMES DAILY
Status: DISCONTINUED | OUTPATIENT
Start: 2024-05-29 | End: 2024-06-02

## 2024-05-29 RX ADMIN — ISOSORBIDE DINITRATE 20 MG: 20 TABLET ORAL at 09:05

## 2024-05-29 RX ADMIN — FUROSEMIDE 20 MG/HR: 10 INJECTION, SOLUTION INTRAMUSCULAR; INTRAVENOUS at 07:05

## 2024-05-29 RX ADMIN — ACETAMINOPHEN 650 MG: 325 TABLET ORAL at 09:05

## 2024-05-29 RX ADMIN — LORAZEPAM 0.5 MG: 2 INJECTION INTRAMUSCULAR; INTRAVENOUS at 06:05

## 2024-05-29 RX ADMIN — SODIUM CHLORIDE 500 ML: 9 INJECTION, SOLUTION INTRAVENOUS at 06:05

## 2024-05-29 RX ADMIN — SENNOSIDES AND DOCUSATE SODIUM 1 TABLET: 50; 8.6 TABLET ORAL at 09:05

## 2024-05-29 RX ADMIN — FUROSEMIDE 80 MG: 10 INJECTION, SOLUTION INTRAVENOUS at 06:05

## 2024-05-29 RX ADMIN — HYDRALAZINE HYDROCHLORIDE 50 MG: 50 TABLET ORAL at 10:05

## 2024-05-29 RX ADMIN — DOBUTAMINE HYDROCHLORIDE 5 MCG/KG/MIN: 400 INJECTION INTRAVENOUS at 08:05

## 2024-05-29 NOTE — ASSESSMENT & PLAN NOTE
Cr 1.2->2.5; likely cardiorenal    Increase  gtt and start Lasix gtt  Holding Valsartan and Spironolactone for now  Strict I/Os, monitor renal function

## 2024-05-29 NOTE — FIRST PROVIDER EVALUATION
Emergency Department TeleTriage Encounter Note      CHIEF COMPLAINT    Chief Complaint   Patient presents with    Multiple complaints     On dobutamine drip, knees locking up, chest pain /upper back       VITAL SIGNS   Initial Vitals [05/29/24 1250]   BP Pulse Resp Temp SpO2   102/69 (!) 119 (!) 22 98 °F (36.7 °C) 98 %      MAP       --            ALLERGIES    Review of patient's allergies indicates:  No Known Allergies    PROVIDER TRIAGE NOTE  Patient with PICC line for dobutamine drip presenting with complaint of upper back pain, chest pain, muscle cramping. Also reports nausea and vomiting. No increased SOB.       ORDERS  Labs Reviewed - No data to display    ED Orders (720h ago, onward)      Start Ordered     Status Ordering Provider    05/29/24 1253 05/29/24 1252  EKG 12-lead  Once         Final result JUANJO WAGNER              Virtual Visit Note: The provider triage portion of this emergency department evaluation and documentation was performed via [employer], a HIPAA-compliant telemedicine application, in concert with a tele-presenter in the room. A face to face patient evaluation with one of my colleagues will occur once the patient is placed in an emergency department room.      DISCLAIMER: This note was prepared with [employer] voice recognition transcription software. Garbled syntax, mangled pronouns, and other bizarre constructions may be attributed to that software system.

## 2024-05-29 NOTE — AI DETERIORATION ALERT
"RAPID RESPONSE NURSE AI ALERT       AI alert received.    Chart Reviewed: 05/29/2024, 5:56 PM    MRN: [id number]  Bed: ED 22/22    Dx: Acute on chronic combined systolic and diastolic heart failure    Ayad Edmond has a past medical history of Acute heart failure, AUDELIA (acute kidney injury), Cardiomyopathy, Elevated troponin, HTN (hypertension), Hypokalemia, Hypomagnesemia, and Renal insufficiency.    Last VS: BP 95/67 (BP Location: Right arm, Patient Position: Lying)   Pulse (!) 127   Temp 98 °F (36.7 °C) (Oral)   Resp 18   Ht 5' 9" (1.753 m)   Wt 77.1 kg (170 lb)   SpO2 97%   BMI 25.10 kg/m²     24H Vital Sign Range:  Temp:  [98 °F (36.7 °C)]   Pulse:  [119-127]   Resp:  [18-22]   BP: ()/(67-74)   SpO2:  [97 %-98 %]     Level of Consciousness (AVPU): alert    Recent Labs     05/29/24  1529   WBC 7.69   HGB 9.1*   HCT 29.6*          Recent Labs     05/29/24  1529   *   K 4.7      CO2 19*   BUN 33*   CREATININE 2.5*      PHOS 4.9*   MG 2.4          MEWS score:      Vital signs assessed, , /73( 88), SPO2 96%, RR 16.  No additional concerns verbalized at this time. Instructed to call [phone] for further concerns or assistance.    Jennifer Stevens RN        "

## 2024-05-29 NOTE — ASSESSMENT & PLAN NOTE
Decompensated HFrEF  NHYA III, ACC D  BNP 3700, LA 1.4    Admit to CSU with HTS  Increase  to 5 mcg/mg/kg  Start Lasix gtt @ 20 mg/hr after IVP 80 mg IV x1  Continue Bidil  Holding Valsartan and Spironolactone given AUDELIA  Daily weights, strict I/Os

## 2024-05-29 NOTE — HPI
44M with history of HFrEF 2/2 NICM, HTN, CKD admitted for ADHF requiring dobutamine and epinephrine supported diuresis.  Given persistent inotropic dependence and lack of expected cardiac recovery, patient being evaluated for advanced therapies.

## 2024-05-29 NOTE — H&P
[facility] - Emergency Dept  Heart Transplant  H&P    Patient Name: Ayad Edmond  MRN: [id number]  Admission Date: 5/29/2024  Attending Physician: Elier Gilmore, *  Primary Care Provider: Zehra Davies MD  Principal Problem:Acute on chronic combined systolic and diastolic heart failure    Subjective:     History of Present Illness:  Mr. Ayad Edmond is a 44 year old male with past medical history of:  NICMP, EF 15-20%  HTN  CKD     Patient is presenting with 2-3 days of fatigue, leg stiffness, weakness, N/V, abd pain, and poor oral intake. Recently admitted 5/10-18 for decompensated heart failure. Echo showing EF drop from 30-35% to 15-20%. RHC showing mildly elevated filling pressures with low CO/CI, so he was started on  @ 2.5. Discharged feeling well. He lives in [place], but started feeling poorly a few days ago. Weight is actually down 3 lbs and no leg swelling. No orthopnea or PND. Feeling really tired and not eating. No falls or syncope. No bleeding. Been urinating fine on Lasix and Torsemide at home. Went to ED in [place] yesterday, but nothing significant done for him and DC home.    They drove to ED here today as he still isn't feeling well. -120 (sinus tach) and BP 80//70 on RA. Labs concerning for hyponatremia, AUDELIA, mild liver injury, elevated BNP 3700, LA 1.4. Bedside echo with severely reduced EF and plethoric IVC.     Cardiology consulted and heart failure mgmt.      RHC 5/13/24  BP: 110/72 (82)  RA: 6  RV: 40/5, EDP 7  PA: 40/20, mean 28  PCWP: 18     Saturation:  Arterial: 94 %  PA: 48 %     Dennise CI/CO: 1.8/3.5  TD CI/CO: 1.5/2.9     SVR: 2097 dynes/sec/cm -5  PVR: 3.4 Wood Units     Conclusions:  Normal right and mildly elevated left sided filling pressures  Mild pulmonary hypertension  Low CI/CO    TTE 5/13/24    Left Ventricle: The left ventricle is severely dilated. There is eccentric hypertrophy. Severe global hypokinesis present. There is severely reduced  systolic function with a visually estimated ejection fraction of 15 - 20%. Biplane (2D) method of discs ejection fraction is 15%. There is diastolic dysfunction. Elevated left ventricular filling pressure.    Right Ventricle: Right ventricle was not well visualized due to poor acoustic window. Mild right ventricular enlargement. There is mild hypertrophy. Systolic function is mildly reduced.    Left Atrium: Left atrium is severely dilated. Atrial septum is bulging to the right.    Mitral Valve: There is moderate regurgitation.    Tricuspid Valve: There is mild to moderate regurgitation.    Pulmonary Artery: The estimated pulmonary artery systolic pressure is 43 mmHg.    IVC/SVC: Intermediate venous pressure at 8 mmHg.    Pericardium: There is a small anterior effusion.    Past Medical History:   Diagnosis Date    Acute heart failure     AUDELIA (acute kidney injury)     Cardiomyopathy     Elevated troponin     HTN (hypertension)     Hypokalemia     Hypomagnesemia     Renal insufficiency        Past Surgical History:   Procedure Laterality Date    broken foot Left     RIGHT HEART CATHETERIZATION Right 5/13/2024    Procedure: INSERTION, CATHETER, RIGHT HEART;  Surgeon: Pradeep Mack MD;  Location: [facility] CATH LAB;  Service: Cardiology;  Laterality: Right;       Review of patient's allergies indicates:  No Known Allergies    Current Facility-Administered Medications   Medication    [START ON 5/30/2024] atorvastatin tablet 40 mg    DOBUtamine 1000 mg in D5W 250 mL infusion    furosemide (Lasix) 500 mg in 50 mL infusion (conc: 10 mg/mL)    furosemide injection 80 mg    heparin (porcine) injection 5,000 Units    hydrALAZINE tablet 50 mg    isosorbide dinitrate tablet 20 mg    LORazepam injection 0.5 mg    [START ON 5/30/2024] pantoprazole EC tablet 20 mg    [START ON 5/30/2024] potassium chloride SA CR tablet 40 mEq    sodium chloride 0.9% bolus 500 mL 500 mL    sodium chloride 0.9% flush 10 mL    [START ON 5/30/2024]  "spironolactone tablet 25 mg    valsartan split tablet 20 mg     Current Outpatient Medications   Medication Sig    ALPRAZolam (XANAX) 0.25 MG tablet Take 1 tablet (0.25 mg total) by mouth 2 (two) times daily as needed for Anxiety.    atorvastatin (LIPITOR) 40 MG tablet Take 40 mg by mouth once daily.    dapagliflozin propanediol (FARXIGA) 10 mg tablet Take 1 tablet (10 mg total) by mouth once daily.    dimenhyDRINATE (DRAMAMINE) 25 mg Chew Take 1 tablet by mouth daily as needed (nausea).    DOBUTamine (DOBUTREX) 1,000 mg/250 mL (4,000 mcg/mL) infusion Inject 380 mcg/min into the vein continuous.    furosemide (LASIX) 20 MG tablet Take 20 mg by mouth every morning.    hydrALAZINE (APRESOLINE) 50 MG tablet Take 1 tablet (50 mg total) by mouth every 8 (eight) hours.    isosorbide dinitrate (ISORDIL) 20 MG tablet Take 1 tablet (20 mg total) by mouth 3 (three) times daily.    pantoprazole (PROTONIX) 20 MG tablet Take 1 tablet (20 mg total) by mouth once daily.    potassium chloride SA (K-DUR,KLOR-CON) 20 MEQ tablet Take 2 tablets (40 mEq total) by mouth once daily.    spironolactone (ALDACTONE) 25 MG tablet Take 25 mg by mouth once daily.    torsemide (DEMADEX) 20 MG Tab Take 20 mg by mouth once daily.    valsartan (DIOVAN) 40 MG tablet Take 0.5 tablets (20 mg total) by mouth 2 (two) times daily.     Family History       Problem Relation (Age of Onset)    Heart failure Mother    No Known Problems Father          Tobacco Use    Smoking status: Former     Types: Cigarettes     Start date: 2023     Quit date: 1998     Years since quittin.0     Passive exposure: Past    Smokeless tobacco: Never   Substance and Sexual Activity    Alcohol use: Yes     Comment: "very seldom."    Drug use: Never    Sexual activity: Not on file     Review of Systems   Constitutional:  Positive for fatigue.   Respiratory:  Negative for chest tightness and shortness of breath.    Gastrointestinal:  Positive for abdominal pain, nausea " "and vomiting. Negative for blood in stool.   Neurological:  Negative for dizziness.   All other systems reviewed and are negative.    Objective:     Vital Signs (Most Recent):  Temp: 98 °F (36.7 °C) (05/29/24 1517)  Pulse: (!) 125 (05/29/24 1517)  Resp: 20 (05/29/24 1517)  BP: 117/74 (05/29/24 1517)  SpO2: 98 % (05/29/24 1517) Vital Signs (24h Range):  Temp:  [98 °F (36.7 °C)] 98 °F (36.7 °C)  Pulse:  [119-125] 125  Resp:  [20-22] 20  SpO2:  [98 %] 98 %  BP: (102-117)/(69-74) 117/74     Patient Vitals for the past 72 hrs (Last 3 readings):   Weight   05/29/24 1250 77.1 kg (170 lb)     Body mass index is 25.1 kg/m².    No intake or output data in the 24 hours ending 05/29/24 1732       Physical Exam  Constitutional:       Appearance: Normal appearance.   HENT:      Head: Normocephalic.      Mouth/Throat:      Mouth: Mucous membranes are moist.   Eyes:      Extraocular Movements: Extraocular movements intact.   Cardiovascular:      Rate and Rhythm: Regular rhythm. Tachycardia present.      Pulses: Normal pulses.   Pulmonary:      Effort: No respiratory distress.   Abdominal:      General: There is no distension.      Palpations: Abdomen is soft.      Tenderness: There is abdominal tenderness.   Musculoskeletal:      Cervical back: Neck supple.      Right lower leg: No edema.      Left lower leg: No edema.   Skin:     General: Skin is warm.   Neurological:      General: No focal deficit present.      Mental Status: He is alert.            Significant Labs:  CBC:  Recent Labs   Lab 05/29/24  1529   WBC 7.69   RBC 4.00*   HGB 9.1*   HCT 29.6*      MCV 74*   MCH 22.8*   MCHC 30.7*     BNP:  Recent Labs   Lab 05/29/24  1529   BNP 3,707*     CMP:  Recent Labs   Lab 05/29/24  1529      CALCIUM 9.2   ALBUMIN 3.3*   PROT 7.3   *   K 4.7   CO2 19*      BUN 33*   CREATININE 2.5*   ALKPHOS 101   ALT 81*   *   BILITOT 0.8      Coagulation:   No results for input(s): "PT", "INR", "APTT" in the last " "168 hours.  LDH:  No results for input(s): "LDH" in the last 72 hours.  Microbiology:  Microbiology Results (last 7 days)       ** No results found for the last 168 hours. **            BMP:   Recent Labs   Lab 05/29/24  1529      *   K 4.7      CO2 19*   BUN 33*   CREATININE 2.5*   CALCIUM 9.2   MG 2.4     Cardiac Markers: No results for input(s): "CKMB", "TROPONINT", "MYOGLOBIN" in the last 72 hours.  Coagulation: No results for input(s): "PT", "INR", "APTT" in the last 72 hours.  Prealbumin: No results for input(s): "PREALBUMIN" in the last 72 hours.  I have reviewed all pertinent labs within the past 24 hours.    Diagnostic Results:  Reviewed    Assessment/Plan:     * Acute on chronic combined systolic and diastolic heart failure  Decompensated HFrEF  NHYA III, ACC D  BNP 3700, LA 1.4    Admit to CSU with HTS  Increase  to 5 mcg/mg/kg  Start Lasix gtt @ 20 mg/hr after IVP 80 mg IV x1  Continue Bidil  Holding Valsartan and Spironolactone given AUDELIA  Daily weights, strict I/Os    AUDELIA (acute kidney injury)  Cr 1.2->2.5; likely cardiorenal    Increase  gtt and start Lasix gtt  Holding Valsartan and Spironolactone for now  Strict I/Os, monitor renal function    Primary hypertension  HF mgmt as mentioned      Case discussed with Dr Elier Rowley MD.     Samuel Celaya MD  Heart Transplant  [facility] - Emergency Dept  "

## 2024-05-29 NOTE — TELEPHONE ENCOUNTER
Spoke with pt to see when he has dressing change and labs completed.  He reports he went on Friday.   No labs show in epic and none received from /infusion company.       Pt reports he has not been feeling well and is currently [place] [facility]         Inpatient team updated.       Spoke with [facility] who report pts' labs and dressing change are being performed by [facility] [phone].  Spoke to that office who report they are not following that pt.     Call placed to [facility] Lab dept.   [phone] to request labs be faxed.       Message sent to  to request follow up on which agency was set up to follow pt for dressing change and labs.

## 2024-05-29 NOTE — ED PROVIDER NOTES
"Encounter Date: 2024       History     Chief Complaint   Patient presents with    Multiple complaints     On dobutamine drip, knees locking up, chest pain /upper back       44-year-old male with history of cardiogenic shock on dobutamine drip presents with multiple complaints.  He says he was recently admitted to an outside hospital for cramping in his lower extremities and was told he was electrolyte depleted due to aggressive diuresis.  Patient is still complains of right-sided back pain that radiates to his chest which is intermittent.  He also complains of nausea and vomiting as well as abdominal pain that began over the last day or so.  He has not been able to eat has a ease not want food.  Denies any numbness or paresthesias.  At this time, he denies any chest pain.  Patient admits to urinating less as he has not been taking his diuretics over the last few days.      Review of patient's allergies indicates:  No Known Allergies  Past Medical History:   Diagnosis Date    Acute heart failure     AUDELIA (acute kidney injury)     Cardiomyopathy     Elevated troponin     HTN (hypertension)     Hypokalemia     Hypomagnesemia     Renal insufficiency      Past Surgical History:   Procedure Laterality Date    broken foot Left     RIGHT HEART CATHETERIZATION Right 2024    Procedure: INSERTION, CATHETER, RIGHT HEART;  Surgeon: Pradeep Mack MD;  Location: [facility] CATH LAB;  Service: Cardiology;  Laterality: Right;     Family History   Problem Relation Name Age of Onset    Heart failure Mother      No Known Problems Father       Social History     Tobacco Use    Smoking status: Former     Types: Cigarettes     Start date: 2023     Quit date: 1998     Years since quittin.1     Passive exposure: Past    Smokeless tobacco: Never   Substance Use Topics    Alcohol use: Yes     Comment: "very seldom."    Drug use: Never     Review of Systems    Physical Exam     Initial Vitals [24 1250]   BP Pulse Resp " Temp SpO2   102/69 (!) 119 (!) 22 98 °F (36.7 °C) 98 %      MAP       --         Physical Exam    Nursing note and vitals reviewed.  Constitutional: He appears well-developed and well-nourished.   HENT:   Head: Normocephalic and atraumatic.   Eyes: EOM are normal. Pupils are equal, round, and reactive to light.   Neck: Neck supple. No JVD present.   Normal range of motion.  Cardiovascular:             Tachycardic rate, regular rhythm   Pulmonary/Chest: Breath sounds normal. No stridor. No respiratory distress.   Abdominal: Abdomen is soft. There is abdominal tenderness.     Epigastric abdominal tenderness   Musculoskeletal:         General: No tenderness or edema. Normal range of motion.      Cervical back: Normal range of motion and neck supple.     Neurological: He is alert and oriented to person, place, and time. GCS score is 15. GCS eye subscore is 4. GCS verbal subscore is 5. GCS motor subscore is 6.   Skin: Skin is warm and dry. Capillary refill takes less than 2 seconds.   Psychiatric: He has a normal mood and affect. Thought content normal.         ED Course   Procedures  Labs Reviewed   CBC W/ AUTO DIFFERENTIAL - Abnormal; Notable for the following components:       Result Value    RBC 4.00 (*)     Hemoglobin 9.1 (*)     Hematocrit 29.6 (*)     MCV 74 (*)     MCH 22.8 (*)     MCHC 30.7 (*)     RDW 18.4 (*)     All other components within normal limits    Narrative:     add on mg and phos per  /order#[id number] and [id number] @   05/29/2024  15:40    COMPREHENSIVE METABOLIC PANEL - Abnormal; Notable for the following components:    Sodium 131 (*)     CO2 19 (*)     BUN 33 (*)     Creatinine 2.5 (*)     Albumin 3.3 (*)      (*)     ALT 81 (*)     eGFR 32.1 (*)     All other components within normal limits    Narrative:     add on mg and phos per  /order#[id number] and [id number] @   05/29/2024  15:40    TROPONIN I - Abnormal; Notable for the following components:    Troponin I 0.610  (*)     All other components within normal limits    Narrative:     add on mg and phos per  /order#[id number] and [id number] @   05/29/2024  15:40    B-TYPE NATRIURETIC PEPTIDE - Abnormal; Notable for the following components:    BNP 3,707 (*)     All other components within normal limits    Narrative:     add on mg and phos per  /order#[id number] and [id number] @   05/29/2024  15:40    PHOSPHORUS - Abnormal; Notable for the following components:    Phosphorus 4.9 (*)     All other components within normal limits    Narrative:     add on mg and phos per  /order#[id number] and [id number] @   05/29/2024  15:40    TROPONIN I - Abnormal; Notable for the following components:    Troponin I 0.610 (*)     All other components within normal limits   LACTIC ACID, PLASMA   MAGNESIUM   PHOSPHORUS   MAGNESIUM    Narrative:     add on mg and phos per  /order#[id number] and [id number] @   05/29/2024  15:40         ECG Results              EKG 12-lead (Final result)        Collection Time Result Time QRS Duration OHS QTC Calculation    05/29/24 13:02:28 05/29/24 14:33:11 100 465                     Final result by Interface, Lab In [facility] (05/29/24 14:33:18)                   Narrative:    Test Reason : R07.89,    Vent. Rate : 118 BPM     Atrial Rate : 118 BPM     P-R Int : 164 ms          QRS Dur : 100 ms      QT Int : 332 ms       P-R-T Axes : 058 011 035 degrees     QTc Int : 465 ms    Sinus tachycardia  Left atrial enlargement  LVH with repolarization abnormality ( [place] product )  Abnormal ECG  When compared with ECG of 12-MAY-2024 09:33,  T wave inversion less evident in Lateral leads  Confirmed by Ced Renteria MD ([id number]) on 5/29/2024 2:33:09 PM    Referred By: [email or web address]   SELF           Confirmed By:Ced Renteria MD                                  Imaging Results              CT Abdomen Pelvis  Without Contrast (Final result)  Result time 05/29/24 18:23:03      Final result by  Bridger Dias DO (05/29/24 18:23:03)                   Impression:      1. Mild abdominopelvic ascites.  Otherwise no acute abnormality of the abdomen or pelvis.  2. Stable nonspecific opacity in the right lower lobe, compatible with atelectasis or a lung nodule. For a solid nodule >8 mm, Fleischner Society 2017 guidelines recommend considering CT, PET/CT or tissue sampling at 3 months.      Electronically signed by: Bridger Dias  Date:    05/29/2024  Time:    18:23               Narrative:    EXAMINATION:  CT ABDOMEN PELVIS WITHOUT CONTRAST    CLINICAL HISTORY:  vomiting;    TECHNIQUE:  Multiplanar images were obtained of the abdomen and pelvis from the hemidiaphragms through the symphysis pubis without intravenous contrast.    COMPARISON:  CT of the chest, abdomen, and pelvis from 05/13/2024.    FINDINGS:  Lung Bases: Stable opacity in the right lower lobe measuring approximately 2.5 x 2.0 cm (series 2, image 22).  The lung bases are otherwise clear.    Heart: The heart is enlarged.  No pericardial effusion.    Liver: The liver is enlarged.  There are no focal hepatic lesions.    Biliary tract: No intrahepatic or extrahepatic biliary ductal dilatation.    Gallbladder: No radiodense gallstone. No wall thickening or pericholecystic fluid.    Pancreas: Normal. No pancreatic ductal dilatation.    Spleen: Normal size without focal lesion.    Adrenals: Normal.    Kidneys and urinary collecting systems: Normal.  No hydronephrosis or urolithiasis.    Lymph nodes: None enlarged.    Stomach and bowel: The stomach is normal.  Loops of small and large bowel are normal in caliber without evidence for inflammation or obstruction.  The appendix is normal.    Peritoneum and mesentery: There is mild abdominopelvic ascites.  Or free intraperitoneal air. No abdominal fluid collection.    Vasculature: Normal.    Urinary bladder: Normal.    Reproductive organs: The prostate is normal.    Body wall: No  abnormality.    Musculoskeletal: No aggressive osseous lesion.                                       X-Ray Chest AP Portable (Final result)  Result time 05/29/24 16:27:08      Final result by Horace Loredo MD (05/29/24 16:27:08)                   Impression:      1. Interstitial findings are accentuated by habitus, early change of congestion or edema not excluded.  Correlation needed.      Electronically signed by: Horace Loredo MD  Date:    05/29/2024  Time:    16:27               Narrative:    EXAMINATION:  XR CHEST AP PORTABLE    CLINICAL HISTORY:  Chest Pain;    TECHNIQUE:  Single frontal view of the chest was performed.    COMPARISON:  05/18/2024    FINDINGS:  Right PICC catheter tip projects over the distal SVC.  The cardiomediastinal silhouette is prominent, stable..  There is no pleural effusion.  The trachea is midline.  The lungs are symmetrically expanded bilaterally with mildly coarse interstitial attenuation, accentuated by habitus..  No large focal consolidation seen.  There is no pneumothorax.  The osseous structures are unremarkable.                                       Medications   sodium chloride 0.9% flush 10 mL (has no administration in time range)   heparin (porcine) injection 5,000 Units (5,000 Units Subcutaneous Not Given 6/2/24 1400)   atorvastatin tablet 40 mg (40 mg Oral Given 6/2/24 0851)   pantoprazole EC tablet 20 mg (20 mg Oral Given 6/2/24 0851)   DOBUtamine 1000 mg in D5W 250 mL infusion (5 mcg/kg/min × 77.1 kg Intravenous Verify Only 6/2/24 1800)   acetaminophen tablet 650 mg (650 mg Oral Given 6/2/24 0954)   ondansetron disintegrating tablet 4 mg (4 mg Oral Given 5/30/24 1851)   senna-docusate 8.6-50 mg per tablet 1 tablet (1 tablet Oral Given 6/1/24 1713)   ondansetron injection 4 mg (4 mg Intravenous Given 5/30/24 1417)   prochlorperazine injection Soln 2.5 mg (2.5 mg Intravenous Given 5/30/24 2021)   ALPRAZolam tablet 0.25 mg (0.25 mg Oral Given 6/1/24 2220)    furosemide injection 80 mg (80 mg Intravenous Given 6/2/24 1519)   hydrALAZINE tablet 25 mg (has no administration in time range)   isosorbide dinitrate tablet 10 mg (has no administration in time range)   potassium chloride SA CR tablet 40 mEq (40 mEq Oral Given 6/2/24 1649)   LORazepam injection 0.5 mg (0.5 mg Intravenous Given 5/29/24 1802)   sodium chloride 0.9% bolus 500 mL 500 mL (0 mLs Intravenous Stopped 5/29/24 1907)   furosemide injection 80 mg (80 mg Intravenous Given 5/29/24 1803)   furosemide injection 80 mg (80 mg Intravenous Given 5/30/24 1158)   alteplase injection 2 mg (2 mg Intra-Catheter Given 5/30/24 1229)   traMADoL tablet 50 mg (50 mg Oral Given 5/30/24 1410)   chlorothiazide (DIURIL) 500 mg in dextrose 5 % (D5W) 50 mL IVPB (0 mg Intravenous Stopped 5/30/24 2138)   potassium chloride SA CR tablet 40 mEq (40 mEq Oral Given 5/30/24 2116)   potassium chloride SA CR tablet 60 mEq (60 mEq Oral Given 5/31/24 0502)   potassium chloride SA CR tablet 40 mEq (40 mEq Oral Given 5/31/24 1028)   chlorothiazide (DIURIL) 500 mg in dextrose 5 % (D5W) 50 mL IVPB (0 mg Intravenous Stopped 5/31/24 1351)   traMADoL tablet 50 mg (50 mg Oral Given 5/31/24 1234)   potassium bicarbonate disintegrating tablet 40 mEq (40 mEq Oral Given 5/31/24 1500)   potassium chloride SA CR tablet 40 mEq (40 mEq Oral Given 6/1/24 0012)   chlorothiazide (DIURIL) 500 mg in dextrose 5 % (D5W) 50 mL IVPB (0 mg Intravenous Stopped 6/1/24 0151)   baclofen tablet 5 mg (5 mg Oral Given 6/1/24 0525)   potassium chloride SA CR tablet 40 mEq (40 mEq Oral Given 6/1/24 0502)   chlorothiazide (DIURIL) 500 mg in dextrose 5 % (D5W) 50 mL IVPB (0 mg Intravenous Stopped 6/1/24 1417)   potassium chloride SA CR tablet 40 mEq (40 mEq Oral Given 6/1/24 1526)   magnesium sulfate 2g in water 50mL IVPB (premix) (0 g Intravenous Stopped 6/1/24 1914)   potassium chloride 40 mEq in 100 mL IVPB (FOR CENTRAL LINE ADMINISTRATION ONLY) (0 mEq Intravenous Stopped  6/2/24 1052)   potassium chloride SA CR tablet 40 mEq (40 mEq Oral Given 6/2/24 0642)     Medical Decision Making  Hemodynamically stable. Afebrile. Phonating and protecting the airway spontaneously. No clinical evidence for cardiovascular instability or impending airway compromise. Examination as above. Additional historians include  family at bedside. Prior medical records reviewed.  Per ED course.  Family shows up with outside records from recent admission to another hospital from a few days ago.  He was admitted for electrolyte replacement and holding off on diuretics.. Current co-morbidities considered that will impact clinical decision making include   As above.    Plan:   Cardiac labs, x-ray, lactic acid, Ativan for antiemetic effect with avoidance of interval prolongation, CT angio of the abdomen and pelvis with a concern for possible early onset mesenteric ischemia given his vomiting, abdominal tenderness and markedly reduced ejection fraction.      Amount and/or Complexity of Data Reviewed  Radiology: ordered.    Risk  Prescription drug management.  Decision regarding hospitalization.               ED Course as of 06/02/24 1900   Wed May 29, 2024   7007 Recent cardiology and admission note reviewed. Hx of cardiogenic shock with LVEF of 10% on dobutamine drip via PICC. [BG]   1530   Twelve lead EKG per my independent interpretation reveals sinus tachycardia at a rate of 118.  Normal axis, interval.  LVH noted.  No regional ST segment elevation. [BG]   1607 Patient reassessed. Resting comfortably and in no acute distress. Discussed the findings that have been obtained as of this time. All questions answered. Will continue to monitor.    [BG]   1630 Spoke with cardiology. They will independently evaluate the patient. [BG]      ED Course User Index  [BG] Tavon Reid MD        Cards to admit.    Critical care time spent on the evaluation and treatment of severe organ dysfunction, review of pertinent labs  and imaging studies, discussions with consulting providers and discussions with patient/family: 60 minutes.                     Clinical Impression:  Final diagnoses:  [R07.89] Chest discomfort          ED Disposition Condition    Admit                 Tavon Reid MD  06/02/24 6700

## 2024-05-29 NOTE — Clinical Note
The PA catheter was repositioned to the main pulmonary artery. Hemodynamics were performed. Cardiac output was obtained at 5 L/min. O2 saturation was measured at 60%. CO=4.75  CI=2.44

## 2024-05-29 NOTE — SUBJECTIVE & OBJECTIVE
Past Medical History:   Diagnosis Date    Acute heart failure     AUDELIA (acute kidney injury)     Cardiomyopathy     Elevated troponin     HTN (hypertension)     Hypokalemia     Hypomagnesemia     Renal insufficiency        Past Surgical History:   Procedure Laterality Date    broken foot Left     RIGHT HEART CATHETERIZATION Right 5/13/2024    Procedure: INSERTION, CATHETER, RIGHT HEART;  Surgeon: Pradeep Mack MD;  Location: [facility] CATH LAB;  Service: Cardiology;  Laterality: Right;       Review of patient's allergies indicates:  No Known Allergies    Current Facility-Administered Medications   Medication    [START ON 5/30/2024] atorvastatin tablet 40 mg    DOBUtamine 1000 mg in D5W 250 mL infusion    furosemide (Lasix) 500 mg in 50 mL infusion (conc: 10 mg/mL)    furosemide injection 80 mg    heparin (porcine) injection 5,000 Units    hydrALAZINE tablet 50 mg    isosorbide dinitrate tablet 20 mg    LORazepam injection 0.5 mg    [START ON 5/30/2024] pantoprazole EC tablet 20 mg    [START ON 5/30/2024] potassium chloride SA CR tablet 40 mEq    sodium chloride 0.9% bolus 500 mL 500 mL    sodium chloride 0.9% flush 10 mL    [START ON 5/30/2024] spironolactone tablet 25 mg    valsartan split tablet 20 mg     Current Outpatient Medications   Medication Sig    ALPRAZolam (XANAX) 0.25 MG tablet Take 1 tablet (0.25 mg total) by mouth 2 (two) times daily as needed for Anxiety.    atorvastatin (LIPITOR) 40 MG tablet Take 40 mg by mouth once daily.    dapagliflozin propanediol (FARXIGA) 10 mg tablet Take 1 tablet (10 mg total) by mouth once daily.    dimenhyDRINATE (DRAMAMINE) 25 mg Chew Take 1 tablet by mouth daily as needed (nausea).    DOBUTamine (DOBUTREX) 1,000 mg/250 mL (4,000 mcg/mL) infusion Inject 380 mcg/min into the vein continuous.    furosemide (LASIX) 20 MG tablet Take 20 mg by mouth every morning.    hydrALAZINE (APRESOLINE) 50 MG tablet Take 1 tablet (50 mg total) by mouth every 8 (eight) hours.    isosorbide  "dinitrate (ISORDIL) 20 MG tablet Take 1 tablet (20 mg total) by mouth 3 (three) times daily.    pantoprazole (PROTONIX) 20 MG tablet Take 1 tablet (20 mg total) by mouth once daily.    potassium chloride SA (K-DUR,KLOR-CON) 20 MEQ tablet Take 2 tablets (40 mEq total) by mouth once daily.    spironolactone (ALDACTONE) 25 MG tablet Take 25 mg by mouth once daily.    torsemide (DEMADEX) 20 MG Tab Take 20 mg by mouth once daily.    valsartan (DIOVAN) 40 MG tablet Take 0.5 tablets (20 mg total) by mouth 2 (two) times daily.     Family History       Problem Relation (Age of Onset)    Heart failure Mother    No Known Problems Father          Tobacco Use    Smoking status: Former     Types: Cigarettes     Start date: 2023     Quit date: 1998     Years since quittin.0     Passive exposure: Past    Smokeless tobacco: Never   Substance and Sexual Activity    Alcohol use: Yes     Comment: "very seldom."    Drug use: Never    Sexual activity: Not on file     Review of Systems   Constitutional:  Positive for fatigue.   Respiratory:  Negative for chest tightness and shortness of breath.    Gastrointestinal:  Positive for abdominal pain, nausea and vomiting. Negative for blood in stool.   Neurological:  Negative for dizziness.   All other systems reviewed and are negative.    Objective:     Vital Signs (Most Recent):  Temp: 98 °F (36.7 °C) (24 1517)  Pulse: (!) 125 (24 1517)  Resp: 20 (24 1517)  BP: 117/74 (24 1517)  SpO2: 98 % (24 1517) Vital Signs (24h Range):  Temp:  [98 °F (36.7 °C)] 98 °F (36.7 °C)  Pulse:  [119-125] 125  Resp:  [20-22] 20  SpO2:  [98 %] 98 %  BP: (102-117)/(69-74) 117/74     Patient Vitals for the past 72 hrs (Last 3 readings):   Weight   24 1250 77.1 kg (170 lb)     Body mass index is 25.1 kg/m².    No intake or output data in the 24 hours ending 24 1732       Physical Exam  Constitutional:       Appearance: Normal appearance.   HENT:      Head: " "Normocephalic.      Mouth/Throat:      Mouth: Mucous membranes are moist.   Eyes:      Extraocular Movements: Extraocular movements intact.   Cardiovascular:      Rate and Rhythm: Regular rhythm. Tachycardia present.      Pulses: Normal pulses.   Pulmonary:      Effort: No respiratory distress.   Abdominal:      General: There is no distension.      Palpations: Abdomen is soft.      Tenderness: There is abdominal tenderness.   Musculoskeletal:      Cervical back: Neck supple.      Right lower leg: No edema.      Left lower leg: No edema.   Skin:     General: Skin is warm.   Neurological:      General: No focal deficit present.      Mental Status: He is alert.            Significant Labs:  CBC:  Recent Labs   Lab 05/29/24  1529   WBC 7.69   RBC 4.00*   HGB 9.1*   HCT 29.6*      MCV 74*   MCH 22.8*   MCHC 30.7*     BNP:  Recent Labs   Lab 05/29/24  1529   BNP 3,707*     CMP:  Recent Labs   Lab 05/29/24  1529      CALCIUM 9.2   ALBUMIN 3.3*   PROT 7.3   *   K 4.7   CO2 19*      BUN 33*   CREATININE 2.5*   ALKPHOS 101   ALT 81*   *   BILITOT 0.8      Coagulation:   No results for input(s): "PT", "INR", "APTT" in the last 168 hours.  LDH:  No results for input(s): "LDH" in the last 72 hours.  Microbiology:  Microbiology Results (last 7 days)       ** No results found for the last 168 hours. **            BMP:   Recent Labs   Lab 05/29/24  1529      *   K 4.7      CO2 19*   BUN 33*   CREATININE 2.5*   CALCIUM 9.2   MG 2.4     Cardiac Markers: No results for input(s): "CKMB", "TROPONINT", "MYOGLOBIN" in the last 72 hours.  Coagulation: No results for input(s): "PT", "INR", "APTT" in the last 72 hours.  Prealbumin: No results for input(s): "PREALBUMIN" in the last 72 hours.  I have reviewed all pertinent labs within the past 24 hours.    Diagnostic Results:  Reviewed    "

## 2024-05-30 PROBLEM — R53.83 FATIGUE: Status: ACTIVE | Noted: 2024-05-30

## 2024-05-30 PROBLEM — R74.01 TRANSAMINITIS: Status: ACTIVE | Noted: 2024-05-30

## 2024-05-30 PROBLEM — D63.8 ANEMIA OF CHRONIC DISEASE: Status: ACTIVE | Noted: 2024-05-30

## 2024-05-30 PROBLEM — E87.1 HYPONATREMIA: Status: ACTIVE | Noted: 2024-05-30

## 2024-05-30 LAB
ALBUMIN SERPL BCP-MCNC: 3.1 G/DL (ref 3.5–5.2)
ALBUMIN SERPL BCP-MCNC: 3.2 G/DL (ref 3.5–5.2)
ALLENS TEST: ABNORMAL
ALLENS TEST: ABNORMAL
ALP SERPL-CCNC: 97 U/L (ref 55–135)
ALT SERPL W/O P-5'-P-CCNC: 76 U/L (ref 10–44)
ANION GAP SERPL CALC-SCNC: 11 MMOL/L (ref 8–16)
ANION GAP SERPL CALC-SCNC: 14 MMOL/L (ref 8–16)
ASCENDING AORTA: 3.09 CM
AST SERPL-CCNC: 93 U/L (ref 10–40)
AV INDEX (PROSTH): 0.58
AV MEAN GRADIENT: 2 MMHG
AV PEAK GRADIENT: 3 MMHG
AV VALVE AREA BY VELOCITY RATIO: 2.86 CM²
AV VALVE AREA: 2.68 CM²
AV VELOCITY RATIO: 0.62
BASOPHILS # BLD AUTO: 0.03 K/UL (ref 0–0.2)
BASOPHILS NFR BLD: 0.4 % (ref 0–1.9)
BILIRUB SERPL-MCNC: 0.7 MG/DL (ref 0.1–1)
BSA FOR ECHO PROCEDURE: 1.95 M2
BUN SERPL-MCNC: 33 MG/DL (ref 6–20)
BUN SERPL-MCNC: 34 MG/DL (ref 6–20)
CALCIUM SERPL-MCNC: 8.9 MG/DL (ref 8.7–10.5)
CALCIUM SERPL-MCNC: 9.1 MG/DL (ref 8.7–10.5)
CHLORIDE SERPL-SCNC: 100 MMOL/L (ref 95–110)
CHLORIDE SERPL-SCNC: 101 MMOL/L (ref 95–110)
CO2 SERPL-SCNC: 20 MMOL/L (ref 23–29)
CO2 SERPL-SCNC: 22 MMOL/L (ref 23–29)
CREAT SERPL-MCNC: 2.2 MG/DL (ref 0.5–1.4)
CREAT SERPL-MCNC: 2.4 MG/DL (ref 0.5–1.4)
CV ECHO LV RWT: 0.22 CM
DELSYS: ABNORMAL
DIFFERENTIAL METHOD BLD: ABNORMAL
DOP CALC AO PEAK VEL: 0.89 M/S
DOP CALC AO VTI: 10.92 CM
DOP CALC LVOT AREA: 4.6 CM2
DOP CALC LVOT DIAMETER: 2.43 CM
DOP CALC LVOT PEAK VEL: 0.55 M/S
DOP CALC LVOT STROKE VOLUME: 29.25 CM3
DOP CALC MV VTI: 4.67 CM
DOP CALCLVOT PEAK VEL VTI: 6.31 CM
E WAVE DECELERATION TIME: 66.04 MSEC
E/E' RATIO: 6.95 M/S
ECHO LV POSTERIOR WALL: 0.8 CM (ref 0.6–1.1)
EOSINOPHIL # BLD AUTO: 0 K/UL (ref 0–0.5)
EOSINOPHIL NFR BLD: 0.3 % (ref 0–8)
ERYTHROCYTE [DISTWIDTH] IN BLOOD BY AUTOMATED COUNT: 18.1 % (ref 11.5–14.5)
EST. GFR  (NO RACE VARIABLE): 33.7 ML/MIN/1.73 M^2
EST. GFR  (NO RACE VARIABLE): 37.4 ML/MIN/1.73 M^2
FIO2: 21
FRACTIONAL SHORTENING: 7 % (ref 28–44)
GLUCOSE SERPL-MCNC: 105 MG/DL (ref 70–110)
GLUCOSE SERPL-MCNC: 134 MG/DL (ref 70–110)
HCO3 UR-SCNC: 26.2 MMOL/L (ref 24–28)
HCO3 UR-SCNC: 27.4 MMOL/L (ref 24–28)
HCT VFR BLD AUTO: 26.9 % (ref 40–54)
HCT VFR BLD CALC: 29 %PCV (ref 36–54)
HGB BLD-MCNC: 8.7 G/DL (ref 14–18)
IMM GRANULOCYTES # BLD AUTO: 0.02 K/UL (ref 0–0.04)
IMM GRANULOCYTES NFR BLD AUTO: 0.3 % (ref 0–0.5)
INTERVENTRICULAR SEPTUM: 0.8 CM (ref 0.6–1.1)
IVRT: 59.94 MSEC
LA MAJOR: 7.41 CM
LA MINOR: 7.28 CM
LA WIDTH: 5.64 CM
LEFT ATRIUM SIZE: 3.98 CM
LEFT ATRIUM VOLUME INDEX MOD: 65.3 ML/M2
LEFT ATRIUM VOLUME INDEX: 72.2 ML/M2
LEFT ATRIUM VOLUME MOD: 126.77 CM3
LEFT ATRIUM VOLUME: 140.13 CM3
LEFT INTERNAL DIMENSION IN SYSTOLE: 6.8 CM (ref 2.1–4)
LEFT VENTRICLE DIASTOLIC VOLUME INDEX: 133.04 ML/M2
LEFT VENTRICLE DIASTOLIC VOLUME: 258.09 ML
LEFT VENTRICLE MASS INDEX: 136 G/M2
LEFT VENTRICLE SYSTOLIC VOLUME INDEX: 117.9 ML/M2
LEFT VENTRICLE SYSTOLIC VOLUME: 228.68 ML
LEFT VENTRICULAR INTERNAL DIMENSION IN DIASTOLE: 7.3 CM (ref 3.5–6)
LEFT VENTRICULAR MASS: 263.47 G
LV LATERAL E/E' RATIO: 5.5 M/S
LV SEPTAL E/E' RATIO: 9.43 M/S
LYMPHOCYTES # BLD AUTO: 2.6 K/UL (ref 1–4.8)
LYMPHOCYTES NFR BLD: 34.7 % (ref 18–48)
MAGNESIUM SERPL-MCNC: 2.3 MG/DL (ref 1.6–2.6)
MCH RBC QN AUTO: 23 PG (ref 27–31)
MCHC RBC AUTO-ENTMCNC: 32.3 G/DL (ref 32–36)
MCV RBC AUTO: 71 FL (ref 82–98)
MODE: ABNORMAL
MONOCYTES # BLD AUTO: 0.8 K/UL (ref 0.3–1)
MONOCYTES NFR BLD: 10.8 % (ref 4–15)
MV MEAN GRADIENT: 0 MMHG
MV PEAK E VEL: 0.66 M/S
MV PEAK GRADIENT: 1 MMHG
MV STENOSIS PRESSURE HALF TIME: 19.15 MS
MV VALVE AREA BY CONTINUITY EQUATION: 6.26 CM2
MV VALVE AREA P 1/2 METHOD: 11.49 CM2
NEUTROPHILS # BLD AUTO: 4 K/UL (ref 1.8–7.7)
NEUTROPHILS NFR BLD: 53.5 % (ref 38–73)
NRBC BLD-RTO: 0 /100 WBC
OHS CV RV/LV RATIO: 0.63 CM
OHS LV EJECTION FRACTION SIMPSONS BIPLANE MOD: 14 %
PCO2 BLDA: 41.7 MMHG (ref 35–45)
PCO2 BLDA: 43.1 MMHG (ref 35–45)
PH SMN: 7.41 [PH] (ref 7.35–7.45)
PH SMN: 7.41 [PH] (ref 7.35–7.45)
PHOSPHATE SERPL-MCNC: 4.2 MG/DL (ref 2.7–4.5)
PHOSPHATE SERPL-MCNC: 4.7 MG/DL (ref 2.7–4.5)
PISA MRMAX VEL: 0.05 M/S
PISA TR MAX VEL: 2.53 M/S
PLATELET # BLD AUTO: 356 K/UL (ref 150–450)
PMV BLD AUTO: 9.2 FL (ref 9.2–12.9)
PO2 BLDA: 25 MMHG (ref 40–60)
PO2 BLDA: 29 MMHG (ref 40–60)
POC BE: 1 MMOL/L
POC BE: 3 MMOL/L
POC IONIZED CALCIUM: 1.1 MMOL/L (ref 1.06–1.42)
POC SATURATED O2: 45 % (ref 95–100)
POC SATURATED O2: 56 % (ref 95–100)
POC SVO2: 56 %
POC TCO2: 27 MMOL/L (ref 24–29)
POC TCO2: 29 MMOL/L (ref 24–29)
POTASSIUM BLD-SCNC: 3.1 MMOL/L (ref 3.5–5.1)
POTASSIUM SERPL-SCNC: 3.6 MMOL/L (ref 3.5–5.1)
POTASSIUM SERPL-SCNC: 4.1 MMOL/L (ref 3.5–5.1)
PROT SERPL-MCNC: 7 G/DL (ref 6–8.4)
PULM VEIN S/D RATIO: 0.6
PV PEAK D VEL: 0.3 M/S
PV PEAK S VEL: 0.18 M/S
RA MAJOR: 6.88 CM
RA PRESSURE ESTIMATED: 15 MMHG
RA WIDTH: 5.97 CM
RBC # BLD AUTO: 3.79 M/UL (ref 4.6–6.2)
RIGHT VENTRICULAR END-DIASTOLIC DIMENSION: 4.6 CM
RV TB RVSP: 18 MMHG
SAMPLE: ABNORMAL
SAMPLE: ABNORMAL
SINUS: 3.04 CM
SITE: ABNORMAL
SITE: ABNORMAL
SODIUM BLD-SCNC: 138 MMOL/L (ref 136–145)
SODIUM SERPL-SCNC: 133 MMOL/L (ref 136–145)
SODIUM SERPL-SCNC: 135 MMOL/L (ref 136–145)
STJ: 2.63 CM
TDI LATERAL: 0.12 M/S
TDI SEPTAL: 0.07 M/S
TDI: 0.1 M/S
TR MAX PG: 26 MMHG
TRICUSPID ANNULAR PLANE SYSTOLIC EXCURSION: 1.03 CM
TV REST PULMONARY ARTERY PRESSURE: 41 MMHG
WBC # BLD AUTO: 7.43 K/UL (ref 3.9–12.7)
Z-SCORE OF LEFT VENTRICULAR DIMENSION IN END DIASTOLE: 2.81
Z-SCORE OF LEFT VENTRICULAR DIMENSION IN END SYSTOLE: 5.5

## 2024-05-30 PROCEDURE — 36415 COLL VENOUS BLD VENIPUNCTURE: CPT | Mod: NTX,XB | Performed by: STUDENT IN AN ORGANIZED HEALTH CARE EDUCATION/TRAINING PROGRAM

## 2024-05-30 PROCEDURE — 63600175 PHARM REV CODE 636 W HCPCS: Mod: NTX | Performed by: STUDENT IN AN ORGANIZED HEALTH CARE EDUCATION/TRAINING PROGRAM

## 2024-05-30 PROCEDURE — 36415 COLL VENOUS BLD VENIPUNCTURE: CPT | Mod: NTX | Performed by: INTERNAL MEDICINE

## 2024-05-30 PROCEDURE — 25000003 PHARM REV CODE 250: Mod: NTX | Performed by: STUDENT IN AN ORGANIZED HEALTH CARE EDUCATION/TRAINING PROGRAM

## 2024-05-30 PROCEDURE — 84100 ASSAY OF PHOSPHORUS: CPT | Mod: NTX | Performed by: INTERNAL MEDICINE

## 2024-05-30 PROCEDURE — 80069 RENAL FUNCTION PANEL: CPT | Mod: NTX | Performed by: STUDENT IN AN ORGANIZED HEALTH CARE EDUCATION/TRAINING PROGRAM

## 2024-05-30 PROCEDURE — 85025 COMPLETE CBC W/AUTO DIFF WBC: CPT | Mod: NTX | Performed by: INTERNAL MEDICINE

## 2024-05-30 PROCEDURE — 87040 BLOOD CULTURE FOR BACTERIA: CPT | Mod: NTX | Performed by: NURSE PRACTITIONER

## 2024-05-30 PROCEDURE — 20000000 HC ICU ROOM: Mod: NTX

## 2024-05-30 PROCEDURE — 63600175 PHARM REV CODE 636 W HCPCS: Mod: NTX | Performed by: INTERNAL MEDICINE

## 2024-05-30 PROCEDURE — 83735 ASSAY OF MAGNESIUM: CPT | Mod: NTX | Performed by: INTERNAL MEDICINE

## 2024-05-30 PROCEDURE — 99900035 HC TECH TIME PER 15 MIN (STAT): Mod: NTX

## 2024-05-30 PROCEDURE — 99233 SBSQ HOSP IP/OBS HIGH 50: CPT | Mod: NTX,,, | Performed by: INTERNAL MEDICINE

## 2024-05-30 PROCEDURE — 36415 COLL VENOUS BLD VENIPUNCTURE: CPT | Mod: NTX | Performed by: NURSE PRACTITIONER

## 2024-05-30 PROCEDURE — 80053 COMPREHEN METABOLIC PANEL: CPT | Mod: NTX | Performed by: INTERNAL MEDICINE

## 2024-05-30 RX ORDER — PROCHLORPERAZINE EDISYLATE 5 MG/ML
2.5 INJECTION INTRAMUSCULAR; INTRAVENOUS EVERY 6 HOURS PRN
Status: DISCONTINUED | OUTPATIENT
Start: 2024-05-30 | End: 2024-06-07 | Stop reason: HOSPADM

## 2024-05-30 RX ORDER — FUROSEMIDE 10 MG/ML
80 INJECTION INTRAMUSCULAR; INTRAVENOUS ONCE
Status: COMPLETED | OUTPATIENT
Start: 2024-05-30 | End: 2024-05-30

## 2024-05-30 RX ORDER — ALPRAZOLAM 0.25 MG/1
0.25 TABLET ORAL NIGHTLY PRN
Status: DISCONTINUED | OUTPATIENT
Start: 2024-05-30 | End: 2024-06-07 | Stop reason: HOSPADM

## 2024-05-30 RX ORDER — POTASSIUM CHLORIDE 20 MEQ/1
40 TABLET, EXTENDED RELEASE ORAL ONCE
Status: COMPLETED | OUTPATIENT
Start: 2024-05-30 | End: 2024-05-30

## 2024-05-30 RX ORDER — ONDANSETRON HYDROCHLORIDE 2 MG/ML
4 INJECTION, SOLUTION INTRAVENOUS EVERY 6 HOURS PRN
Status: DISCONTINUED | OUTPATIENT
Start: 2024-05-30 | End: 2024-06-07 | Stop reason: HOSPADM

## 2024-05-30 RX ORDER — TRAMADOL HYDROCHLORIDE 50 MG/1
50 TABLET ORAL ONCE
Status: COMPLETED | OUTPATIENT
Start: 2024-05-30 | End: 2024-05-30

## 2024-05-30 RX ADMIN — ISOSORBIDE DINITRATE 20 MG: 20 TABLET ORAL at 02:05

## 2024-05-30 RX ADMIN — HYDRALAZINE HYDROCHLORIDE 50 MG: 50 TABLET ORAL at 02:05

## 2024-05-30 RX ADMIN — ISOSORBIDE DINITRATE 20 MG: 20 TABLET ORAL at 09:05

## 2024-05-30 RX ADMIN — TRAMADOL HYDROCHLORIDE 50 MG: 50 TABLET, COATED ORAL at 02:05

## 2024-05-30 RX ADMIN — ONDANSETRON 4 MG: 4 TABLET, ORALLY DISINTEGRATING ORAL at 06:05

## 2024-05-30 RX ADMIN — EPINEPHRINE 0.02 MCG/KG/MIN: 1 INJECTION INTRAMUSCULAR; INTRAVENOUS; SUBCUTANEOUS at 08:05

## 2024-05-30 RX ADMIN — ATORVASTATIN CALCIUM 40 MG: 40 TABLET, FILM COATED ORAL at 09:05

## 2024-05-30 RX ADMIN — FUROSEMIDE 80 MG: 10 INJECTION, SOLUTION INTRAVENOUS at 11:05

## 2024-05-30 RX ADMIN — PANTOPRAZOLE SODIUM 20 MG: 20 TABLET, DELAYED RELEASE ORAL at 09:05

## 2024-05-30 RX ADMIN — HYDRALAZINE HYDROCHLORIDE 50 MG: 50 TABLET ORAL at 09:05

## 2024-05-30 RX ADMIN — POTASSIUM CHLORIDE 40 MEQ: 1500 TABLET, EXTENDED RELEASE ORAL at 09:05

## 2024-05-30 RX ADMIN — FUROSEMIDE 20 MG/HR: 10 INJECTION, SOLUTION INTRAMUSCULAR; INTRAVENOUS at 06:05

## 2024-05-30 RX ADMIN — ACETAMINOPHEN 650 MG: 325 TABLET ORAL at 10:05

## 2024-05-30 RX ADMIN — ONDANSETRON 4 MG: 4 TABLET, ORALLY DISINTEGRATING ORAL at 11:05

## 2024-05-30 RX ADMIN — ONDANSETRON 4 MG: 2 INJECTION INTRAMUSCULAR; INTRAVENOUS at 02:05

## 2024-05-30 RX ADMIN — ALPRAZOLAM 0.25 MG: 0.25 TABLET ORAL at 11:05

## 2024-05-30 RX ADMIN — CHLOROTHIAZIDE SODIUM 500 MG: 500 INJECTION, POWDER, LYOPHILIZED, FOR SOLUTION INTRAVENOUS at 09:05

## 2024-05-30 RX ADMIN — PROCHLORPERAZINE EDISYLATE 2.5 MG: 5 INJECTION INTRAMUSCULAR; INTRAVENOUS at 08:05

## 2024-05-30 RX ADMIN — HYDRALAZINE HYDROCHLORIDE 50 MG: 50 TABLET ORAL at 05:05

## 2024-05-30 RX ADMIN — ALTEPLASE 2 MG: 2.2 INJECTION, POWDER, LYOPHILIZED, FOR SOLUTION INTRAVENOUS at 12:05

## 2024-05-30 NOTE — PROGRESS NOTES
Admit Note     SW met with patient to assess needs. Patient is a 44 y.o.  male, admitted for for heart failure.      Patient admitted from ED on 5/29/2024 .  At this time, patient presents as alert and oriented x 4, pleasant, average intelligence, calm, communicative, and cooperative.  At this time, patients caregiver is not present.    Household/Family Systems     Patient resides with patient's wife and her 14 yr old son.    Address:  [address] [place] [address].      Support system includes wife, son, and family.    Pt's son is being cared for by family.     Patients primary caregiver is self with his wife's assistance.    Patients wife (Sia Gutierrez) phone number [phone].    Pt reported they are legally .     Confirmed patients contact information is [phone].    During admission, patient's caregiver plans to stay  in patient's room.      Confirmed patient and patients caregivers do have access to reliable transportation.    Cognitive Status/Learning     Patient reports reading ability as 11th grade and states patient does not have difficulty with reading, writing, seeing, hearing, comprehension, learning, and memory.       Needed: No.   Highest education level: High School (9-12) or GED (11th grade)    Vocation/Disability   .  Working for Income: No  If no, reason not working: Demands of Treatment  Patient reported that he was  laid off on 2/23/2024 due to his medical issues.  Pt was working as an  in a shop.    Pt reported his wife worked as a nurse in [place], but is not working as a nurse in the [place].     Adherence     Patient reports a high level of adherence to patients health care regimen.  Adherence counseling and education provided. Patient verbalizes understanding.    Substance Use    Patient reports the following substance usage.    Tobacco:  Pt reported he quit 1 yr ago. At that time he was smoking 1ppd .  Alcohol:  Pt reported  occasional use .  Illicit Drugs/Non-prescribed Medications: none, patient denies any use.  Patient states clear understanding of the potential impact of substance use.  Substance abstinence/cessation counseling, education and resources provided and reviewed.     Services Utilizing/ADLS    Infusion Service: Prior to admission, patient utilizing? yes [facility] [phone]  Home Health: Prior to admission, patient utilizing? no  DME: Prior to admission, no  Pulmonary/Cardiac Rehab: Prior to admission, no  Dialysis:  Prior to admission, no  Transplant Specialty Pharmacy:  Prior to admission, no.    Pt was having PICC care and labs done at [facility] in [place].  [facility] - [address] [address]  [phone] (Outpt Hem/Onc Clinic)      Prior to admission, patient reports patient was independent with ADLS most of the time, but his wife does assist him at times.  Pt reported he was driving very little.  Patient reports patient is not able to care for self at this time due to compromised medical condition (as documented in medical record) and physical weakness..  Patient indicates a willingness to care for self once medically cleared to do so.    Insurance/Medications    Insured by   Payer/Plan Subscr  Sex Relation Sub. Ins. ID Effective Group Num   1. MEDICAID - * VINAY TAVERAS 1980 Male Self [id number]* 26                                    [address] [address]      Primary Insurance (for UNOS reporting): Public Insurance - Medicaid  Secondary Insurance (for UNOS reporting): None    Patient reports patient is able to obtain and afford medications at this time and at time of discharge.    Living Will/Healthcare Power of     Patient states patient has a HCPA. Pt's wife (Sia Gutierrez) is listed as his HCPA.    Coping/Mental Health    Patient is coping adequately with the aid of  family members.   Patient indicates mental health  issues related to his medical problems. Pt reported having trouble sleeping and takes sleep meds.    Discharge Planning    At time of discharge, patient plans to return to patient's home under the care of his wife.  Patients wife will transport patient.  Per rounds today, expected discharge date has not been medically determined at this time. Patient verbalizes understanding and are involved in treatment planning and discharge process.    Additional Concerns    Patient is being followed for needs, education, resources, information, emotional support, supportive counseling, and for supportive and skilled discharge plan of care.  providing ongoing psychosocial support, education, resources and d/c planning as needed.  SW remains available. Patient denies additional needs and/or concerns at this time. Patient verbalizes understanding and agreement with information reviewed, social work availability, and how to access available resources as needed.

## 2024-05-30 NOTE — CARE UPDATE
I have reviewed the chart of Ayad Edmond who is hospitalized for the following:    Active Hospital Problems    Diagnosis    *Acute on chronic combined systolic and diastolic heart failure    Fatigue     POA      Hyponatremia     POA  Daily labs      Transaminitis    Anemia of chronic disease     POA  Daily CBC      AUDELIA (acute kidney injury)    Primary hypertension        Leigh Schreiber, [place]  Unit Based [facility]

## 2024-05-30 NOTE — ASSESSMENT & PLAN NOTE
Decompensated HFrEF  NHYA III, ACC D  BNP 3700, LA 1.4    Continue  to 5 mcg/mg/kg  Increase Lasix gtt @ 30 mg/hr after IVP 80 mg IV x1  Continue Bidil  Holding Valsartan and Spironolactone given AUDELIA  Daily weights, strict I/Os  -Repeat tte

## 2024-05-30 NOTE — CARE UPDATE
Heart Transplant Care Update Note:    Hemodynamics: at 4 pm  CVP:>15  SVO2: 45  CO: 4.25  CI: 2.18  SVR: 1224    Continuous Infusions:  On  5    Intake/Output Summary (Last 24 hours) at 5/30/2024 1830  Last data filed at 5/30/2024 1713  Gross per 24 hour   Intake 2042 ml   Output 1900 ml   Net 142 ml     Given 80mg IV lasix and increase to lasix 30mg/hr  UOP 1.2L    Plan:  Will move patient to ICU for closing monitoring  Start Epi 0.02 and diuril 500mg once and will reassess      Discussed with HTS Attending      Monica GARDNER MD  Cardiovascular Disease PGY IV  [facility]

## 2024-05-30 NOTE — CARE UPDATE
"RAPID RESPONSE NURSE CHART REVIEW        Chart Reviewed: 05/30/2024, 12:33 AM    MRN: [id number]  Bed: 358/358 A    Dx: Acute on chronic combined systolic and diastolic heart failure    Ayad Edmond has a past medical history of Acute heart failure, AUDELIA (acute kidney injury), Cardiomyopathy, Elevated troponin, HTN (hypertension), Hypokalemia, Hypomagnesemia, and Renal insufficiency.    Last VS: /68   Pulse (!) 117   Temp 98 °F (36.7 °C) (Oral)   Resp 18   Ht 5' 9" (1.753 m)   Wt 77.8 kg (171 lb 8.3 oz)   SpO2 96%   BMI 25.33 kg/m²     24H Vital Sign Range:  Temp:  [98 °F (36.7 °C)-98.1 °F (36.7 °C)]   Pulse:  [117-128]   Resp:  [17-22]   BP: ()/(59-80)   SpO2:  [96 %-100 %]     Level of Consciousness (AVPU): alert    Recent Labs     05/29/24  1529   WBC 7.69   HGB 9.1*   HCT 29.6*          Recent Labs     05/29/24  1529   *   K 4.7      CO2 19*   BUN 33*   CREATININE 2.5*      PHOS 4.9*   MG 2.4        No results for input(s): "PH", "PCO2", "PO2", "HCO3", "POCSATURATED", "BE" in the last 72 hours.     OXYGEN:             MEWS score: 3    Charge Kylee SULTANA reached out to RRT to make team aware of pt's HR and soft BP. Pt is on  gtt at this time.  No additional concerns verbalized at this time. Instructed to call [phone] for further concerns or assistance.    Titus Barros RN       "

## 2024-05-30 NOTE — PLAN OF CARE
Problem: Adult Inpatient Plan of Care  Goal: Plan of Care Review  Outcome: Progressing  Goal: Patient-Specific Goal (Individualized)  Outcome: Progressing  Goal: Absence of Hospital-Acquired Illness or Injury  Outcome: Progressing  Goal: Optimal Comfort and Wellbeing  Outcome: Progressing  Goal: Readiness for Transition of Care  Outcome: Progressing     Problem: Acute Kidney Injury/Impairment  Goal: Fluid and Electrolyte Balance  Outcome: Progressing  Goal: Improved Oral Intake  Outcome: Progressing  Goal: Effective Renal Function  Outcome: Progressing     Problem: Infection  Goal: Absence of Infection Signs and Symptoms  Outcome: Progressing     Problem: Heart Failure  Goal: Optimal Coping  Outcome: Progressing  Goal: Optimal Cardiac Output  Outcome: Progressing  Goal: Stable Heart Rate and Rhythm  Outcome: Progressing  Goal: Optimal Functional Ability  Outcome: Progressing  Goal: Fluid and Electrolyte Balance  Outcome: Progressing  Goal: Improved Oral Intake  Outcome: Progressing  Goal: Effective Oxygenation and Ventilation  Outcome: Progressing  Goal: Effective Breathing Pattern During Sleep  Outcome: Progressing   Plan of care discussed with patient. Patient is free of fall/trauma/injury. Denies CP, SOB, or pain/discomfort. All questions addressed. Will continue to monitor. Lasix gtt infusing at 30mg/hr. Dobutamine gtt infusing at 5mcg/kg/min. Pt being transferred to ICU to start Epi gtt. Waiting for room to open up. Pt ambulates independently and AAOx4.

## 2024-05-30 NOTE — NURSING
Nurses Note -- 4 Eyes      5/30/2024   3:33 AM      Skin assessed during: Transfer      [x] No Altered Skin Integrity Present    [x]Prevention Measures Documented      [] Yes- Altered Skin Integrity Present or Discovered   [] LDA Added if Not in Epic (Describe Wound)   [] New Altered Skin Integrity was Present on Admit and Documented in LDA   [] Wound Image Taken    Wound Care Consulted? No    Attending Nurse:  Janice Jorge RN     Second RN/Staff Member:  ANAYELI Cochran    Pt reports hemorrhoids & refused skin check at site. Instructed to notify RN if excessive bleeding

## 2024-05-30 NOTE — CARE UPDATE
Unit [facility] Care Support Interaction      I have reviewed the chart of Ayad Edmond who is hospitalized for Acute on chronic combined systolic and diastolic heart failure. The patient is currently located in the following unit: CSU        I have assisted the primary physician in management of the following:      Central Line - Present on admission to the unit - ordered blood cultures      Leigh Schreiber NP  Unit Based [facility]

## 2024-05-30 NOTE — SUBJECTIVE & OBJECTIVE
Interval History: Admitted yesterday with increase in  to 5 and started lasix ggt. UOP only 600 overnight.     Continuous Infusions:   DOBUTamine IV infusion (non-titrating)  5 mcg/kg/min Intravenous Continuous 5.8 mL/hr at 05/29/24 2044 5 mcg/kg/min at 05/29/24 2044    furosemide (Lasix) 500 mg in 50 mL infusion (conc: 10 mg/mL)  30 mg/hr Intravenous Continuous   Paused at 05/30/24 1230     Scheduled Meds:   atorvastatin  40 mg Oral Daily    heparin (porcine)  5,000 Units Subcutaneous Q8H    hydrALAZINE  50 mg Oral Q8H    isosorbide dinitrate  20 mg Oral TID    pantoprazole  20 mg Oral Daily     PRN Meds:  Current Facility-Administered Medications:     acetaminophen, 650 mg, Oral, Q6H PRN    ondansetron, 4 mg, Oral, Q6H PRN    ondansetron, 4 mg, Intravenous, Q6H PRN    senna-docusate 8.6-50 mg, 1 tablet, Oral, Daily PRN    sodium chloride 0.9%, 10 mL, Intravenous, PRN    Review of patient's allergies indicates:  No Known Allergies  Objective:     Vital Signs (Most Recent):  Temp: 98.2 °F (36.8 °C) (05/30/24 1209)  Pulse: (!) 117 (05/30/24 1307)  Resp: 18 (05/30/24 1410)  BP: 102/70 (05/30/24 1415)  SpO2: 96 % (05/30/24 1209) Vital Signs (24h Range):  Temp:  [98 °F (36.7 °C)-98.3 °F (36.8 °C)] 98.2 °F (36.8 °C)  Pulse:  [112-233] 117  Resp:  [17-20] 18  SpO2:  [95 %-100 %] 96 %  BP: ()/(59-80) 102/70     Patient Vitals for the past 72 hrs (Last 3 readings):   Weight   05/30/24 1140 77.8 kg (171 lb 8.3 oz)   05/29/24 2100 77.8 kg (171 lb 8.3 oz)   05/29/24 1250 77.1 kg (170 lb)     Body mass index is 25.33 kg/m².      Intake/Output Summary (Last 24 hours) at 5/30/2024 1444  Last data filed at 5/30/2024 1444  Gross per 24 hour   Intake 1820 ml   Output 1600 ml   Net 220 ml          Physical Exam  Vitals reviewed.   Constitutional:       Appearance: Normal appearance.   HENT:      Mouth/Throat:      Mouth: Mucous membranes are moist.   Eyes:      Extraocular Movements: Extraocular movements intact.       "Pupils: Pupils are equal, round, and reactive to light.   Cardiovascular:      Rate and Rhythm: Tachycardia present.   Pulmonary:      Effort: Pulmonary effort is normal.      Breath sounds: Normal breath sounds.   Abdominal:      General: Abdomen is flat.      Tenderness: There is abdominal tenderness.   Musculoskeletal:      Right lower leg: No edema.      Left lower leg: No edema.   Skin:     General: Skin is warm.   Neurological:      General: No focal deficit present.      Mental Status: He is alert and oriented to person, place, and time.   Psychiatric:         Mood and Affect: Mood normal.         Behavior: Behavior normal.            Significant Labs:  CBC:  Recent Labs   Lab 05/29/24  1529 05/30/24  0616   WBC 7.69 7.43   RBC 4.00* 3.79*   HGB 9.1* 8.7*   HCT 29.6* 26.9*    356   MCV 74* 71*   MCH 22.8* 23.0*   MCHC 30.7* 32.3     BNP:  Recent Labs   Lab 05/29/24  1529   BNP 3,707*     CMP:  Recent Labs   Lab 05/29/24  1529 05/30/24  0616    105   CALCIUM 9.2 9.1   ALBUMIN 3.3* 3.2*   PROT 7.3 7.0   * 135*   K 4.7 4.1   CO2 19* 20*    101   BUN 33* 34*   CREATININE 2.5* 2.4*   ALKPHOS 101 97   ALT 81* 76*   * 93*   BILITOT 0.8 0.7      Coagulation:   No results for input(s): "PT", "INR", "APTT" in the last 168 hours.  LDH:  No results for input(s): "LDH" in the last 72 hours.  Microbiology:  Microbiology Results (last 7 days)       Procedure Component Value Units Date/Time    Blood culture [[id number]] Collected: 05/30/24 0920    Order Status: Sent Specimen: Blood from Peripheral, Antecubital, Right Updated: 05/30/24 0951    Blood culture [[id number]] Collected: 05/30/24 0921    Order Status: Sent Specimen: Blood Updated: 05/30/24 0951            I have reviewed all pertinent labs within the past 24 hours.    Estimated Creatinine Clearance: 40.1 mL/min (A) (based on SCr of 2.4 mg/dL (H)).    Diagnostic Results:  I have reviewed and interpreted all pertinent imaging " results/findings within the past 24 hours.

## 2024-05-30 NOTE — PROGRESS NOTES
[facility] - Cardiology Stepdown  Heart Transplant  Progress Note    Patient Name: Ayad Edmond  MRN: [id number]  Admission Date: 5/29/2024  Hospital Length of Stay: 1 days  Attending Physician: Elier Gilmore, *  Primary Care Provider: Zehra Davies MD  Principal Problem:Acute on chronic combined systolic and diastolic heart failure    Subjective:   Interval History: Admitted yesterday with increase in  to 5 and started lasix ggt. UOP only 600 overnight.     Continuous Infusions:   DOBUTamine IV infusion (non-titrating)  5 mcg/kg/min Intravenous Continuous 5.8 mL/hr at 05/29/24 2044 5 mcg/kg/min at 05/29/24 2044    furosemide (Lasix) 500 mg in 50 mL infusion (conc: 10 mg/mL)  30 mg/hr Intravenous Continuous   Paused at 05/30/24 1230     Scheduled Meds:   atorvastatin  40 mg Oral Daily    heparin (porcine)  5,000 Units Subcutaneous Q8H    hydrALAZINE  50 mg Oral Q8H    isosorbide dinitrate  20 mg Oral TID    pantoprazole  20 mg Oral Daily     PRN Meds:  Current Facility-Administered Medications:     acetaminophen, 650 mg, Oral, Q6H PRN    ondansetron, 4 mg, Oral, Q6H PRN    ondansetron, 4 mg, Intravenous, Q6H PRN    senna-docusate 8.6-50 mg, 1 tablet, Oral, Daily PRN    sodium chloride 0.9%, 10 mL, Intravenous, PRN    Review of patient's allergies indicates:  No Known Allergies  Objective:     Vital Signs (Most Recent):  Temp: 98.2 °F (36.8 °C) (05/30/24 1209)  Pulse: (!) 117 (05/30/24 1307)  Resp: 18 (05/30/24 1410)  BP: 102/70 (05/30/24 1415)  SpO2: 96 % (05/30/24 1209) Vital Signs (24h Range):  Temp:  [98 °F (36.7 °C)-98.3 °F (36.8 °C)] 98.2 °F (36.8 °C)  Pulse:  [112-233] 117  Resp:  [17-20] 18  SpO2:  [95 %-100 %] 96 %  BP: ()/(59-80) 102/70     Patient Vitals for the past 72 hrs (Last 3 readings):   Weight   05/30/24 1140 77.8 kg (171 lb 8.3 oz)   05/29/24 2100 77.8 kg (171 lb 8.3 oz)   05/29/24 1250 77.1 kg (170 lb)     Body mass index is 25.33 kg/m².      Intake/Output Summary (Last 24  "hours) at 5/30/2024 1444  Last data filed at 5/30/2024 1444  Gross per 24 hour   Intake 1820 ml   Output 1600 ml   Net 220 ml          Physical Exam  Vitals reviewed.   Constitutional:       Appearance: Normal appearance.   HENT:      Mouth/Throat:      Mouth: Mucous membranes are moist.   Eyes:      Extraocular Movements: Extraocular movements intact.      Pupils: Pupils are equal, round, and reactive to light.   Cardiovascular:      Rate and Rhythm: Tachycardia present.   Pulmonary:      Effort: Pulmonary effort is normal.      Breath sounds: Normal breath sounds.   Abdominal:      General: Abdomen is flat.      Tenderness: There is abdominal tenderness.   Musculoskeletal:      Right lower leg: No edema.      Left lower leg: No edema.   Skin:     General: Skin is warm.   Neurological:      General: No focal deficit present.      Mental Status: He is alert and oriented to person, place, and time.   Psychiatric:         Mood and Affect: Mood normal.         Behavior: Behavior normal.            Significant Labs:  CBC:  Recent Labs   Lab 05/29/24  1529 05/30/24  0616   WBC 7.69 7.43   RBC 4.00* 3.79*   HGB 9.1* 8.7*   HCT 29.6* 26.9*    356   MCV 74* 71*   MCH 22.8* 23.0*   MCHC 30.7* 32.3     BNP:  Recent Labs   Lab 05/29/24  1529   BNP 3,707*     CMP:  Recent Labs   Lab 05/29/24  1529 05/30/24  0616    105   CALCIUM 9.2 9.1   ALBUMIN 3.3* 3.2*   PROT 7.3 7.0   * 135*   K 4.7 4.1   CO2 19* 20*    101   BUN 33* 34*   CREATININE 2.5* 2.4*   ALKPHOS 101 97   ALT 81* 76*   * 93*   BILITOT 0.8 0.7      Coagulation:   No results for input(s): "PT", "INR", "APTT" in the last 168 hours.  LDH:  No results for input(s): "LDH" in the last 72 hours.  Microbiology:  Microbiology Results (last 7 days)       Procedure Component Value Units Date/Time    Blood culture [[id number]] Collected: 05/30/24 0920    Order Status: Sent Specimen: Blood from Peripheral, Antecubital, Right Updated: 05/30/24 0951 "    Blood culture [[id number]] Collected: 05/30/24 0921    Order Status: Sent Specimen: Blood Updated: 05/30/24 0951            I have reviewed all pertinent labs within the past 24 hours.    Estimated Creatinine Clearance: 40.1 mL/min (A) (based on SCr of 2.4 mg/dL (H)).    Diagnostic Results:  I have reviewed and interpreted all pertinent imaging results/findings within the past 24 hours.  Assessment and Plan:       * Acute on chronic combined systolic and diastolic heart failure  Decompensated HFrEF  NHYA III, ACC D  BNP 3700, LA 1.4    Continue  to 5 mcg/mg/kg  Increase Lasix gtt @ 30 mg/hr after IVP 80 mg IV x1  Continue Bidil  Holding Valsartan and Spironolactone given AUDELIA  Daily weights, strict I/Os  -Repeat tte    AUDELIA (acute kidney injury)  Cr 1.2->2.5; likely cardiorenal     gtt and increase Lasix gtt  Holding Valsartan and Spironolactone for now  Strict I/Os, monitor renal function    Primary hypertension  HF mgmt as mentioned        Monica Orellana MD  Heart Transplant  [facility] - Cardiology Stepdown

## 2024-05-30 NOTE — ED TRIAGE NOTES
Ayad Edmond, a 44 y.o. male presents to the ED w/ complaint of chest pain  that radiates to upper back. Pt started dobutamine drip and feels like knees are locking up on him since he started tx. Pt also endorses N/V.    Triage note:  Chief Complaint   Patient presents with    Multiple complaints     On dobutamine drip, knees locking up, chest pain /upper back     Review of patient's allergies indicates:  No Known Allergies  Past Medical History:   Diagnosis Date    Acute heart failure     AUDELIA (acute kidney injury)     Cardiomyopathy     Elevated troponin     HTN (hypertension)     Hypokalemia     Hypomagnesemia     Renal insufficiency

## 2024-05-30 NOTE — PLAN OF CARE
-120s, BP WNL. Pt denies SOB, c/o pain to R shoulder, tylenol given w relief. Lasix &  per order, I&O monitored    PICC infusing/flush fine but not drawing back blood. RN offered cathflo, pt refused & preferred lab collect. Switched to lab collect     Problem: Acute Kidney Injury/Impairment  Goal: Fluid and Electrolyte Balance  Outcome: Progressing     Problem: Infection  Goal: Absence of Infection Signs and Symptoms  Outcome: Progressing     Problem: Heart Failure  Goal: Optimal Cardiac Output  Outcome: Progressing

## 2024-05-30 NOTE — ASSESSMENT & PLAN NOTE
Cr 1.2->2.5; likely cardiorenal     gtt and increase Lasix gtt  Holding Valsartan and Spironolactone for now  Strict I/Os, monitor renal function

## 2024-05-30 NOTE — CARE UPDATE
"RAPID RESPONSE NURSE CHART REVIEW        Chart Reviewed: 05/30/2024, 8:52 AM    MRN: [id number]  Bed: 358/358 A    Dx: Acute on chronic combined systolic and diastolic heart failure    Ayad Edmond has a past medical history of Acute heart failure, AUDELIA (acute kidney injury), Cardiomyopathy, Elevated troponin, HTN (hypertension), Hypokalemia, Hypomagnesemia, and Renal insufficiency.    Last VS: /78   Pulse (!) 233   Temp 98.1 °F (36.7 °C) (Oral)   Resp 18   Ht 5' 9" (1.753 m)   Wt 77.8 kg (171 lb 8.3 oz)   SpO2 97%   BMI 25.33 kg/m²     24H Vital Sign Range:  Temp:  [98 °F (36.7 °C)-98.3 °F (36.8 °C)]   Pulse:  [112-233]   Resp:  [17-22]   BP: ()/(59-80)   SpO2:  [95 %-100 %]     Level of Consciousness (AVPU): alert    Recent Labs     05/29/24  1529 05/30/24  0616   WBC 7.69 7.43   HGB 9.1* 8.7*   HCT 29.6* 26.9*    356       Recent Labs     05/29/24  1529 05/30/24  0616   * 135*   K 4.7 4.1    101   CO2 19* 20*   BUN 33* 34*   CREATININE 2.5* 2.4*    105   PHOS 4.9* 4.7*   MG 2.4 2.3          OXYGEN:             MEWS score: 3    Charge Darrick SULTANA checked on patient due to erroneous vitals. Currently Hr is 117  No additional concerns verbalized at this time. Instructed to call [phone] for further concerns or assistance.    Smith Raman RN       "

## 2024-05-31 ENCOUNTER — EDUCATION (OUTPATIENT)
Dept: TRANSPLANT | Facility: CLINIC | Age: 44
End: 2024-05-31
Payer: MEDICAID

## 2024-05-31 ENCOUNTER — SOCIAL WORK (OUTPATIENT)
Dept: TRANSPLANT | Facility: CLINIC | Age: 44
End: 2024-05-31
Payer: MEDICAID

## 2024-05-31 LAB
ABORH REPEAT: NORMAL
ALBUMIN SERPL BCP-MCNC: 3.1 G/DL (ref 3.5–5.2)
ALBUMIN SERPL BCP-MCNC: 3.2 G/DL (ref 3.5–5.2)
ALLENS TEST: ABNORMAL
ALLENS TEST: ABNORMAL
ALP SERPL-CCNC: 106 U/L (ref 55–135)
ALT SERPL W/O P-5'-P-CCNC: 86 U/L (ref 10–44)
ANION GAP SERPL CALC-SCNC: 12 MMOL/L (ref 8–16)
ANION GAP SERPL CALC-SCNC: 13 MMOL/L (ref 8–16)
ANION GAP SERPL CALC-SCNC: 14 MMOL/L (ref 8–16)
APTT PPP: 29.3 SEC (ref 21–32)
AST SERPL-CCNC: 84 U/L (ref 10–40)
BASOPHILS # BLD AUTO: 0.04 K/UL (ref 0–0.2)
BASOPHILS NFR BLD: 0.5 % (ref 0–1.9)
BILIRUB DIRECT SERPL-MCNC: 0.4 MG/DL (ref 0.1–0.3)
BILIRUB SERPL-MCNC: 0.6 MG/DL (ref 0.1–1)
BILIRUB UR QL STRIP: NEGATIVE
BUN SERPL-MCNC: 23 MG/DL (ref 6–20)
BUN SERPL-MCNC: 26 MG/DL (ref 6–20)
BUN SERPL-MCNC: 29 MG/DL (ref 6–20)
CALCIUM SERPL-MCNC: 9.1 MG/DL (ref 8.7–10.5)
CALCIUM SERPL-MCNC: 9.2 MG/DL (ref 8.7–10.5)
CALCIUM SERPL-MCNC: 9.5 MG/DL (ref 8.7–10.5)
CHLORIDE SERPL-SCNC: 92 MMOL/L (ref 95–110)
CHLORIDE SERPL-SCNC: 94 MMOL/L (ref 95–110)
CHLORIDE SERPL-SCNC: 96 MMOL/L (ref 95–110)
CHOLEST SERPL-MCNC: 123 MG/DL (ref 120–199)
CHOLEST/HDLC SERPL: 4 {RATIO} (ref 2–5)
CLARITY UR REFRACT.AUTO: CLEAR
CO2 SERPL-SCNC: 26 MMOL/L (ref 23–29)
CO2 SERPL-SCNC: 28 MMOL/L (ref 23–29)
CO2 SERPL-SCNC: 29 MMOL/L (ref 23–29)
COLOR UR AUTO: COLORLESS
COMPLEXED PSA SERPL-MCNC: 1.5 NG/ML (ref 0–4)
CREAT SERPL-MCNC: 1.8 MG/DL (ref 0.5–1.4)
CREAT SERPL-MCNC: 2 MG/DL (ref 0.5–1.4)
CREAT SERPL-MCNC: 2 MG/DL (ref 0.5–1.4)
DELSYS: ABNORMAL
DIFFERENTIAL METHOD BLD: ABNORMAL
EOSINOPHIL # BLD AUTO: 0 K/UL (ref 0–0.5)
EOSINOPHIL NFR BLD: 0.5 % (ref 0–8)
ERYTHROCYTE [DISTWIDTH] IN BLOOD BY AUTOMATED COUNT: 18.5 % (ref 11.5–14.5)
EST. GFR  (NO RACE VARIABLE): 41.9 ML/MIN/1.73 M^2
EST. GFR  (NO RACE VARIABLE): 41.9 ML/MIN/1.73 M^2
EST. GFR  (NO RACE VARIABLE): 47.6 ML/MIN/1.73 M^2
ESTIMATED AVG GLUCOSE: 126 MG/DL (ref 68–131)
FERRITIN SERPL-MCNC: 207 NG/ML (ref 20–300)
FIBRINOGEN PPP-MCNC: 651 MG/DL (ref 182–400)
FIO2: 21
GLUCOSE SERPL-MCNC: 121 MG/DL (ref 70–110)
GLUCOSE SERPL-MCNC: 124 MG/DL (ref 70–110)
GLUCOSE SERPL-MCNC: 178 MG/DL (ref 70–110)
GLUCOSE UR QL STRIP: NEGATIVE
HAV IGG SER QL IA: NORMAL
HBA1C MFR BLD: 6 % (ref 4–5.6)
HBV CORE AB SERPL QL IA: NORMAL
HBV SURFACE AB SER-ACNC: <3 MIU/ML
HBV SURFACE AB SER-ACNC: NORMAL M[IU]/ML
HBV SURFACE AG SERPL QL IA: NORMAL
HCO3 UR-SCNC: 30.3 MMOL/L (ref 24–28)
HCT VFR BLD AUTO: 27.7 % (ref 40–54)
HCV AB SERPL QL IA: NORMAL
HCYS SERPL-SCNC: 15.8 UMOL/L (ref 4–16.5)
HDLC SERPL-MCNC: 31 MG/DL (ref 40–75)
HDLC SERPL: 25.2 % (ref 20–50)
HGB BLD-MCNC: 8.8 G/DL (ref 14–18)
HGB UR QL STRIP: NEGATIVE
HIV 1+2 AB+HIV1 P24 AG SERPL QL IA: NORMAL
IMM GRANULOCYTES # BLD AUTO: 0.05 K/UL (ref 0–0.04)
IMM GRANULOCYTES NFR BLD AUTO: 0.7 % (ref 0–0.5)
INR PPP: 1.1 (ref 0.8–1.2)
IRON SERPL-MCNC: 12 UG/DL (ref 45–160)
KETONES UR QL STRIP: NEGATIVE
LDH SERPL L TO P-CCNC: 321 U/L (ref 110–260)
LDLC SERPL CALC-MCNC: 77.8 MG/DL (ref 63–159)
LEUKOCYTE ESTERASE UR QL STRIP: NEGATIVE
LYMPHOCYTES # BLD AUTO: 2.3 K/UL (ref 1–4.8)
LYMPHOCYTES NFR BLD: 31.5 % (ref 18–48)
MAGNESIUM SERPL-MCNC: 2.2 MG/DL (ref 1.6–2.6)
MCH RBC QN AUTO: 22.7 PG (ref 27–31)
MCHC RBC AUTO-ENTMCNC: 31.8 G/DL (ref 32–36)
MCV RBC AUTO: 71 FL (ref 82–98)
MODE: ABNORMAL
MONOCYTES # BLD AUTO: 0.9 K/UL (ref 0.3–1)
MONOCYTES NFR BLD: 12 % (ref 4–15)
NEUTROPHILS # BLD AUTO: 4 K/UL (ref 1.8–7.7)
NEUTROPHILS NFR BLD: 54.8 % (ref 38–73)
NITRITE UR QL STRIP: NEGATIVE
NONHDLC SERPL-MCNC: 92 MG/DL
NRBC BLD-RTO: 0 /100 WBC
PCO2 BLDA: 44.6 MMHG (ref 35–45)
PH SMN: 7.44 [PH] (ref 7.35–7.45)
PH UR STRIP: 6 [PH] (ref 5–8)
PHOSPHATE SERPL-MCNC: 3.8 MG/DL (ref 2.7–4.5)
PHOSPHATE SERPL-MCNC: 4.3 MG/DL (ref 2.7–4.5)
PLATELET # BLD AUTO: 392 K/UL (ref 150–450)
PLATELET FUNCTION ASSAY - EPINEPHRINE: 147 SECS (ref 76–199)
PMV BLD AUTO: 9 FL (ref 9.2–12.9)
PO2 BLDA: 27 MMHG (ref 40–60)
PO2 BLDA: 29 MMHG (ref 40–60)
POC BE: 6 MMOL/L
POC SATURATED O2: 54 % (ref 95–100)
POC SATURATED O2: 57 % (ref 95–100)
POC TCO2: 32 MMOL/L (ref 24–29)
POTASSIUM SERPL-SCNC: 3.1 MMOL/L (ref 3.5–5.1)
POTASSIUM SERPL-SCNC: 3.3 MMOL/L (ref 3.5–5.1)
POTASSIUM SERPL-SCNC: 3.4 MMOL/L (ref 3.5–5.1)
PREALB SERPL-MCNC: 18 MG/DL (ref 20–43)
PROT SERPL-MCNC: 7.4 G/DL (ref 6–8.4)
PROT UR QL STRIP: NEGATIVE
PROTHROMBIN TIME: 12.4 SEC (ref 9–12.5)
RBC # BLD AUTO: 3.88 M/UL (ref 4.6–6.2)
SAMPLE: ABNORMAL
SAMPLE: ABNORMAL
SITE: ABNORMAL
SITE: ABNORMAL
SODIUM SERPL-SCNC: 134 MMOL/L (ref 136–145)
SODIUM SERPL-SCNC: 134 MMOL/L (ref 136–145)
SODIUM SERPL-SCNC: 136 MMOL/L (ref 136–145)
SP GR UR STRIP: 1.01 (ref 1–1.03)
SP02: 99
TRANSFERRIN SERPL-MCNC: 272 MG/DL (ref 200–375)
TREPONEMA PALLIDUM IGG+IGM AB [PRESENCE] IN SERUM OR PLASMA BY IMMUNOASSAY: NONREACTIVE
TREPONEMA PALLIDUM IGG+IGM AB [PRESENCE] IN SERUM OR PLASMA BY IMMUNOASSAY: NONREACTIVE
TRIGL SERPL-MCNC: 71 MG/DL (ref 30–150)
TSH SERPL DL<=0.005 MIU/L-ACNC: 0.81 UIU/ML (ref 0.4–4)
URATE SERPL-MCNC: 12 MG/DL (ref 3.4–7)
URN SPEC COLLECT METH UR: ABNORMAL
WBC # BLD AUTO: 7.31 K/UL (ref 3.9–12.7)

## 2024-05-31 PROCEDURE — 82955 ASSAY OF G6PD ENZYME: CPT | Mod: NTX | Performed by: PHYSICIAN ASSISTANT

## 2024-05-31 PROCEDURE — 63600175 PHARM REV CODE 636 W HCPCS: Mod: NTX | Performed by: STUDENT IN AN ORGANIZED HEALTH CARE EDUCATION/TRAINING PROGRAM

## 2024-05-31 PROCEDURE — 83540 ASSAY OF IRON: CPT | Mod: NTX | Performed by: PHYSICIAN ASSISTANT

## 2024-05-31 PROCEDURE — 86147 CARDIOLIPIN ANTIBODY EA IG: CPT | Mod: NTX | Performed by: PHYSICIAN ASSISTANT

## 2024-05-31 PROCEDURE — 86777 TOXOPLASMA ANTIBODY: CPT | Mod: NTX | Performed by: PHYSICIAN ASSISTANT

## 2024-05-31 PROCEDURE — 86706 HEP B SURFACE ANTIBODY: CPT | Mod: 91,NTX | Performed by: PHYSICIAN ASSISTANT

## 2024-05-31 PROCEDURE — 84153 ASSAY OF PSA TOTAL: CPT | Mod: NTX | Performed by: PHYSICIAN ASSISTANT

## 2024-05-31 PROCEDURE — 84443 ASSAY THYROID STIM HORMONE: CPT | Mod: NTX | Performed by: PHYSICIAN ASSISTANT

## 2024-05-31 PROCEDURE — 82542 COL CHROMOTOGRAPHY QUAL/QUAN: CPT | Mod: NTX | Performed by: PHYSICIAN ASSISTANT

## 2024-05-31 PROCEDURE — 85610 PROTHROMBIN TIME: CPT | Mod: NTX | Performed by: PHYSICIAN ASSISTANT

## 2024-05-31 PROCEDURE — 87389 HIV-1 AG W/HIV-1&-2 AB AG IA: CPT | Mod: NTX | Performed by: PHYSICIAN ASSISTANT

## 2024-05-31 PROCEDURE — 84100 ASSAY OF PHOSPHORUS: CPT | Mod: NTX | Performed by: INTERNAL MEDICINE

## 2024-05-31 PROCEDURE — 85247 CLOT FACTOR VIII MULTIMETRIC: CPT | Mod: NTX | Performed by: PHYSICIAN ASSISTANT

## 2024-05-31 PROCEDURE — 86665 EPSTEIN-BARR CAPSID VCA: CPT | Mod: NTX | Performed by: PHYSICIAN ASSISTANT

## 2024-05-31 PROCEDURE — 86787 VARICELLA-ZOSTER ANTIBODY: CPT | Mod: NTX | Performed by: PHYSICIAN ASSISTANT

## 2024-05-31 PROCEDURE — 81376 HLA II TYPING 1 LOCUS LR: CPT | Mod: 59,TXP | Performed by: PHYSICIAN ASSISTANT

## 2024-05-31 PROCEDURE — 86803 HEPATITIS C AB TEST: CPT | Mod: NTX | Performed by: PHYSICIAN ASSISTANT

## 2024-05-31 PROCEDURE — 82803 BLOOD GASES ANY COMBINATION: CPT | Mod: NTX

## 2024-05-31 PROCEDURE — 86901 BLOOD TYPING SEROLOGIC RH(D): CPT | Mod: NTX | Performed by: PHYSICIAN ASSISTANT

## 2024-05-31 PROCEDURE — 82248 BILIRUBIN DIRECT: CPT | Mod: NTX | Performed by: PHYSICIAN ASSISTANT

## 2024-05-31 PROCEDURE — 87340 HEPATITIS B SURFACE AG IA: CPT | Mod: NTX | Performed by: PHYSICIAN ASSISTANT

## 2024-05-31 PROCEDURE — 84550 ASSAY OF BLOOD/URIC ACID: CPT | Mod: NTX | Performed by: PHYSICIAN ASSISTANT

## 2024-05-31 PROCEDURE — 85025 COMPLETE CBC W/AUTO DIFF WBC: CPT | Mod: NTX | Performed by: INTERNAL MEDICINE

## 2024-05-31 PROCEDURE — 80307 DRUG TEST PRSMV CHEM ANLYZR: CPT | Mod: NTX | Performed by: PHYSICIAN ASSISTANT

## 2024-05-31 PROCEDURE — 83735 ASSAY OF MAGNESIUM: CPT | Mod: NTX | Performed by: INTERNAL MEDICINE

## 2024-05-31 PROCEDURE — 86833 HLA CLASS II HIGH DEFIN QUAL: CPT | Mod: TXP | Performed by: PHYSICIAN ASSISTANT

## 2024-05-31 PROCEDURE — 82728 ASSAY OF FERRITIN: CPT | Mod: NTX | Performed by: PHYSICIAN ASSISTANT

## 2024-05-31 PROCEDURE — 25000003 PHARM REV CODE 250: Mod: NTX | Performed by: STUDENT IN AN ORGANIZED HEALTH CARE EDUCATION/TRAINING PROGRAM

## 2024-05-31 PROCEDURE — 84134 ASSAY OF PREALBUMIN: CPT | Mod: NTX | Performed by: PHYSICIAN ASSISTANT

## 2024-05-31 PROCEDURE — 81373 HLA I TYPING 1 LOCUS LR: CPT | Mod: TXP | Performed by: PHYSICIAN ASSISTANT

## 2024-05-31 PROCEDURE — 86790 VIRUS ANTIBODY NOS: CPT | Mod: NTX | Performed by: PHYSICIAN ASSISTANT

## 2024-05-31 PROCEDURE — 99900035 HC TECH TIME PER 15 MIN (STAT): Mod: NTX

## 2024-05-31 PROCEDURE — 36415 COLL VENOUS BLD VENIPUNCTURE: CPT | Mod: NTX | Performed by: PHYSICIAN ASSISTANT

## 2024-05-31 PROCEDURE — 80061 LIPID PANEL: CPT | Mod: NTX | Performed by: PHYSICIAN ASSISTANT

## 2024-05-31 PROCEDURE — 86832 HLA CLASS I HIGH DEFIN QUAL: CPT | Mod: TXP | Performed by: PHYSICIAN ASSISTANT

## 2024-05-31 PROCEDURE — 80069 RENAL FUNCTION PANEL: CPT | Mod: NTX | Performed by: STUDENT IN AN ORGANIZED HEALTH CARE EDUCATION/TRAINING PROGRAM

## 2024-05-31 PROCEDURE — 94761 N-INVAS EAR/PLS OXIMETRY MLT: CPT | Mod: NTX,XB

## 2024-05-31 PROCEDURE — 85730 THROMBOPLASTIN TIME PARTIAL: CPT | Mod: NTX | Performed by: PHYSICIAN ASSISTANT

## 2024-05-31 PROCEDURE — 81003 URINALYSIS AUTO W/O SCOPE: CPT | Mod: NTX | Performed by: PHYSICIAN ASSISTANT

## 2024-05-31 PROCEDURE — 85246 CLOT FACTOR VIII VW ANTIGEN: CPT | Mod: NTX | Performed by: PHYSICIAN ASSISTANT

## 2024-05-31 PROCEDURE — 99291 CRITICAL CARE FIRST HOUR: CPT | Mod: NTX,,, | Performed by: INTERNAL MEDICINE

## 2024-05-31 PROCEDURE — 80323 ALKALOIDS NOS: CPT | Mod: NTX | Performed by: PHYSICIAN ASSISTANT

## 2024-05-31 PROCEDURE — 86593 SYPHILIS TEST NON-TREP QUANT: CPT | Mod: NTX | Performed by: PHYSICIAN ASSISTANT

## 2024-05-31 PROCEDURE — 81376 HLA II TYPING 1 LOCUS LR: CPT | Mod: TXP | Performed by: PHYSICIAN ASSISTANT

## 2024-05-31 PROCEDURE — 84466 ASSAY OF TRANSFERRIN: CPT | Mod: NTX | Performed by: PHYSICIAN ASSISTANT

## 2024-05-31 PROCEDURE — 83615 LACTATE (LD) (LDH) ENZYME: CPT | Mod: NTX | Performed by: PHYSICIAN ASSISTANT

## 2024-05-31 PROCEDURE — 81373 HLA I TYPING 1 LOCUS LR: CPT | Mod: 59,TXP | Performed by: PHYSICIAN ASSISTANT

## 2024-05-31 PROCEDURE — 20000000 HC ICU ROOM: Mod: NTX

## 2024-05-31 PROCEDURE — 85301 ANTITHROMBIN III ANTIGEN: CPT | Mod: NTX | Performed by: PHYSICIAN ASSISTANT

## 2024-05-31 PROCEDURE — 86682 HELMINTH ANTIBODY: CPT | Mod: NTX | Performed by: PHYSICIAN ASSISTANT

## 2024-05-31 PROCEDURE — 86704 HEP B CORE ANTIBODY TOTAL: CPT | Mod: NTX | Performed by: PHYSICIAN ASSISTANT

## 2024-05-31 PROCEDURE — 85384 FIBRINOGEN ACTIVITY: CPT | Mod: NTX | Performed by: PHYSICIAN ASSISTANT

## 2024-05-31 PROCEDURE — 85576 BLOOD PLATELET AGGREGATION: CPT | Mod: NTX | Performed by: PHYSICIAN ASSISTANT

## 2024-05-31 PROCEDURE — 83036 HEMOGLOBIN GLYCOSYLATED A1C: CPT | Mod: NTX | Performed by: PHYSICIAN ASSISTANT

## 2024-05-31 PROCEDURE — 86480 TB TEST CELL IMMUN MEASURE: CPT | Mod: NTX | Performed by: PHYSICIAN ASSISTANT

## 2024-05-31 PROCEDURE — 86977 RBC SERUM PRETX INCUBJ/INHIB: CPT | Mod: 59,TXP | Performed by: PHYSICIAN ASSISTANT

## 2024-05-31 PROCEDURE — 80048 BASIC METABOLIC PNL TOTAL CA: CPT | Mod: NTX,XB | Performed by: INTERNAL MEDICINE

## 2024-05-31 PROCEDURE — 80053 COMPREHEN METABOLIC PANEL: CPT | Mod: NTX | Performed by: INTERNAL MEDICINE

## 2024-05-31 PROCEDURE — 83090 ASSAY OF HOMOCYSTEINE: CPT | Mod: NTX | Performed by: PHYSICIAN ASSISTANT

## 2024-05-31 PROCEDURE — 94799 UNLISTED PULMONARY SVC/PX: CPT | Mod: NTX

## 2024-05-31 PROCEDURE — 86644 CMV ANTIBODY: CPT | Mod: NTX | Performed by: PHYSICIAN ASSISTANT

## 2024-05-31 PROCEDURE — 86696 HERPES SIMPLEX TYPE 2 TEST: CPT | Mod: NTX | Performed by: PHYSICIAN ASSISTANT

## 2024-05-31 RX ORDER — POTASSIUM CHLORIDE 20 MEQ/1
60 TABLET, EXTENDED RELEASE ORAL ONCE
Status: COMPLETED | OUTPATIENT
Start: 2024-05-31 | End: 2024-05-31

## 2024-05-31 RX ORDER — TRAMADOL HYDROCHLORIDE 50 MG/1
50 TABLET ORAL ONCE
Status: COMPLETED | OUTPATIENT
Start: 2024-05-31 | End: 2024-05-31

## 2024-05-31 RX ORDER — POTASSIUM CHLORIDE 20 MEQ/1
40 TABLET, EXTENDED RELEASE ORAL ONCE
Status: COMPLETED | OUTPATIENT
Start: 2024-06-01 | End: 2024-06-01

## 2024-05-31 RX ORDER — ALPRAZOLAM 0.25 MG/1
0.25 TABLET ORAL NIGHTLY PRN
Status: DISCONTINUED | OUTPATIENT
Start: 2024-05-31 | End: 2024-05-31

## 2024-05-31 RX ORDER — POTASSIUM CHLORIDE 20 MEQ/1
40 TABLET, EXTENDED RELEASE ORAL ONCE
Status: COMPLETED | OUTPATIENT
Start: 2024-05-31 | End: 2024-05-31

## 2024-05-31 RX ADMIN — CHLOROTHIAZIDE SODIUM 500 MG: 500 INJECTION, POWDER, LYOPHILIZED, FOR SOLUTION INTRAVENOUS at 01:05

## 2024-05-31 RX ADMIN — HYDRALAZINE HYDROCHLORIDE 50 MG: 50 TABLET ORAL at 05:05

## 2024-05-31 RX ADMIN — ATORVASTATIN CALCIUM 40 MG: 40 TABLET, FILM COATED ORAL at 10:05

## 2024-05-31 RX ADMIN — ISOSORBIDE DINITRATE 20 MG: 20 TABLET ORAL at 10:05

## 2024-05-31 RX ADMIN — DOBUTAMINE HYDROCHLORIDE 5 MCG/KG/MIN: 400 INJECTION INTRAVENOUS at 05:05

## 2024-05-31 RX ADMIN — ISOSORBIDE DINITRATE 20 MG: 20 TABLET ORAL at 03:05

## 2024-05-31 RX ADMIN — POTASSIUM CHLORIDE 40 MEQ: 1500 TABLET, EXTENDED RELEASE ORAL at 10:05

## 2024-05-31 RX ADMIN — PANTOPRAZOLE SODIUM 20 MG: 20 TABLET, DELAYED RELEASE ORAL at 10:05

## 2024-05-31 RX ADMIN — ALPRAZOLAM 0.25 MG: 0.25 TABLET ORAL at 10:05

## 2024-05-31 RX ADMIN — HEPARIN SODIUM 5000 UNITS: 5000 INJECTION INTRAVENOUS; SUBCUTANEOUS at 01:05

## 2024-05-31 RX ADMIN — POTASSIUM BICARBONATE 40 MEQ: 391 TABLET, EFFERVESCENT ORAL at 03:05

## 2024-05-31 RX ADMIN — POTASSIUM CHLORIDE 60 MEQ: 1500 TABLET, EXTENDED RELEASE ORAL at 05:05

## 2024-05-31 RX ADMIN — HYDRALAZINE HYDROCHLORIDE 50 MG: 50 TABLET ORAL at 01:05

## 2024-05-31 RX ADMIN — HYDRALAZINE HYDROCHLORIDE 50 MG: 50 TABLET ORAL at 10:05

## 2024-05-31 RX ADMIN — TRAMADOL HYDROCHLORIDE 50 MG: 50 TABLET, COATED ORAL at 12:05

## 2024-05-31 RX ADMIN — FUROSEMIDE 30 MG/HR: 10 INJECTION, SOLUTION INTRAMUSCULAR; INTRAVENOUS at 02:05

## 2024-05-31 RX ADMIN — FUROSEMIDE 30 MG/HR: 10 INJECTION, SOLUTION INTRAMUSCULAR; INTRAVENOUS at 05:05

## 2024-05-31 NOTE — PROGRESS NOTES
[facility] - Surgical Intensive Care  Heart Transplant  Progress Note    Patient Name: Ayad Edmond  MRN: [id number]  Admission Date: 5/29/2024  Hospital Length of Stay: 2 days  Attending Physician: Elier Gilmore, *  Primary Care Provider: Zehra Davies MD  Principal Problem:Acute on chronic combined systolic and diastolic heart failure    Subjective:   Interval History: Patient yesterday with decrease UOP started on epi 0.02 and given Diuril 500mg IV    UOP 4.4 neg 2.5 CVP 9 SVO 2.57 CO 5.4 CI 2.79 [id number]    Continuous Infusions:   DOBUTamine IV infusion (non-titrating)  5 mcg/kg/min Intravenous Continuous 5.8 mL/hr at 05/31/24 1100 5 mcg/kg/min at 05/31/24 1100    EPINEPHrine  0.02 mcg/kg/min Intravenous Continuous 4.7 mL/hr at 05/31/24 1100 0.02 mcg/kg/min at 05/31/24 1100    furosemide (Lasix) 500 mg in 50 mL infusion (conc: 10 mg/mL)  30 mg/hr Intravenous Continuous 3 mL/hr at 05/31/24 1100 30 mg/hr at 05/31/24 1100     Scheduled Meds:   atorvastatin  40 mg Oral Daily    chlorothiazide (DIURIL) 500 mg in dextrose 5 % (D5W) 50 mL IVPB  500 mg Intravenous Once    heparin (porcine)  5,000 Units Subcutaneous Q8H    hydrALAZINE  50 mg Oral Q8H    isosorbide dinitrate  20 mg Oral TID    pantoprazole  20 mg Oral Daily    traMADoL  50 mg Oral Once     PRN Meds:  Current Facility-Administered Medications:     acetaminophen, 650 mg, Oral, Q6H PRN    ALPRAZolam, 0.25 mg, Oral, Nightly PRN    ondansetron, 4 mg, Oral, Q6H PRN    ondansetron, 4 mg, Intravenous, Q6H PRN    prochlorperazine, 2.5 mg, Intravenous, Q6H PRN    senna-docusate 8.6-50 mg, 1 tablet, Oral, Daily PRN    sodium chloride 0.9%, 10 mL, Intravenous, PRN    Review of patient's allergies indicates:  No Known Allergies  Objective:     Vital Signs (Most Recent):  Temp: 98.4 °F (36.9 °C) (05/31/24 1101)  Pulse: (!) 111 (05/31/24 1115)  Resp: (!) 23 (05/31/24 1115)  BP: 116/65 (05/31/24 1115)  SpO2: 99 % (05/31/24 1115) Vital Signs (24h Range):  Temp:   [98.2 °F (36.8 °C)-99.4 °F (37.4 °C)] 98.4 °F (36.9 °C)  Pulse:  [103-238] 111  Resp:  [3-30] 23  SpO2:  [93 %-100 %] 99 %  BP: ()/(55-88) 116/65     Patient Vitals for the past 72 hrs (Last 3 readings):   Weight   05/31/24 0400 72.5 kg (159 lb 13.3 oz)   05/30/24 1140 77.8 kg (171 lb 8.3 oz)   05/29/24 2100 77.8 kg (171 lb 8.3 oz)     Body mass index is 23.6 kg/m².      Intake/Output Summary (Last 24 hours) at 5/31/2024 1156  Last data filed at 5/31/2024 1100  Gross per 24 hour   Intake 1734.71 ml   Output 4415 ml   Net -2680.29 ml              Physical Exam  Constitutional:       Appearance: Normal appearance.   Eyes:      Pupils: Pupils are equal, round, and reactive to light.   Neck:      Comments: Elevated JVD to mandible   Cardiovascular:      Rate and Rhythm: Normal rate and regular rhythm.      Pulses: Normal pulses.   Pulmonary:      Effort: Pulmonary effort is normal.      Breath sounds: Normal breath sounds.   Abdominal:      General: Abdomen is flat.   Musculoskeletal:         General: Normal range of motion.      Right lower leg: No edema.      Left lower leg: No edema.   Skin:     General: Skin is warm.   Neurological:      General: No focal deficit present.      Mental Status: He is alert and oriented to person, place, and time.   Psychiatric:         Mood and Affect: Mood normal.         Behavior: Behavior normal.            Significant Labs:  CBC:  Recent Labs   Lab 05/29/24  1529 05/30/24  0616 05/30/24  2246 05/31/24  0317   WBC 7.69 7.43  --  7.31   RBC 4.00* 3.79*  --  3.88*   HGB 9.1* 8.7*  --  8.8*   HCT 29.6* 26.9* 29* 27.7*    356  --  392   MCV 74* 71*  --  71*   MCH 22.8* 23.0*  --  22.7*   MCHC 30.7* 32.3  --  31.8*     BNP:  Recent Labs   Lab 05/29/24  1529   BNP 3,707*     CMP:  Recent Labs   Lab 05/29/24  1529 05/30/24  0616 05/30/24  1902 05/31/24  0317    105 134* 121*   CALCIUM 9.2 9.1 8.9 9.1   ALBUMIN 3.3* 3.2* 3.1* 3.2*   PROT 7.3 7.0  --  7.4   * 135*  "133* 136   K 4.7 4.1 3.6 3.1*   CO2 19* 20* 22* 26    101 100 96   BUN 33* 34* 33* 29*   CREATININE 2.5* 2.4* 2.2* 2.0*   ALKPHOS 101 97  --  106   ALT 81* 76*  --  86*   * 93*  --  84*   BILITOT 0.8 0.7  --  0.6      Coagulation:   No results for input(s): "PT", "INR", "APTT" in the last 168 hours.  LDH:  No results for input(s): "LDH" in the last 72 hours.  Microbiology:  Microbiology Results (last 7 days)       Procedure Component Value Units Date/Time    Blood culture [[id number]] Collected: 05/30/24 0920    Order Status: Completed Specimen: Blood from Peripheral, Antecubital, Right Updated: 05/30/24 1715     Blood Culture, Routine No Growth to date    Blood culture [[id number]] Collected: 05/30/24 0921    Order Status: Completed Specimen: Blood Updated: 05/30/24 1715     Blood Culture, Routine No Growth to date            I have reviewed all pertinent labs within the past 24 hours.    Estimated Creatinine Clearance: 48.1 mL/min (A) (based on SCr of 2 mg/dL (H)).    Diagnostic Results:  I have reviewed and interpreted all pertinent imaging results/findings within the past 24 hours.  Assessment and Plan:       * Acute on chronic combined systolic and diastolic heart failure  Decompensated HFrEF  NHYA III, ACC D  BNP 3700, LA 1.4    Continue  to 5 mcg/mg/kg and Epi 0.02   Lasix gtt @ 30 mg/hr and give diuril 500mg once today   Continue Bidil  Holding Valsartan and Spironolactone given AUDELIA  Daily weights, strict I/Os      AUDELIA (acute kidney injury)  Cr 1.2->2.5 on admission; likely cardiorenal  Holding Valsartan and Spironolactone for now  Strict I/Os, monitor renal function  Improving with diuresing     Primary hypertension  HF mgmt as mentioned        Monica Orellana MD  Heart Transplant  [facility] - Surgical Intensive Care    "

## 2024-05-31 NOTE — SUBJECTIVE & OBJECTIVE
Interval History: Patient yesterday with decrease UOP started on epi 0.02 and given Diuril 500mg IV    UOP 4.4 neg 2.5 CVP 9 SVO 2.57 CO 5.4 CI 2.79 [id number]    Continuous Infusions:   DOBUTamine IV infusion (non-titrating)  5 mcg/kg/min Intravenous Continuous 5.8 mL/hr at 05/31/24 1100 5 mcg/kg/min at 05/31/24 1100    EPINEPHrine  0.02 mcg/kg/min Intravenous Continuous 4.7 mL/hr at 05/31/24 1100 0.02 mcg/kg/min at 05/31/24 1100    furosemide (Lasix) 500 mg in 50 mL infusion (conc: 10 mg/mL)  30 mg/hr Intravenous Continuous 3 mL/hr at 05/31/24 1100 30 mg/hr at 05/31/24 1100     Scheduled Meds:   atorvastatin  40 mg Oral Daily    chlorothiazide (DIURIL) 500 mg in dextrose 5 % (D5W) 50 mL IVPB  500 mg Intravenous Once    heparin (porcine)  5,000 Units Subcutaneous Q8H    hydrALAZINE  50 mg Oral Q8H    isosorbide dinitrate  20 mg Oral TID    pantoprazole  20 mg Oral Daily    traMADoL  50 mg Oral Once     PRN Meds:  Current Facility-Administered Medications:     acetaminophen, 650 mg, Oral, Q6H PRN    ALPRAZolam, 0.25 mg, Oral, Nightly PRN    ondansetron, 4 mg, Oral, Q6H PRN    ondansetron, 4 mg, Intravenous, Q6H PRN    prochlorperazine, 2.5 mg, Intravenous, Q6H PRN    senna-docusate 8.6-50 mg, 1 tablet, Oral, Daily PRN    sodium chloride 0.9%, 10 mL, Intravenous, PRN    Review of patient's allergies indicates:  No Known Allergies  Objective:     Vital Signs (Most Recent):  Temp: 98.4 °F (36.9 °C) (05/31/24 1101)  Pulse: (!) 111 (05/31/24 1115)  Resp: (!) 23 (05/31/24 1115)  BP: 116/65 (05/31/24 1115)  SpO2: 99 % (05/31/24 1115) Vital Signs (24h Range):  Temp:  [98.2 °F (36.8 °C)-99.4 °F (37.4 °C)] 98.4 °F (36.9 °C)  Pulse:  [103-238] 111  Resp:  [3-30] 23  SpO2:  [93 %-100 %] 99 %  BP: ()/(55-88) 116/65     Patient Vitals for the past 72 hrs (Last 3 readings):   Weight   05/31/24 0400 72.5 kg (159 lb 13.3 oz)   05/30/24 1140 77.8 kg (171 lb 8.3 oz)   05/29/24 2100 77.8 kg (171 lb 8.3 oz)     Body mass index is  "23.6 kg/m².      Intake/Output Summary (Last 24 hours) at 5/31/2024 1156  Last data filed at 5/31/2024 1100  Gross per 24 hour   Intake 1734.71 ml   Output 4415 ml   Net -2680.29 ml              Physical Exam  Constitutional:       Appearance: Normal appearance.   Eyes:      Pupils: Pupils are equal, round, and reactive to light.   Neck:      Comments: Elevated JVD to mandible   Cardiovascular:      Rate and Rhythm: Normal rate and regular rhythm.      Pulses: Normal pulses.   Pulmonary:      Effort: Pulmonary effort is normal.      Breath sounds: Normal breath sounds.   Abdominal:      General: Abdomen is flat.   Musculoskeletal:         General: Normal range of motion.      Right lower leg: No edema.      Left lower leg: No edema.   Skin:     General: Skin is warm.   Neurological:      General: No focal deficit present.      Mental Status: He is alert and oriented to person, place, and time.   Psychiatric:         Mood and Affect: Mood normal.         Behavior: Behavior normal.            Significant Labs:  CBC:  Recent Labs   Lab 05/29/24  1529 05/30/24  0616 05/30/24  2246 05/31/24  0317   WBC 7.69 7.43  --  7.31   RBC 4.00* 3.79*  --  3.88*   HGB 9.1* 8.7*  --  8.8*   HCT 29.6* 26.9* 29* 27.7*    356  --  392   MCV 74* 71*  --  71*   MCH 22.8* 23.0*  --  22.7*   MCHC 30.7* 32.3  --  31.8*     BNP:  Recent Labs   Lab 05/29/24  1529   BNP 3,707*     CMP:  Recent Labs   Lab 05/29/24  1529 05/30/24  0616 05/30/24  1902 05/31/24  0317    105 134* 121*   CALCIUM 9.2 9.1 8.9 9.1   ALBUMIN 3.3* 3.2* 3.1* 3.2*   PROT 7.3 7.0  --  7.4   * 135* 133* 136   K 4.7 4.1 3.6 3.1*   CO2 19* 20* 22* 26    101 100 96   BUN 33* 34* 33* 29*   CREATININE 2.5* 2.4* 2.2* 2.0*   ALKPHOS 101 97  --  106   ALT 81* 76*  --  86*   * 93*  --  84*   BILITOT 0.8 0.7  --  0.6      Coagulation:   No results for input(s): "PT", "INR", "APTT" in the last 168 hours.  LDH:  No results for input(s): "LDH" in the last " 72 hours.  Microbiology:  Microbiology Results (last 7 days)       Procedure Component Value Units Date/Time    Blood culture [[id number]] Collected: 05/30/24 0920    Order Status: Completed Specimen: Blood from Peripheral, Antecubital, Right Updated: 05/30/24 1715     Blood Culture, Routine No Growth to date    Blood culture [[id number]] Collected: 05/30/24 0921    Order Status: Completed Specimen: Blood Updated: 05/30/24 1715     Blood Culture, Routine No Growth to date            I have reviewed all pertinent labs within the past 24 hours.    Estimated Creatinine Clearance: 48.1 mL/min (A) (based on SCr of 2 mg/dL (H)).    Diagnostic Results:  I have reviewed and interpreted all pertinent imaging results/findings within the past 24 hours.

## 2024-05-31 NOTE — CARE UPDATE
"Hemodynamics:   Parameter   at 2000   MAP 84   CVP 13   SvO2 56   CO  5.6   CI 2.9   SVR 1021      DOBUTamine IV infusion (non-titrating)  5 mcg/kg/min Intravenous Continuous 5.8 mL/hr at 05/30/24 2300 5 mcg/kg/min at 05/30/24 2300    EPINEPHrine  0.02 mcg/kg/min Intravenous Continuous 4.7 mL/hr at 05/30/24 2300 0.02 mcg/kg/min at 05/30/24 2300    furosemide (Lasix) 500 mg in 50 mL infusion (conc: 10 mg/mL)  30 mg/hr Intravenous Continuous 3 mL/hr at 05/30/24 2300 30 mg/hr at 05/30/24 2300         Recent Labs   Lab 05/30/24  0616 05/30/24  1902   * 133*   K 4.1 3.6    100   CO2 20* 22*   BUN 34* 33*   CREATININE 2.4* 2.2*   CALCIUM 9.1 8.9   MG 2.3  --      Recent Labs   Lab 05/30/24  0616 05/30/24  2246   WBC 7.43  --    RBC 3.79*  --    HGB 8.7*  --    HCT 26.9* 29*     --    MCV 71*  --    MCH 23.0*  --    MCHC 32.3  --      No results for input(s): "PT", "INR", "APTT" in the last 24 hours.    UOP 24Hours:   Intake/Output Summary (Last 24 hours) at 5/31/2024 0040  Last data filed at 5/30/2024 2313  Gross per 24 hour   Intake 1453.28 ml   Output 3125 ml   Net -1671.72 ml       Assessment/Plan:  - No changes, we will continue current course  - Plan was discussed with on-call attending    Jack Rowan MD  Cardiovascular Disease PGY-5  [facility]   "

## 2024-05-31 NOTE — PLAN OF CARE
Recommendations     1.) Recommend liberalizing the diet to Regular to help increase PO intake.      2.) Recommend Boost ONS (or alternate) TID if PO intake <50%.      3.) Recommend re-evaluation of bowel regimen to help with constipation.      4.) RD to monitor wt, PO intake, skin, labs.      Goals: to meet % of EEN/EPN by next RD f/u  Nutrition Goal Status: new  Communication of RD Recs:  (POC)

## 2024-05-31 NOTE — PROGRESS NOTES
Update:  SW met with pt to complete psychosocial.  Pt given SW resource booklet.  Pt advised to consider caregivers for VAD and Transplant.  SW will follow up next week.  Psychosocial documentation to follow.   SW remains available.

## 2024-05-31 NOTE — ASSESSMENT & PLAN NOTE
Decompensated HFrEF  NHYA III, ACC D  BNP 3700, LA 1.4    Continue  to 5 mcg/mg/kg and Epi 0.02   Lasix gtt @ 30 mg/hr and give diuril 500mg once today   Continue Bidil  Holding Valsartan and Spironolactone given AUDELIA  Daily weights, strict I/Os

## 2024-05-31 NOTE — PLAN OF CARE
Problem: Adult Inpatient Plan of Care  Goal: Plan of Care Review  Outcome: Progressing  Goal: Patient-Specific Goal (Individualized)  Outcome: Progressing  Goal: Absence of Hospital-Acquired Illness or Injury  Outcome: Progressing  Goal: Optimal Comfort and Wellbeing  Outcome: Progressing  Goal: Readiness for Transition of Care  Outcome: Progressing     Problem: Acute Kidney Injury/Impairment  Goal: Fluid and Electrolyte Balance  Outcome: Progressing  Goal: Improved Oral Intake  Outcome: Progressing  Goal: Effective Renal Function  Outcome: Progressing     Problem: Infection  Goal: Absence of Infection Signs and Symptoms  Outcome: Progressing     Problem: Heart Failure  Goal: Optimal Coping  Outcome: Progressing  Goal: Optimal Cardiac Output  Outcome: Progressing  Goal: Stable Heart Rate and Rhythm  Outcome: Progressing  Goal: Optimal Functional Ability  Outcome: Progressing  Goal: Fluid and Electrolyte Balance  Outcome: Progressing  Goal: Improved Oral Intake  Outcome: Progressing  Goal: Effective Oxygenation and Ventilation  Outcome: Progressing  Goal: Effective Breathing Pattern During Sleep  Outcome: Progressing

## 2024-05-31 NOTE — PROGRESS NOTES
EDUCATION NOTE:    Ayad Edmond was seen today for pre-heart transplant education.  Patient signed wellness contract and informed consent to undergo heart transplant evaluation work-up.  Thorough pre-transplant education conducted.      Information presented included:  Evaluation process  Members of the transplant team  Selection committee members and role of the committee  Listing process for transplant  Different listing designations, including status 7  1-year graft survival statistics  LVAD as bridge to transplant or DT  Need to reach patient within 15 minutes of donor offer  PHS Donors with Risk Factors  Blood transfusions  Process for matching donor with recipient  Need for weight loss and how it relates to the wait time  Post-transplant immunosuppression for life with need to be able to afford post-transplant medications  Need for a caregiver to be with them at all times beginning with discharge from ICU, through at least the first 6 weeks post-transplant  Need to find local housing for the first 6 weeks post-transplant  How to reach team members at any time  OS website with written instructions regarding how to look up information specific to [facility] transplant program  Use of Hepatitis C organs    Patient was uaccompanied.  Patient asked pertinent questions, which were answered to their satisfaction.  Patient was also given a copy of the wellness contract and informed consent to undergo evaluation work-up.

## 2024-05-31 NOTE — ASSESSMENT & PLAN NOTE
Cr 1.2->2.5 on admission; likely cardiorenal  Holding Valsartan and Spironolactone for now  Strict I/Os, monitor renal function  Improving with diuresing

## 2024-06-01 LAB
ALBUMIN SERPL BCP-MCNC: 3.3 G/DL (ref 3.5–5.2)
ALLENS TEST: ABNORMAL
ALLENS TEST: ABNORMAL
ALP SERPL-CCNC: 146 U/L (ref 55–135)
ALT SERPL W/O P-5'-P-CCNC: 92 U/L (ref 10–44)
ANION GAP SERPL CALC-SCNC: 14 MMOL/L (ref 8–16)
ANION GAP SERPL CALC-SCNC: 15 MMOL/L (ref 8–16)
AST SERPL-CCNC: 82 U/L (ref 10–40)
BASOPHILS # BLD AUTO: 0.04 K/UL (ref 0–0.2)
BASOPHILS NFR BLD: 0.4 % (ref 0–1.9)
BILIRUB SERPL-MCNC: 0.7 MG/DL (ref 0.1–1)
BUN SERPL-MCNC: 23 MG/DL (ref 6–20)
BUN SERPL-MCNC: 23 MG/DL (ref 6–20)
CALCIUM SERPL-MCNC: 10.1 MG/DL (ref 8.7–10.5)
CALCIUM SERPL-MCNC: 9.9 MG/DL (ref 8.7–10.5)
CHLORIDE SERPL-SCNC: 89 MMOL/L (ref 95–110)
CHLORIDE SERPL-SCNC: 90 MMOL/L (ref 95–110)
CO2 SERPL-SCNC: 29 MMOL/L (ref 23–29)
CO2 SERPL-SCNC: 32 MMOL/L (ref 23–29)
CREAT SERPL-MCNC: 2 MG/DL (ref 0.5–1.4)
CREAT SERPL-MCNC: 2.1 MG/DL (ref 0.5–1.4)
CRP SERPL-MCNC: 171.8 MG/L (ref 0–8.2)
DELSYS: ABNORMAL
DELSYS: ABNORMAL
DIFFERENTIAL METHOD BLD: ABNORMAL
EOSINOPHIL # BLD AUTO: 0.1 K/UL (ref 0–0.5)
EOSINOPHIL NFR BLD: 0.7 % (ref 0–8)
ERYTHROCYTE [DISTWIDTH] IN BLOOD BY AUTOMATED COUNT: 18.5 % (ref 11.5–14.5)
EST. GFR  (NO RACE VARIABLE): 39.6 ML/MIN/1.73 M^2
EST. GFR  (NO RACE VARIABLE): 41.9 ML/MIN/1.73 M^2
FIO2: 21
GLUCOSE SERPL-MCNC: 149 MG/DL (ref 70–110)
GLUCOSE SERPL-MCNC: 150 MG/DL (ref 70–110)
HCO3 UR-SCNC: 41.1 MMOL/L (ref 24–28)
HCT VFR BLD AUTO: 30.3 % (ref 40–54)
HGB BLD-MCNC: 9.4 G/DL (ref 14–18)
IMM GRANULOCYTES # BLD AUTO: 0.07 K/UL (ref 0–0.04)
IMM GRANULOCYTES NFR BLD AUTO: 0.7 % (ref 0–0.5)
LYMPHOCYTES # BLD AUTO: 2.1 K/UL (ref 1–4.8)
LYMPHOCYTES NFR BLD: 21.2 % (ref 18–48)
MAGNESIUM SERPL-MCNC: 2 MG/DL (ref 1.6–2.6)
MAGNESIUM SERPL-MCNC: 2 MG/DL (ref 1.6–2.6)
MCH RBC QN AUTO: 22.7 PG (ref 27–31)
MCHC RBC AUTO-ENTMCNC: 31 G/DL (ref 32–36)
MCV RBC AUTO: 73 FL (ref 82–98)
MODE: ABNORMAL
MODE: ABNORMAL
MONOCYTES # BLD AUTO: 1.1 K/UL (ref 0.3–1)
MONOCYTES NFR BLD: 10.7 % (ref 4–15)
NEUTROPHILS # BLD AUTO: 6.6 K/UL (ref 1.8–7.7)
NEUTROPHILS NFR BLD: 66.3 % (ref 38–73)
NRBC BLD-RTO: 0 /100 WBC
PCO2 BLDA: 50 MMHG (ref 35–45)
PH SMN: 7.52 [PH] (ref 7.35–7.45)
PHOSPHATE SERPL-MCNC: 4.9 MG/DL (ref 2.7–4.5)
PLATELET # BLD AUTO: 546 K/UL (ref 150–450)
PMV BLD AUTO: 9.3 FL (ref 9.2–12.9)
PO2 BLDA: 29 MMHG (ref 40–60)
PO2 BLDA: 32 MMHG (ref 40–60)
POC BE: 18 MMOL/L
POC SATURATED O2: 60 % (ref 95–100)
POC SATURATED O2: 66 % (ref 95–100)
POC SVO2: 60 %
POC TCO2: 43 MMOL/L (ref 24–29)
POTASSIUM SERPL-SCNC: 3.1 MMOL/L (ref 3.5–5.1)
POTASSIUM SERPL-SCNC: 3.4 MMOL/L (ref 3.5–5.1)
PROT SERPL-MCNC: 8 G/DL (ref 6–8.4)
RBC # BLD AUTO: 4.15 M/UL (ref 4.6–6.2)
SAMPLE: ABNORMAL
SAMPLE: ABNORMAL
SITE: ABNORMAL
SITE: ABNORMAL
SODIUM SERPL-SCNC: 134 MMOL/L (ref 136–145)
SODIUM SERPL-SCNC: 135 MMOL/L (ref 136–145)
WBC # BLD AUTO: 9.88 K/UL (ref 3.9–12.7)

## 2024-06-01 PROCEDURE — 85520 HEPARIN ASSAY: CPT | Mod: NTX | Performed by: PHYSICIAN ASSISTANT

## 2024-06-01 PROCEDURE — 94799 UNLISTED PULMONARY SVC/PX: CPT | Mod: NTX

## 2024-06-01 PROCEDURE — 85397 CLOTTING FUNCT ACTIVITY: CPT | Mod: NTX | Performed by: PHYSICIAN ASSISTANT

## 2024-06-01 PROCEDURE — 85302 CLOT INHIBIT PROT C ANTIGEN: CPT | Mod: NTX | Performed by: PHYSICIAN ASSISTANT

## 2024-06-01 PROCEDURE — 84100 ASSAY OF PHOSPHORUS: CPT | Mod: NTX | Performed by: INTERNAL MEDICINE

## 2024-06-01 PROCEDURE — 63600175 PHARM REV CODE 636 W HCPCS: Mod: NTX | Performed by: STUDENT IN AN ORGANIZED HEALTH CARE EDUCATION/TRAINING PROGRAM

## 2024-06-01 PROCEDURE — 83735 ASSAY OF MAGNESIUM: CPT | Mod: 91,NTX | Performed by: STUDENT IN AN ORGANIZED HEALTH CARE EDUCATION/TRAINING PROGRAM

## 2024-06-01 PROCEDURE — 83735 ASSAY OF MAGNESIUM: CPT | Mod: NTX | Performed by: INTERNAL MEDICINE

## 2024-06-01 PROCEDURE — 85613 RUSSELL VIPER VENOM DILUTED: CPT | Mod: NTX | Performed by: PHYSICIAN ASSISTANT

## 2024-06-01 PROCEDURE — 97165 OT EVAL LOW COMPLEX 30 MIN: CPT | Mod: NTX

## 2024-06-01 PROCEDURE — 97535 SELF CARE MNGMENT TRAINING: CPT | Mod: NTX

## 2024-06-01 PROCEDURE — 85306 CLOT INHIBIT PROT S FREE: CPT | Mod: NTX | Performed by: PHYSICIAN ASSISTANT

## 2024-06-01 PROCEDURE — 97530 THERAPEUTIC ACTIVITIES: CPT | Mod: NTX

## 2024-06-01 PROCEDURE — 25000003 PHARM REV CODE 250: Mod: NTX | Performed by: STUDENT IN AN ORGANIZED HEALTH CARE EDUCATION/TRAINING PROGRAM

## 2024-06-01 PROCEDURE — 86140 C-REACTIVE PROTEIN: CPT | Mod: NTX | Performed by: PHYSICIAN ASSISTANT

## 2024-06-01 PROCEDURE — 80053 COMPREHEN METABOLIC PANEL: CPT | Mod: NTX | Performed by: INTERNAL MEDICINE

## 2024-06-01 PROCEDURE — 99900035 HC TECH TIME PER 15 MIN (STAT): Mod: NTX

## 2024-06-01 PROCEDURE — 85025 COMPLETE CBC W/AUTO DIFF WBC: CPT | Mod: NTX | Performed by: INTERNAL MEDICINE

## 2024-06-01 PROCEDURE — 85613 RUSSELL VIPER VENOM DILUTED: CPT | Mod: 59,NTX | Performed by: PHYSICIAN ASSISTANT

## 2024-06-01 PROCEDURE — 80048 BASIC METABOLIC PNL TOTAL CA: CPT | Mod: NTX,XB | Performed by: STUDENT IN AN ORGANIZED HEALTH CARE EDUCATION/TRAINING PROGRAM

## 2024-06-01 PROCEDURE — 36415 COLL VENOUS BLD VENIPUNCTURE: CPT | Mod: NTX | Performed by: PHYSICIAN ASSISTANT

## 2024-06-01 PROCEDURE — 20000000 HC ICU ROOM: Mod: NTX

## 2024-06-01 PROCEDURE — 97161 PT EVAL LOW COMPLEX 20 MIN: CPT | Mod: NTX

## 2024-06-01 PROCEDURE — 85730 THROMBOPLASTIN TIME PARTIAL: CPT | Mod: NTX | Performed by: PHYSICIAN ASSISTANT

## 2024-06-01 PROCEDURE — 99291 CRITICAL CARE FIRST HOUR: CPT | Mod: NTX,,, | Performed by: INTERNAL MEDICINE

## 2024-06-01 PROCEDURE — 85305 CLOT INHIBIT PROT S TOTAL: CPT | Mod: NTX | Performed by: PHYSICIAN ASSISTANT

## 2024-06-01 PROCEDURE — 85240 CLOT FACTOR VIII AHG 1 STAGE: CPT | Mod: NTX | Performed by: PHYSICIAN ASSISTANT

## 2024-06-01 PROCEDURE — 94761 N-INVAS EAR/PLS OXIMETRY MLT: CPT | Mod: NTX

## 2024-06-01 RX ORDER — MAGNESIUM SULFATE HEPTAHYDRATE 40 MG/ML
2 INJECTION, SOLUTION INTRAVENOUS ONCE
Status: COMPLETED | OUTPATIENT
Start: 2024-06-01 | End: 2024-06-01

## 2024-06-01 RX ORDER — POTASSIUM CHLORIDE 20 MEQ/1
40 TABLET, EXTENDED RELEASE ORAL ONCE
Status: COMPLETED | OUTPATIENT
Start: 2024-06-01 | End: 2024-06-01

## 2024-06-01 RX ORDER — BACLOFEN 5 MG/1
5 TABLET ORAL ONCE
Status: COMPLETED | OUTPATIENT
Start: 2024-06-01 | End: 2024-06-01

## 2024-06-01 RX ADMIN — PANTOPRAZOLE SODIUM 20 MG: 20 TABLET, DELAYED RELEASE ORAL at 08:06

## 2024-06-01 RX ADMIN — SENNOSIDES AND DOCUSATE SODIUM 1 TABLET: 50; 8.6 TABLET ORAL at 05:06

## 2024-06-01 RX ADMIN — ALPRAZOLAM 0.25 MG: 0.25 TABLET ORAL at 10:06

## 2024-06-01 RX ADMIN — ACETAMINOPHEN 650 MG: 325 TABLET ORAL at 08:06

## 2024-06-01 RX ADMIN — POTASSIUM CHLORIDE 40 MEQ: 1500 TABLET, EXTENDED RELEASE ORAL at 12:06

## 2024-06-01 RX ADMIN — BACLOFEN 5 MG: 5 TABLET ORAL at 05:06

## 2024-06-01 RX ADMIN — CHLOROTHIAZIDE SODIUM 500 MG: 500 INJECTION, POWDER, LYOPHILIZED, FOR SOLUTION INTRAVENOUS at 01:06

## 2024-06-01 RX ADMIN — FUROSEMIDE 30 MG/HR: 10 INJECTION, SOLUTION INTRAMUSCULAR; INTRAVENOUS at 10:06

## 2024-06-01 RX ADMIN — ACETAMINOPHEN 650 MG: 325 TABLET ORAL at 10:06

## 2024-06-01 RX ADMIN — POTASSIUM CHLORIDE 40 MEQ: 1500 TABLET, EXTENDED RELEASE ORAL at 03:06

## 2024-06-01 RX ADMIN — ATORVASTATIN CALCIUM 40 MG: 40 TABLET, FILM COATED ORAL at 08:06

## 2024-06-01 RX ADMIN — HYDRALAZINE HYDROCHLORIDE 50 MG: 50 TABLET ORAL at 01:06

## 2024-06-01 RX ADMIN — MAGNESIUM SULFATE HEPTAHYDRATE 2 G: 40 INJECTION, SOLUTION INTRAVENOUS at 05:06

## 2024-06-01 RX ADMIN — HYDRALAZINE HYDROCHLORIDE 50 MG: 50 TABLET ORAL at 05:06

## 2024-06-01 RX ADMIN — POTASSIUM CHLORIDE 40 MEQ: 1500 TABLET, EXTENDED RELEASE ORAL at 05:06

## 2024-06-01 RX ADMIN — HYDRALAZINE HYDROCHLORIDE 50 MG: 50 TABLET ORAL at 10:06

## 2024-06-01 RX ADMIN — ISOSORBIDE DINITRATE 20 MG: 20 TABLET ORAL at 10:06

## 2024-06-01 RX ADMIN — ISOSORBIDE DINITRATE 20 MG: 20 TABLET ORAL at 08:06

## 2024-06-01 RX ADMIN — ISOSORBIDE DINITRATE 20 MG: 20 TABLET ORAL at 03:06

## 2024-06-01 NOTE — PT/OT/SLP EVAL
Occupational Therapy   Evaluation and Discharge Note    Name: Ayad Edmond  MRN: [id number]  Admitting Diagnosis: Acute on chronic combined systolic and diastolic heart failure  Recent Surgery: * No surgery found *      Recommendations:     Discharge Recommendations: No Therapy Indicated  Discharge Equipment Recommendations: none  Barriers to discharge:  None    Assessment:     Ayad Edmond is a 44 y.o. male with a medical diagnosis of Acute on chronic combined systolic and diastolic heart failure. At this time, patient is functioning at their prior level of function and does not require further acute OT services.     Plan:     During this hospitalization, patient does not require further acute OT services.  Please re-consult if situation changes.    Plan of Care Reviewed with: patient    Subjective     Chief Complaint: heel pain  Patient/Family Comments/goals: patient agreed to therapy    Occupational Profile:  Living Environment: Patient lives with wife in a [facility] with 4 DAYNE and B HRs. Patient has a WIS with a built in bench with grab bar. Patient has a low toilet. Patient works as a paint . Patient's wife is a nurse and is able to assist if needed. Patient was independent with ADLs and functional mobility PTA. Patient owns no other DME. Patient enjoys following sports.     Pain/Comfort:  Pain Rating 1:  (patient did not rate)  Location - Side 1: Bilateral  Location - Orientation 1: generalized  Location 1: heel  Pain Addressed 1: Reposition, Distraction  Pain Rating Post-Intervention 1:  (patient did not rate, but when standing)    Patients cultural, spiritual, Yazidism conflicts given the current situation: no    Objective:     Communicated with: SARAH prior to session.  Patient found HOB elevated with telemetry, peripheral IV, pulse ox (continuous), PICC line (CVP) upon OT entry to room.    General Precautions: Standard, fall  Orthopedic Precautions: N/A  Braces: N/A  Respiratory Status: Room air      Occupational Performance:    Bed Mobility:    Patient completed Scooting/Bridging with independence  Patient completed Supine to Sit with independence with HOB elevated    Functional Mobility/Transfers:  Patient completed Sit <> Stand Transfer with independence  with  no assistive device   Patient transferred bed>toilet with no AD and with independence with no AD functional ambulation technique and also with use of no B UEs. Patient then stood and ambulated to sink for g/h task with independence with no AD with no use of B UEs. After g/h tasks at sink, patient ambulated to bedside chair with no AD and independence.     Activities of Daily Living:  Grooming: independence standing at sink for oral care and washing face  Lower Body Dressing: independence Donning socks long sitting in bed prior to EOB activity. Donning shoes EOB.     Through clinical reasoning and observation of patient functional ambulation/mobility, strength, and ROM, patient can perform other ADLs with independence at this time.     Cognitive/Visual Perceptual:  Cognitive/Psychosocial Skills:     -       Oriented to: Person, Place, Time, and Situation   -       Follows Commands/attention:Follows multistep  commands  -       Communication: clear/fluent  -       Memory: No Deficits noted  -       Safety awareness/insight to disability: intact   -       Mood/Affect/Coping skills/emotional control: Appropriate to situation  Visual/Perceptual:      -Intact      Physical Exam:  Upper Extremity Range of Motion:     -       Right Upper Extremity: WFL  -       Left Upper Extremity: WFL  Upper Extremity Strength:    -       Right Upper Extremity: WFL  -       Left Upper Extremity: WFL   Strength:    -       Right Upper Extremity: WFL  -       Left Upper Extremity: WFL  Fine Motor Coordination:    -       Intact  Gross motor coordination:   WFL    AMPAC 6 Click ADL:  AMPAC Total Score: 24    Treatment & Education:  Role of OT and POC  ADL  retraining  Functional mobility training  Safety  Discharge planning  Importance EOB/OOB activity    Educated patient on patient on sternal precautions for reference pending future medical decisions.    Educated patient on reporting weakness/difficulty with ADLs or mobility to medical staff and to reconsult therapy if needed in future.  Patient is safe to mobilize with staff and family at this time.     Co-treatment performed due to patient's multiple deficits requiring two skilled therapists to appropriately and safely assess patient's strength and endurance while facilitating functional tasks in addition to accommodating for patient's activity tolerance.       Patient left up in chair with all lines intact, call button in reach, and all needs met.     GOALS:   Multidisciplinary Problems       Occupational Therapy Goals       Not on file              Multidisciplinary Problems (Resolved)          Problem: Occupational Therapy    Goal Priority Disciplines Outcome Interventions   Occupational Therapy Goal   (Resolved)     OT, PT/OT Met                        History:     Past Medical History:   Diagnosis Date    Acute heart failure     AUDELIA (acute kidney injury)     Cardiomyopathy     Elevated troponin     HTN (hypertension)     Hypokalemia     Hypomagnesemia     Renal insufficiency          Past Surgical History:   Procedure Laterality Date    broken foot Left     RIGHT HEART CATHETERIZATION Right 5/13/2024    Procedure: INSERTION, CATHETER, RIGHT HEART;  Surgeon: Pradeep Mack MD;  Location: [facility] CATH LAB;  Service: Cardiology;  Laterality: Right;       Time Tracking:     OT Date of Treatment: 06/01/24  OT Start Time: 0946  OT Stop Time: 1011  OT Total Time (min): 25 min    Billable Minutes:Evaluation 10  Self Care/Home Management 15    6/1/2024

## 2024-06-01 NOTE — PLAN OF CARE
Problem: Occupational Therapy  Goal: Occupational Therapy Goal  Outcome: Met  Evaluation/Discharge only. No acute OT needs at this time. No goals established. Re-consult if situation changes.

## 2024-06-01 NOTE — PLAN OF CARE
Problem: Adult Inpatient Plan of Care  Goal: Plan of Care Review  Outcome: Progressing     Problem: Adult Inpatient Plan of Care  Goal: Optimal Comfort and Wellbeing  Outcome: Progressing

## 2024-06-01 NOTE — SUBJECTIVE & OBJECTIVE
Interval History: Patient made 4.4L of urine.    UOP 4.4 neg 2.6 CVP 4 SVO 66% CO 6.4 CI 3.4     Continuous Infusions:   DOBUTamine IV infusion (non-titrating)  5 mcg/kg/min Intravenous Continuous 5.8 mL/hr at 06/01/24 0900 5 mcg/kg/min at 06/01/24 0900    EPINEPHrine  0.02 mcg/kg/min Intravenous Continuous 4.7 mL/hr at 06/01/24 0900 0.02 mcg/kg/min at 06/01/24 0900    furosemide (Lasix) 500 mg in 50 mL infusion (conc: 10 mg/mL)  30 mg/hr Intravenous Continuous 3 mL/hr at 06/01/24 1035 30 mg/hr at 06/01/24 1035     Scheduled Meds:   atorvastatin  40 mg Oral Daily    heparin (porcine)  5,000 Units Subcutaneous Q8H    hydrALAZINE  50 mg Oral Q8H    isosorbide dinitrate  20 mg Oral TID    pantoprazole  20 mg Oral Daily     PRN Meds:  Current Facility-Administered Medications:     acetaminophen, 650 mg, Oral, Q6H PRN    ALPRAZolam, 0.25 mg, Oral, Nightly PRN    ondansetron, 4 mg, Oral, Q6H PRN    ondansetron, 4 mg, Intravenous, Q6H PRN    prochlorperazine, 2.5 mg, Intravenous, Q6H PRN    senna-docusate 8.6-50 mg, 1 tablet, Oral, Daily PRN    sodium chloride 0.9%, 10 mL, Intravenous, PRN    Review of patient's allergies indicates:  No Known Allergies  Objective:     Vital Signs (Most Recent):  Temp: 98.7 °F (37.1 °C) (06/01/24 0700)  Pulse: 107 (06/01/24 1100)  Resp: (!) 22 (06/01/24 1100)  BP: 92/64 (06/01/24 1015)  SpO2: 99 % (06/01/24 1100) Vital Signs (24h Range):  Temp:  [98.4 °F (36.9 °C)-99.6 °F (37.6 °C)] 98.7 °F (37.1 °C)  Pulse:  [] 107  Resp:  [13-39] 22  SpO2:  [91 %-100 %] 99 %  BP: ()/(57-82) 92/64     Patient Vitals for the past 72 hrs (Last 3 readings):   Weight   06/01/24 0400 72.1 kg (158 lb 15.2 oz)   05/31/24 0400 72.5 kg (159 lb 13.3 oz)   05/30/24 1140 77.8 kg (171 lb 8.3 oz)     Body mass index is 23.47 kg/m².      Intake/Output Summary (Last 24 hours) at 6/1/2024 1233  Last data filed at 6/1/2024 1016  Gross per 24 hour   Intake 1710.95 ml   Output 4445 ml   Net -2734.05 ml               Physical Exam  Constitutional:       Appearance: Normal appearance.   Eyes:      Pupils: Pupils are equal, round, and reactive to light.   Neck:      Comments: Elevated JVD to mandible   Cardiovascular:      Rate and Rhythm: Normal rate and regular rhythm.      Pulses: Normal pulses.   Pulmonary:      Effort: Pulmonary effort is normal.      Breath sounds: Normal breath sounds.   Abdominal:      General: Abdomen is flat.   Musculoskeletal:         General: Normal range of motion.      Right lower leg: No edema.      Left lower leg: No edema.   Skin:     General: Skin is warm.   Neurological:      General: No focal deficit present.      Mental Status: He is alert and oriented to person, place, and time.   Psychiatric:         Mood and Affect: Mood normal.         Behavior: Behavior normal.            Significant Labs:  CBC:  Recent Labs   Lab 05/30/24  0616 05/30/24  2246 05/31/24  0317 06/01/24  0344   WBC 7.43  --  7.31 9.88   RBC 3.79*  --  3.88* 4.15*   HGB 8.7*  --  8.8* 9.4*   HCT 26.9* 29* 27.7* 30.3*     --  392 546*   MCV 71*  --  71* 73*   MCH 23.0*  --  22.7* 22.7*   MCHC 32.3  --  31.8* 31.0*     BNP:  Recent Labs   Lab 05/29/24  1529   BNP 3,707*     CMP:  Recent Labs   Lab 05/30/24  0616 05/30/24  1902 05/31/24  0317 05/31/24  1129 05/31/24  2153 06/01/24  0344      < > 121* 124* 178* 150*   CALCIUM 9.1   < > 9.1 9.2 9.5 9.9   ALBUMIN 3.2*   < > 3.2* 3.1*  --  3.3*   PROT 7.0  --  7.4  --   --  8.0   *   < > 136 134* 134* 135*   K 4.1   < > 3.1* 3.4* 3.3* 3.1*   CO2 20*   < > 26 28 29 32*      < > 96 94* 92* 89*   BUN 34*   < > 29* 26* 23* 23*   CREATININE 2.4*   < > 2.0* 2.0* 1.8* 2.0*   ALKPHOS 97  --  106  --   --  146*   ALT 76*  --  86*  --   --  92*   AST 93*  --  84*  --   --  82*   BILITOT 0.7  --  0.6  --   --  0.7    < > = values in this interval not displayed.      Coagulation:   Recent Labs   Lab 05/31/24  1533   INR 1.1   APTT 29.3     LDH:  Recent  Labs   Lab 05/31/24  1710   *     Microbiology:  Microbiology Results (last 7 days)       Procedure Component Value Units Date/Time    Blood culture [[id number]] Collected: 05/30/24 0920    Order Status: Completed Specimen: Blood from Peripheral, Antecubital, Right Updated: 06/01/24 1212     Blood Culture, Routine No Growth to date      No Growth to date      No Growth to date    Blood culture [[id number]] Collected: 05/30/24 0921    Order Status: Completed Specimen: Blood Updated: 06/01/24 1212     Blood Culture, Routine No Growth to date      No Growth to date      No Growth to date            I have reviewed all pertinent labs within the past 24 hours.    Estimated Creatinine Clearance: 48.1 mL/min (A) (based on SCr of 2 mg/dL (H)).    Diagnostic Results:  I have reviewed and interpreted all pertinent imaging results/findings within the past 24 hours.

## 2024-06-01 NOTE — PLAN OF CARE
Problem: Adult Inpatient Plan of Care  Goal: Plan of Care Review  Outcome: Progressing  Goal: Patient-Specific Goal (Individualized)  Outcome: Progressing  Goal: Absence of Hospital-Acquired Illness or Injury  Outcome: Progressing  Goal: Optimal Comfort and Wellbeing  Outcome: Progressing  Goal: Readiness for Transition of Care  Outcome: Progressing     Problem: Acute Kidney Injury/Impairment  Goal: Fluid and Electrolyte Balance  Outcome: Progressing  Goal: Improved Oral Intake  Outcome: Progressing  Goal: Effective Renal Function  Outcome: Progressing     Problem: Infection  Goal: Absence of Infection Signs and Symptoms  Outcome: Progressing     Problem: Heart Failure  Goal: Optimal Coping  Outcome: Progressing  Goal: Optimal Cardiac Output  Outcome: Progressing  Goal: Stable Heart Rate and Rhythm  Outcome: Progressing  Goal: Optimal Functional Ability  Outcome: Progressing  Goal: Fluid and Electrolyte Balance  Outcome: Progressing  Goal: Improved Oral Intake  Outcome: Progressing  Goal: Effective Oxygenation and Ventilation  Outcome: Progressing  Goal: Effective Breathing Pattern During Sleep  Outcome: Progressing     Problem: Cardiac Output Decreased  Goal: Effective Cardiac Output  Outcome: Progressing

## 2024-06-01 NOTE — PLAN OF CARE
"      SICU PLAN OF CARE NOTE    Dx: Acute on chronic combined systolic and diastolic heart failure    Shift Events: diuril given, VSS throughout shift, potassium replaced    Goals of Care: MAP 60-80    Neuro: AAO x4, Follows Commands, and Moves All Extremities    Vital Signs: BP (!) 108/57   Pulse (!) 114   Temp 98.4 °F (36.9 °C) (Oral)   Resp (!) 26   Ht 5' 9" (1.753 m)   Wt 72.5 kg (159 lb 13.3 oz)   SpO2 100%   BMI 23.60 kg/m²     Respiratory: Room Air    Diet: Cardiac Diet    Gtts: Epinephrine, Dobutamine, and Lasix    Urine Output: Voids Spontaneously 2235 cc/shift    Skin: Patient moves independently, no skin breakdown throughout shift. Patient OOBTC during shift.       "

## 2024-06-01 NOTE — PLAN OF CARE
SICU PLAN OF CARE    Dx: Acute on chronic combined systolic and diastolic heart failure    Goals of Care: MAP > 65    Vital Signs (last 12 hours):   Temp:  [98 °F (36.7 °C)-98.7 °F (37.1 °C)]   Pulse:  []   Resp:  [14-41]   BP: ()/(59-78)   SpO2:  [96 %-100 %]      Neuro: AAOx4, Follows commands , and Moves all extremities spontaneously     Cardiac: NSR/sinus tachycardia    Respiratory: Room Air    Gtts: Epinephrine , Lasix, and Dobutamine    Urine Output: Voids Spontaneously   1850 mL/shift    Diet: Regular     Labs/Accuchecks: SvO2 q12, am labs    Skin:  no breakdown on shift.  Patient turned q2h, bony prominences protected, and mattress inflated/working correctly.   Glen Score: 22. If Glen Score is 16 or less, complete 4EYES note each shift.    Shift Events:  OOBTC, Mg replaced, PT/OT, MD notified of workup labs unresulted, .  See flowsheet for further assessment/details.  Family updated on current condition/plan of care, questions answered, and emotional support provided.  MD updated on current condition, vitals, labs, and gtts.

## 2024-06-01 NOTE — PROGRESS NOTES
[facility] - Surgical Intensive Care  Heart Transplant  Progress Note    Patient Name: Ayad Edmond  MRN: [id number]  Admission Date: 5/29/2024  Hospital Length of Stay: 3 days  Attending Physician: Elier Gilmore, *  Primary Care Provider: Zehra Davies MD  Principal Problem:Acute on chronic combined systolic and diastolic heart failure    Subjective:   Interval History: Patient made 4.4L of urine.    UOP 4.4 neg 2.6 CVP 4 SVO 66% CO 6.4 CI 3.4     Continuous Infusions:   DOBUTamine IV infusion (non-titrating)  5 mcg/kg/min Intravenous Continuous 5.8 mL/hr at 06/01/24 0900 5 mcg/kg/min at 06/01/24 0900    EPINEPHrine  0.02 mcg/kg/min Intravenous Continuous 4.7 mL/hr at 06/01/24 0900 0.02 mcg/kg/min at 06/01/24 0900    furosemide (Lasix) 500 mg in 50 mL infusion (conc: 10 mg/mL)  30 mg/hr Intravenous Continuous 3 mL/hr at 06/01/24 1035 30 mg/hr at 06/01/24 1035     Scheduled Meds:   atorvastatin  40 mg Oral Daily    heparin (porcine)  5,000 Units Subcutaneous Q8H    hydrALAZINE  50 mg Oral Q8H    isosorbide dinitrate  20 mg Oral TID    pantoprazole  20 mg Oral Daily     PRN Meds:  Current Facility-Administered Medications:     acetaminophen, 650 mg, Oral, Q6H PRN    ALPRAZolam, 0.25 mg, Oral, Nightly PRN    ondansetron, 4 mg, Oral, Q6H PRN    ondansetron, 4 mg, Intravenous, Q6H PRN    prochlorperazine, 2.5 mg, Intravenous, Q6H PRN    senna-docusate 8.6-50 mg, 1 tablet, Oral, Daily PRN    sodium chloride 0.9%, 10 mL, Intravenous, PRN    Review of patient's allergies indicates:  No Known Allergies  Objective:     Vital Signs (Most Recent):  Temp: 98.7 °F (37.1 °C) (06/01/24 0700)  Pulse: 107 (06/01/24 1100)  Resp: (!) 22 (06/01/24 1100)  BP: 92/64 (06/01/24 1015)  SpO2: 99 % (06/01/24 1100) Vital Signs (24h Range):  Temp:  [98.4 °F (36.9 °C)-99.6 °F (37.6 °C)] 98.7 °F (37.1 °C)  Pulse:  [] 107  Resp:  [13-39] 22  SpO2:  [91 %-100 %] 99 %  BP: ()/(57-82) 92/64     Patient Vitals for the  past 72 hrs (Last 3 readings):   Weight   06/01/24 0400 72.1 kg (158 lb 15.2 oz)   05/31/24 0400 72.5 kg (159 lb 13.3 oz)   05/30/24 1140 77.8 kg (171 lb 8.3 oz)     Body mass index is 23.47 kg/m².      Intake/Output Summary (Last 24 hours) at 6/1/2024 1233  Last data filed at 6/1/2024 1016  Gross per 24 hour   Intake 1710.95 ml   Output 4445 ml   Net -2734.05 ml              Physical Exam  Constitutional:       Appearance: Normal appearance.   Eyes:      Pupils: Pupils are equal, round, and reactive to light.   Neck:      Comments: Elevated JVD to mandible   Cardiovascular:      Rate and Rhythm: Normal rate and regular rhythm.      Pulses: Normal pulses.   Pulmonary:      Effort: Pulmonary effort is normal.      Breath sounds: Normal breath sounds.   Abdominal:      General: Abdomen is flat.   Musculoskeletal:         General: Normal range of motion.      Right lower leg: No edema.      Left lower leg: No edema.   Skin:     General: Skin is warm.   Neurological:      General: No focal deficit present.      Mental Status: He is alert and oriented to person, place, and time.   Psychiatric:         Mood and Affect: Mood normal.         Behavior: Behavior normal.            Significant Labs:  CBC:  Recent Labs   Lab 05/30/24  0616 05/30/24  2246 05/31/24 0317 06/01/24  0344   WBC 7.43  --  7.31 9.88   RBC 3.79*  --  3.88* 4.15*   HGB 8.7*  --  8.8* 9.4*   HCT 26.9* 29* 27.7* 30.3*     --  392 546*   MCV 71*  --  71* 73*   MCH 23.0*  --  22.7* 22.7*   MCHC 32.3  --  31.8* 31.0*     BNP:  Recent Labs   Lab 05/29/24  1529   BNP 3,707*     CMP:  Recent Labs   Lab 05/30/24  0616 05/30/24  1902 05/31/24 0317 05/31/24  1129 05/31/24  2153 06/01/24  0344      < > 121* 124* 178* 150*   CALCIUM 9.1   < > 9.1 9.2 9.5 9.9   ALBUMIN 3.2*   < > 3.2* 3.1*  --  3.3*   PROT 7.0  --  7.4  --   --  8.0   *   < > 136 134* 134* 135*   K 4.1   < > 3.1* 3.4* 3.3* 3.1*   CO2 20*   < > 26 28 29 32*      < > 96 94*  92* 89*   BUN 34*   < > 29* 26* 23* 23*   CREATININE 2.4*   < > 2.0* 2.0* 1.8* 2.0*   ALKPHOS 97  --  106  --   --  146*   ALT 76*  --  86*  --   --  92*   AST 93*  --  84*  --   --  82*   BILITOT 0.7  --  0.6  --   --  0.7    < > = values in this interval not displayed.      Coagulation:   Recent Labs   Lab 05/31/24  1533   INR 1.1   APTT 29.3     LDH:  Recent Labs   Lab 05/31/24  1710   *     Microbiology:  Microbiology Results (last 7 days)       Procedure Component Value Units Date/Time    Blood culture [[id number]] Collected: 05/30/24 0920    Order Status: Completed Specimen: Blood from Peripheral, Antecubital, Right Updated: 06/01/24 1212     Blood Culture, Routine No Growth to date      No Growth to date      No Growth to date    Blood culture [[id number]] Collected: 05/30/24 0921    Order Status: Completed Specimen: Blood Updated: 06/01/24 1212     Blood Culture, Routine No Growth to date      No Growth to date      No Growth to date            I have reviewed all pertinent labs within the past 24 hours.    Estimated Creatinine Clearance: 48.1 mL/min (A) (based on SCr of 2 mg/dL (H)).    Diagnostic Results:  I have reviewed and interpreted all pertinent imaging results/findings within the past 24 hours.  Assessment and Plan:     Mr. Ayad Edmond is a 44 year old male with past medical history of:  NICMP, EF 15-20%  HTN  CKD     Patient is presenting with 2-3 days of fatigue, leg stiffness, weakness, N/V, abd pain, and poor oral intake. Recently admitted 5/10-18 for decompensated heart failure. Echo showing EF drop from 30-35% to 15-20%. RHC showing mildly elevated filling pressures with low CO/CI, so he was started on  @ 2.5. Discharged feeling well. He lives in [place], but started feeling poorly a few days ago. Weight is actually down 3 lbs and no leg swelling. No orthopnea or PND. Feeling really tired and not eating. No falls or syncope. No bleeding. Been urinating fine on Lasix and  Torsemide at home. Went to ED in [place] yesterday, but nothing significant done for him and DC home.    They drove to ED here today as he still isn't feeling well. -120 (sinus tach) and BP 80//70 on RA. Labs concerning for hyponatremia, AUDELIA, mild liver injury, elevated BNP 3700, LA 1.4. Bedside echo with severely reduced EF and plethoric IVC.     Cardiology consulted and heart failure mgmt.      RHC 5/13/24  BP: 110/72 (82)  RA: 6  RV: 40/5, EDP 7  PA: 40/20, mean 28  PCWP: 18     Saturation:  Arterial: 94 %  PA: 48 %     Dennise CI/CO: 1.8/3.5  TD CI/CO: 1.5/2.9     SVR: 2097 dynes/sec/cm -5  PVR: 3.4 Wood Units     Conclusions:  Normal right and mildly elevated left sided filling pressures  Mild pulmonary hypertension  Low CI/CO    TTE 5/13/24    Left Ventricle: The left ventricle is severely dilated. There is eccentric hypertrophy. Severe global hypokinesis present. There is severely reduced systolic function with a visually estimated ejection fraction of 15 - 20%. Biplane (2D) method of discs ejection fraction is 15%. There is diastolic dysfunction. Elevated left ventricular filling pressure.    Right Ventricle: Right ventricle was not well visualized due to poor acoustic window. Mild right ventricular enlargement. There is mild hypertrophy. Systolic function is mildly reduced.    Left Atrium: Left atrium is severely dilated. Atrial septum is bulging to the right.    Mitral Valve: There is moderate regurgitation.    Tricuspid Valve: There is mild to moderate regurgitation.    Pulmonary Artery: The estimated pulmonary artery systolic pressure is 43 mmHg.    IVC/SVC: Intermediate venous pressure at 8 mmHg.    Pericardium: There is a small anterior effusion.    * Acute on chronic combined systolic and diastolic heart failure  Decompensated HFrEF  NHYA III, ACC D  BNP 3700, LA 1.4    Continue  to 5 mcg/mg/kg and Epi 0.02   Lasix gtt @ 30 mg/hr and give diuril 500mg once today   Continue  Bidil  Holding Valsartan and Spironolactone given AUDELIA  Daily weights, strict I/Os      AUDELIA (acute kidney injury)  Cr 1.2->2.5 on admission; likely cardiorenal  Holding Valsartan and Spironolactone for now  Strict I/Os, monitor renal function  Improving with diuresing     Primary hypertension  HF mgmt as mentioned        Anneliese Melendez MD  Heart Transplant  [facility] - [facility]

## 2024-06-02 LAB
ALBUMIN SERPL BCP-MCNC: 3.4 G/DL (ref 3.5–5.2)
ALLENS TEST: ABNORMAL
ALP SERPL-CCNC: 191 U/L (ref 55–135)
ALT SERPL W/O P-5'-P-CCNC: 101 U/L (ref 10–44)
ANION GAP SERPL CALC-SCNC: 10 MMOL/L (ref 8–16)
ANION GAP SERPL CALC-SCNC: 16 MMOL/L (ref 8–16)
AST SERPL-CCNC: 106 U/L (ref 10–40)
BASOPHILS # BLD AUTO: 0.05 K/UL (ref 0–0.2)
BASOPHILS NFR BLD: 0.6 % (ref 0–1.9)
BILIRUB SERPL-MCNC: 0.7 MG/DL (ref 0.1–1)
BUN SERPL-MCNC: 26 MG/DL (ref 6–20)
BUN SERPL-MCNC: 26 MG/DL (ref 6–20)
CALCIUM SERPL-MCNC: 9.7 MG/DL (ref 8.7–10.5)
CALCIUM SERPL-MCNC: 9.9 MG/DL (ref 8.7–10.5)
CHLORIDE SERPL-SCNC: 87 MMOL/L (ref 95–110)
CHLORIDE SERPL-SCNC: 94 MMOL/L (ref 95–110)
CO2 SERPL-SCNC: 30 MMOL/L (ref 23–29)
CO2 SERPL-SCNC: 31 MMOL/L (ref 23–29)
COTININE SERPL-MCNC: <3 NG/ML
CREAT SERPL-MCNC: 1.9 MG/DL (ref 0.5–1.4)
CREAT SERPL-MCNC: 2 MG/DL (ref 0.5–1.4)
DELSYS: ABNORMAL
DIFFERENTIAL METHOD BLD: ABNORMAL
EOSINOPHIL # BLD AUTO: 0.2 K/UL (ref 0–0.5)
EOSINOPHIL NFR BLD: 1.9 % (ref 0–8)
ERYTHROCYTE [DISTWIDTH] IN BLOOD BY AUTOMATED COUNT: 19.2 % (ref 11.5–14.5)
EST. GFR  (NO RACE VARIABLE): 41.9 ML/MIN/1.73 M^2
EST. GFR  (NO RACE VARIABLE): 44.6 ML/MIN/1.73 M^2
GLUCOSE SERPL-MCNC: 101 MG/DL (ref 70–110)
GLUCOSE SERPL-MCNC: 157 MG/DL (ref 70–110)
HCT VFR BLD AUTO: 35.9 % (ref 40–54)
HGB BLD-MCNC: 10.9 G/DL (ref 14–18)
IMM GRANULOCYTES # BLD AUTO: 0.04 K/UL (ref 0–0.04)
IMM GRANULOCYTES NFR BLD AUTO: 0.5 % (ref 0–0.5)
LYMPHOCYTES # BLD AUTO: 1.5 K/UL (ref 1–4.8)
LYMPHOCYTES NFR BLD: 17.4 % (ref 18–48)
MAGNESIUM SERPL-MCNC: 2.6 MG/DL (ref 1.6–2.6)
MAGNESIUM SERPL-MCNC: 2.7 MG/DL (ref 1.6–2.6)
MCH RBC QN AUTO: 22.2 PG (ref 27–31)
MCHC RBC AUTO-ENTMCNC: 30.4 G/DL (ref 32–36)
MCV RBC AUTO: 73 FL (ref 82–98)
MODE: ABNORMAL
MONOCYTES # BLD AUTO: 1 K/UL (ref 0.3–1)
MONOCYTES NFR BLD: 12.2 % (ref 4–15)
NEUTROPHILS # BLD AUTO: 5.7 K/UL (ref 1.8–7.7)
NEUTROPHILS NFR BLD: 67.4 % (ref 38–73)
NICOTINE SERPL-MCNC: <3 NG/ML
NRBC BLD-RTO: 0 /100 WBC
PHOSPHATE SERPL-MCNC: 5.6 MG/DL (ref 2.7–4.5)
PLATELET # BLD AUTO: 596 K/UL (ref 150–450)
PMV BLD AUTO: 8.6 FL (ref 9.2–12.9)
PO2 BLDA: 29 MMHG (ref 40–60)
POC SATURATED O2: 56 % (ref 95–100)
POTASSIUM SERPL-SCNC: 3.1 MMOL/L (ref 3.5–5.1)
POTASSIUM SERPL-SCNC: 3.5 MMOL/L (ref 3.5–5.1)
POTASSIUM SERPL-SCNC: 8.2 MMOL/L (ref 3.5–5.1)
PROT SERPL-MCNC: 8.2 G/DL (ref 6–8.4)
RBC # BLD AUTO: 4.92 M/UL (ref 4.6–6.2)
SAMPLE: ABNORMAL
SITE: ABNORMAL
SODIUM SERPL-SCNC: 134 MMOL/L (ref 136–145)
SODIUM SERPL-SCNC: 134 MMOL/L (ref 136–145)
WBC # BLD AUTO: 8.39 K/UL (ref 3.9–12.7)

## 2024-06-02 PROCEDURE — 63600175 PHARM REV CODE 636 W HCPCS: Mod: NTX | Performed by: STUDENT IN AN ORGANIZED HEALTH CARE EDUCATION/TRAINING PROGRAM

## 2024-06-02 PROCEDURE — 84132 ASSAY OF SERUM POTASSIUM: CPT | Mod: NTX | Performed by: STUDENT IN AN ORGANIZED HEALTH CARE EDUCATION/TRAINING PROGRAM

## 2024-06-02 PROCEDURE — 83735 ASSAY OF MAGNESIUM: CPT | Mod: NTX | Performed by: INTERNAL MEDICINE

## 2024-06-02 PROCEDURE — 25000003 PHARM REV CODE 250: Mod: NTX | Performed by: STUDENT IN AN ORGANIZED HEALTH CARE EDUCATION/TRAINING PROGRAM

## 2024-06-02 PROCEDURE — 20000000 HC ICU ROOM: Mod: NTX

## 2024-06-02 PROCEDURE — 94761 N-INVAS EAR/PLS OXIMETRY MLT: CPT | Mod: NTX

## 2024-06-02 PROCEDURE — 84100 ASSAY OF PHOSPHORUS: CPT | Mod: NTX | Performed by: INTERNAL MEDICINE

## 2024-06-02 PROCEDURE — 82803 BLOOD GASES ANY COMBINATION: CPT | Mod: NTX

## 2024-06-02 PROCEDURE — 80048 BASIC METABOLIC PNL TOTAL CA: CPT | Mod: NTX,XB | Performed by: STUDENT IN AN ORGANIZED HEALTH CARE EDUCATION/TRAINING PROGRAM

## 2024-06-02 PROCEDURE — 99291 CRITICAL CARE FIRST HOUR: CPT | Mod: NTX,,, | Performed by: INTERNAL MEDICINE

## 2024-06-02 PROCEDURE — 99900035 HC TECH TIME PER 15 MIN (STAT): Mod: NTX

## 2024-06-02 PROCEDURE — 80053 COMPREHEN METABOLIC PANEL: CPT | Mod: NTX | Performed by: INTERNAL MEDICINE

## 2024-06-02 PROCEDURE — 83735 ASSAY OF MAGNESIUM: CPT | Mod: 91,NTX | Performed by: STUDENT IN AN ORGANIZED HEALTH CARE EDUCATION/TRAINING PROGRAM

## 2024-06-02 PROCEDURE — 85025 COMPLETE CBC W/AUTO DIFF WBC: CPT | Mod: NTX | Performed by: INTERNAL MEDICINE

## 2024-06-02 RX ORDER — FUROSEMIDE 10 MG/ML
80 INJECTION INTRAMUSCULAR; INTRAVENOUS 3 TIMES DAILY
Status: DISCONTINUED | OUTPATIENT
Start: 2024-06-02 | End: 2024-06-02

## 2024-06-02 RX ORDER — OXYCODONE HYDROCHLORIDE 5 MG/1
5 TABLET ORAL EVERY 6 HOURS PRN
Status: DISCONTINUED | OUTPATIENT
Start: 2024-06-02 | End: 2024-06-07 | Stop reason: HOSPADM

## 2024-06-02 RX ORDER — HYDROCORTISONE 25 MG/G
CREAM TOPICAL 2 TIMES DAILY
Status: DISCONTINUED | OUTPATIENT
Start: 2024-06-02 | End: 2024-06-07 | Stop reason: HOSPADM

## 2024-06-02 RX ORDER — HYDRALAZINE HYDROCHLORIDE 25 MG/1
25 TABLET, FILM COATED ORAL EVERY 8 HOURS
Status: DISCONTINUED | OUTPATIENT
Start: 2024-06-02 | End: 2024-06-07 | Stop reason: HOSPADM

## 2024-06-02 RX ORDER — POTASSIUM CHLORIDE 29.8 MG/ML
40 INJECTION INTRAVENOUS ONCE
Status: COMPLETED | OUTPATIENT
Start: 2024-06-02 | End: 2024-06-02

## 2024-06-02 RX ORDER — POTASSIUM CHLORIDE 20 MEQ/1
40 TABLET, EXTENDED RELEASE ORAL ONCE
Status: COMPLETED | OUTPATIENT
Start: 2024-06-02 | End: 2024-06-02

## 2024-06-02 RX ORDER — POTASSIUM CHLORIDE 20 MEQ/1
40 TABLET, EXTENDED RELEASE ORAL ONCE AS NEEDED
Status: DISCONTINUED | OUTPATIENT
Start: 2024-06-02 | End: 2024-06-07 | Stop reason: HOSPADM

## 2024-06-02 RX ORDER — POTASSIUM CHLORIDE 20 MEQ/1
40 TABLET, EXTENDED RELEASE ORAL 2 TIMES DAILY
Status: DISCONTINUED | OUTPATIENT
Start: 2024-06-02 | End: 2024-06-02

## 2024-06-02 RX ORDER — ISOSORBIDE DINITRATE 10 MG/1
10 TABLET ORAL 3 TIMES DAILY
Status: DISCONTINUED | OUTPATIENT
Start: 2024-06-02 | End: 2024-06-07 | Stop reason: HOSPADM

## 2024-06-02 RX ORDER — POTASSIUM CHLORIDE 20 MEQ/1
40 TABLET, EXTENDED RELEASE ORAL 2 TIMES DAILY
Status: DISCONTINUED | OUTPATIENT
Start: 2024-06-02 | End: 2024-06-05

## 2024-06-02 RX ADMIN — POTASSIUM CHLORIDE 40 MEQ: 29.8 INJECTION, SOLUTION INTRAVENOUS at 06:06

## 2024-06-02 RX ADMIN — ACETAMINOPHEN 650 MG: 325 TABLET ORAL at 09:06

## 2024-06-02 RX ADMIN — PANTOPRAZOLE SODIUM 20 MG: 20 TABLET, DELAYED RELEASE ORAL at 08:06

## 2024-06-02 RX ADMIN — FUROSEMIDE 30 MG/HR: 10 INJECTION, SOLUTION INTRAMUSCULAR; INTRAVENOUS at 12:06

## 2024-06-02 RX ADMIN — FUROSEMIDE 80 MG: 10 INJECTION, SOLUTION INTRAVENOUS at 03:06

## 2024-06-02 RX ADMIN — ALPRAZOLAM 0.25 MG: 0.25 TABLET ORAL at 09:06

## 2024-06-02 RX ADMIN — POTASSIUM CHLORIDE 40 MEQ: 1500 TABLET, EXTENDED RELEASE ORAL at 06:06

## 2024-06-02 RX ADMIN — ISOSORBIDE DINITRATE 10 MG: 10 TABLET ORAL at 09:06

## 2024-06-02 RX ADMIN — EPINEPHRINE 0.02 MCG/KG/MIN: 1 INJECTION INTRAMUSCULAR; INTRAVENOUS; SUBCUTANEOUS at 07:06

## 2024-06-02 RX ADMIN — ISOSORBIDE DINITRATE 20 MG: 20 TABLET ORAL at 08:06

## 2024-06-02 RX ADMIN — DOBUTAMINE HYDROCHLORIDE 5 MCG/KG/MIN: 400 INJECTION INTRAVENOUS at 01:06

## 2024-06-02 RX ADMIN — ATORVASTATIN CALCIUM 40 MG: 40 TABLET, FILM COATED ORAL at 08:06

## 2024-06-02 RX ADMIN — FUROSEMIDE 80 MG: 10 INJECTION, SOLUTION INTRAVENOUS at 09:06

## 2024-06-02 RX ADMIN — POTASSIUM CHLORIDE 40 MEQ: 1500 TABLET, EXTENDED RELEASE ORAL at 04:06

## 2024-06-02 RX ADMIN — HYDRALAZINE HYDROCHLORIDE 50 MG: 50 TABLET ORAL at 06:06

## 2024-06-02 RX ADMIN — HYDRALAZINE HYDROCHLORIDE 25 MG: 25 TABLET ORAL at 09:06

## 2024-06-02 RX ADMIN — HYDROCORTISONE 1 APPLICATION: 25 CREAM TOPICAL at 09:06

## 2024-06-02 RX ADMIN — OXYCODONE 5 MG: 5 TABLET ORAL at 09:06

## 2024-06-02 NOTE — PLAN OF CARE
Problem: Heart Failure  Goal: Improved Oral Intake  Outcome: Progressing     Problem: Adult Inpatient Plan of Care  Goal: Optimal Comfort and Wellbeing  Outcome: Progressing

## 2024-06-02 NOTE — PROGRESS NOTES
[facility] - [facility] Intensive Care  Heart Transplant  Progress Note    Patient Name: Ayad Edmond  MRN: [id number]  Admission Date: 5/29/2024  Hospital Length of Stay: 4 days  Attending Physician: Elier Gilmore, *  Primary Care Provider: Zehra Davies MD  Principal Problem:Acute on chronic combined systolic and diastolic heart failure    Subjective:   Interval History: Patient made 4.4L of urine.    UOP 3L neg 1.8L JVP 6-7cm above angle of Butch with neg HJR; SVO 58% CO 4.3L/min CI 2.3 SVR 1395    Continuous Infusions:   DOBUTamine IV infusion (non-titrating)  5 mcg/kg/min Intravenous Continuous 5.8 mL/hr at 06/02/24 1200 5 mcg/kg/min at 06/02/24 1200     Scheduled Meds:   atorvastatin  40 mg Oral Daily    furosemide (LASIX) injection  80 mg Intravenous TID    heparin (porcine)  5,000 Units Subcutaneous Q8H    hydrALAZINE  50 mg Oral Q8H    isosorbide dinitrate  20 mg Oral TID    pantoprazole  20 mg Oral Daily    potassium chloride  40 mEq Oral BID     PRN Meds:  Current Facility-Administered Medications:     acetaminophen, 650 mg, Oral, Q6H PRN    ALPRAZolam, 0.25 mg, Oral, Nightly PRN    ondansetron, 4 mg, Oral, Q6H PRN    ondansetron, 4 mg, Intravenous, Q6H PRN    prochlorperazine, 2.5 mg, Intravenous, Q6H PRN    senna-docusate 8.6-50 mg, 1 tablet, Oral, Daily PRN    sodium chloride 0.9%, 10 mL, Intravenous, PRN    Review of patient's allergies indicates:  No Known Allergies  Objective:     Vital Signs (Most Recent):  Temp: 98.5 °F (36.9 °C) (06/02/24 1100)  Pulse: 100 (06/02/24 1230)  Resp: 18 (06/02/24 1230)  BP: (!) 100/57 (06/02/24 1230)  SpO2: 100 % (06/02/24 1230) Vital Signs (24h Range):  Temp:  [98.3 °F (36.8 °C)-98.7 °F (37.1 °C)] 98.5 °F (36.9 °C)  Pulse:  [] 100  Resp:  [12-41] 18  SpO2:  [94 %-100 %] 100 %  BP: ()/(57-81) 100/57     Patient Vitals for the past 72 hrs (Last 3 readings):   Weight   06/01/24 0400 72.1 kg (158 lb 15.2 oz)   05/31/24 0400 72.5 kg (159 lb 13.3 oz)      Body mass index is 23.47 kg/m².      Intake/Output Summary (Last 24 hours) at 6/2/2024 1237  Last data filed at 6/2/2024 1200  Gross per 24 hour   Intake 1114.97 ml   Output 2825 ml   Net -1710.03 ml              Physical Exam  Constitutional:       Appearance: Normal appearance.   Eyes:      Pupils: Pupils are equal, round, and reactive to light.   Neck:      Comments:    Cardiovascular:      Rate and Rhythm: Normal rate and regular rhythm.      Pulses: Normal pulses.   Pulmonary:      Effort: Pulmonary effort is normal.      Breath sounds: Normal breath sounds.   Abdominal:      General: Abdomen is flat.   Musculoskeletal:         General: Normal range of motion.      Right lower leg: No edema.      Left lower leg: No edema.   Skin:     General: Skin is warm.   Neurological:      General: No focal deficit present.      Mental Status: He is alert and oriented to person, place, and time.   Psychiatric:         Mood and Affect: Mood normal.         Behavior: Behavior normal.            Significant Labs:  CBC:  Recent Labs   Lab 05/31/24 0317 06/01/24  0344 06/02/24  0508   WBC 7.31 9.88 8.39   RBC 3.88* 4.15* 4.92   HGB 8.8* 9.4* 10.9*   HCT 27.7* 30.3* 35.9*    546* 596*   MCV 71* 73* 73*   MCH 22.7* 22.7* 22.2*   MCHC 31.8* 31.0* 30.4*     BNP:  Recent Labs   Lab 05/29/24  1529   BNP 3,707*     CMP:  Recent Labs   Lab 05/31/24  0317 05/31/24  1129 05/31/24  2153 06/01/24  0344 06/01/24  1251 06/02/24  0508   * 124*   < > 150* 149* 157*   CALCIUM 9.1 9.2   < > 9.9 10.1 9.9   ALBUMIN 3.2* 3.1*  --  3.3*  --  3.4*   PROT 7.4  --   --  8.0  --  8.2    134*   < > 135* 134* 134*   K 3.1* 3.4*   < > 3.1* 3.4* 3.1*   CO2 26 28   < > 32* 29 31*   CL 96 94*   < > 89* 90* 87*   BUN 29* 26*   < > 23* 23* 26*   CREATININE 2.0* 2.0*   < > 2.0* 2.1* 2.0*   ALKPHOS 106  --   --  146*  --  191*   ALT 86*  --   --  92*  --  101*   AST 84*  --   --  82*  --  106*   BILITOT 0.6  --   --  0.7  --  0.7    < > =  values in this interval not displayed.      Coagulation:   Recent Labs   Lab 05/31/24  1533   INR 1.1   APTT 29.3     LDH:  Recent Labs   Lab 05/31/24  1710   *     Microbiology:  Microbiology Results (last 7 days)       Procedure Component Value Units Date/Time    Blood culture [[id number]] Collected: 05/30/24 0920    Order Status: Completed Specimen: Blood from Peripheral, Antecubital, Right Updated: 06/02/24 1212     Blood Culture, Routine No Growth to date      No Growth to date      No Growth to date      No Growth to date    Blood culture [[id number]] Collected: 05/30/24 0921    Order Status: Completed Specimen: Blood Updated: 06/02/24 1212     Blood Culture, Routine No Growth to date      No Growth to date      No Growth to date      No Growth to date            I have reviewed all pertinent labs within the past 24 hours.    Estimated Creatinine Clearance: 48.1 mL/min (A) (based on SCr of 2 mg/dL (H)).    Diagnostic Results:  I have reviewed and interpreted all pertinent imaging results/findings within the past 24 hours.  Assessment and Plan:     Mr. Ayad Edmond is a 44 year old male with past medical history of:  NICMP, EF 15-20%  HTN  CKD     Patient is presenting with 2-3 days of fatigue, leg stiffness, weakness, N/V, abd pain, and poor oral intake. Recently admitted 5/10-18 for decompensated heart failure. Echo showing EF drop from 30-35% to 15-20%. RHC showing mildly elevated filling pressures with low CO/CI, so he was started on  @ 2.5. Discharged feeling well. He lives in [place], but started feeling poorly a few days ago. Weight is actually down 3 lbs and no leg swelling. No orthopnea or PND. Feeling really tired and not eating. No falls or syncope. No bleeding. Been urinating fine on Lasix and Torsemide at home. Went to ED in [place] yesterday, but nothing significant done for him and DC home.    They drove to ED here today as he still isn't feeling well. -120 (sinus  tach) and BP 80//70 on RA. Labs concerning for hyponatremia, AUDELIA, mild liver injury, elevated BNP 3700, LA 1.4. Bedside echo with severely reduced EF and plethoric IVC.     Cardiology consulted and heart failure mgmt.      [facility] 5/13/24  BP: 110/72 (82)  RA: 6  RV: 40/5, EDP 7  PA: 40/20, mean 28  PCWP: 18     Saturation:  Arterial: 94 %  PA: 48 %     Dennise CI/CO: 1.8/3.5  TD CI/CO: 1.5/2.9     SVR: 2097 dynes/sec/cm -5  PVR: 3.4 Wood Units     Conclusions:  Normal right and mildly elevated left sided filling pressures  Mild pulmonary hypertension  Low CI/CO    TTE 5/13/24    Left Ventricle: The left ventricle is severely dilated. There is eccentric hypertrophy. Severe global hypokinesis present. There is severely reduced systolic function with a visually estimated ejection fraction of 15 - 20%. Biplane (2D) method of discs ejection fraction is 15%. There is diastolic dysfunction. Elevated left ventricular filling pressure.    Right Ventricle: Right ventricle was not well visualized due to poor acoustic window. Mild right ventricular enlargement. There is mild hypertrophy. Systolic function is mildly reduced.    Left Atrium: Left atrium is severely dilated. Atrial septum is bulging to the right.    Mitral Valve: There is moderate regurgitation.    Tricuspid Valve: There is mild to moderate regurgitation.    Pulmonary Artery: The estimated pulmonary artery systolic pressure is 43 mmHg.    IVC/SVC: Intermediate venous pressure at 8 mmHg.    Pericardium: There is a small anterior effusion.    * Acute on chronic combined systolic and diastolic heart failure  Decompensated HFrEF  NHYA III, ACC D  BNP 3700, LA 1.4    Stop epinephrine  Continue  to 5 mcg/mg/kg   Lasix 80 mg IV TID  Continue Bidil  Holding Valsartan and Spironolactone given AUDELIA  Daily weights, strict I/Os      AUDELIA (acute kidney injury)  Cr 1.2->2.5 on admission; likely cardiorenal  Holding Valsartan and Spironolactone for now  Strict I/Os, monitor  renal function  Improving with diuresing     Primary hypertension  HF mgmt as mentioned        Anneliese Melendez MD  Heart Transplant  [facility] - [facility]

## 2024-06-02 NOTE — PLAN OF CARE
Heart Transplant Care Update Note:    Hemodynamics:  CVP: 1  SVO2: 60  CO: 5.12  CI: 2.74  SVR: 1,296    I/O's (12h-shift):  - Total: 1.8L  - Net: -1.14L    Plan:  - No changes made overnight      Discussed with HTS Attending      Radha Ledbetter MD  Cardiovascular Disease PGY IV  [facility]

## 2024-06-02 NOTE — PLAN OF CARE
SICU PLAN OF CARE    Dx: Acute on chronic combined systolic and diastolic heart failure    Goals of Care: SBP > 100, electrolyte replacements, diurese    Vital Signs (last 12 hours):   Temp:  [98.5 °F (36.9 °C)-98.7 °F (37.1 °C)]   Pulse:  []   Resp:  [12-29]   BP: ()/(57-81)   SpO2:  [95 %-100 %]      Neuro: AAOx4, Follows commands , and Moves all extremities spontaneously     Cardiac: NSR/sinus tachycardia    Respiratory: Room Air    Gtts: Dobutamine    Urine Output: Voids Spontaneously   750 mL/shift    Diet: Regular     Labs/Accuchecks: am labs, potassium rechecks throughout shift    Skin:  no breakdown on shift.  Patient turned q2h, bony prominences protected, and mattress inflated/working correctly.   Glen Score: 22. If Glen Score is 16 or less, complete 4EYES note each shift.    Shift Events:  OOBTC, BM x1, epi and lasix gtt d/c, IVP lasix. Potassium replacements administered.  See flowsheet for further assessment/details. Patient updated on current condition/plan of care, questions answered, and emotional support provided.  MD updated on current condition, vitals, labs, and gtts.

## 2024-06-02 NOTE — PLAN OF CARE
Problem: Physical Therapy  Goal: Physical Therapy Goal  Description: Goals to be met by: 2024     Patient will increase functional independence with mobility by performin. Ambulate in room with independence.    Outcome: Met

## 2024-06-02 NOTE — PT/OT/SLP EVAL
"Physical Therapy Evaluation and Discharge Note    Patient Name:  Ayad Edmond   MRN:  [id number]    Recommendations:     Discharge Recommendations: No Therapy Indicated  Discharge Equipment Recommendations: none   Barriers to discharge: None    Assessment:     Ayad Edmond is a 44 y.o. male admitted with a medical diagnosis of Acute on chronic combined systolic and diastolic heart failure. .  At this time, patient is functioning at their prior level of function and does not require further acute PT services.     Recent Surgery: * No surgery found *      Plan:     During this hospitalization, patient does not require further acute PT services.  Please re-consult if situation changes.      Subjective     Chief Complaint: pain in heels in standing  Patient/Family Comments/goals: "I can do all this" - pt referring to all mobility and assessments  Pain/Comfort:  Pain Rating 1:  (not rated)  Location 1: heel (in WBing)  Pain Addressed 1: Reposition, Distraction  Pain Rating Post-Intervention 1: other (see comments) (no pain at rest)    Patients cultural, spiritual, Rastafarian conflicts given the current situation: no    Living Environment:  Living Environment: Patient lives with wife in a [facility] with 4 DAYNE and B HRs. Patient has a WIS with a built in bench with grab bar. Patient has a low toilet. Patient works as a paint . Patient's wife is a nurse and is able to assist if needed. Patient was independent with ADLs and functional mobility PTA. Patient owns no other DME. Patient enjoys following sports.     Objective:     Communicated with RN prior to session.  Patient found HOB elevated with telemetry, peripheral IV, pulse ox (continuous), PICC line, blood pressure cuff (CVP) upon PT entry to room.    General Precautions: Standard, fall    Orthopedic Precautions:N/A   Braces: N/A  Respiratory Status: Room air    Exams:  Cognitive Exam:  Patient is oriented to Person, Place, Time, and Situation  Fine Motor " Coordination:    -       Intact B finger opposition and B heel/shin  Gross Motor Coordination:  WFL  Postural Exam:  Patient presented with the following abnormalities:    -       No postural abnormalities identified  Sensation:    -       Intact  RLE ROM: WNL  RLE Strength: grossly 5/5  LLE ROM: WNL  LLE Strength: grossly 5/5    Functional Mobility:  Bed Mobility:     Scooting: independence  Supine to Sit: independence  Transfers:     Sit to Stand form low toilet:  independence with no AD, no Ues utilized  Gait: ~30 ft in room, independent, no AD; no LOBs or unsteadiness present  Balance: independent for all functional mobility this date    AM-PAC 6 CLICK MOBILITY  Total Score:24       Treatment and Education:  Patient educated on role of therapy, goals of session, and benefits of mobilizing.   Discussed PT plan of care during hospitalization.   Patient educated on calling for assistance.   Patient educated on how their diagnosis impacts their mobility within PT scope of practice.   All questions answered within PT scope of practice.    AM-PAC 6 CLICK MOBILITY  Total Score:24     Patient left up in chair with all lines intact, call button in reach, and RN notified.    GOALS:   Multidisciplinary Problems       Physical Therapy Goals       Not on file              Multidisciplinary Problems (Resolved)          Problem: Physical Therapy    Goal Priority Disciplines Outcome Goal Variances Interventions   Physical Therapy Goal   (Resolved)     PT, PT/OT Met     Description: Goals to be met by: 2024     Patient will increase functional independence with mobility by performin. Ambulate in room with independence.                         History:     Past Medical History:   Diagnosis Date    Acute heart failure     AUDELIA (acute kidney injury)     Cardiomyopathy     Elevated troponin     HTN (hypertension)     Hypokalemia     Hypomagnesemia     Renal insufficiency        Past Surgical History:   Procedure Laterality  Date    broken foot Left     RIGHT HEART CATHETERIZATION Right 5/13/2024    Procedure: INSERTION, CATHETER, RIGHT HEART;  Surgeon: Pradeep Mack MD;  Location: [facility] CATH LAB;  Service: Cardiology;  Laterality: Right;       Time Tracking:     PT Received On: 06/01/24  PT Start Time: 0946     PT Stop Time: 1008  PT Total Time (min): 22 min     Billable Minutes: Evaluation 11 and Therapeutic Activity 11      06/01/2024

## 2024-06-02 NOTE — ASSESSMENT & PLAN NOTE
Decompensated HFrEF  NHYA III, ACC D  BNP 3700, LA 1.4    Stop epinephrine  Continue  to 5 mcg/mg/kg   Lasix 80 mg IV TID  Continue Bidil  Holding Valsartan and Spironolactone given AUDELIA  Daily weights, strict I/Os

## 2024-06-02 NOTE — SUBJECTIVE & OBJECTIVE
Interval History:     UOP 3L neg 1.8L JVP 6-7cm above angle of Butch with neg HJR; SVO 58% CO 4.3L/min CI 2.3 SVR 1395    Continuous Infusions:   DOBUTamine IV infusion (non-titrating)  5 mcg/kg/min Intravenous Continuous 5.8 mL/hr at 06/02/24 1200 5 mcg/kg/min at 06/02/24 1200     Scheduled Meds:   atorvastatin  40 mg Oral Daily    furosemide (LASIX) injection  80 mg Intravenous TID    heparin (porcine)  5,000 Units Subcutaneous Q8H    hydrALAZINE  50 mg Oral Q8H    isosorbide dinitrate  20 mg Oral TID    pantoprazole  20 mg Oral Daily    potassium chloride  40 mEq Oral BID     PRN Meds:  Current Facility-Administered Medications:     acetaminophen, 650 mg, Oral, Q6H PRN    ALPRAZolam, 0.25 mg, Oral, Nightly PRN    ondansetron, 4 mg, Oral, Q6H PRN    ondansetron, 4 mg, Intravenous, Q6H PRN    prochlorperazine, 2.5 mg, Intravenous, Q6H PRN    senna-docusate 8.6-50 mg, 1 tablet, Oral, Daily PRN    sodium chloride 0.9%, 10 mL, Intravenous, PRN    Review of patient's allergies indicates:  No Known Allergies  Objective:     Vital Signs (Most Recent):  Temp: 98.5 °F (36.9 °C) (06/02/24 1100)  Pulse: 100 (06/02/24 1230)  Resp: 18 (06/02/24 1230)  BP: (!) 100/57 (06/02/24 1230)  SpO2: 100 % (06/02/24 1230) Vital Signs (24h Range):  Temp:  [98.3 °F (36.8 °C)-98.7 °F (37.1 °C)] 98.5 °F (36.9 °C)  Pulse:  [] 100  Resp:  [12-41] 18  SpO2:  [94 %-100 %] 100 %  BP: ()/(57-81) 100/57     Patient Vitals for the past 72 hrs (Last 3 readings):   Weight   06/01/24 0400 72.1 kg (158 lb 15.2 oz)   05/31/24 0400 72.5 kg (159 lb 13.3 oz)     Body mass index is 23.47 kg/m².      Intake/Output Summary (Last 24 hours) at 6/2/2024 1237  Last data filed at 6/2/2024 1200  Gross per 24 hour   Intake 1114.97 ml   Output 2825 ml   Net -1710.03 ml              Physical Exam  Constitutional:       Appearance: Normal appearance.   Eyes:      Pupils: Pupils are equal, round, and reactive to light.   Neck:      Comments:    Cardiovascular:       Rate and Rhythm: Normal rate and regular rhythm.      Pulses: Normal pulses.   Pulmonary:      Effort: Pulmonary effort is normal.      Breath sounds: Normal breath sounds.   Abdominal:      General: Abdomen is flat.   Musculoskeletal:         General: Normal range of motion.      Right lower leg: No edema.      Left lower leg: No edema.   Skin:     General: Skin is warm.   Neurological:      General: No focal deficit present.      Mental Status: He is alert and oriented to person, place, and time.   Psychiatric:         Mood and Affect: Mood normal.         Behavior: Behavior normal.            Significant Labs:  CBC:  Recent Labs   Lab 05/31/24 0317 06/01/24  0344 06/02/24  0508   WBC 7.31 9.88 8.39   RBC 3.88* 4.15* 4.92   HGB 8.8* 9.4* 10.9*   HCT 27.7* 30.3* 35.9*    546* 596*   MCV 71* 73* 73*   MCH 22.7* 22.7* 22.2*   MCHC 31.8* 31.0* 30.4*     BNP:  Recent Labs   Lab 05/29/24  1529   BNP 3,707*     CMP:  Recent Labs   Lab 05/31/24  0317 05/31/24  1129 05/31/24  2153 06/01/24  0344 06/01/24  1251 06/02/24  0508   * 124*   < > 150* 149* 157*   CALCIUM 9.1 9.2   < > 9.9 10.1 9.9   ALBUMIN 3.2* 3.1*  --  3.3*  --  3.4*   PROT 7.4  --   --  8.0  --  8.2    134*   < > 135* 134* 134*   K 3.1* 3.4*   < > 3.1* 3.4* 3.1*   CO2 26 28   < > 32* 29 31*   CL 96 94*   < > 89* 90* 87*   BUN 29* 26*   < > 23* 23* 26*   CREATININE 2.0* 2.0*   < > 2.0* 2.1* 2.0*   ALKPHOS 106  --   --  146*  --  191*   ALT 86*  --   --  92*  --  101*   AST 84*  --   --  82*  --  106*   BILITOT 0.6  --   --  0.7  --  0.7    < > = values in this interval not displayed.      Coagulation:   Recent Labs   Lab 05/31/24  1533   INR 1.1   APTT 29.3     LDH:  Recent Labs   Lab 05/31/24  1710   *     Microbiology:  Microbiology Results (last 7 days)       Procedure Component Value Units Date/Time    Blood culture [[id number]] Collected: 05/30/24 0920    Order Status: Completed Specimen: Blood from Peripheral,  Antecubital, Right Updated: 06/02/24 1212     Blood Culture, Routine No Growth to date      No Growth to date      No Growth to date      No Growth to date    Blood culture [[id number]] Collected: 05/30/24 0921    Order Status: Completed Specimen: Blood Updated: 06/02/24 1212     Blood Culture, Routine No Growth to date      No Growth to date      No Growth to date      No Growth to date            I have reviewed all pertinent labs within the past 24 hours.    Estimated Creatinine Clearance: 48.1 mL/min (A) (based on SCr of 2 mg/dL (H)).    Diagnostic Results:  I have reviewed and interpreted all pertinent imaging results/findings within the past 24 hours.

## 2024-06-02 NOTE — CARE UPDATE
SICU PLAN OF CARE    Dx: Acute on chronic combined systolic and diastolic heart failure    Goals of Care: diuresis; stable VS; continue work up for VAD/heart transplant    Vital Signs (last 12 hours):   Temp:  [98.3 °F (36.8 °C)-98.4 °F (36.9 °C)]   Pulse:  []   Resp:  [13-34]   BP: ()/(60-79)   SpO2:  [94 %-100 %]      Neuro: AAOx4, Follows commands , and Moves all extremities spontaneously     Cardiac: NSR    Respiratory: Room Air    Gtts: Epinephrine , Lasix, and Dobutamine    Urine Output: Voids Spontaneously  1200 mL/shift    Drains: n/a    Diet: Regular     Labs/Accuchecks: daily labs    Skin:  Patient turned q2h, bony prominences protected, and mattress inflated/working correctly.   Glen Score: 22. If Glen Score is 16 or less, complete 4EYES note each shift. No skin issues    Shift Events:  See flowsheet for further assessment/details.  Family updated on current condition/plan of care, questions answered, and emotional support provided.  MD updated on current condition, vitals, labs, and gtts.  No significant events; pt rested well throughout the night

## 2024-06-03 PROBLEM — Z01.818 PRE-TRANSPLANT EVALUATION FOR HEART TRANSPLANT: Status: ACTIVE | Noted: 2024-06-03

## 2024-06-03 LAB
ALBUMIN SERPL BCP-MCNC: 3.2 G/DL (ref 3.5–5.2)
ALLENS TEST: ABNORMAL
ALLENS TEST: ABNORMAL
ALP SERPL-CCNC: 189 U/L (ref 55–135)
ALT SERPL W/O P-5'-P-CCNC: 83 U/L (ref 10–44)
AMPHETAMINES SERPL QL: NEGATIVE
ANION GAP SERPL CALC-SCNC: 13 MMOL/L (ref 8–16)
AST SERPL-CCNC: 78 U/L (ref 10–40)
BARBITURATES SERPL QL SCN: NEGATIVE
BASOPHILS # BLD AUTO: 0.05 K/UL (ref 0–0.2)
BASOPHILS NFR BLD: 0.5 % (ref 0–1.9)
BENZODIAZ SERPL QL SCN: NEGATIVE
BILIRUB SERPL-MCNC: 0.6 MG/DL (ref 0.1–1)
BUN SERPL-MCNC: 27 MG/DL (ref 6–20)
BZE SERPL QL: NEGATIVE
CALCIUM SERPL-MCNC: 9.9 MG/DL (ref 8.7–10.5)
CARBOXYTHC SERPL QL SCN: NEGATIVE
CARDIOLIPIN IGG SER IA-ACNC: <9.4 GPL (ref 0–14.99)
CARDIOLIPIN IGM SER IA-ACNC: <9.4 MPL (ref 0–12.49)
CHLORIDE SERPL-SCNC: 94 MMOL/L (ref 95–110)
CO2 SERPL-SCNC: 28 MMOL/L (ref 23–29)
CREAT SERPL-MCNC: 1.8 MG/DL (ref 0.5–1.4)
CRP SERPL-MCNC: 67.2 MG/L (ref 0–8.2)
DELSYS: ABNORMAL
DIFFERENTIAL METHOD BLD: ABNORMAL
EBV VCA IGG SER QL IA: POSITIVE
EOSINOPHIL # BLD AUTO: 0.2 K/UL (ref 0–0.5)
EOSINOPHIL NFR BLD: 1.6 % (ref 0–8)
ERYTHROCYTE [DISTWIDTH] IN BLOOD BY AUTOMATED COUNT: 19.1 % (ref 11.5–14.5)
EST. GFR  (NO RACE VARIABLE): 47.6 ML/MIN/1.73 M^2
ETHANOL SERPL QL SCN: NEGATIVE
FACT VIII ACT/NOR PPP: 472 % (ref 60–170)
GLUCOSE SERPL-MCNC: 101 MG/DL (ref 70–110)
HCO3 UR-SCNC: 33.6 MMOL/L (ref 24–28)
HCO3 UR-SCNC: 36 MMOL/L (ref 24–28)
HCT VFR BLD AUTO: 35.3 % (ref 40–54)
HGB BLD-MCNC: 10.7 G/DL (ref 14–18)
HSV1 IGG SERPL QL IA: POSITIVE
HSV2 IGG SERPL QL IA: POSITIVE
IMM GRANULOCYTES # BLD AUTO: 0.05 K/UL (ref 0–0.04)
IMM GRANULOCYTES NFR BLD AUTO: 0.5 % (ref 0–0.5)
LYMPHOCYTES # BLD AUTO: 2 K/UL (ref 1–4.8)
LYMPHOCYTES NFR BLD: 21.4 % (ref 18–48)
MAGNESIUM SERPL-MCNC: 2.4 MG/DL (ref 1.6–2.6)
MCH RBC QN AUTO: 22.3 PG (ref 27–31)
MCHC RBC AUTO-ENTMCNC: 30.3 G/DL (ref 32–36)
MCV RBC AUTO: 74 FL (ref 82–98)
METHADONE SERPL QL SCN: NEGATIVE
MODE: ABNORMAL
MONOCYTES # BLD AUTO: 1 K/UL (ref 0.3–1)
MONOCYTES NFR BLD: 10.4 % (ref 4–15)
NEUTROPHILS # BLD AUTO: 6.1 K/UL (ref 1.8–7.7)
NEUTROPHILS NFR BLD: 65.6 % (ref 38–73)
NRBC BLD-RTO: 0 /100 WBC
OPIATES SERPL QL SCN: NEGATIVE
PCO2 BLDA: 50.7 MMHG (ref 35–45)
PCO2 BLDA: 51.5 MMHG (ref 35–45)
PCP SERPL QL SCN: NEGATIVE
PH SMN: 7.42 [PH] (ref 7.35–7.45)
PH SMN: 7.46 [PH] (ref 7.35–7.45)
PHOSPHATE SERPL-MCNC: 4.5 MG/DL (ref 2.7–4.5)
PLATELET # BLD AUTO: 643 K/UL (ref 150–450)
PMV BLD AUTO: 8.5 FL (ref 9.2–12.9)
PO2 BLDA: 26 MMHG (ref 40–60)
PO2 BLDA: 32 MMHG (ref 40–60)
POC BE: 12 MMOL/L
POC BE: 9 MMOL/L
POC SATURATED O2: 49 % (ref 95–100)
POC SATURATED O2: 63 % (ref 95–100)
POC TCO2: 35 MMOL/L (ref 24–29)
POC TCO2: 38 MMOL/L (ref 24–29)
POTASSIUM SERPL-SCNC: 3.6 MMOL/L (ref 3.5–5.1)
PREALB SERPL-MCNC: 18 MG/DL (ref 20–43)
PROPOXYPH SERPL QL: NEGATIVE
PROT S AG ACT/NOR PPP IA: 124 % (ref 50–140)
PROT SERPL-MCNC: 7.7 G/DL (ref 6–8.4)
RBC # BLD AUTO: 4.79 M/UL (ref 4.6–6.2)
SAMPLE: ABNORMAL
SAMPLE: ABNORMAL
SITE: ABNORMAL
SITE: ABNORMAL
SODIUM SERPL-SCNC: 135 MMOL/L (ref 136–145)
VARICELLA INTERPRETATION: POSITIVE
VARICELLA ZOSTER IGG: 1328
WBC # BLD AUTO: 9.25 K/UL (ref 3.9–12.7)

## 2024-06-03 PROCEDURE — 80321 ALCOHOLS BIOMARKERS 1OR 2: CPT | Mod: NTX | Performed by: INTERNAL MEDICINE

## 2024-06-03 PROCEDURE — 82800 BLOOD PH: CPT | Mod: NTX

## 2024-06-03 PROCEDURE — 85025 COMPLETE CBC W/AUTO DIFF WBC: CPT | Mod: NTX | Performed by: INTERNAL MEDICINE

## 2024-06-03 PROCEDURE — 25000003 PHARM REV CODE 250: Mod: NTX | Performed by: STUDENT IN AN ORGANIZED HEALTH CARE EDUCATION/TRAINING PROGRAM

## 2024-06-03 PROCEDURE — 99900035 HC TECH TIME PER 15 MIN (STAT): Mod: NTX

## 2024-06-03 PROCEDURE — 90471 IMMUNIZATION ADMIN: CPT | Mod: NTX | Performed by: STUDENT IN AN ORGANIZED HEALTH CARE EDUCATION/TRAINING PROGRAM

## 2024-06-03 PROCEDURE — 90632 HEPA VACCINE ADULT IM: CPT | Mod: NTX | Performed by: STUDENT IN AN ORGANIZED HEALTH CARE EDUCATION/TRAINING PROGRAM

## 2024-06-03 PROCEDURE — 20000000 HC ICU ROOM: Mod: NTX

## 2024-06-03 PROCEDURE — 86140 C-REACTIVE PROTEIN: CPT | Mod: NTX | Performed by: INTERNAL MEDICINE

## 2024-06-03 PROCEDURE — 3E0234Z INTRODUCTION OF SERUM, TOXOID AND VACCINE INTO MUSCLE, PERCUTANEOUS APPROACH: ICD-10-PCS | Performed by: INTERNAL MEDICINE

## 2024-06-03 PROCEDURE — 84100 ASSAY OF PHOSPHORUS: CPT | Mod: NTX | Performed by: INTERNAL MEDICINE

## 2024-06-03 PROCEDURE — 80053 COMPREHEN METABOLIC PANEL: CPT | Mod: NTX | Performed by: INTERNAL MEDICINE

## 2024-06-03 PROCEDURE — 83735 ASSAY OF MAGNESIUM: CPT | Mod: NTX | Performed by: INTERNAL MEDICINE

## 2024-06-03 PROCEDURE — 84134 ASSAY OF PREALBUMIN: CPT | Mod: NTX | Performed by: INTERNAL MEDICINE

## 2024-06-03 PROCEDURE — 99222 1ST HOSP IP/OBS MODERATE 55: CPT | Mod: NTX,,, | Performed by: STUDENT IN AN ORGANIZED HEALTH CARE EDUCATION/TRAINING PROGRAM

## 2024-06-03 PROCEDURE — 94761 N-INVAS EAR/PLS OXIMETRY MLT: CPT | Mod: NTX

## 2024-06-03 PROCEDURE — 82803 BLOOD GASES ANY COMBINATION: CPT | Mod: NTX

## 2024-06-03 PROCEDURE — 63600175 PHARM REV CODE 636 W HCPCS: Mod: NTX | Performed by: STUDENT IN AN ORGANIZED HEALTH CARE EDUCATION/TRAINING PROGRAM

## 2024-06-03 PROCEDURE — 99233 SBSQ HOSP IP/OBS HIGH 50: CPT | Mod: NTX,,, | Performed by: INTERNAL MEDICINE

## 2024-06-03 PROCEDURE — 86480 TB TEST CELL IMMUN MEASURE: CPT | Mod: NTX | Performed by: INTERNAL MEDICINE

## 2024-06-03 RX ORDER — AMOXICILLIN 250 MG
1 CAPSULE ORAL DAILY
Status: DISCONTINUED | OUTPATIENT
Start: 2024-06-03 | End: 2024-06-04

## 2024-06-03 RX ORDER — POLYETHYLENE GLYCOL 3350 17 G/17G
17 POWDER, FOR SOLUTION ORAL DAILY
Status: DISCONTINUED | OUTPATIENT
Start: 2024-06-03 | End: 2024-06-04

## 2024-06-03 RX ADMIN — ISOSORBIDE DINITRATE 10 MG: 10 TABLET ORAL at 09:06

## 2024-06-03 RX ADMIN — POTASSIUM CHLORIDE 40 MEQ: 1500 TABLET, EXTENDED RELEASE ORAL at 06:06

## 2024-06-03 RX ADMIN — POTASSIUM CHLORIDE 40 MEQ: 1500 TABLET, EXTENDED RELEASE ORAL at 09:06

## 2024-06-03 RX ADMIN — HYDRALAZINE HYDROCHLORIDE 25 MG: 25 TABLET ORAL at 09:06

## 2024-06-03 RX ADMIN — ATORVASTATIN CALCIUM 40 MG: 40 TABLET, FILM COATED ORAL at 08:06

## 2024-06-03 RX ADMIN — OXYCODONE 5 MG: 5 TABLET ORAL at 09:06

## 2024-06-03 RX ADMIN — ISOSORBIDE DINITRATE 10 MG: 10 TABLET ORAL at 08:06

## 2024-06-03 RX ADMIN — PANTOPRAZOLE SODIUM 20 MG: 20 TABLET, DELAYED RELEASE ORAL at 08:06

## 2024-06-03 RX ADMIN — HYDROCORTISONE: 25 CREAM TOPICAL at 08:06

## 2024-06-03 RX ADMIN — HEPATITIS A VACCINE 1440 UNITS: 1440 INJECTION, SUSPENSION INTRAMUSCULAR at 03:06

## 2024-06-03 RX ADMIN — ACETAMINOPHEN 650 MG: 325 TABLET ORAL at 02:06

## 2024-06-03 RX ADMIN — ALPRAZOLAM 0.25 MG: 0.25 TABLET ORAL at 09:06

## 2024-06-03 RX ADMIN — OXYCODONE 5 MG: 5 TABLET ORAL at 02:06

## 2024-06-03 RX ADMIN — HYDRALAZINE HYDROCHLORIDE 25 MG: 25 TABLET ORAL at 02:06

## 2024-06-03 RX ADMIN — HYDRALAZINE HYDROCHLORIDE 25 MG: 25 TABLET ORAL at 06:06

## 2024-06-03 RX ADMIN — SENNOSIDES AND DOCUSATE SODIUM 1 TABLET: 50; 8.6 TABLET ORAL at 10:06

## 2024-06-03 RX ADMIN — ISOSORBIDE DINITRATE 10 MG: 10 TABLET ORAL at 03:06

## 2024-06-03 RX ADMIN — POLYETHYLENE GLYCOL 3350 17 G: 17 POWDER, FOR SOLUTION ORAL at 10:06

## 2024-06-03 NOTE — HPI
PRE-TRANSPLANT INFECTIOUS DISEASE CONSULT    Reason for Visit:  Pre-transplant evaluation  Referring Provider: Aaareferral Self     History of Present Illness:    44 y.o. male with a history of  heart failure and AUDELIA presents for pre-heart transplant/LVAD evaluation.    Infectious History:  Recent hospital admissions: Yes  Recent infections: No  Recent or current antibiotic use: No  History of recurrent infections *(sinus / pneumonia / UTI / SBP)*: No  History of diabetic foot wound, bone/joint infection: No  Recent dental infections, issues or procedures: No  History of chicken pox: No  History of shingles: No  History of STI: No  History of COVID infection: No    History of Immunosuppression:  Prior chemotherapy / immunosuppression: No  Prior transplant: No  History of splenectomy: No    Tuberculosis:  Prior screening for latent TB: Yes  Prior diagnosis of latent TB: No  Risk factors for TB *known exposure, incarceration, homelessness*: No    Geographical exposures:  Currently lives in [place] with wife  Lived in the following states: none  Lived in or travelled to [facility] [place]: No  International travel: No  Travel-associated illness: No    Social/Environmental:  Occupation:  Coating inspecter  Pets: Yes - husky   Livestock: No  Fishing / hunting: No  Hobbies: work out  Water: bottled  Consumption of raw/undercooked meat or seafood?  No  Tobacco: No  Alcohol: No  Recreational drug use:  No  Sexual partners: yes, wife      Past Histories:   Past Medical History:   Diagnosis Date    Acute heart failure     AUDELIA (acute kidney injury)     Cardiomyopathy     Elevated troponin     HTN (hypertension)     Hypokalemia     Hypomagnesemia     Renal insufficiency      Past Surgical History:   Procedure Laterality Date    broken foot Left     RIGHT HEART CATHETERIZATION Right 5/13/2024    Procedure: INSERTION, CATHETER, RIGHT HEART;  Surgeon: Pradeep Mack MD;  Location: [facility] CATH LAB;  Service: Cardiology;  Laterality:  "Right;     Family History   Problem Relation Name Age of Onset    Heart failure Mother      No Known Problems Father       Social History     Tobacco Use    Smoking status: Former     Types: Cigarettes     Start date: 2023     Quit date: 1998     Years since quittin.1     Passive exposure: Past    Smokeless tobacco: Never   Substance Use Topics    Alcohol use: Yes     Comment: "very seldom."    Drug use: Never     Review of patient's allergies indicates:  No Known Allergies      Immunization History:  Received all childhood vaccines: Yes  All household members receive annual flu vaccine: Yes  All household members are up to date on COVID vaccine: Yes      Labs:    CBC:   Lab Results   Component Value Date    WBC 9.25 2024    HGB 10.7 (L) 2024    HCT 35.3 (L) 2024    MCV 74 (L) 2024     (H) 2024    GRAN 6.1 2024    GRAN 65.6 2024    LYMPH 2.0 2024    LYMPH 21.4 2024    MONO 1.0 2024    MONO 10.4 2024    EOSINOPHIL 1.6 2024       Syphilis screening: No results found for: "RPR", "PRPQ", "FTAABS"     TB screening: No results found for: "TBGOLDPLUS", "TSPOTSCREN"    HIV screening:   Lab Results   Component Value Date    RIM26XQGD Non-reactive 2024       Strongyloides IgG: No results found for: "STRONGANTIGG"    Hepatitis Serologies:   Lab Results   Component Value Date    HEPAIGG Non-reactive 2024    HEPBCAB Non-reactive 2024    HEPBSAB <3.00 2024    HEPBSAB Non-reactive 2024    HEPCAB Non-reactive 2024        Varicella IgG: No results found for: "VARICELLAINT"        There is no immunization history on file for this patient.    "

## 2024-06-03 NOTE — PROGRESS NOTES
Update:  SW spoke with pt to f/up on caregiver education. Pt reported that his mom will be here tonight and will be available tomorrow.   SW will f/up tomorrow for caregiver assessment and education.

## 2024-06-03 NOTE — SUBJECTIVE & OBJECTIVE
Past Medical History:   Diagnosis Date    Acute heart failure     AUDELIA (acute kidney injury)     Cardiomyopathy     Elevated troponin     HTN (hypertension)     Hypokalemia     Hypomagnesemia     Renal insufficiency        Past Surgical History:   Procedure Laterality Date    broken foot Left     RIGHT HEART CATHETERIZATION Right 5/13/2024    Procedure: INSERTION, CATHETER, RIGHT HEART;  Surgeon: Pradeep Mack MD;  Location: [facility] CATH LAB;  Service: Cardiology;  Laterality: Right;       Review of patient's allergies indicates:  No Known Allergies    Medications:  Medications Prior to Admission   Medication Sig    ALPRAZolam (XANAX) 0.25 MG tablet Take 1 tablet (0.25 mg total) by mouth 2 (two) times daily as needed for Anxiety.    atorvastatin (LIPITOR) 40 MG tablet Take 40 mg by mouth once daily.    dapagliflozin propanediol (FARXIGA) 10 mg tablet Take 1 tablet (10 mg total) by mouth once daily.    dimenhyDRINATE (DRAMAMINE) 25 mg Chew Take 1 tablet by mouth daily as needed (nausea).    DOBUTamine (DOBUTREX) 1,000 mg/250 mL (4,000 mcg/mL) infusion Inject 380 mcg/min into the vein continuous.    furosemide (LASIX) 20 MG tablet Take 20 mg by mouth every morning.    hydrALAZINE (APRESOLINE) 50 MG tablet Take 1 tablet (50 mg total) by mouth every 8 (eight) hours.    isosorbide dinitrate (ISORDIL) 20 MG tablet Take 1 tablet (20 mg total) by mouth 3 (three) times daily.    pantoprazole (PROTONIX) 20 MG tablet Take 1 tablet (20 mg total) by mouth once daily.    potassium chloride SA (K-DUR,KLOR-CON) 20 MEQ tablet Take 2 tablets (40 mEq total) by mouth once daily.    spironolactone (ALDACTONE) 25 MG tablet Take 25 mg by mouth once daily.    torsemide (DEMADEX) 20 MG Tab Take 20 mg by mouth once daily.    valsartan (DIOVAN) 40 MG tablet Take 0.5 tablets (20 mg total) by mouth 2 (two) times daily.     Antibiotics (From admission, onward)      None          Antifungals (From admission, onward)      None          Antivirals  "(From admission, onward)      None               There is no immunization history on file for this patient.    Family History       Problem Relation (Age of Onset)    Heart failure Mother    No Known Problems Father          Social History     Socioeconomic History    Marital status:    Tobacco Use    Smoking status: Former     Types: Cigarettes     Start date: 2023     Quit date: 1998     Years since quittin.1     Passive exposure: Past    Smokeless tobacco: Never   Substance and Sexual Activity    Alcohol use: Yes     Comment: "very seldom."    Drug use: Never     Review of Systems   Constitutional:  Negative for chills and fever.   All other systems reviewed and are negative.    Objective:     Vital Signs (Most Recent):  Temp: 98.2 °F (36.8 °C) (24 1200)  Pulse: 107 (24 1300)  Resp: 20 (24 1300)  BP: 108/64 (24 1300)  SpO2: 98 % (24 1300) Vital Signs (24h Range):  Temp:  [98.2 °F (36.8 °C)-99.1 °F (37.3 °C)] 98.2 °F (36.8 °C)  Pulse:  [] 107  Resp:  [12-94] 20  SpO2:  [92 %-100 %] 98 %  BP: ()/(57-81) 108/64     Weight: 76.3 kg (168 lb 3.4 oz)  Body mass index is 24.84 kg/m².    Estimated Creatinine Clearance: 53.5 mL/min (A) (based on SCr of 1.8 mg/dL (H)).     Physical Exam  HENT:      Mouth/Throat:      Mouth: Mucous membranes are moist.      Comments: Dental caries  Pulmonary:      Effort: Pulmonary effort is normal. No respiratory distress.   Skin:     General: Skin is warm and dry.   Neurological:      Mental Status: He is alert and oriented to person, place, and time.          Significant Labs:   Microbiology Results (last 7 days)       Procedure Component Value Units Date/Time    Blood culture [[id number]] Collected: 24 0920    Order Status: Completed Specimen: Blood from Peripheral, Antecubital, Right Updated: 24 1212     Blood Culture, Routine No Growth to date      No Growth to date      No Growth to date      No Growth to date "      No Growth to date    Blood culture [[id number]] Collected: 05/30/24 0921    Order Status: Completed Specimen: Blood Updated: 06/03/24 1212     Blood Culture, Routine No Growth to date      No Growth to date      No Growth to date      No Growth to date      No Growth to date            Significant Imaging: I have reviewed all pertinent imaging results/findings within the past 24 hours.

## 2024-06-03 NOTE — PLAN OF CARE
Continue current plan of care; team to meet today to discuss progression and goals of plan of care and update the patient

## 2024-06-03 NOTE — PLAN OF CARE
Heart Transplant Care Update Note:    Hemodynamics: ( 5, Lasix 80 TID)  CVP: Inaccurate  SVO2: 56  CO: 4.08  CI: 2.18  SVR: - Unable to obtain    Wt: 72.1, BSA: 1.87, Hb: 10.9      I/O's (12h-shift):  - Total: 750  - Net:-558    Plan:  - Patient appears euvolemic on exam. Will hold evening lasix and re-assess volume status in the AM.   - Repeat BMP and monitor Hypokalemia    Discussed with HTS Attending    Radha Ledbetter MD  Cardiovascular Disease PGY IV  [facility]

## 2024-06-03 NOTE — PROGRESS NOTES
Pt AAAO, no family at bedside. Talked with him about VAD education forms. He verbalized he read it and trying to get his caregiver, his mom, here to learn about the VAD also. She doesn't drive so he needs his sister to bring her.  I asked if I could email her a copy of the education and he said no, she isn't that savvy. I explained if she reads it today with him then I can call her tomorrow to talk about the education and he said he would rather it all be in person. I provided my number for him to call once he knows when she will be coming.  Pt verbalized understanding and agreement.

## 2024-06-03 NOTE — CONSULTS
[facility] - Surgical Intensive Care  Infectious Disease  Consult Note    Patient Name: Ayad Edmond  MRN: [id number]  Admission Date: 5/29/2024  Hospital Length of Stay: 5 days  Attending Physician: Nicole Chua MD  Primary Care Provider: Zehra Davies MD     Isolation Status: No active isolations    Patient information was obtained from patient, past medical records, and ER records.      Inpatient consult to Infectious Diseases  Consult performed by: Angelita Daley MD  Consult ordered by: Leigh Cheng PA        Assessment/Plan:     Cardiac/Vascular  Pre-transplant evaluation for heart transplant  Assessment and Plan    1. Risks of Infection: Available serologies were reviewed. No unusual risks of infection or significant barriers to transplantation were identified from the infectious disease standpoint given the information available at this time.    - Acute infectious issues: None   - Pending serologies: Quantiferon gold / T-spot, Strongyloides IgG, and VZV IgG   - Please call if any pending serologic testing is positive.    2. Immunizations:  Based on the patient's immunization history and serologies, the following immunizations are recommended:  - Hepatitis A    Patient does not have immunity to hepatitis A    Vaccination ordered today: Yes   - Hepatitis B    Patient does not have immunity to hepatitis B    Vaccination ordered today: Yes   - COVID    Current CDC vaccination recommendations were discussed with the patient   - Annual high dose influenza     Vaccination ordered today: No. Reason for not ordering: patient declined  pt preferred for outpatient vaccination   - Prevnar 20    Vaccination ordered today: No. Reason for not ordering: patient declined  pt preferred for outpatient vaccination   - Tdap    Vaccination ordered today: No. Reason for not ordering: patient declined  pt preferred for outpatient vaccination   - Shingrix    Vaccination ordered today: No. Reason for not ordering:  patient declined  pt preferred for outpatient vaccination    Recommended Pre-Transplant Immunization Schedule   Vaccine  0m 1m 2m 6m   Pneumococcal conjugate vaccine (Prevnar 20) X      Tetanus-diphtheria-pertussis (Tdap)* X      Hepatitis A Vaccine (Havrix)** X   X   Hepatitis B Vaccine (Heplisav)** X X     Influenza (annual) X      Zoster Recombinant Vaccine (Shingrix) X  X           *Administer booster every 10 years.       **Administer if no immunity demonstrated on serologies               Patient will receive vaccines at local pharmacy. A written prescription was provided for all vaccine doses.     3. Counseling:   I discussed with the patient the risk for increased susceptibility to infections following transplantation including increased risk for infection right after transplant and if rejection should occur.  The patient has been counseled on the importance of vaccinations to decrease risk of infection and severe illness. Specific guidance has been provided to the patient regarding the patient's occupation, hobbies and activities to avoid future infectious complications.     4. Transplant Candidacy: Based on available information, there are no identified significant barriers to transplantation from an infectious disease standpoint.  Final determination of transplant candidacy will be made once evaluation is complete and reviewed by the Selection Committee.     Recommend that patient address dental issues prior to transplant.  However, if unable to do so, this is not a contraindication to transplant.         Follow up with infectious disease as needed.       The total time for evaluation and management services performed on 06/03/2024 was greater than 60 minutes.          Thank you for your consult. Will sign off, please call with questions, new culture data or clinical changes. Above d/w primary team.       Angelita Daley MD  Infectious Disease  [facility] - Surgical Intensive Care    Subjective:      Principal Problem: Acute on chronic combined systolic and diastolic heart failure    HPI:   PRE-TRANSPLANT INFECTIOUS DISEASE CONSULT    Reason for Visit:  Pre-transplant evaluation  Referring Provider: Aaareferral Self     History of Present Illness:    44 y.o. male with a history of  heart failure and AUDELIA presents for pre-heart transplant/LVAD evaluation.    Infectious History:  Recent hospital admissions: Yes  Recent infections: No  Recent or current antibiotic use: No  History of recurrent infections *(sinus / pneumonia / UTI / SBP)*: No  History of diabetic foot wound, bone/joint infection: No  Recent dental infections, issues or procedures: No  History of chicken pox: No  History of shingles: No  History of STI: No  History of COVID infection: No    History of Immunosuppression:  Prior chemotherapy / immunosuppression: No  Prior transplant: No  History of splenectomy: No    Tuberculosis:  Prior screening for latent TB: Yes, TST for work a couple of years ago that was negative  Prior diagnosis of latent TB: No  Risk factors for TB *known exposure, incarceration, homelessness*: No    Geographical exposures:  Currently lives in [place] with wife  Lived in the following states: none  Lived in or travelled to [facility] [place]: No  International travel: No  Travel-associated illness: No    Social/Environmental:  Occupation:  Coating inspecter  Pets: Yes - [place] 1  Livestock: No  Fishing / hunting: No  Hobbies: work out  Water: bottled  Consumption of raw/undercooked meat or seafood?  No  Tobacco: No  Alcohol: No  Recreational drug use:  No  Sexual partners: yes, wife      Past Histories:   Past Medical History:   Diagnosis Date    Acute heart failure     AUDELIA (acute kidney injury)     Cardiomyopathy     Elevated troponin     HTN (hypertension)     Hypokalemia     Hypomagnesemia     Renal insufficiency      Past Surgical History:   Procedure Laterality Date    broken foot Left     RIGHT HEART CATHETERIZATION  "Right 2024    Procedure: INSERTION, CATHETER, RIGHT HEART;  Surgeon: Pradeep Mack MD;  Location: [facility] CATH LAB;  Service: Cardiology;  Laterality: Right;     Family History   Problem Relation Name Age of Onset    Heart failure Mother      No Known Problems Father       Social History     Tobacco Use    Smoking status: Former     Types: Cigarettes     Start date: 2023     Quit date: 1998     Years since quittin.1     Passive exposure: Past    Smokeless tobacco: Never   Substance Use Topics    Alcohol use: Yes     Comment: "very seldom."    Drug use: Never     Review of patient's allergies indicates:  No Known Allergies      Immunization History:  Received all childhood vaccines: Yes  All household members receive annual flu vaccine: Yes  All household members are up to date on COVID vaccine: Yes      Labs:    CBC:   Lab Results   Component Value Date    WBC 9.25 2024    HGB 10.7 (L) 2024    HCT 35.3 (L) 2024    MCV 74 (L) 2024     (H) 2024    GRAN 6.1 2024    GRAN 65.6 2024    LYMPH 2.0 2024    LYMPH 21.4 2024    MONO 1.0 2024    MONO 10.4 2024    EOSINOPHIL 1.6 2024       Syphilis screening: No results found for: "RPR", "PRPQ", "FTAABS"     TB screening: No results found for: "TBGOLDPLUS", "TSPOTSCREN"    HIV screening:   Lab Results   Component Value Date    [id number] Non-reactive 2024       Strongyloides IgG: No results found for: "STRONGANTIGG"    Hepatitis Serologies:   Lab Results   Component Value Date    HEPAIGG Non-reactive 2024    HEPBCAB Non-reactive 2024    HEPBSAB <3.00 2024    HEPBSAB Non-reactive 2024    HEPCAB Non-reactive 2024        Varicella IgG: No results found for: "VARICELLAINT"        There is no immunization history on file for this patient.      Past Medical History:   Diagnosis Date    Acute heart failure     AUDELIA (acute kidney injury)     Cardiomyopathy     " Elevated troponin     HTN (hypertension)     Hypokalemia     Hypomagnesemia     Renal insufficiency        Past Surgical History:   Procedure Laterality Date    broken foot Left     RIGHT HEART CATHETERIZATION Right 5/13/2024    Procedure: INSERTION, CATHETER, RIGHT HEART;  Surgeon: Pradeep Mack MD;  Location: [facility] CATH LAB;  Service: Cardiology;  Laterality: Right;       Review of patient's allergies indicates:  No Known Allergies    Medications:  Medications Prior to Admission   Medication Sig    ALPRAZolam (XANAX) 0.25 MG tablet Take 1 tablet (0.25 mg total) by mouth 2 (two) times daily as needed for Anxiety.    atorvastatin (LIPITOR) 40 MG tablet Take 40 mg by mouth once daily.    dapagliflozin propanediol (FARXIGA) 10 mg tablet Take 1 tablet (10 mg total) by mouth once daily.    dimenhyDRINATE (DRAMAMINE) 25 mg Chew Take 1 tablet by mouth daily as needed (nausea).    DOBUTamine (DOBUTREX) 1,000 mg/250 mL (4,000 mcg/mL) infusion Inject 380 mcg/min into the vein continuous.    furosemide (LASIX) 20 MG tablet Take 20 mg by mouth every morning.    hydrALAZINE (APRESOLINE) 50 MG tablet Take 1 tablet (50 mg total) by mouth every 8 (eight) hours.    isosorbide dinitrate (ISORDIL) 20 MG tablet Take 1 tablet (20 mg total) by mouth 3 (three) times daily.    pantoprazole (PROTONIX) 20 MG tablet Take 1 tablet (20 mg total) by mouth once daily.    potassium chloride SA (K-DUR,KLOR-CON) 20 MEQ tablet Take 2 tablets (40 mEq total) by mouth once daily.    spironolactone (ALDACTONE) 25 MG tablet Take 25 mg by mouth once daily.    torsemide (DEMADEX) 20 MG Tab Take 20 mg by mouth once daily.    valsartan (DIOVAN) 40 MG tablet Take 0.5 tablets (20 mg total) by mouth 2 (two) times daily.     Antibiotics (From admission, onward)      None          Antifungals (From admission, onward)      None          Antivirals (From admission, onward)      None               There is no immunization history on file for this patient.    Family  "History       Problem Relation (Age of Onset)    Heart failure Mother    No Known Problems Father          Social History     Socioeconomic History    Marital status:    Tobacco Use    Smoking status: Former     Types: Cigarettes     Start date: 2023     Quit date: 1998     Years since quittin.1     Passive exposure: Past    Smokeless tobacco: Never   Substance and Sexual Activity    Alcohol use: Yes     Comment: "very seldom."    Drug use: Never     Review of Systems   Constitutional:  Negative for chills and fever.   All other systems reviewed and are negative.    Objective:     Vital Signs (Most Recent):  Temp: 98.2 °F (36.8 °C) (24 1200)  Pulse: 107 (24 1300)  Resp: 20 (24 1300)  BP: 108/64 (24 1300)  SpO2: 98 % (24 1300) Vital Signs (24h Range):  Temp:  [98.2 °F (36.8 °C)-99.1 °F (37.3 °C)] 98.2 °F (36.8 °C)  Pulse:  [] 107  Resp:  [12-94] 20  SpO2:  [92 %-100 %] 98 %  BP: ()/(57-81) 108/64     Weight: 76.3 kg (168 lb 3.4 oz)  Body mass index is 24.84 kg/m².    Estimated Creatinine Clearance: 53.5 mL/min (A) (based on SCr of 1.8 mg/dL (H)).     Physical Exam  HENT:      Mouth/Throat:      Mouth: Mucous membranes are moist.      Comments: Dental caries  Pulmonary:      Effort: Pulmonary effort is normal. No respiratory distress.   Skin:     General: Skin is warm and dry.   Neurological:      Mental Status: He is alert and oriented to person, place, and time.          Significant Labs:   Microbiology Results (last 7 days)       Procedure Component Value Units Date/Time    Blood culture [[id number]] Collected: 24 0920    Order Status: Completed Specimen: Blood from Peripheral, Antecubital, Right Updated: 24 1212     Blood Culture, Routine No Growth to date      No Growth to date      No Growth to date      No Growth to date      No Growth to date    Blood culture [[id number]] Collected: 24 0921    Order Status: Completed Specimen: " Blood Updated: 06/03/24 1212     Blood Culture, Routine No Growth to date      No Growth to date      No Growth to date      No Growth to date      No Growth to date            Significant Imaging: I have reviewed all pertinent imaging results/findings within the past 24 hours.

## 2024-06-03 NOTE — ASSESSMENT & PLAN NOTE
Assessment and Plan    1. Risks of Infection: Available serologies were reviewed. No unusual risks of infection or significant barriers to transplantation were identified from the infectious disease standpoint given the information available at this time.    - Acute infectious issues: None   - Pending serologies: Quantiferon gold / T-spot, Strongyloides IgG, and VZV IgG   - Please call if any pending serologic testing is positive.    2. Immunizations:  Based on the patient's immunization history and serologies, the following immunizations are recommended:  - Hepatitis A    Patient does not have immunity to hepatitis A    Vaccination ordered today: No. Reason for not ordering: patient declined  pt preferred for outpatient vaccination   - Hepatitis B    Patient does not have immunity to hepatitis B    Vaccination ordered today: No. Reason for not ordering: Patient objection  pt preferred for outpatient vaccination   - COVID    Current CDC vaccination recommendations were discussed with the patient   - Annual high dose influenza     Vaccination ordered today: No. Reason for not ordering: patient declined  pt preferred for outpatient vaccination   - Prevnar 20    Vaccination ordered today: No. Reason for not ordering: patient declined  pt preferred for outpatient vaccination   - Tdap    Vaccination ordered today: No. Reason for not ordering: patient declined  pt preferred for outpatient vaccination   - Shingrix    Vaccination ordered today: No. Reason for not ordering: patient declined  pt preferred for outpatient vaccination    Recommended Pre-Transplant Immunization Schedule   Vaccine  0m 1m 2m 6m   Pneumococcal conjugate vaccine (Prevnar 20) X      Tetanus-diphtheria-pertussis (Tdap)* X      Hepatitis A Vaccine (Havrix)** X   X   Hepatitis B Vaccine (Heplisav)** X X     Influenza (annual) X      Zoster Recombinant Vaccine (Shingrix) X  X           *Administer booster every 10 years.       **Administer if no immunity  demonstrated on serologies               Patient will receive vaccines at local pharmacy. A written prescription was provided for all vaccine doses.     3. Counseling:   I discussed with the patient the risk for increased susceptibility to infections following transplantation including increased risk for infection right after transplant and if rejection should occur.  The patient has been counseled on the importance of vaccinations to decrease risk of infection and severe illness. Specific guidance has been provided to the patient regarding the patient's occupation, hobbies and activities to avoid future infectious complications.     4. Transplant Candidacy: Based on available information, there are no identified significant barriers to transplantation from an infectious disease standpoint.  Final determination of transplant candidacy will be made once evaluation is complete and reviewed by the Selection Committee.     Recommend that patient address dental issues prior to transplant.  However, if unable to do so, this is not a contraindication to transplant.         Follow up with infectious disease as needed.       The total time for evaluation and management services performed on 06/03/2024 was greater than 60 minutes.

## 2024-06-03 NOTE — PLAN OF CARE
SICU PLAN OF CARE    Dx: Acute on chronic combined systolic and diastolic heart failure    Goals of Care: continue dobutamine and diuresis; continue to trend electrolytes and replace as needed    Vital Signs (last 12 hours):   Temp:  [98.9 °F (37.2 °C)-99.1 °F (37.3 °C)]   Pulse:  []   Resp:  [12-38]   BP: ()/(60-77)   SpO2:  [92 %-100 %]      Neuro: AAOx4, Follows commands , and Moves all extremities spontaneously     Cardiac: NSR/ST    Respiratory: Room Air    Gtts: Dobutamine    Urine Output: Voids Spontaneously  700 mL/shift    Drains: n/a    Diet: Regular     Labs/Accuchecks: daily labs    Skin:  Patient turned q2h, bony prominences protected, and mattress inflated/working correctly.   Glen Score: 22. If Glen Score is 16 or less, complete 4EYES note each shift. Skin intact    Shift Events:  See flowsheet for further assessment/details.  Family updated on current condition/plan of care, questions answered, and emotional support provided.  MD updated on current condition, vitals, labs, and gtts.  Pt c/o severe pain d/t hemorroids-reported to provider and orders rec'd for topical treatment and pain medication. Pt did not rest as well last night as compared to the previous night d/t the discomfort from the hemorroids.

## 2024-06-04 PROBLEM — R91.1 PULMONARY NODULE: Status: ACTIVE | Noted: 2024-06-04

## 2024-06-04 LAB
ALBUMIN SERPL BCP-MCNC: 3 G/DL (ref 3.5–5.2)
ALLENS TEST: ABNORMAL
ALLENS TEST: ABNORMAL
ALP SERPL-CCNC: 175 U/L (ref 55–135)
ALT SERPL W/O P-5'-P-CCNC: 59 U/L (ref 10–44)
ANION GAP SERPL CALC-SCNC: 10 MMOL/L (ref 8–16)
ANION GAP SERPL CALC-SCNC: 9 MMOL/L (ref 8–16)
AST SERPL-CCNC: 46 U/L (ref 10–40)
AT III AG ACT/NOR PPP IA: 108 % (ref 80–120)
BACTERIA BLD CULT: NORMAL
BACTERIA BLD CULT: NORMAL
BASOPHILS # BLD AUTO: 0.07 K/UL (ref 0–0.2)
BASOPHILS NFR BLD: 0.9 % (ref 0–1.9)
BILIRUB SERPL-MCNC: 0.5 MG/DL (ref 0.1–1)
BUN SERPL-MCNC: 16 MG/DL (ref 6–20)
BUN SERPL-MCNC: 20 MG/DL (ref 6–20)
CALCIUM SERPL-MCNC: 9.3 MG/DL (ref 8.7–10.5)
CALCIUM SERPL-MCNC: 9.6 MG/DL (ref 8.7–10.5)
CHLORIDE SERPL-SCNC: 100 MMOL/L (ref 95–110)
CHLORIDE SERPL-SCNC: 97 MMOL/L (ref 95–110)
CMV IGG SERPL QL IA: REACTIVE
CO2 SERPL-SCNC: 26 MMOL/L (ref 23–29)
CO2 SERPL-SCNC: 30 MMOL/L (ref 23–29)
CONFIRM DRVVT STA-STACLOT: ABNORMAL S
CREAT SERPL-MCNC: 1.2 MG/DL (ref 0.5–1.4)
CREAT SERPL-MCNC: 1.3 MG/DL (ref 0.5–1.4)
DELSYS: ABNORMAL
DELSYS: ABNORMAL
DIFFERENTIAL METHOD BLD: ABNORMAL
DRVVT SCREEN TO CONFIRM RATIO: 1 {RATIO}
EOSINOPHIL # BLD AUTO: 0.2 K/UL (ref 0–0.5)
EOSINOPHIL NFR BLD: 2.1 % (ref 0–8)
ERYTHROCYTE [DISTWIDTH] IN BLOOD BY AUTOMATED COUNT: 18.9 % (ref 11.5–14.5)
EST. GFR  (NO RACE VARIABLE): >60 ML/MIN/1.73 M^2
EST. GFR  (NO RACE VARIABLE): >60 ML/MIN/1.73 M^2
FIO2: 21
GAMMA INTERFERON BACKGROUND BLD IA-ACNC: 0.1 IU/ML
GLUCOSE SERPL-MCNC: 105 MG/DL (ref 70–110)
GLUCOSE SERPL-MCNC: 121 MG/DL (ref 70–110)
HCO3 UR-SCNC: 33.6 MMOL/L (ref 24–28)
HCT VFR BLD AUTO: 32.6 % (ref 40–54)
HEPARIN NT PPP QL: ABNORMAL
HGB BLD-MCNC: 10 G/DL (ref 14–18)
IMM GRANULOCYTES # BLD AUTO: 0.07 K/UL (ref 0–0.04)
IMM GRANULOCYTES NFR BLD AUTO: 0.9 % (ref 0–0.5)
LA 3 SCREEN W REFLEX-IMP: ABNORMAL
LMW HEPARIN IND PLT AB SER QL: ABNORMAL
LYMPHOCYTES # BLD AUTO: 2.5 K/UL (ref 1–4.8)
LYMPHOCYTES NFR BLD: 30.8 % (ref 18–48)
M TB IFN-G CD4+ BCKGRND COR BLD-ACNC: -0 IU/ML
M TB IFN-G CD4+ BCKGRND COR BLD-ACNC: 0.02 IU/ML
MAGNESIUM SERPL-MCNC: 2.2 MG/DL (ref 1.6–2.6)
MAGNESIUM SERPL-MCNC: 2.3 MG/DL (ref 1.6–2.6)
MCH RBC QN AUTO: 22.4 PG (ref 27–31)
MCHC RBC AUTO-ENTMCNC: 30.7 G/DL (ref 32–36)
MCV RBC AUTO: 73 FL (ref 82–98)
MITOGEN IGNF BCKGRD COR BLD-ACNC: 9.9 IU/ML
MIXING DRVVT/NORMAL: 1.15 %
MODE: ABNORMAL
MODE: ABNORMAL
MONOCYTES # BLD AUTO: 1 K/UL (ref 0.3–1)
MONOCYTES NFR BLD: 12.1 % (ref 4–15)
NEUTRALIZED DRVVT SCREEN RATIO: ABNORMAL
NEUTROPHILS # BLD AUTO: 4.3 K/UL (ref 1.8–7.7)
NEUTROPHILS NFR BLD: 53.2 % (ref 38–73)
NRBC BLD-RTO: 0 /100 WBC
PCO2 BLDA: 46.4 MMHG (ref 35–45)
PH SMN: 7.47 [PH] (ref 7.35–7.45)
PHOSPHATE SERPL-MCNC: 3.7 MG/DL (ref 2.7–4.5)
PLATELET # BLD AUTO: 600 K/UL (ref 150–450)
PMV BLD AUTO: 8.5 FL (ref 9.2–12.9)
PO2 BLDA: 34 MMHG (ref 40–60)
PO2 BLDA: 37 MMHG (ref 40–60)
POC BE: 10 MMOL/L
POC SATURATED O2: 68 % (ref 95–100)
POC SATURATED O2: 73 % (ref 95–100)
POC TCO2: 35 MMOL/L (ref 24–29)
POTASSIUM SERPL-SCNC: 3.7 MMOL/L (ref 3.5–5.1)
POTASSIUM SERPL-SCNC: 3.9 MMOL/L (ref 3.5–5.1)
PROT S FREE AG ACT/NOR PPP IA: 123 % (ref 57–171)
PROT SERPL-MCNC: 7.2 G/DL (ref 6–8.4)
PROTHROMBIN TIME: 14.3 S (ref 12–15.5)
RBC # BLD AUTO: 4.46 M/UL (ref 4.6–6.2)
SAMPLE: ABNORMAL
SAMPLE: ABNORMAL
SCREEN APTT/NORMAL: 1.1
SCREEN APTT/NORMAL: ABNORMAL
SCREEN DRVVT/NORMAL: 1.27 %
SITE: ABNORMAL
SITE: ABNORMAL
SODIUM SERPL-SCNC: 136 MMOL/L (ref 136–145)
SODIUM SERPL-SCNC: 136 MMOL/L (ref 136–145)
SP02: 100
STRONGYLOIDES ANTIBODY IGG: NEGATIVE
TB GOLD PLUS: NEGATIVE
THROMBIN TIME: ABNORMAL S
VWF AG ACT/NOR PPP IA: 277 % (ref 55–200)
WBC # BLD AUTO: 8 K/UL (ref 3.9–12.7)

## 2024-06-04 PROCEDURE — 80048 BASIC METABOLIC PNL TOTAL CA: CPT | Mod: NTX,XB | Performed by: STUDENT IN AN ORGANIZED HEALTH CARE EDUCATION/TRAINING PROGRAM

## 2024-06-04 PROCEDURE — 84100 ASSAY OF PHOSPHORUS: CPT | Mod: NTX | Performed by: INTERNAL MEDICINE

## 2024-06-04 PROCEDURE — 83735 ASSAY OF MAGNESIUM: CPT | Mod: 91,NTX | Performed by: STUDENT IN AN ORGANIZED HEALTH CARE EDUCATION/TRAINING PROGRAM

## 2024-06-04 PROCEDURE — C1894 INTRO/SHEATH, NON-LASER: HCPCS | Mod: NTX | Performed by: INTERNAL MEDICINE

## 2024-06-04 PROCEDURE — 21400001 HC TELEMETRY ROOM: Mod: NTX

## 2024-06-04 PROCEDURE — 99223 1ST HOSP IP/OBS HIGH 75: CPT | Mod: NTX,,, | Performed by: INTERNAL MEDICINE

## 2024-06-04 PROCEDURE — 25000003 PHARM REV CODE 250: Mod: NTX | Performed by: STUDENT IN AN ORGANIZED HEALTH CARE EDUCATION/TRAINING PROGRAM

## 2024-06-04 PROCEDURE — 63600175 PHARM REV CODE 636 W HCPCS: Mod: NTX | Performed by: STUDENT IN AN ORGANIZED HEALTH CARE EDUCATION/TRAINING PROGRAM

## 2024-06-04 PROCEDURE — 99499 UNLISTED E&M SERVICE: CPT | Mod: NTX,,, | Performed by: INTERNAL MEDICINE

## 2024-06-04 PROCEDURE — C1751 CATH, INF, PER/CENT/MIDLINE: HCPCS | Mod: NTX | Performed by: INTERNAL MEDICINE

## 2024-06-04 PROCEDURE — 86480 TB TEST CELL IMMUN MEASURE: CPT | Mod: NTX | Performed by: STUDENT IN AN ORGANIZED HEALTH CARE EDUCATION/TRAINING PROGRAM

## 2024-06-04 PROCEDURE — 80053 COMPREHEN METABOLIC PANEL: CPT | Mod: NTX | Performed by: INTERNAL MEDICINE

## 2024-06-04 PROCEDURE — 83735 ASSAY OF MAGNESIUM: CPT | Mod: NTX | Performed by: INTERNAL MEDICINE

## 2024-06-04 PROCEDURE — 25000003 PHARM REV CODE 250: Mod: NTX | Performed by: INTERNAL MEDICINE

## 2024-06-04 PROCEDURE — 94761 N-INVAS EAR/PLS OXIMETRY MLT: CPT | Mod: NTX,XB

## 2024-06-04 PROCEDURE — 93451 RIGHT HEART CATH: CPT | Mod: 26,NTX,, | Performed by: INTERNAL MEDICINE

## 2024-06-04 PROCEDURE — 4A023N6 MEASUREMENT OF CARDIAC SAMPLING AND PRESSURE, RIGHT HEART, PERCUTANEOUS APPROACH: ICD-10-PCS | Performed by: INTERNAL MEDICINE

## 2024-06-04 PROCEDURE — 82803 BLOOD GASES ANY COMBINATION: CPT | Mod: NTX

## 2024-06-04 PROCEDURE — 93451 RIGHT HEART CATH: CPT | Mod: NTX | Performed by: INTERNAL MEDICINE

## 2024-06-04 PROCEDURE — 82800 BLOOD PH: CPT | Mod: NTX

## 2024-06-04 PROCEDURE — 99900035 HC TECH TIME PER 15 MIN (STAT): Mod: NTX

## 2024-06-04 PROCEDURE — 99233 SBSQ HOSP IP/OBS HIGH 50: CPT | Mod: 25,NTX,, | Performed by: INTERNAL MEDICINE

## 2024-06-04 PROCEDURE — 85025 COMPLETE CBC W/AUTO DIFF WBC: CPT | Mod: NTX | Performed by: INTERNAL MEDICINE

## 2024-06-04 RX ORDER — POLYETHYLENE GLYCOL 3350 17 G/17G
17 POWDER, FOR SOLUTION ORAL 2 TIMES DAILY
Status: DISCONTINUED | OUTPATIENT
Start: 2024-06-05 | End: 2024-06-07 | Stop reason: HOSPADM

## 2024-06-04 RX ORDER — AMOXICILLIN 250 MG
1 CAPSULE ORAL 2 TIMES DAILY
Status: DISCONTINUED | OUTPATIENT
Start: 2024-06-05 | End: 2024-06-07 | Stop reason: HOSPADM

## 2024-06-04 RX ORDER — POTASSIUM CHLORIDE 750 MG/1
10 CAPSULE, EXTENDED RELEASE ORAL ONCE
Status: DISCONTINUED | OUTPATIENT
Start: 2024-06-04 | End: 2024-06-04

## 2024-06-04 RX ORDER — LIDOCAINE HYDROCHLORIDE 20 MG/ML
INJECTION, SOLUTION EPIDURAL; INFILTRATION; INTRACAUDAL; PERINEURAL
Status: DISCONTINUED | OUTPATIENT
Start: 2024-06-04 | End: 2024-06-04 | Stop reason: HOSPADM

## 2024-06-04 RX ORDER — POTASSIUM CHLORIDE 20 MEQ/1
20 TABLET, EXTENDED RELEASE ORAL ONCE
Status: COMPLETED | OUTPATIENT
Start: 2024-06-04 | End: 2024-06-04

## 2024-06-04 RX ORDER — FUROSEMIDE 10 MG/ML
80 INJECTION INTRAMUSCULAR; INTRAVENOUS 3 TIMES DAILY
Status: DISCONTINUED | OUTPATIENT
Start: 2024-06-04 | End: 2024-06-05

## 2024-06-04 RX ORDER — OXYCODONE AND ACETAMINOPHEN 5; 325 MG/1; MG/1
1 TABLET ORAL ONCE
Status: COMPLETED | OUTPATIENT
Start: 2024-06-05 | End: 2024-06-05

## 2024-06-04 RX ADMIN — POTASSIUM CHLORIDE 40 MEQ: 1500 TABLET, EXTENDED RELEASE ORAL at 06:06

## 2024-06-04 RX ADMIN — POTASSIUM CHLORIDE 20 MEQ: 1500 TABLET, EXTENDED RELEASE ORAL at 07:06

## 2024-06-04 RX ADMIN — ISOSORBIDE DINITRATE 10 MG: 10 TABLET ORAL at 08:06

## 2024-06-04 RX ADMIN — FUROSEMIDE 80 MG: 10 INJECTION, SOLUTION INTRAVENOUS at 09:06

## 2024-06-04 RX ADMIN — PANTOPRAZOLE SODIUM 20 MG: 20 TABLET, DELAYED RELEASE ORAL at 08:06

## 2024-06-04 RX ADMIN — ALPRAZOLAM 0.25 MG: 0.25 TABLET ORAL at 09:06

## 2024-06-04 RX ADMIN — HYDRALAZINE HYDROCHLORIDE 25 MG: 25 TABLET ORAL at 06:06

## 2024-06-04 RX ADMIN — POLYETHYLENE GLYCOL 3350 17 G: 17 POWDER, FOR SOLUTION ORAL at 08:06

## 2024-06-04 RX ADMIN — ATORVASTATIN CALCIUM 40 MG: 40 TABLET, FILM COATED ORAL at 08:06

## 2024-06-04 RX ADMIN — ISOSORBIDE DINITRATE 10 MG: 10 TABLET ORAL at 09:06

## 2024-06-04 RX ADMIN — SENNOSIDES AND DOCUSATE SODIUM 1 TABLET: 50; 8.6 TABLET ORAL at 08:06

## 2024-06-04 RX ADMIN — HYDRALAZINE HYDROCHLORIDE 25 MG: 25 TABLET ORAL at 02:06

## 2024-06-04 RX ADMIN — OXYCODONE 5 MG: 5 TABLET ORAL at 09:06

## 2024-06-04 RX ADMIN — HYDRALAZINE HYDROCHLORIDE 25 MG: 25 TABLET ORAL at 09:06

## 2024-06-04 RX ADMIN — DOBUTAMINE HYDROCHLORIDE 5 MCG/KG/MIN: 400 INJECTION INTRAVENOUS at 10:06

## 2024-06-04 RX ADMIN — POTASSIUM CHLORIDE 40 MEQ: 1500 TABLET, EXTENDED RELEASE ORAL at 09:06

## 2024-06-04 RX ADMIN — ISOSORBIDE DINITRATE 10 MG: 10 TABLET ORAL at 02:06

## 2024-06-04 RX ADMIN — HYDROCORTISONE: 25 CREAM TOPICAL at 09:06

## 2024-06-04 RX ADMIN — OXYCODONE 5 MG: 5 TABLET ORAL at 06:06

## 2024-06-04 NOTE — PLAN OF CARE
SICU PLAN OF CARE    Dx: Acute on chronic combined systolic and diastolic heart failure    Goals of Care: work up for VAD with bridge to heart transplant; RHC today to determine current heart function    Vital Signs (last 12 hours):   Temp:  [98.7 °F (37.1 °C)-98.8 °F (37.1 °C)]   Pulse:  []   Resp:  [13-27]   BP: (102-131)/(60-93)   SpO2:  [95 %-100 %]      Neuro: AAOx4, Follows commands , and Moves all extremities spontaneously     Cardiac: NSR    Respiratory: Room Air    Gtts: Dobutamine    Urine Output: Voids Spontaneously  650 mL/shift    Drains: n/a    Diet: NPO  for procedure     Labs/Accuchecks: daily labs, continue serial labs for work up for LVAD with bridge to transplant    Skin:  Patient turned q2h, bony prominences protected, and mattress inflated/working correctly.   Glen Score: 22. If Glen Score is 16 or less, complete 4EYES note each shift.    Shift Events:  See flowsheet for further assessment/details.  Family updated on current condition/plan of care, questions answered, and emotional support provided.  MD updated on current condition, vitals, labs, and gtts. NPO since midnight for RHC today; no significant events throughout the shift; pt rested well

## 2024-06-04 NOTE — ASSESSMENT & PLAN NOTE
Patient with stable nonspecific opacity in the right lower lobe concerning for a lung nodule in the RLL in the setting of prior cigarette use. Transthoracic biopsy would be most reasonable approach, but patient feels overwhelmed at the idea of using needles in his chest at this time. Pt reports one episode of hemoptysis last year prompting a CT chest at medical facility in [place].    - Will continue to follow with patient pending review of CXR and CT chest that pt's wife is working to secure  - Will continue discussions with pt regarding needle biopsy, feels overwhelmed and resistant to the idea at this time

## 2024-06-04 NOTE — PLAN OF CARE
Recommendations     1.) Recommend continuing with Regular Diet, fluid per MD.      2.) RD to monitor PO intake, skin, labs, wt.         Goals: to meet % of EEN/EPN by next RD f/u  Nutrition Goal Status: progressing towards goal  Communication of RD Recs:  (POC)

## 2024-06-04 NOTE — ASSESSMENT & PLAN NOTE
Patient with stable nonspecific opacity in the right lower lobe concerning for a lung nodule in the RLL in the setting of prior cigarette use. Biopsy

## 2024-06-04 NOTE — CONSULTS
[facility]  Pulmonology  Consult Note    Patient Name: Ayad Edmond  MRN: [id number]  Admission Date: 5/29/2024  Hospital Length of Stay: 6 days  Code Status: Full Code  Attending Physician: Nicole Chua MD  Primary Care Provider: Zehra Davies MD   Principal Problem: Acute on chronic combined systolic and diastolic heart failure    Inpatient consult to Pulmonology  Consult performed by: Roddy Murray MD  Consult ordered by: Anneliese Melendez MD        Subjective:     HPI:  Mr. Ayad Edmond is a 44 year old male with NICM, EF 15-20%, HTN, CKD presenting with 2-3 days of fatigue, leg stiffness, weakness, N/V, abd pain, and poor oral intake after a recent hospitalization for decompensated HFrEF. After diruesis pt feels better and is being discussed at a heart transplant evaluation meeting tomorrow. A recent CT AP demonstrated a stable opacity in the right lower lobe measuring approximately 2.5 x 2.0 cm that was previously identified on CT CAP on 05/13/24. He was scheduled for pre-transplant evaluation PFTs today, but is currently admitted.     Pt reports no prior hx of asthma, emphysema, COPD. Started smoking cigarettes around age 19 and quit at age 42. At his peak he smoked 1 PPD. He was exposed to secondhand smoke as a child. No reported drug, ETOH use. 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 Has a dog as a pet. No hx of travel abroad.     Pulmonology consulted for evaluation of pulmonary opacity.    Past Medical History:   Diagnosis Date    Acute heart failure     AUDELIA (acute kidney injury)     Cardiomyopathy     Elevated troponin     HTN (hypertension)     Hypokalemia     Hypomagnesemia     Renal insufficiency        Past Surgical History:   Procedure Laterality Date    broken foot Left     RIGHT HEART CATHETERIZATION Right 5/13/2024    Procedure:  "INSERTION, CATHETER, RIGHT HEART;  Surgeon: Pradeep Mack MD;  Location: [facility] CATH LAB;  Service: Cardiology;  Laterality: Right;       Review of patient's allergies indicates:  No Known Allergies    Family History       Problem Relation (Age of Onset)    Heart failure Mother    No Known Problems Father          Tobacco Use    Smoking status: Former     Current packs/day: 0.00     Average packs/day: 1 pack/day for 19.0 years (19.0 ttl pk-yrs)     Types: Cigarettes     Start date:      Quit date:      Years since quittin.4     Passive exposure: Past    Smokeless tobacco: Never   Substance and Sexual Activity    Alcohol use: Yes     Comment: "very seldom."    Drug use: Never    Sexual activity: Not on file         Review of Systems   Constitutional:  Positive for fatigue. Negative for activity change, appetite change and fever.   Respiratory:  Negative for cough, shortness of breath and wheezing.    Cardiovascular:  Negative for chest pain and leg swelling.   Gastrointestinal:  Negative for abdominal pain, constipation, diarrhea, nausea and vomiting.   Musculoskeletal: Negative.    Skin:  Negative for rash.   Neurological:  Negative for headaches.     Objective:     Vital Signs (Most Recent):  Temp: 98.5 °F (36.9 °C) (24 0700)  Pulse: 95 (24 1200)  Resp: (!) 24 (24 1200)  BP: (!) 141/82 (24 1200)  SpO2: 99 % (24 1200) Vital Signs (24h Range):  Temp:  [98.5 °F (36.9 °C)-98.8 °F (37.1 °C)] 98.5 °F (36.9 °C)  Pulse:  [] 95  Resp:  [11-31] 24  SpO2:  [95 %-100 %] 99 %  BP: ()/(58-93) 141/82     Weight: 78.5 kg (173 lb 1 oz)  Body mass index is 25.56 kg/m².      Intake/Output Summary (Last 24 hours) at 2024 1356  Last data filed at 2024 1300  Gross per 24 hour   Intake 378.15 ml   Output 1700 ml   Net -1321.85 ml        Physical Exam  Vitals and nursing note reviewed.   HENT:      Head: Normocephalic and atraumatic.   Eyes:      Extraocular Movements: " Extraocular movements intact.      Conjunctiva/sclera: Conjunctivae normal.   Cardiovascular:      Rate and Rhythm: Normal rate and regular rhythm.      Pulses: Normal pulses.   Pulmonary:      Effort: Pulmonary effort is normal. No respiratory distress.      Breath sounds: Normal breath sounds. No wheezing or rales.   Abdominal:      Palpations: Abdomen is soft.      Tenderness: There is no abdominal tenderness. There is no guarding.   Musculoskeletal:      Right lower leg: No edema.      Left lower leg: No edema.   Skin:     General: Skin is warm and dry.   Neurological:      General: No focal deficit present.      Mental Status: He is alert and oriented to person, place, and time.   Psychiatric:         Mood and Affect: Mood normal.          Vents:  Oxygen Concentration (%): 21 (06/02/24 0708)    Lines/Drains/Airways       Peripherally Inserted Central Catheter Line  Duration             PICC Double Lumen 05/14/24 1445 right brachial 20 days              Peripheral Intravenous Line  Duration                  Peripheral IV - Single Lumen 05/30/24 2130 20 G Anterior;Left;Proximal Forearm 4 days                    Significant Labs:    CBC/Anemia Profile:  Recent Labs   Lab 06/03/24  0535 06/04/24  0504   WBC 9.25 8.00   HGB 10.7* 10.0*   HCT 35.3* 32.6*   * 600*   MCV 74* 73*   RDW 19.1* 18.9*        Chemistries:  Recent Labs   Lab 06/02/24  1526 06/02/24  1610 06/03/24  0535 06/04/24  0504   *  --  135* 136   K 8.2* 3.5 3.6 3.7   CL 94*  --  94* 97   CO2 30*  --  28 30*   BUN 26*  --  27* 20   CREATININE 1.9*  --  1.8* 1.3   CALCIUM 9.7  --  9.9 9.6   ALBUMIN  --   --  3.2* 3.0*   PROT  --   --  7.7 7.2   BILITOT  --   --  0.6 0.5   ALKPHOS  --   --  189* 175*   ALT  --   --  83* 59*   AST  --   --  78* 46*   MG 2.6  --  2.4 2.3   PHOS  --   --  4.5 3.7       All pertinent labs within the past 24 hours have been reviewed.    Significant Imaging:   I have reviewed all pertinent imaging results/findings  within the past 24 hours.  Assessment/Plan:     Pulmonary  Pulmonary nodule  Patient with stable nonspecific opacity in the right lower lobe concerning for a lung nodule in the RLL in the setting of prior cigarette use. Transthoracic biopsy would be most reasonable approach, but patient feels overwhelmed at the idea of using needles in his chest at this time. Pt reports one episode of hemoptysis last year prompting a CT chest at medical facility in [place].    - Will continue to follow with patient pending review of CXR and CT chest that pt's wife is working to secure  - Will continue discussions with pt regarding needle biopsy, feels overwhelmed and resistant to the idea at this time            Thank you for your consult. I will follow-up with patient. Please contact us if you have any additional questions.     Roddy Murray MD  Pulmonology  [facility]

## 2024-06-04 NOTE — SUBJECTIVE & OBJECTIVE
Interval History:     UOP 1.5L neg 821 ccs  JVP 8 cm with neg HJR; SVO 63% CO 4.3L/min CI 2.3 SVR 1395    Subjective:  -doing well, wants to know what medical plan is   Continuous Infusions:   DOBUTamine IV infusion (non-titrating)  5 mcg/kg/min Intravenous Continuous 5.8 mL/hr at 06/03/24 2000 5 mcg/kg/min at 06/03/24 2000     Scheduled Meds:   atorvastatin  40 mg Oral Daily    heparin (porcine)  5,000 Units Subcutaneous Q8H    hydrALAZINE  25 mg Oral Q8H    hydrocortisone   Rectal BID    isosorbide dinitrate  10 mg Oral TID    pantoprazole  20 mg Oral Daily    polyethylene glycol  17 g Oral Daily    potassium chloride  40 mEq Oral BID    senna-docusate 8.6-50 mg  1 tablet Oral Daily     PRN Meds:  Current Facility-Administered Medications:     acetaminophen, 650 mg, Oral, Q6H PRN    ALPRAZolam, 0.25 mg, Oral, Nightly PRN    [START ON 6/4/2024] hepatitis B, 0.5 mL, Intramuscular, vaccine x 1 dose    ondansetron, 4 mg, Oral, Q6H PRN    ondansetron, 4 mg, Intravenous, Q6H PRN    oxyCODONE, 5 mg, Oral, Q6H PRN    potassium chloride, 40 mEq, Oral, Once PRN    prochlorperazine, 2.5 mg, Intravenous, Q6H PRN    senna-docusate 8.6-50 mg, 1 tablet, Oral, Daily PRN    sodium chloride 0.9%, 10 mL, Intravenous, PRN    Review of patient's allergies indicates:  No Known Allergies  Objective:     Vital Signs (Most Recent):  Temp: 98.8 °F (37.1 °C) (06/03/24 1930)  Pulse:  (on portable monitor to perform hygiene in the bathroom) (06/03/24 2100)  Resp: 18 (06/03/24 2030)  BP: 116/82 (06/03/24 2030)  SpO2: 99 % (06/03/24 2030) Vital Signs (24h Range):  Temp:  [98.2 °F (36.8 °C)-99 °F (37.2 °C)] 98.8 °F (37.1 °C)  Pulse:  [] 93  Resp:  [12-94] 18  SpO2:  [92 %-100 %] 99 %  BP: ()/(57-84) 116/82     Patient Vitals for the past 72 hrs (Last 3 readings):   Weight   06/03/24 0400 76.3 kg (168 lb 3.4 oz)   06/01/24 0400 72.1 kg (158 lb 15.2 oz)     Body mass index is 24.84 kg/m².      Intake/Output Summary (Last 24 hours) at  6/3/2024 2102  Last data filed at 6/3/2024 2000  Gross per 24 hour   Intake 482.28 ml   Output 1300 ml   Net -817.72 ml              Physical Exam  Constitutional:       Appearance: Normal appearance.   Eyes:      Pupils: Pupils are equal, round, and reactive to light.   Neck:      Comments:    Cardiovascular:      Rate and Rhythm: Normal rate and regular rhythm.      Pulses: Normal pulses.   Pulmonary:      Effort: Pulmonary effort is normal.      Breath sounds: Normal breath sounds.   Abdominal:      General: Abdomen is flat.   Musculoskeletal:         General: Normal range of motion.      Right lower leg: No edema.      Left lower leg: No edema.   Skin:     General: Skin is warm.   Neurological:      General: No focal deficit present.      Mental Status: He is alert and oriented to person, place, and time.   Psychiatric:         Mood and Affect: Mood normal.         Behavior: Behavior normal.            Significant Labs:  CBC:  Recent Labs   Lab 06/01/24  0344 06/02/24  0508 06/03/24  0535   WBC 9.88 8.39 9.25   RBC 4.15* 4.92 4.79   HGB 9.4* 10.9* 10.7*   HCT 30.3* 35.9* 35.3*   * 596* 643*   MCV 73* 73* 74*   MCH 22.7* 22.2* 22.3*   MCHC 31.0* 30.4* 30.3*     BNP:  Recent Labs   Lab 05/29/24  1529   BNP 3,707*     CMP:  Recent Labs   Lab 06/01/24  0344 06/01/24  1251 06/02/24  0508 06/02/24  1526 06/02/24  1610 06/03/24  0535   *   < > 157* 101  --  101   CALCIUM 9.9   < > 9.9 9.7  --  9.9   ALBUMIN 3.3*  --  3.4*  --   --  3.2*   PROT 8.0  --  8.2  --   --  7.7   *   < > 134* 134*  --  135*   K 3.1*   < > 3.1* 8.2* 3.5 3.6   CO2 32*   < > 31* 30*  --  28   CL 89*   < > 87* 94*  --  94*   BUN 23*   < > 26* 26*  --  27*   CREATININE 2.0*   < > 2.0* 1.9*  --  1.8*   ALKPHOS 146*  --  191*  --   --  189*   ALT 92*  --  101*  --   --  83*   AST 82*  --  106*  --   --  78*   BILITOT 0.7  --  0.7  --   --  0.6    < > = values in this interval not displayed.      Coagulation:   Recent Labs   Lab  "05/31/24  1533   INR 1.1   APTT 29.3     LDH:  No results for input(s): "LDH" in the last 72 hours.    Microbiology:  Microbiology Results (last 7 days)       Procedure Component Value Units Date/Time    Blood culture [[id number]] Collected: 05/30/24 0920    Order Status: Completed Specimen: Blood from Peripheral, Antecubital, Right Updated: 06/03/24 1212     Blood Culture, Routine No Growth to date      No Growth to date      No Growth to date      No Growth to date      No Growth to date    Blood culture [[id number]] Collected: 05/30/24 0921    Order Status: Completed Specimen: Blood Updated: 06/03/24 1212     Blood Culture, Routine No Growth to date      No Growth to date      No Growth to date      No Growth to date      No Growth to date            I have reviewed all pertinent labs within the past 24 hours.    Estimated Creatinine Clearance: 53.5 mL/min (A) (based on SCr of 1.8 mg/dL (H)).    Diagnostic Results:  I have reviewed and interpreted all pertinent imaging results/findings within the past 24 hours.  "

## 2024-06-04 NOTE — ASSESSMENT & PLAN NOTE
Decompensated HFrEF  NHYA III, ACC D  Given persistent dobutamine dependence and need for epinephrine-supported diuresis this admission, patient is being evaluated for advanced therapies.    -patient to be discussed at transplant meeting on Wednesday  -Pulm consult for evaluation of lung nodule seen on CT chest  Continue  to 5 mcg/mg/kg   Lasix 80 mg IV TID  Continue Bidil  Holding Valsartan and Spironolactone given recovering AUDELIA  Daily weights, strict I/Os

## 2024-06-04 NOTE — PLAN OF CARE
SICU PLAN OF CARE    Dx: Acute on chronic combined systolic and diastolic heart failure    Goals of Care: MAP>60     Vital Signs (last 12 hours):   Temp:  [98.3 °F (36.8 °C)-98.5 °F (36.9 °C)]   Pulse:  []   Resp:  [11-25]   BP: (112-141)/(58-87)   SpO2:  [96 %-100 %]      Neuro: AAOx4 and Follows commands     Cardiac: NSR     Respiratory: Room Air    Gtts: Dobutamine    Urine Output: Voids Spontaneously  875 mL/shift    Diet: Regular and 1500mL Fluid Restriction     Labs/Accuchecks: Daily labs.     Skin:  No skin breakdown or redness noted. Patient turned q2h, bony prominences protected, and mattress inflated/working correctly.   Glen Score: 22. If Glen Score is 16 or less, complete 4EYES note each shift.    Shift Events:  RHC today. Will be presented to  for LVAD vs. Heart transplant tmrw. Pulm consulted for R lung nodule, may need needle aspiration. Awaiting family to bring CT/Xrays from last year before further intervention.   Family updated on current condition/plan of care, questions answered, and emotional support provided.

## 2024-06-04 NOTE — H&P
[facility]  Interventional Cardiology  H&P    Patient Name: Ayad Edmond  MRN: [id number]  Admission Date: 5/29/2024  Code Status: Full Code   Attending Provider: Nicole Chua MD   Primary Care Physician: Zehra Davies MD  Principal Problem:Acute on chronic combined systolic and diastolic heart failure    Patient information was obtained from ER records.     Subjective:     Chief Complaint:  dyspnea     HPI: patient with HF here for RHC    Past Medical History:   Diagnosis Date    Acute heart failure     AUDELIA (acute kidney injury)     Cardiomyopathy     Elevated troponin     HTN (hypertension)     Hypokalemia     Hypomagnesemia     Renal insufficiency        Past Surgical History:   Procedure Laterality Date    broken foot Left     RIGHT HEART CATHETERIZATION Right 5/13/2024    Procedure: INSERTION, CATHETER, RIGHT HEART;  Surgeon: Pradeep Mack MD;  Location[facility] CATH LAB;  Service: Cardiology;  Laterality: Right;       Review of patient's allergies indicates:  No Known Allergies    PTA Medications   Medication Sig    ALPRAZolam (XANAX) 0.25 MG tablet Take 1 tablet (0.25 mg total) by mouth 2 (two) times daily as needed for Anxiety.    atorvastatin (LIPITOR) 40 MG tablet Take 40 mg by mouth once daily.    dapagliflozin propanediol (FARXIGA) 10 mg tablet Take 1 tablet (10 mg total) by mouth once daily.    dimenhyDRINATE (DRAMAMINE) 25 mg Chew Take 1 tablet by mouth daily as needed (nausea).    DOBUTamine (DOBUTREX) 1,000 mg/250 mL (4,000 mcg/mL) infusion Inject 380 mcg/min into the vein continuous.    furosemide (LASIX) 20 MG tablet Take 20 mg by mouth every morning.    hydrALAZINE (APRESOLINE) 50 MG tablet Take 1 tablet (50 mg total) by mouth every 8 (eight) hours.    isosorbide dinitrate (ISORDIL) 20 MG tablet Take 1 tablet (20 mg total) by mouth 3 (three) times daily.    pantoprazole (PROTONIX) 20 MG tablet Take 1 tablet (20 mg total) by mouth once daily.    potassium chloride SA  "(K-DUR,KLOR-CON) 20 MEQ tablet Take 2 tablets (40 mEq total) by mouth once daily.    spironolactone (ALDACTONE) 25 MG tablet Take 25 mg by mouth once daily.    torsemide (DEMADEX) 20 MG Tab Take 20 mg by mouth once daily.    valsartan (DIOVAN) 40 MG tablet Take 0.5 tablets (20 mg total) by mouth 2 (two) times daily.     Family History       Problem Relation (Age of Onset)    Heart failure Mother    No Known Problems Father          Tobacco Use    Smoking status: Former     Current packs/day: 0.00     Average packs/day: 1 pack/day for 19.0 years (19.0 ttl pk-yrs)     Types: Cigarettes     Start date:      Quit date:      Years since quittin.4     Passive exposure: Past    Smokeless tobacco: Never   Substance and Sexual Activity    Alcohol use: Yes     Comment: "very seldom."    Drug use: Never    Sexual activity: Not on file     ROS  Objective:     Vital Signs (Most Recent):  Temp: 98.5 °F (36.9 °C) (24 0700)  Pulse: 95 (24 1200)  Resp: (!) 24 (24 1200)  BP: (!) 141/82 (24 1200)  SpO2: 99 % (24 1200) Vital Signs (24h Range):  Temp:  [98.5 °F (36.9 °C)-98.8 °F (37.1 °C)] 98.5 °F (36.9 °C)  Pulse:  [] 95  Resp:  [11-31] 24  SpO2:  [95 %-100 %] 99 %  BP: ()/(58-93) 141/82     Weight: 78.5 kg (173 lb 1 oz)  Body mass index is 25.56 kg/m².    SpO2: 99 %         Intake/Output Summary (Last 24 hours) at 2024 1211  Last data filed at 2024 1100  Gross per 24 hour   Intake 372.36 ml   Output 2050 ml   Net -1677.64 ml       Lines/Drains/Airways       Peripherally Inserted Central Catheter Line  Duration             PICC Double Lumen 24 1445 right brachial 20 days              Peripheral Intravenous Line  Duration                  Peripheral IV - Single Lumen 24 2130 20 G Anterior;Left;Proximal Forearm 4 days                    Physical Exam    Significant Labs: CBC   Recent Labs   Lab 24  0535 24  0504   WBC 9.25 8.00   HGB 10.7* 10.0*   HCT " 35.3* 32.6*   * 600*       Significant Imaging: Echocardiogram: 2D echo with color flow doppler: Echo results to be reported separately.   Assessment and Plan:     Active Diagnoses:    Diagnosis Date Noted POA    PRINCIPAL PROBLEM:  Acute on chronic combined systolic and diastolic heart failure [I50.43] 05/10/2024 Yes    Pre-transplant evaluation for heart transplant [Z01.818] 06/03/2024 Not Applicable    Fatigue [R53.83] 05/30/2024 Yes    Hyponatremia [E87.1] 05/30/2024 Yes    Transaminitis [R74.01] 05/30/2024 Yes    Anemia of chronic disease [D63.8] 05/30/2024 Yes    AUDELIA (acute kidney injury) [N17.9] 05/10/2024 Yes    Primary hypertension [I10] 05/10/2024 Yes      Problems Resolved During this Admission:       Patient agrees      VTE Risk Mitigation (From admission, onward)           Ordered     heparin (porcine) injection 5,000 Units  Every 8 hours         05/29/24 1726     IP VTE HIGH RISK PATIENT  Once         05/29/24 1726     Place sequential compression device  Until discontinued         05/29/24 1726                    Elier Norris MD  Interventional Cardiology   [facility]

## 2024-06-04 NOTE — PROGRESS NOTES
Patient and caregiver mom THIERRY and have read this VAD education.  I have reviewed the contents of this in detail and all questions have been answered to patient and caregiver's satisfaction as evidence by verbal acknowledgement. Spent an hour at bedside.     Verbal and written VAD Education:     Please carefully read and review the following statement, and, sign to indicate you have been fully informed about the candidacy and evaluation process for the mechanical circulatory support device (MCSD) also known as the Ventricular Assist Device (VAD).     Why a VAD Is Needed   Heart failure is defined as a condition in which your heart is unable to pump enough blood to support the basic needs of your body. This can make you feel tired, have abnormal rhythms, and shortness of breath. Heart failure can also lead to failure of other organs (e.g. liver or kidneys). You are being offered this treatment option because you have a marked increase risk of irreversible end-organ damage or death. For this reason, you are being considered for placement of a Left Ventricular Assist Device (VAD) at [facility]. The heart pump is designed to take over the pumping action of your heart.   Prior to undergoing this procedure, it is important that you and your family understand the options, benefits, risks, and expectations associated with having a VAD. It is required that you and your proposed caregiver(s) understand and agree with the treatment plan and are willing to participate in the guidelines outlined in the following pages.     Bridge to transplant (BTT) is when a VAD is used to help support heart function for someone waiting for a heart transplant. This treatment plan is subject to change pending the results from your evaluation and your physicians decision. If your medical condition worsens after you receive the VAD, you may not be a transplant candidate.   The information within this document pertains only to VAD  therapy. You will receive information regarding heart transplantation allocation, procedures, and risks from the Pre-Transplant when appropriate.   OR   Destination therapy is when a VAD is used as a long-term treatment for patients who are not candidates for transplant, such as those with end-stage congestive heart failure. In these patients, the pumps are placed permanently to help the heart work better. This is subject to change pending the results from your evaluation and your physicians decision.     Types of Ventricular Assist Devices:   There are several different types of mechanical circulatory support devices:   -Left ventricular assist devices (LVAD) help the left side of the heart pump blood to the largest artery of the body, the aorta.     Your VAD Team may also talk with you about:   -Right ventricular assist devices (RVAD) help the right side of the heart pump blood to the lungs.   -Total Artificial Heart (SHONDA) that replaces the heart and pumps blood to the body.   A VAD is a long-term device utilized by patients for months to years. Some patients may receive another device prior to receiving a VAD depending on their treatment and health status.   When Is a VAD Used?   When medications can no longer help, and other surgical options have been exhausted, a physician may recommend a VAD. A VAD is used to assist the pumping action of a severely weakened heart. It works with your heart to improve and to increase blood flow; it does not replace your own heart. VADs are most often used for patients experiencing New York Heart Association (NYHA) Class III-IV heart failure symptoms.     Alternative Options:   If you are not found to be a candidate for VAD implantation or if you decide that a VAD is not the best option for you, you will continue to receive customary standard of care. You may also continue optimal medical management alone including the use of inotrope therapy. An inotrope is an IV medication  that helps the strength of the hearts contraction. However, the reason that you are being considered for VAD implantation is because optimal medical management has not been adequate and without a VAD, your condition is likely to deteriorate over time. While going straight to transplant may be a possibility, death is a possibility as well.   You May Not Be Eligible To Receive A VAD If You Are Found To Have Any Of The Following:      Any irreversible non-cardiac disease state with less than 2-year survival rate    Inadequate social support to be successful at home after surgery    Irreversible pulmonary disease or fixed pulmonary hypertension    Irreversible renal disease    Irreversible liver disease    Unresolved stroke or uncorrected cerebral vascular disease    A history of chronic noncompliance    Using illicit drugs or alcohol    Uncorrected thyroid disease    Significant right ventricular failure    Obstructive or restrictive cardiomyopathy    Amyloidosis    Active pericardial disease    Untreated aortic aneurysm    Irreversible cognitive dysfunction    History of psychiatric disease, uncontrolled affective disorder, or any cognitive dysfunction that may prevent you from managing self-care    Diabetes with severe retinopathy or peripheral neuropathy    Obesity with BMI (body mass index) greater than 35    Severe chronic malnutrition    Uncorrected blood disorders    Active uncontrolled infection    Pregnancy (positive pregnancy test)    HIV positive or immunosuppression that could result in device infection    Muscular dystrophy, MS or similar disease states    Active systemic infection    Cancer    Insufficient funding     Possible Benefits:   The overall goal is improved health and quality of life. In most cases, because circulation has been restored as a result of the VAD, you can expect to have more energy and also experience less heart failure symptoms. Since VADs help deliver more oxygen rich blood, you  may feel well enough to resume many of the usual activities and hobbies that you enjoy. In fact, many patients return to work and are no longer disabled, depending on the type of work they do. Be aware that you may lose your disability post VAD based on review of your records and disability benefits. It is important that you speak with a  so that you may plan for this should it occur.   The improved circulation may prolong life and may improve some organ damage caused by your heart failure. This is supported by some studies that have shown that VAD patients have a longer survival than patients treated with medications alone. Although the VAD can improve your chances for survival, the type and severity of your heart disease may outweigh any benefits from the device and you may still die.     Possible Surgical/ Anesthesia Risks:   As with any surgery or procedure, there are risks and the possibility of complications or death. There are also risks related to the operation itself and undergoing anesthesia, and risks related to the device itself. You will discuss the risks in detail with the Cardiac Surgeon who intends to perform your surgery. Your surgeon and anesthesia provider will review consent forms prior to surgery.     Operative Procedure Risks:   There are many risks with this operation including but not limited to: death, heart attack, stroke, nerve injury, blood clots, damage to arteries needing limb amputation, bleeding and hemorrhage, hemolysis, infection, development of new antibodies in your blood, mediastinitis, arrhythmia, right heart failure, heart block, or the need for pacemaker or ICD implantation. The need for re-operation for any reason may cause: renal, hepatic or pulmonary failure resulting in death or long-term need of ventilation or dialysis, and blood transfusion with its risk of HIV, and hepatitis. Studies have also shown that patients may have problems with memory,  attention, and speed of processing thoughts after a cardiac surgical procedure. Any of these complications will be explained to you in more detail if you desire. In addition to these potential complications, there may be other risks that are currently unknown.     Risks Related to VAD Therapy:   Include but not limited to: death, need for re-operation, device malfunction or device infection, renal failure requiring dialysis, blood clots, stroke, pain, or bleeding. These risks may lead to prolonged hospital stay or re-hospitalization. Pump exchange due to complications is a possibility. Patients may also experience a potential decrease in their quality of life including limitations of their normal activities. Patients may reach a point where quality of life is so impaired, that the decision to terminate VAD-support will need to be addressed. The longer you are on the device, the greater the chances that complications will develop.   Please see addendum for likelihood of these risks impacting you in your VAD therapy journey.     Evaluation Process:   There will be many people involved in the evaluation process to assure that this is the best choice for you. You will receive a number of tests and consultations. Some of the people that may help evaluate you include Heart Failure Physicians, Cardiac Surgeons, Social Workers and VAD Coordinators. During the evaluation process, you will be given education about the VAD and the care you would require. After the evaluation, the group will decide if you meet the criteria to have a VAD implanted. You may require or have already been implanted with a short-term VAD prior to surgery for a long-term VAD.     Care Team   Additionally, you will meet with a number of different team members to coordinate your care: financial counselor to discuss insurance and dressing supplies, research coordinators, anesthesiologist, psychiatrist/psychologist, physical therapist or occupational  therapist to discuss rehabilitation after VAD, and dietician to improve nutrition. Some patients may be referred to other services for consultation. These may include, but are not limited to: infectious disease doctor, gastroenterologist, nephrologist (kidney doctor), pulmonologist (lung doctor), hepatologist (liver doctor), or an ophthalmologist (eye doctor). It is recommended that you see your dentist to ensure no infection is present and all needed dental work is completed before surgery. Cavities and rotten teeth can cause an infection which can lead to death.   Testing is required to determine VAD candidacy. These include but are not limited to the following:   Laboratory studies of blood and urine, chest x-ray, abdominal ultrasound, CT scan, echocardiogram, cardiopulmonary treadmill, right heart catheterization, electrocardiogram, pulmonary function tests, colonoscopy or endoscopy, vascular studies, and pulmonary studies.     Device Choice:   VADs are currently approved by the Food and Drug Administration (FDA) to be used as Bridge to Transplantation (BTT) or Destination Therapy (DT). A full list will be provided for you and your family to review if desired. This [facility] also participates in clinical trials with devices that are considered investigational, and are not yet FDA approved for BTT/DT. Your Surgeon and Cardiologist will discuss with you which device is the best option for you. VADs have four main parts: the implantable heart pump, a tube that passes through the skin of your abdomen (driveline), a controller (small computer) that controls the pump operation, and an external power source (batteries or power device). In addition, there are other VADs that are used temporarily when patients are in cardiogenic shock.   You have the right to refuse surgery at any time. This educational document consent will help you to make an informed decision about your VAD option. If you decide this is not the  best option for you , please make your physician aware. You and your family make the decision to proceed.   Pre-operative Care expectations:    Your surgeon will request a tentative operative date in your name in the event that VAD therapy is the right option defined by you, your family, and provider team. You may see this date on [facility].    Informed surgical and anesthesia consents will be completed prior to the procedure.    You will be admitted to the hospital a few days before the day of surgery for optimization before surgery (unless already hospitalized).    Intra-aortic balloon pump or impella will be placed before surgery    If you are listed for transplant, once you go for the VAD your listing status will change. This will be discussed with you by your Transplant team.    If you have an ICD (defibrillator), it will be turned off prior to surgery, and then turned back on after surgery. It will NOT be removed.     Surgical Procedure:   The surgical procedure to implant the VAD will require open-heart surgery and can take on average between 6-12 hours. The surgeon will need to make an incision down the front of your chest to reach your heart. You will have a breathing tube and ventilator while under general anesthesia. The VAD is placed below the heart and the surgeon will connect the pump to your heart and secure it in place with sutures. Once the pump is in place, the VAD along with your natural heart will resume pumping blood through your body. After the surgery is completed, you will receive care in the ICU.     Post-Operative Care Expectations:   Upon arrival to the ICU, you will receive close monitoring and support from the following medical mechanisms:    Upon arrival to ICU, please adhere to our ICU (intensive care unit) visiting hours. You will need to rest and we ask that family not stay the night for a few days.    Heart monitor (telemetry) to monitor heart rate and rhythm.    A breathing tube  (endotracheal tube) and ventilator to assist with breathing and maintain and open airway.    An oral-gastric tube will be utilized to keep the stomach empty when connected to suction, as well as to give the nursing staff the capability to administer oral medications directly into the stomach.    A Escobedo catheter to measure urinary output.    A [place]-Bertin Catheter to measure pressures within the heart and lungs.    An arterial line catheter in order to measure arterial blood pressure.    Chest tubes to collect and measure drainage from surgery.    A VAD driveline that exits the skin in the abdominal area and is connected to the VAD power source.    Your chest may be left open for 24 to 72 hours after implantation, after which you will be taken back to the operating room to close your chest.     You will receive medications for sedation and to control your pain in order to achieve a tolerable level of comfort. You will also be on IV medications until your blood pressure and fluid status are stable. Your home medications will be resumed as soon as possible if still medically relevant. In addition to your previous taken medications, patients with VADs are commonly prescribed medications for anticoagulation/anti-platelet, antibiotics, blood pressure, and vitamin/mineral supplements. Your length of stay in the ICU will depend on how fast you recover. Once you are more stable, breathing on your own with your lines and tubes out, you will be transferred to a general care unit where you can expect to stay for another 1-3 weeks. On average, your total length of stay will be about three or four weeks after your surgery. During this time, it is expected that you and your family will begin to learn to manage   the device and learn how to manage your care at home. Most patients are able to return home after VAD implantation, but this cannot be guaranteed. Complications may require a prolonged period of hospitalization, long term  acute care facility, or inpatient rehabilitation. Be aware, if you are unattended and the device fails, you may not be able to perform the emergency procedures yourself, which could result in death and/or blood clots in the device.     Education:   Verbal, written, and visual educational materials are provided throughout your hospitalization and are available to anyone involved in your care at home. You and your caregiver(s) will be trained by a VAD Coordinator on how to manage your care and device. Other staff such as your bedside Nurse, the Occupational and Physical Therapists will also provide training to you. You and your caregiver(s) must show ability to manage the device, understand how it operates, troubleshoot problems, and care for your driveline exit site. It is expected that a caregiver(s) will be present and available while you are in the hospital to learn how to manage the device and how to care for you when you are at home. The education will be an ongoing process while you are here at the hospital. Prior to your expected discharge, your family and/or caregivers will be required to show competency in the care and management of you and your VAD. In addition, a VAD Coordinator will ensure that your local fire department, emergency personnel, and any other community members will be given education materials and training as necessary. Your home must have consistent electricity and phone services; the outlets must be three pronged and grounded. Any additional safety needs are arranged during this time. You will need to have your glasses and hearing aids at the hospital in order to be educated, as soon as possible.     Caregiver Requirements:   VAD implantation is based on suitability, need, and a committed caregiver. The caregiver must agree to certain responsibilities for the VAD implant process to continue. This is not negotiable. The VAD patient may be completely dependent on the caregiver. While the  patient is in the hospital, the caregiver is expected to visit daily, preferably at a time between 8 am- 4 pm. The caregiver will need to learn the dressing change process by the nurses and consistently demonstrate the ability to perform VAD dressing change in addition to helping the patient learn about the VAD. If the patient is unable to meet education goals before discharge, they may need 24-hour care. 24-hour caregivers may experience increased stress in their day-to-day life resulting from caring for a loved one with a VAD. There are support groups available to help you through living with a VAD.   The patient will not be able to drive for several months. The caregiver will need to drive the patient to appointments, as frequently as two to three times weekly at first. The caregiver will need to assist the patient with medication management. The caregiver will need to encourage the patient to document VAD numbers daily and know when to call for a problem.   Education begins now and is on-going throughout the VAD patients life.   The VAD team recognizes that VAD patients have an increased satisfactory outcome with a dedicated and committed caregiver. The caregiver has a tremendous responsibility. It is essential for you, the caregiver, to understand what is expected prior to moving forward. Any concerns about being the caregiver should be discussed with the , the VAD coordinator, or the physician.     Discharge Process:   Your daily progress will be followed by a team of people involved in your care including your Surgeon, Cardiologist, VAD Coordinators, Staff Nurses, Nurse Practitioners, Physician Assistants, Physical/Occupational Therapy, Social Workers, and a Discharge Planner. They will monitor your recovery and help you to adjust to life with a VAD. You will need to demonstrate the ability to care for yourself, such as grooming and exercise. You will also need to demonstrate the ability to care  for your VAD, the equipment, and alarms. You must have a thermometer, weight scale, flashlight, and a blood pressure cuff at your home. Most patients return home however, some patients choose to live with a caregiver or need a rehabilitation facility for a short period before returning home. If insurance allows, a Home Health nurse will be recommended to come to your home and assist you in your care when you return home. The length of time that the Visiting Nurse will come to your home will depend on your overall recovery. It is recommended  after you return home that you enroll in a Cardiac Rehab program to continue to improve your physical health.     Follow up care:   After you are discharged, you will follow up with your Surgeon, your Heart Failure Cardiologist and your VAD coordinator. They will collaboratively care for you and make decisions about your treatment. Typically, your first visit will be 1 week after discharge. We will see you every week for 3-4 weeks, followed by every 2 weeks for 1 month, then monthly thereafter while you have the VAD. Once you are considered a stable established patient, your Physicians may decide that you can follow-up every 2-3 months. Along with seeing your Cardiologist and Surgeon, you will have laboratory testing, and other physiological testing done on a regular basis in order to monitor and maintain your progress and health. The types of testing that you may need and the frequency will be decided by your Physicians but can include blood tests, EKG, Echocardiogram, Right Heart Catheterization, V02 Treadmill Stress Test, and Implantable Cardioverter Defibrillator (ICD) device check. If you have received an investigational VAD, you will have other testing that will be required for the research study. You will be required to take anticoagulation medications, also known as blood thinning medications (coumadin, warfarin). You will   be required to have frequent blood draws, up  to 2-3 times a week, in order to monitor your blood count and blood thinning agents. You will also be in frequent contact with a VAD Coordinator who will make phone calls to assess how you are doing at home and assist you with any problems that may arise. A VAD Coordinator and a Heart Failure Cardiologist are also available 24 hours a day in the event that you have an emergency. On average, you can expect that within 12 weeks after surgery, you will be able to return to most activities, with the permission of your VAD Team.     Lifestyle Changes and Prohibited Activities:   You will have limitations and can resume most usual activities. Certain activities are hazardous or fatal after implant. Persons with implantable VADs must not allow their controller/computer and electrical equipment to submerge in water. Showering is possible with proper protective equipment. You may only resume showering once your driveline has healed and your VAD coordinator gives their permission. Swimming and baths are prohibited. You cannot sleep on your stomach or the side the driveline is exiting your abdomen. Contact sports, repetitive jumping, or impact with an airbag are examples of activities that may cause trauma to the pump attachments and must be avoided.   You may be sexually active but must care for your driveline. Female patients cannot get pregnant. Medical care after implant includes lifetime follow up to monitor device function and health status. You may not have a magnetic resonance imaging (MRI) test because of the magnetic fields. You may not vacuum, touch television or computer screens, or take hot clothes out of the dryer due to the static electricity. VAD therapy requires significant self-care responsibility and a willingness to participate with you VAD team. Driveline exit site dressings must be performed daily using sterile technique outlined in your care of driveline documentation or as directed by your VAD team.  Care of your driveline must be done daily by yourself or caregiver. Maintenance care of the device components, batteries, and driveline is necessary to prevent pump failure, infections, or other serious complications. You will continue to take medications for your heart failure although the dose and or medication name may change. You may continue to take your diuretic such as lasix or demedex. You may also continue to be on a fluid restriction. You may drive once approved by your VAD team. You may be able to travel with your VAD, depending on the situation.     VAD Equipment:   Along with the device that is implanted inside your body, you will have a number of other external pieces of equipment that will require care and maintenance. You will have a driveline that exits your body through your abdomen that will power a controller, which is the computer component that tells the heart pump how to perform. The controller will also tell you about alarms, sounds, and words, on how your pump is operating and if there are any problems. In order to power the device and the controller, you will have batteries and a battery charger and/or power device. The batteries allow you to be mobile and move freely without being attached to outlet power. The  or power device allows   you to be connected to power for long periods of time such as when you are sleeping. Different VADs have similar pieces of equipment, but will vary depending on the device you receive. You will receive education and teaching before you leave the hospital to make sure you understand clearly how to operate the equipment and troubleshoot problems that may occur.     Confidentiality:   Hospital personnel who are involved in the course of your care may review your medical record. Communication between you and [facility] remains confidential. If you do become a candidate for transplant, data about your case, which will include your identity, will be sent to  [employer] ([employer]) and may be sent to other places involved in the transplant process as permitted by law. Data about your VAD, which will include your identity, will be shared with the  of the device. Device  may be involved in your care alongside your VAD Team in the hospital and/or clinic setting. Your information may also be entered into a registry for pump implants, known as INTERMACS. Your participation in this is voluntary, and will be discussed at greater length prior to implant.     End of Life:   If you are approaching the end of life, the VAD can be turned off. You may be in a hospital, home, or hospice setting. If you become very sick and do not have a chance to survive, we may talk about stopping the pump. The doctor would talk to you or your family about what is right for you. It is helpful to talk to your family about your goals before surgery so they know what your wishes are. A member of our Goals of Care team will visit you before surgery to help you learn your goals. You have the right to have the device turned off or to decline pump exchange if your pump fails or malfunctions.     Device Return:   At the time of either transplant or death, the surgeon may need to remove the device to return to the . You or a family member may need to sign a separate consent for removal of the device.   Questions   We encourage you to learn everything you can about the potential benefits and risks of having a VAD. If you or your family has any questions, you should feel free to contact your Transplant Coordinator or VAD Coordinator.   By signing this form, you understand and have reviewed the implant procedure as well as the potential benefits and risks involved with the getting a VAD. You also acknowledge and understand the care that will be required to maintain this device and yourself, including changes in your lifestyle, and impact on your  independence.

## 2024-06-04 NOTE — HPI
Mr. Ayad Edmond is a 44 year old male with NICM, EF 15-20%, HTN, CKD presenting with 2-3 days of fatigue, leg stiffness, weakness, N/V, abd pain, and poor oral intake after a recent hospitalization for decompensated HFrEF. After diruesis pt feels better and is being discussed at a heart transplant evaluation meeting tomorrow. A recent CT AP demonstrated a stable opacity in the right lower lobe measuring approximately 2.5 x 2.0 cm that was previously identified on CT CAP on 05/13/24. He was scheduled for pre-transplant evaluation PFTs today, but is currently admitted.     Pt reports no prior hx of asthma, emphysema, COPD. Started smoking cigarettes around age 19 and quit at age 42. At his peak he smoked 1 PPD. He was exposed to secondhand smoke as a child. No reported drug, ETOH use. 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 Has a dog as a pet. No hx of travel abroad.     Pulmonology consulted for evaluation of pulmonary opacity.

## 2024-06-04 NOTE — PROGRESS NOTES
[facility]  Heart Transplant  Progress Note    Patient Name: Ayad Edmond  MRN: [id number]  Admission Date: 5/29/2024  Hospital Length of Stay: 5 days  Attending Physician: Nicole Chua MD  Primary Care Provider: Zehra Davies MD  Principal Problem:Acute on chronic combined systolic and diastolic heart failure    Subjective:   Interval History:     UOP 1.5L neg 821 ccs  JVP 8 cm with neg HJR; SVO 63% CO 4.3L/min CI 2.3 SVR 1395    Subjective:  -doing well, wants to know what medical plan is   Continuous Infusions:   DOBUTamine IV infusion (non-titrating)  5 mcg/kg/min Intravenous Continuous 5.8 mL/hr at 06/03/24 2000 5 mcg/kg/min at 06/03/24 2000     Scheduled Meds:   atorvastatin  40 mg Oral Daily    heparin (porcine)  5,000 Units Subcutaneous Q8H    hydrALAZINE  25 mg Oral Q8H    hydrocortisone   Rectal BID    isosorbide dinitrate  10 mg Oral TID    pantoprazole  20 mg Oral Daily    polyethylene glycol  17 g Oral Daily    potassium chloride  40 mEq Oral BID    senna-docusate 8.6-50 mg  1 tablet Oral Daily     PRN Meds:  Current Facility-Administered Medications:     acetaminophen, 650 mg, Oral, Q6H PRN    ALPRAZolam, 0.25 mg, Oral, Nightly PRN    [START ON 6/4/2024] hepatitis B, 0.5 mL, Intramuscular, vaccine x 1 dose    ondansetron, 4 mg, Oral, Q6H PRN    ondansetron, 4 mg, Intravenous, Q6H PRN    oxyCODONE, 5 mg, Oral, Q6H PRN    potassium chloride, 40 mEq, Oral, Once PRN    prochlorperazine, 2.5 mg, Intravenous, Q6H PRN    senna-docusate 8.6-50 mg, 1 tablet, Oral, Daily PRN    sodium chloride 0.9%, 10 mL, Intravenous, PRN    Review of patient's allergies indicates:  No Known Allergies  Objective:     Vital Signs (Most Recent):  Temp: 98.8 °F (37.1 °C) (06/03/24 1930)  Pulse:  (on portable monitor to perform hygiene in the bathroom) (06/03/24 2100)  Resp: 18 (06/03/24 2030)  BP: 116/82 (06/03/24 2030)  SpO2: 99 % (06/03/24 2030) Vital Signs (24h Range):  Temp:  [98.2 °F (36.8  °C)-99 °F (37.2 °C)] 98.8 °F (37.1 °C)  Pulse:  [] 93  Resp:  [12-94] 18  SpO2:  [92 %-100 %] 99 %  BP: ()/(57-84) 116/82     Patient Vitals for the past 72 hrs (Last 3 readings):   Weight   06/03/24 0400 76.3 kg (168 lb 3.4 oz)   06/01/24 0400 72.1 kg (158 lb 15.2 oz)     Body mass index is 24.84 kg/m².      Intake/Output Summary (Last 24 hours) at 6/3/2024 2102  Last data filed at 6/3/2024 2000  Gross per 24 hour   Intake 482.28 ml   Output 1300 ml   Net -817.72 ml              Physical Exam  Constitutional:       Appearance: Normal appearance.   Eyes:      Pupils: Pupils are equal, round, and reactive to light.   Neck:      Comments:    Cardiovascular:      Rate and Rhythm: Normal rate and regular rhythm.      Pulses: Normal pulses.   Pulmonary:      Effort: Pulmonary effort is normal.      Breath sounds: Normal breath sounds.   Abdominal:      General: Abdomen is flat.   Musculoskeletal:         General: Normal range of motion.      Right lower leg: No edema.      Left lower leg: No edema.   Skin:     General: Skin is warm.   Neurological:      General: No focal deficit present.      Mental Status: He is alert and oriented to person, place, and time.   Psychiatric:         Mood and Affect: Mood normal.         Behavior: Behavior normal.            Significant Labs:  CBC:  Recent Labs   Lab 06/01/24  0344 06/02/24  0508 06/03/24  0535   WBC 9.88 8.39 9.25   RBC 4.15* 4.92 4.79   HGB 9.4* 10.9* 10.7*   HCT 30.3* 35.9* 35.3*   * 596* 643*   MCV 73* 73* 74*   MCH 22.7* 22.2* 22.3*   MCHC 31.0* 30.4* 30.3*     BNP:  Recent Labs   Lab 05/29/24  1529   BNP 3,707*     CMP:  Recent Labs   Lab 06/01/24  0344 06/01/24  1251 06/02/24  0508 06/02/24  1526 06/02/24  1610 06/03/24  0535   *   < > 157* 101  --  101   CALCIUM 9.9   < > 9.9 9.7  --  9.9   ALBUMIN 3.3*  --  3.4*  --   --  3.2*   PROT 8.0  --  8.2  --   --  7.7   *   < > 134* 134*  --  135*   K 3.1*   < > 3.1* 8.2* 3.5 3.6   CO2 32*   " < > 31* 30*  --  28   CL 89*   < > 87* 94*  --  94*   BUN 23*   < > 26* 26*  --  27*   CREATININE 2.0*   < > 2.0* 1.9*  --  1.8*   ALKPHOS 146*  --  191*  --   --  189*   ALT 92*  --  101*  --   --  83*   AST 82*  --  106*  --   --  78*   BILITOT 0.7  --  0.7  --   --  0.6    < > = values in this interval not displayed.      Coagulation:   Recent Labs   Lab 05/31/24  1533   INR 1.1   APTT 29.3     LDH:  No results for input(s): "LDH" in the last 72 hours.    Microbiology:  Microbiology Results (last 7 days)       Procedure Component Value Units Date/Time    Blood culture [[id number]] Collected: 05/30/24 0920    Order Status: Completed Specimen: Blood from Peripheral, Antecubital, Right Updated: 06/03/24 1212     Blood Culture, Routine No Growth to date      No Growth to date      No Growth to date      No Growth to date      No Growth to date    Blood culture [[id number]] Collected: 05/30/24 0921    Order Status: Completed Specimen: Blood Updated: 06/03/24 1212     Blood Culture, Routine No Growth to date      No Growth to date      No Growth to date      No Growth to date      No Growth to date            I have reviewed all pertinent labs within the past 24 hours.    Estimated Creatinine Clearance: 53.5 mL/min (A) (based on SCr of 1.8 mg/dL (H)).    Diagnostic Results:  I have reviewed and interpreted all pertinent imaging results/findings within the past 24 hours.  Assessment and Plan:     Mr. Ayad Edmond is a 44 year old male with past medical history of:  NICMP, EF 15-20%  HTN  CKD     Patient is presenting with 2-3 days of fatigue, leg stiffness, weakness, N/V, abd pain, and poor oral intake. Recently admitted 5/10-18 for decompensated heart failure. Echo showing EF drop from 30-35% to 15-20%. RHC showing mildly elevated filling pressures with low CO/CI, so he was started on  @ 2.5. Discharged feeling well. He lives in [place], but started feeling poorly a few days ago. Weight is actually down 3 " lbs and no leg swelling. No orthopnea or PND. Feeling really tired and not eating. No falls or syncope. No bleeding. Been urinating fine on Lasix and Torsemide at home. Went to ED in [place] yesterday, but nothing significant done for him and DC home.    They drove to ED here today as he still isn't feeling well. -120 (sinus tach) and BP 80//70 on RA. Labs concerning for hyponatremia, AUDELIA, mild liver injury, elevated BNP 3700, LA 1.4. Bedside echo with severely reduced EF and plethoric IVC.     Cardiology consulted and heart failure mgmt.      RHC 5/13/24  BP: 110/72 (82)  RA: 6  RV: 40/5, EDP 7  PA: 40/20, mean 28  PCWP: 18     Saturation:  Arterial: 94 %  PA: 48 %     Dennise CI/CO: 1.8/3.5  TD CI/CO: 1.5/2.9     SVR: 2097 dynes/sec/cm -5  PVR: 3.4 Wood Units     Conclusions:  Normal right and mildly elevated left sided filling pressures  Mild pulmonary hypertension  Low CI/CO    TTE 5/13/24    Left Ventricle: The left ventricle is severely dilated. There is eccentric hypertrophy. Severe global hypokinesis present. There is severely reduced systolic function with a visually estimated ejection fraction of 15 - 20%. Biplane (2D) method of discs ejection fraction is 15%. There is diastolic dysfunction. Elevated left ventricular filling pressure.    Right Ventricle: Right ventricle was not well visualized due to poor acoustic window. Mild right ventricular enlargement. There is mild hypertrophy. Systolic function is mildly reduced.    Left Atrium: Left atrium is severely dilated. Atrial septum is bulging to the right.    Mitral Valve: There is moderate regurgitation.    Tricuspid Valve: There is mild to moderate regurgitation.    Pulmonary Artery: The estimated pulmonary artery systolic pressure is 43 mmHg.    IVC/SVC: Intermediate venous pressure at 8 mmHg.    Pericardium: There is a small anterior effusion.    * Acute on chronic combined systolic and diastolic heart failure  Decompensated HFrEF  NHYA  III, ACC D  Patient with increasing volume status off of epinephrine.      -RHC tomorrow to assess filling pressures  -patient to be discussed at transplant meeting on Wednesday  Continue  to 5 mcg/mg/kg   Lasix 80 mg IV TID  Continue Bidil  Holding Valsartan and Spironolactone given AUDELIA  Daily weights, strict I/Os      AUDELIA (acute kidney injury)  Cr 1.2->2.5 on admission; likely cardiorenal  Holding Valsartan and Spironolactone for now  Strict I/Os, monitor renal function  Improving with diuresing     Primary hypertension  HF mgmt as mentioned        Anneliese Melendez MD  Heart Transplant  [facility]

## 2024-06-04 NOTE — PROGRESS NOTES
[facility] - [facility]  Adult Nutrition  Progress Note    SUMMARY       Recommendations    1.) Recommend continuing with Regular Diet, fluid per MD.     2.) RD to monitor PO intake, skin, labs, wt.       Goals: to meet % of EEN/EPN by next RD f/u  Nutrition Goal Status: progressing towards goal  Communication of RD Recs:  (POC)    Assessment and Plan    Nutrition Problem  Increased PRO needs     Related to (etiology):   Increased physiological needs     Signs and Symptoms (as evidenced by):   LVAD/tx wk up      Interventions/Recommendations (treatment strategy):  Collaboration of nutritional care with other providers.   Low Na and Heart Healthy diet edu     Nutrition Diagnosis Status:   Continues    Reason for Assessment    Reason For Assessment: RD follow-up  Diagnosis: cardiac disease (Acute on chronic combined systolic and diastolic heart failure)  Relevant Medical History: CHF, HTN  Interdisciplinary Rounds: did not attend    General Information Comments: RD f/u: Pt denies n/v/d/c. Pt was NPO, getting right heart cath implanted. Diet was advanced earlier today. PO intake prior to NPO status was varied. Pt is at risk for malnutrition. RD team to continue to monitor and f/u.     Nutrition Discharge Planning: Cardiac education provided on 5/31. Discussed foods to limit or avoid and benefits of diet adherence. Post-transplant expectations/guidelines also introduced. Appropriate education material with RD contact information provided. No other needs identified.    Nutrition Risk Screen    Nutrition Risk Screen: no indicators present    Nutrition/Diet History    Patient Reported Diet/Restrictions/Preferences: general  Typical Food/Fluid Intake: 3 meals/day  Food Preferences: does not like chicken breast and most vegetables  Spiritual, Cultural Beliefs, Jainism Practices, Values that Affect Care: no  Factors Affecting Nutritional Intake: other (see comments) (limiting food  "choices)    Anthropometrics    Temp: 98.5 °F (36.9 °C)  Height Method: Stated  Height: 5' 9" (175.3 cm)  Height (inches): 69 in  Weight Method: Bed Scale  Weight: 78.5 kg (173 lb 1 oz)  Weight (lb): 173.06 lb  Ideal Body Weight (IBW), Male: 160 lb  % Ideal Body Weight, Male (lb): 107.2 %  BMI (Calculated): 25.5    Lab/Procedures/Meds    Pertinent Labs Reviewed: reviewed  Pertinent Labs Comments: ALP: 175, alb: 3.0, AST: 46, ALT: 59    Pertinent Medications Reviewed: reviewed  Pertinent Medications Comments: Statin, heparin, hydralazine, isosorbide dinitrate, pantoprazole, polyethylene glycol, KCl, senna-docusate, dobutamine    Estimated/Assessed Needs    Weight Used For Calorie Calculations: 72.5 kg (159 lb 13.3 oz)  Energy Calorie Requirements (kcal): 1813- 2175 kcal  Energy Need Method: Kcal/kg (25-30 kcal/kg)    Protein Requirements: 73- 87g (1.0-1.2g/kg)  Weight Used For Protein Calculations: 72.5 kg (159 lb 13.3 oz)    Fluid Requirements (mL): 1ml/1kcal or per MD  Estimated Fluid Requirement Method: RDA Method  RDA Method (mL): 1813    Nutrition Prescription Ordered    Current Diet Order: NPO  Nutrition Order Comments: for procedure    Evaluation of Received Nutrient/Fluid Intake    I/O: -7.9L since admit  Energy Calories Required: not meeting needs  Protein Required: not meeting needs  Fluid Required:  (as per MD)  Comments: LBM 6/2  Tolerance: tolerating  % Intake of Estimated Energy Needs: 0 - 25 %  % Meal Intake: Other: Diet was just advanced from NPO    Nutrition Risk    Level of Risk/Frequency of Follow-up:  (RD to f/u 1-2/week)     Monitor and Evaluation    Food and Nutrient Intake: energy intake, food and beverage intake  Food and Nutrient Adminstration: diet order  Knowledge/Beliefs/Attitudes: food and nutrition knowledge/skill, beliefs and attitudes  Anthropometric Measurements: weight, weight change, body mass index  Biochemical Data, Medical Tests and Procedures: electrolyte and renal panel, " gastrointestinal profile, glucose/endocrine profile, inflammatory profile, lipid profile  Nutrition-Focused Physical Findings: overall appearance, skin     Nutrition Follow-Up    RD Follow-up?: Yes

## 2024-06-04 NOTE — SUBJECTIVE & OBJECTIVE
Interval History:     UOP 1.5L neg 1.2L  JVP 9 cm with neg HJR; SVO 68% CO 6.8L/min CI 3.5 SVR 1000    Subjective:  -doing well, awaiting RHC    Continuous Infusions:   DOBUTamine IV infusion (non-titrating)  5 mcg/kg/min Intravenous Continuous 5.8 mL/hr at 06/04/24 1400 5 mcg/kg/min at 06/04/24 1400     Scheduled Meds:   atorvastatin  40 mg Oral Daily    heparin (porcine)  5,000 Units Subcutaneous Q8H    hydrALAZINE  25 mg Oral Q8H    hydrocortisone   Rectal BID    isosorbide dinitrate  10 mg Oral TID    pantoprazole  20 mg Oral Daily    polyethylene glycol  17 g Oral Daily    potassium chloride  40 mEq Oral BID    senna-docusate 8.6-50 mg  1 tablet Oral Daily     PRN Meds:  Current Facility-Administered Medications:     acetaminophen, 650 mg, Oral, Q6H PRN    ALPRAZolam, 0.25 mg, Oral, Nightly PRN    hepatitis B, 0.5 mL, Intramuscular, vaccine x 1 dose    ondansetron, 4 mg, Oral, Q6H PRN    ondansetron, 4 mg, Intravenous, Q6H PRN    oxyCODONE, 5 mg, Oral, Q6H PRN    potassium chloride, 40 mEq, Oral, Once PRN    prochlorperazine, 2.5 mg, Intravenous, Q6H PRN    senna-docusate 8.6-50 mg, 1 tablet, Oral, Daily PRN    sodium chloride 0.9%, 10 mL, Intravenous, PRN    Review of patient's allergies indicates:  No Known Allergies  Objective:     Vital Signs (Most Recent):  Temp: 98.5 °F (36.9 °C) (06/04/24 0700)  Pulse: 94 (06/04/24 1400)  Resp: (!) 23 (06/04/24 1400)  BP: 128/82 (06/04/24 1400)  SpO2: 100 % (06/04/24 1400) Vital Signs (24h Range):  Temp:  [98.5 °F (36.9 °C)-98.8 °F (37.1 °C)] 98.5 °F (36.9 °C)  Pulse:  [] 94  Resp:  [11-31] 23  SpO2:  [95 %-100 %] 100 %  BP: ()/(58-93) 128/82     Patient Vitals for the past 72 hrs (Last 3 readings):   Weight   06/04/24 0400 78.5 kg (173 lb 1 oz)   06/03/24 0400 76.3 kg (168 lb 3.4 oz)     Body mass index is 25.56 kg/m².      Intake/Output Summary (Last 24 hours) at 6/4/2024 1417  Last data filed at 6/4/2024 1400  Gross per 24 hour   Intake 378.13 ml   Output  "1700 ml   Net -1321.87 ml              Physical Exam  Constitutional:       Appearance: Normal appearance.   Eyes:      Pupils: Pupils are equal, round, and reactive to light.   Neck:      Comments:    Cardiovascular:      Rate and Rhythm: Normal rate and regular rhythm.      Pulses: Normal pulses.   Pulmonary:      Effort: Pulmonary effort is normal.      Breath sounds: Normal breath sounds.   Abdominal:      General: Abdomen is flat.   Musculoskeletal:         General: Normal range of motion.      Right lower leg: No edema.      Left lower leg: No edema.   Skin:     General: Skin is warm.   Neurological:      General: No focal deficit present.      Mental Status: He is alert and oriented to person, place, and time.   Psychiatric:         Mood and Affect: Mood normal.         Behavior: Behavior normal.            Significant Labs:  CBC:  Recent Labs   Lab 06/02/24  0508 06/03/24  0535 06/04/24  0504   WBC 8.39 9.25 8.00   RBC 4.92 4.79 4.46*   HGB 10.9* 10.7* 10.0*   HCT 35.9* 35.3* 32.6*   * 643* 600*   MCV 73* 74* 73*   MCH 22.2* 22.3* 22.4*   MCHC 30.4* 30.3* 30.7*     BNP:  Recent Labs   Lab 05/29/24  1529   BNP 3,707*     CMP:  Recent Labs   Lab 06/02/24  0508 06/02/24  1526 06/02/24  1610 06/03/24  0535 06/04/24  0504   * 101  --  101 105   CALCIUM 9.9 9.7  --  9.9 9.6   ALBUMIN 3.4*  --   --  3.2* 3.0*   PROT 8.2  --   --  7.7 7.2   * 134*  --  135* 136   K 3.1* 8.2* 3.5 3.6 3.7   CO2 31* 30*  --  28 30*   CL 87* 94*  --  94* 97   BUN 26* 26*  --  27* 20   CREATININE 2.0* 1.9*  --  1.8* 1.3   ALKPHOS 191*  --   --  189* 175*   *  --   --  83* 59*   *  --   --  78* 46*   BILITOT 0.7  --   --  0.6 0.5      Coagulation:   Recent Labs   Lab 05/31/24  1533   INR 1.1   APTT 29.3     LDH:  No results for input(s): "LDH" in the last 72 hours.    Microbiology:  Microbiology Results (last 7 days)       Procedure Component Value Units Date/Time    Blood culture [[id number]] Collected: " 05/30/24 0920    Order Status: Completed Specimen: Blood from Peripheral, Antecubital, Right Updated: 06/04/24 1212     Blood Culture, Routine No growth after 5 days.    Blood culture [[id number]] Collected: 05/30/24 0921    Order Status: Completed Specimen: Blood Updated: 06/04/24 1212     Blood Culture, Routine No growth after 5 days.            I have reviewed all pertinent labs within the past 24 hours.    Estimated Creatinine Clearance: 74 mL/min (based on SCr of 1.3 mg/dL).    Diagnostic Results:  I have reviewed and interpreted all pertinent imaging results/findings within the past 24 hours.

## 2024-06-04 NOTE — PROGRESS NOTES
[facility] - [facility] Intensive [facility]  Heart Transplant  Progress Note    Patient Name: Ayad Edmond  MRN: [id number]  Admission Date: 5/29/2024  Hospital Length of Stay: 6 days  Attending Physician: Nicole Chua MD  Primary Care Provider: Zehra Davies MD  Principal Problem:Acute on chronic combined systolic and diastolic heart failure    Subjective:   Interval History:     UOP 1.5L neg 1.2L  JVP 9 cm with neg HJR; SVO 68% CO 6.8L/min CI 3.5 SVR 1000    Subjective:  -doing well, awaiting RHC    Continuous Infusions:   DOBUTamine IV infusion (non-titrating)  5 mcg/kg/min Intravenous Continuous 5.8 mL/hr at 06/04/24 1400 5 mcg/kg/min at 06/04/24 1400     Scheduled Meds:   atorvastatin  40 mg Oral Daily    heparin (porcine)  5,000 Units Subcutaneous Q8H    hydrALAZINE  25 mg Oral Q8H    hydrocortisone   Rectal BID    isosorbide dinitrate  10 mg Oral TID    pantoprazole  20 mg Oral Daily    polyethylene glycol  17 g Oral Daily    potassium chloride  40 mEq Oral BID    senna-docusate 8.6-50 mg  1 tablet Oral Daily     PRN Meds:  Current Facility-Administered Medications:     acetaminophen, 650 mg, Oral, Q6H PRN    ALPRAZolam, 0.25 mg, Oral, Nightly PRN    hepatitis B, 0.5 mL, Intramuscular, vaccine x 1 dose    ondansetron, 4 mg, Oral, Q6H PRN    ondansetron, 4 mg, Intravenous, Q6H PRN    oxyCODONE, 5 mg, Oral, Q6H PRN    potassium chloride, 40 mEq, Oral, Once PRN    prochlorperazine, 2.5 mg, Intravenous, Q6H PRN    senna-docusate 8.6-50 mg, 1 tablet, Oral, Daily PRN    sodium chloride 0.9%, 10 mL, Intravenous, PRN    Review of patient's allergies indicates:  No Known Allergies  Objective:     Vital Signs (Most Recent):  Temp: 98.5 °F (36.9 °C) (06/04/24 0700)  Pulse: 94 (06/04/24 1400)  Resp: (!) 23 (06/04/24 1400)  BP: 128/82 (06/04/24 1400)  SpO2: 100 % (06/04/24 1400) Vital Signs (24h Range):  Temp:  [98.5 °F (36.9 °C)-98.8 °F (37.1 °C)] 98.5 °F (36.9 °C)  Pulse:  [] 94  Resp:  [11-31] 23  SpO2:  [95 %-100  %] 100 %  BP: ()/(58-93) 128/82     Patient Vitals for the past 72 hrs (Last 3 readings):   Weight   06/04/24 0400 78.5 kg (173 lb 1 oz)   06/03/24 0400 76.3 kg (168 lb 3.4 oz)     Body mass index is 25.56 kg/m².      Intake/Output Summary (Last 24 hours) at 6/4/2024 1417  Last data filed at 6/4/2024 1400  Gross per 24 hour   Intake 378.13 ml   Output 1700 ml   Net -1321.87 ml              Physical Exam  Constitutional:       Appearance: Normal appearance.   Eyes:      Pupils: Pupils are equal, round, and reactive to light.   Neck:      Comments:    Cardiovascular:      Rate and Rhythm: Normal rate and regular rhythm.      Pulses: Normal pulses.   Pulmonary:      Effort: Pulmonary effort is normal.      Breath sounds: Normal breath sounds.   Abdominal:      General: Abdomen is flat.   Musculoskeletal:         General: Normal range of motion.      Right lower leg: No edema.      Left lower leg: No edema.   Skin:     General: Skin is warm.   Neurological:      General: No focal deficit present.      Mental Status: He is alert and oriented to person, place, and time.   Psychiatric:         Mood and Affect: Mood normal.         Behavior: Behavior normal.            Significant Labs:  CBC:  Recent Labs   Lab 06/02/24  0508 06/03/24  0535 06/04/24  0504   WBC 8.39 9.25 8.00   RBC 4.92 4.79 4.46*   HGB 10.9* 10.7* 10.0*   HCT 35.9* 35.3* 32.6*   * 643* 600*   MCV 73* 74* 73*   MCH 22.2* 22.3* 22.4*   MCHC 30.4* 30.3* 30.7*     BNP:  Recent Labs   Lab 05/29/24  1529   BNP 3,707*     CMP:  Recent Labs   Lab 06/02/24  0508 06/02/24  1526 06/02/24  1610 06/03/24  0535 06/04/24  0504   * 101  --  101 105   CALCIUM 9.9 9.7  --  9.9 9.6   ALBUMIN 3.4*  --   --  3.2* 3.0*   PROT 8.2  --   --  7.7 7.2   * 134*  --  135* 136   K 3.1* 8.2* 3.5 3.6 3.7   CO2 31* 30*  --  28 30*   CL 87* 94*  --  94* 97   BUN 26* 26*  --  27* 20   CREATININE 2.0* 1.9*  --  1.8* 1.3   ALKPHOS 191*  --   --  189* 175*   *  " --   --  83* 59*   *  --   --  78* 46*   BILITOT 0.7  --   --  0.6 0.5      Coagulation:   Recent Labs   Lab 05/31/24  1533   INR 1.1   APTT 29.3     LDH:  No results for input(s): "LDH" in the last 72 hours.    Microbiology:  Microbiology Results (last 7 days)       Procedure Component Value Units Date/Time    Blood culture [[id number]] Collected: 05/30/24 0920    Order Status: Completed Specimen: Blood from Peripheral, Antecubital, Right Updated: 06/04/24 1212     Blood Culture, Routine No growth after 5 days.    Blood culture [[id number]] Collected: 05/30/24 0921    Order Status: Completed Specimen: Blood Updated: 06/04/24 1212     Blood Culture, Routine No growth after 5 days.            I have reviewed all pertinent labs within the past 24 hours.    Estimated Creatinine Clearance: 74 mL/min (based on SCr of 1.3 mg/dL).    Diagnostic Results:  I have reviewed and interpreted all pertinent imaging results/findings within the past 24 hours.  Assessment and Plan:     44M with history of HFrEF 2/2 NICM, HTN, CKD admitted for ADHF requiring dobutamine and epinephrine supported diuresis.  Given persistent inotropic dependence and lack of expected cardiac recovery, patient being evaluated for advanced therapies.    * Acute on chronic combined systolic and diastolic heart failure  Decompensated HFrEF  NHYA III, ACC D  Given persistent dobutamine dependence and need for epinephrine-supported diuresis this admission, patient is being evaluated for advanced therapies.    -patient to be discussed at transplant meeting on Wednesday  -Pulm consult for evaluation of lung nodule seen on CT chest  Continue  to 5 mcg/mg/kg   Lasix 80 mg IV TID  Continue Bidil  Holding Valsartan and Spironolactone given recovering AUDELIA  Daily weights, strict I/Os      AUDELIA (acute kidney injury)  Cr 1.2->2.5 on admission; likely cardiorenal  Holding Valsartan and Spironolactone for now  Strict I/Os, monitor renal function  Improving with " diuresing     Primary hypertension  HF mgmt as mentioned        Anneliese Melendez MD  Heart Transplant  [facility]

## 2024-06-04 NOTE — PROGRESS NOTES
Left Ventricular Assist Device (LVAD) and Transplant Recipient Adult Psychosocial Assessment    Pt met with pt in his hospital room 5/31/2024.    SW met with pt's Primary Caregiver (Aleida Doty) on 6/4/2024 in pt's hospital room.     Ayad Edmond  [address] [place] [address]  Telephone Information:   Mobile [phone]   Home  [phone] (home)  Work  There is no work phone number on file.  E-mail  [email or web address]    Sex: male  YOB: 1980  Age: 44 y.o.    Encounter Date: 5/31/2024  [place] Citizen: yes  Primary Language: English   Needed: no    Emergency Contact:  Name: Sia Gutierrez  Relationship: wife  Address: same as patient  Phone Numbers:  [phone] (mobile)    Family/Social Support:   Number of dependents/: Pt has 1 step son (14 yr) that lives with he and his wife.  Marital history: Pt has been  twice.  First marriage ended in divorce.  to current wife in 2021.   Other family dynamics: Pt has a supportive sister (33 yr), parents, and 3 adult children. Pt has a 23 yr old son and 21 yr old twin daughters from first marriage.       Household Composition:  Pt , wife, and 14 yr old son.  Pt and his wife both drive.    Do you and your caregivers have access to reliable transportation? yes    PRIMARY CAREGIVER: Aleida Doty (mother) will be primary caregiver, phone number [phone].     Secondary Caregiver: Sia Gutierrez (spouse) [phone]     provided in-depth information to Patient and Caregiver regarding  regarding pre- and post-LVAD and pre- and post-transplant caregiver role.   strongly encourages Patient and Caregiver to have concrete plan regarding post-transplant care giving, including back-up caregiver(s) to ensure care giving needs are met as needed.    Patient and Caregiver states understanding all aspects of caregiver role/commitment and is able/willing/committed to being caregiver  to the fullest extent necessary. .      Patient and Caregiver verbalizes understanding of the education provided today and caregiver responsibilities.       remains available. Patient and Caregiver agree to contact  in a timely manner if concerns arise.      Able to take time off work without financial concerns: yes.     Living Will: no  Healthcare Power of : yes (Pt's wife (Sia Gutierrez) is listed as pt's HCPA.  Forms in EPIC.   Advance Directives on file: <<no information> per medical record.  Verbally reviewed LW/HCPA information.      Highest Education Level: High School (9-12) or GED 11th grade  Reading Ability: 12th grade  Reports difficulty with:  n/a  Pt wears glasses  Learns Best By:  repetition     Status: no  VA Benefits: no     Working for Income: No  If no, reason not working: Disability  Spouse/Significant Other Employment: Pt's wife works at [employer].     Disabled: yes: date disability began: 2024, due to: pt reported being laid off due to illness.  Pt reported that he applied for SSDI 3/2024.    Monthly Income:  Salary/Wages: $1800/month (spouse's work)  Able to afford all costs now and if transplanted or receives LVAD, including medications: yes  Pt reports secure power source? yes  Pt reports ability to afford monthly electric bill? yes  Pt reports ability to afford LVAD dressing supplies? yes  Patient and Caregiver verbalizes understanding of personal responsibilities related to LVAD and transplant costs and the importance of having a financial plan to ensure that patients LVAD and transplant costs are fully covered.       provided fundraising information/education.  Patient and Caregiver verbalizes understanding.   remains available.    Insurance:   Payer/Plan Subscr  Sex Relation Sub. Ins. ID Effective Group Num   1. OPTUM MANAGED* NITINVINAY 1980 Male Self [id number]                                     [address] [address]   2. MEDICAID - * VINAY TAVERAS 1980 Male Self [id number] [facility]                                   [address] [address]     Primary Insurance (for UNOS reporting): Public Insurance - Medicaid  Secondary Insurance (for UNOS reporting): Public Insurance - Medicaid  Patient and Caregiver verbalizes clear understanding that patient may experience difficulty obtaining and/or be denied insurance coverage post-surgery. This includes and is not limited to disability insurance, life insurance, health insurance, burial insurance, long term care insurance, and other insurances.      Patient and Caregiver also reports understanding that future health concerns   related to or unrelated to LVAD or transplantation may not be covered by patient's insurance.  Resources and information provided and reviewed.      Patient and Caregiver provides verbal permission to release any necessary information to outside resources for patient care and discharge planning.  Resources and information provided are reviewed.      Infusion Service: patient utilizing? yes ([facility])  Pt receives PICC care and labs at [facility] in [place] (Hem/Onc clinic).  Home Health: patient utilizing? no  DME: no  Pulmonary/Cardiac Rehab: N/A   ADLS:  Pt is indep with ADLs.    Adherence:   Pt reported a high adherence to his healthcare regimen.  Adherence education and counseling provided.     Per History Section:  Past Medical History:   Diagnosis Date    Acute heart failure     AUDELIA (acute kidney injury)     Cardiomyopathy     Elevated troponin     HTN (hypertension)     Hypokalemia     Hypomagnesemia     Renal insufficiency      Social History     Tobacco Use    Smoking status: Former     Current packs/day: 0.00     Average packs/day: 1 pack/day for 19.0 years (19.0 ttl pk-yrs)     Types: Cigarettes     Start date:      Quit date:      Years since quittin.4     Passive exposure: Past    Smokeless tobacco:  "Never   Substance Use Topics    Alcohol use: Yes     Comment: "very seldom."     Social History     Substance and Sexual Activity   Drug Use Never     Social History     Substance and Sexual Activity   Sexual Activity Not on file       Per Today's Psychosocial:  Tobacco:  Pt reported he quit 1 yr ago. Pt was smoing 1ppd at that time .  Alcohol:  Pt reported occasional use.  Last use was New Year's (2 drinks) per pt .  Illicit Drugs/Non-prescribed Medications: none, patient denies any use.    Patient and Caregiver states clear understanding of the potential impact of substance use as it relates to LVAD and transplant candidacy and is aware of possible random substance screening.  Substance abstinence/cessation counseling, education and resources provided and reviewed.     Arrests/DWI/Treatment/Rehab: yes Pt reported arrest in 2006 for unpaid tickets, and this is resolved.  Pt has child support court date on Maddy 10, 2024.    Psychiatric History:    Mental Health:  anxiety and sleep issues related to illness  Psychiatrist/Counselor: N/A  Medications:  xanax prescribed by PCP.   Suicide/Homicide Issues: Pt denies SI/HI now or in the past.   Safety at home: Pt reported feeling safe at home and free from abuse/neglect.    Knowledge: Patient and Caregiver states having clear understanding and realistic expectations regarding the potential risks and potential benefits LVAD implantation and organ transplantation and organ donation and agrees to discuss with health care team members and support system members, as well as to utilize available resources and express questions and/or concerns in order to further facilitate the pt informed decision-making.  Resources and information provided and reviewed.     Patient and Caregiver is aware of [facility] affiliation and/or partnership with agencies in home health care, LTAC, SNF, [facility], and other hospitals and clinics.    Understanding: Patient and Caregiver reports having a clear " understanding of the many lifetime commitments involved with being an LVAD and transplant recipient, including costs, compliance, medications, lab work, procedures, appointments, concrete and financial planning, preparedness, timely and appropriate communication of concerns, abstinence (ETOH, tobacco, illicit non-prescribed drugs), adherence to all health care team recommendations, support system and caregiver involvement, appropriate and timely resource utilization and follow-through, mental health counseling as needed/recommended, and patient and caregiver responsibilities.  Social Service Handbook, resources and detailed educational information provided and reviewed.  Educational information provided.    Patient and Caregiver also reports current and expected compliance with health care regime and states having a clear understanding of the importance of compliance.       Patient and Caregiver reports a clear understanding that risks and benefits may be involved with LVAD heart failure treatment and organ transplantation and with organ donation.     Patient and Caregiver also reports clear understanding that psychosocial risk factors may affect patient, and include but are not limited to feelings of depression, generalized anxiety, anxiety regarding dependence on others, post traumatic stress disorder, feelings of guilt and other emotional and/or mental concerns, and/or exacerbation of existing mental health concerns.  Detailed resources provided and discussed.      Patient and Caregiver agrees to access appropriate resources in a timely manner as needed and/or as recommended, and to communicate concerns appropriately.     Patient and Caregiver also reports a clear understanding of treatment options available.      Feelings or Concerns: Pt reported that he prefers having a transplant, but he is willing to do what is necessary.    Coping: Identify Patient & Caregiver Strategies to [place]:   1. In the past, coping  with major surgery and/or related stress - games on phone, TV, family support, prayer/Restoration.    2. Currently & Pre-transplant - games on phone, TV, family support, prayer   3. At the time of surgery - games on phone, TV, family support, prayer   4. During post-Transplant & Recovery Period - games on phone, TV, family support, prayer    Goals: Pt has a goal to get better, return to work, and be able to return to gym .  Patient referred to Vocational Rehabilitation.    Interview Behavior: Patient and Caregiver presents as alert and oriented x 4, pleasant, good eye contact, concentration/judgement good, average intelligence, calm, communicative, cooperative, and asking and answering questions appropriately.           Transplant Social Work - Candidacy  Assessment/Plan:     Psychosocial Suitability: Patient presents as a suitable candidate for LVAD or heart transplant at this time pending meeting secondary caregiver.  Based on psychosocial risk factors, patient presents as low risk, due to no reported transportation issues, no reported mental health or substance abuse issues, and reported having an adequate caregiver plan.  Pt is pending SSDI at this time .    Recommendations/Additional Comments: Recommended fundraising.  SAADIA provided SW Resource Booklet.  SAADIA discussed local lodging options for transplant, including [place] apts.     Final determination on eligibility to be determined by transplant selection committee.     Melly Lugo, [facility]    Addendum 6/5/2024  SAADIA spoke with pt's wife (Sia Gutierrez) via phone regarding caregiver responsibilities and to arrange a face to face meeting with SAADIA.  Pt's wife reported that she is not able to commit to being pt's back up caregiver due to being the sole source of income for the family at this time and also caring for her minor child.    SAADIA met with pt and his mother to discuss need to identify a secondary caregiver for transplant.  Pt advised that he will discuss  with his family.   Team updated in selection this morning.

## 2024-06-04 NOTE — CARE UPDATE
Heart Transplant Care Update Note:    Hemodynamics @8pm:  CVP:based on JVD 9cm  SVO2: 73  CO: 3.7  CI: 1.9  SVR: 1774      Hemodynamics @12am:  CVP:based on JVD 9cm  SVO2: 73  CO: 6.9  CI: 3.6  SVR: 936    Continuous Infusions:   DOBUTamine IV infusion (non-titrating)  5 mcg/kg/min Intravenous Continuous 5.8 mL/hr at 06/03/24 2200 5 mcg/kg/min at 06/03/24 2200       Intake/Output Summary (Last 24 hours) at 6/3/2024 2228  Last data filed at 6/3/2024 2200  Gross per 24 hour   Intake 578 ml   Output 1300 ml   Net -722 ml           Plan:  Continue current management.        Discussed with HTS Attending      Monica GARDNER MD  Cardiovascular Disease PGY IV  [facility]

## 2024-06-04 NOTE — SUBJECTIVE & OBJECTIVE
"Past Medical History:   Diagnosis Date    Acute heart failure     AUDELIA (acute kidney injury)     Cardiomyopathy     Elevated troponin     HTN (hypertension)     Hypokalemia     Hypomagnesemia     Renal insufficiency        Past Surgical History:   Procedure Laterality Date    broken foot Left     RIGHT HEART CATHETERIZATION Right 2024    Procedure: INSERTION, CATHETER, RIGHT HEART;  Surgeon: Pradeep Mack MD;  Location: [facility] CATH LAB;  Service: Cardiology;  Laterality: Right;       Review of patient's allergies indicates:  No Known Allergies    Family History       Problem Relation (Age of Onset)    Heart failure Mother    No Known Problems Father          Tobacco Use    Smoking status: Former     Current packs/day: 0.00     Average packs/day: 1 pack/day for 19.0 years (19.0 ttl pk-yrs)     Types: Cigarettes     Start date:      Quit date:      Years since quittin.4     Passive exposure: Past    Smokeless tobacco: Never   Substance and Sexual Activity    Alcohol use: Yes     Comment: "very seldom."    Drug use: Never    Sexual activity: Not on file         Review of Systems   Constitutional:  Positive for fatigue. Negative for activity change, appetite change and fever.   Respiratory:  Negative for cough, shortness of breath and wheezing.    Cardiovascular:  Negative for chest pain and leg swelling.   Gastrointestinal:  Negative for abdominal pain, constipation, diarrhea, nausea and vomiting.   Musculoskeletal: Negative.    Skin:  Negative for rash.   Neurological:  Negative for headaches.     Objective:     Vital Signs (Most Recent):  Temp: 98.5 °F (36.9 °C) (24 0700)  Pulse: 95 (24 1200)  Resp: (!) 24 (24 1200)  BP: (!) 141/82 (24 1200)  SpO2: 99 % (24 1200) Vital Signs (24h Range):  Temp:  [98.5 °F (36.9 °C)-98.8 °F (37.1 °C)] 98.5 °F (36.9 °C)  Pulse:  [] 95  Resp:  [11-31] 24  SpO2:  [95 %-100 %] 99 %  BP: ()/(58-93) 141/82     Weight: 78.5 kg (173 lb " 1 oz)  Body mass index is 25.56 kg/m².      Intake/Output Summary (Last 24 hours) at 6/4/2024 1356  Last data filed at 6/4/2024 1300  Gross per 24 hour   Intake 378.15 ml   Output 1700 ml   Net -1321.85 ml        Physical Exam  Vitals and nursing note reviewed.   HENT:      Head: Normocephalic and atraumatic.   Eyes:      Extraocular Movements: Extraocular movements intact.      Conjunctiva/sclera: Conjunctivae normal.   Cardiovascular:      Rate and Rhythm: Normal rate and regular rhythm.      Pulses: Normal pulses.   Pulmonary:      Effort: Pulmonary effort is normal. No respiratory distress.      Breath sounds: Normal breath sounds. No wheezing or rales.   Abdominal:      Palpations: Abdomen is soft.      Tenderness: There is no abdominal tenderness. There is no guarding.   Musculoskeletal:      Right lower leg: No edema.      Left lower leg: No edema.   Skin:     General: Skin is warm and dry.   Neurological:      General: No focal deficit present.      Mental Status: He is alert and oriented to person, place, and time.   Psychiatric:         Mood and Affect: Mood normal.          Vents:  Oxygen Concentration (%): 21 (06/02/24 0708)    Lines/Drains/Airways       Peripherally Inserted Central Catheter Line  Duration             PICC Double Lumen 05/14/24 1445 right brachial 20 days              Peripheral Intravenous Line  Duration                  Peripheral IV - Single Lumen 05/30/24 2130 20 G Anterior;Left;Proximal Forearm 4 days                    Significant Labs:    CBC/Anemia Profile:  Recent Labs   Lab 06/03/24  0535 06/04/24  0504   WBC 9.25 8.00   HGB 10.7* 10.0*   HCT 35.3* 32.6*   * 600*   MCV 74* 73*   RDW 19.1* 18.9*        Chemistries:  Recent Labs   Lab 06/02/24  1526 06/02/24  1610 06/03/24  0535 06/04/24  0504   *  --  135* 136   K 8.2* 3.5 3.6 3.7   CL 94*  --  94* 97   CO2 30*  --  28 30*   BUN 26*  --  27* 20   CREATININE 1.9*  --  1.8* 1.3   CALCIUM 9.7  --  9.9 9.6   ALBUMIN   --   --  3.2* 3.0*   PROT  --   --  7.7 7.2   BILITOT  --   --  0.6 0.5   ALKPHOS  --   --  189* 175*   ALT  --   --  83* 59*   AST  --   --  78* 46*   MG 2.6  --  2.4 2.3   PHOS  --   --  4.5 3.7       All pertinent labs within the past 24 hours have been reviewed.    Significant Imaging:   I have reviewed all pertinent imaging results/findings within the past 24 hours.

## 2024-06-04 NOTE — ASSESSMENT & PLAN NOTE
Decompensated HFrEF  NHYA III, ACC D  Patient with increasing volume status off of epinephrine.      -RHC tomorrow to assess filling pressures  -patient to be discussed at transplant meeting on Wednesday  Continue  to 5 mcg/mg/kg   Lasix 80 mg IV TID  Continue Bidil  Holding Valsartan and Spironolactone given AUDELIA  Daily weights, strict I/Os

## 2024-06-05 ENCOUNTER — COMMITTEE REVIEW (OUTPATIENT)
Dept: TRANSPLANT | Facility: CLINIC | Age: 44
End: 2024-06-05
Payer: MEDICAID

## 2024-06-05 ENCOUNTER — TELEPHONE (OUTPATIENT)
Dept: ADMINISTRATIVE | Facility: HOSPITAL | Age: 44
End: 2024-06-05
Payer: MEDICAID

## 2024-06-05 PROBLEM — N17.9 AKI (ACUTE KIDNEY INJURY): Status: RESOLVED | Noted: 2024-05-10 | Resolved: 2024-06-05

## 2024-06-05 LAB
ALBUMIN SERPL BCP-MCNC: 3.1 G/DL (ref 3.5–5.2)
ALLENS TEST: ABNORMAL
ALP SERPL-CCNC: 164 U/L (ref 55–135)
ALT SERPL W/O P-5'-P-CCNC: 47 U/L (ref 10–44)
ANION GAP SERPL CALC-SCNC: 10 MMOL/L (ref 8–16)
ANION GAP SERPL CALC-SCNC: 8 MMOL/L (ref 8–16)
AST SERPL-CCNC: 33 U/L (ref 10–40)
BASOPHILS # BLD AUTO: 0.05 K/UL (ref 0–0.2)
BASOPHILS NFR BLD: 0.6 % (ref 0–1.9)
BILIRUB SERPL-MCNC: 0.4 MG/DL (ref 0.1–1)
BUN SERPL-MCNC: 14 MG/DL (ref 6–20)
BUN SERPL-MCNC: 17 MG/DL (ref 6–20)
CALCIUM SERPL-MCNC: 9.4 MG/DL (ref 8.7–10.5)
CALCIUM SERPL-MCNC: 9.7 MG/DL (ref 8.7–10.5)
CHLORIDE SERPL-SCNC: 100 MMOL/L (ref 95–110)
CHLORIDE SERPL-SCNC: 102 MMOL/L (ref 95–110)
CLASS I ANTIBODY COMMENTS - LUMINEX: NORMAL
CLASS II ANTIBODIES - LUMINEX: NEGATIVE
CO2 SERPL-SCNC: 24 MMOL/L (ref 23–29)
CO2 SERPL-SCNC: 26 MMOL/L (ref 23–29)
CPRA %: 0
CREAT SERPL-MCNC: 1.2 MG/DL (ref 0.5–1.4)
CREAT SERPL-MCNC: 1.4 MG/DL (ref 0.5–1.4)
DIFFERENTIAL METHOD BLD: ABNORMAL
EOSINOPHIL # BLD AUTO: 0.1 K/UL (ref 0–0.5)
EOSINOPHIL NFR BLD: 1.6 % (ref 0–8)
ERYTHROCYTE [DISTWIDTH] IN BLOOD BY AUTOMATED COUNT: 19.3 % (ref 11.5–14.5)
EST. GFR  (NO RACE VARIABLE): >60 ML/MIN/1.73 M^2
EST. GFR  (NO RACE VARIABLE): >60 ML/MIN/1.73 M^2
G6PD RBC-CCNT: 14.3 U/G HB (ref 8–11.9)
GLUCOSE SERPL-MCNC: 108 MG/DL (ref 70–110)
GLUCOSE SERPL-MCNC: 137 MG/DL (ref 70–110)
HCT VFR BLD AUTO: 30.3 % (ref 40–54)
HGB BLD-MCNC: 9.3 G/DL (ref 14–18)
IMM GRANULOCYTES # BLD AUTO: 0.05 K/UL (ref 0–0.04)
IMM GRANULOCYTES NFR BLD AUTO: 0.6 % (ref 0–0.5)
LYMPHOCYTES # BLD AUTO: 1.9 K/UL (ref 1–4.8)
LYMPHOCYTES NFR BLD: 22.2 % (ref 18–48)
MAGNESIUM SERPL-MCNC: 1.8 MG/DL (ref 1.6–2.6)
MAGNESIUM SERPL-MCNC: 2.1 MG/DL (ref 1.6–2.6)
MCH RBC QN AUTO: 22.4 PG (ref 27–31)
MCHC RBC AUTO-ENTMCNC: 30.7 G/DL (ref 32–36)
MCV RBC AUTO: 73 FL (ref 82–98)
MONOCYTES # BLD AUTO: 0.8 K/UL (ref 0.3–1)
MONOCYTES NFR BLD: 9.3 % (ref 4–15)
NEUTROPHILS # BLD AUTO: 5.8 K/UL (ref 1.8–7.7)
NEUTROPHILS NFR BLD: 65.7 % (ref 38–73)
NRBC BLD-RTO: 0 /100 WBC
PHOSPHATE SERPL-MCNC: 3.9 MG/DL (ref 2.7–4.5)
PLATELET # BLD AUTO: 546 K/UL (ref 150–450)
PMV BLD AUTO: 8.1 FL (ref 9.2–12.9)
PO2 BLDA: 30 MMHG (ref 40–60)
POC SATURATED O2: 59 % (ref 95–100)
POTASSIUM SERPL-SCNC: 3.9 MMOL/L (ref 3.5–5.1)
POTASSIUM SERPL-SCNC: 4.2 MMOL/L (ref 3.5–5.1)
PROT SERPL-MCNC: 7.3 G/DL (ref 6–8.4)
RBC # BLD AUTO: 4.15 M/UL (ref 4.6–6.2)
SAMPLE: ABNORMAL
SERUM COLLECTION DT - LUMINEX CLASS I: NORMAL
SERUM COLLECTION DT - LUMINEX CLASS II: NORMAL
SITE: ABNORMAL
SODIUM SERPL-SCNC: 134 MMOL/L (ref 136–145)
SODIUM SERPL-SCNC: 136 MMOL/L (ref 136–145)
SPCL1 TESTING DATE: NORMAL
SPCL2 TESTING DATE: NORMAL
SPLUA TESTING DATE: NORMAL
T GONDII IGG SER QL IA: NORMAL
T GONDII IGG SERPL IA-ACNC: <5 IU/ML (ref 0–6.4)
VWF:AC ACT/NOR PPP IA: 209 % (ref 55–200)
WBC # BLD AUTO: 8.74 K/UL (ref 3.9–12.7)

## 2024-06-05 PROCEDURE — 25000003 PHARM REV CODE 250: Mod: NTX | Performed by: INTERNAL MEDICINE

## 2024-06-05 PROCEDURE — 80048 BASIC METABOLIC PNL TOTAL CA: CPT | Mod: NTX,XB | Performed by: INTERNAL MEDICINE

## 2024-06-05 PROCEDURE — 63600175 PHARM REV CODE 636 W HCPCS: Mod: NTX | Performed by: STUDENT IN AN ORGANIZED HEALTH CARE EDUCATION/TRAINING PROGRAM

## 2024-06-05 PROCEDURE — 99233 SBSQ HOSP IP/OBS HIGH 50: CPT | Mod: NTX,,, | Performed by: INTERNAL MEDICINE

## 2024-06-05 PROCEDURE — 25000003 PHARM REV CODE 250: Mod: NTX | Performed by: STUDENT IN AN ORGANIZED HEALTH CARE EDUCATION/TRAINING PROGRAM

## 2024-06-05 PROCEDURE — 85025 COMPLETE CBC W/AUTO DIFF WBC: CPT | Mod: NTX | Performed by: INTERNAL MEDICINE

## 2024-06-05 PROCEDURE — 99900035 HC TECH TIME PER 15 MIN (STAT): Mod: NTX

## 2024-06-05 PROCEDURE — 63600175 PHARM REV CODE 636 W HCPCS: Mod: JZ,JG,NTX | Performed by: STUDENT IN AN ORGANIZED HEALTH CARE EDUCATION/TRAINING PROGRAM

## 2024-06-05 PROCEDURE — 84100 ASSAY OF PHOSPHORUS: CPT | Mod: NTX | Performed by: INTERNAL MEDICINE

## 2024-06-05 PROCEDURE — 20600001 HC STEP DOWN PRIVATE ROOM: Mod: NTX

## 2024-06-05 PROCEDURE — 83735 ASSAY OF MAGNESIUM: CPT | Mod: 91,NTX | Performed by: INTERNAL MEDICINE

## 2024-06-05 PROCEDURE — 83735 ASSAY OF MAGNESIUM: CPT | Mod: NTX | Performed by: INTERNAL MEDICINE

## 2024-06-05 PROCEDURE — 86480 TB TEST CELL IMMUN MEASURE: CPT | Mod: NTX | Performed by: STUDENT IN AN ORGANIZED HEALTH CARE EDUCATION/TRAINING PROGRAM

## 2024-06-05 PROCEDURE — 90471 IMMUNIZATION ADMIN: CPT | Mod: NTX | Performed by: STUDENT IN AN ORGANIZED HEALTH CARE EDUCATION/TRAINING PROGRAM

## 2024-06-05 PROCEDURE — 80053 COMPREHEN METABOLIC PANEL: CPT | Mod: NTX | Performed by: INTERNAL MEDICINE

## 2024-06-05 PROCEDURE — 21400001 HC TELEMETRY ROOM: Mod: NTX

## 2024-06-05 PROCEDURE — 90739 HEPB VACC 2/4 DOSE ADULT IM: CPT | Mod: NTX | Performed by: STUDENT IN AN ORGANIZED HEALTH CARE EDUCATION/TRAINING PROGRAM

## 2024-06-05 RX ORDER — SPIRONOLACTONE 25 MG/1
25 TABLET ORAL DAILY
Status: DISCONTINUED | OUTPATIENT
Start: 2024-06-05 | End: 2024-06-07 | Stop reason: HOSPADM

## 2024-06-05 RX ORDER — LIDOCAINE AND PRILOCAINE 25; 25 MG/G; MG/G
CREAM TOPICAL ONCE
Status: COMPLETED | OUTPATIENT
Start: 2024-06-05 | End: 2024-06-05

## 2024-06-05 RX ORDER — FUROSEMIDE 10 MG/ML
80 INJECTION INTRAMUSCULAR; INTRAVENOUS EVERY 12 HOURS
Status: DISCONTINUED | OUTPATIENT
Start: 2024-06-05 | End: 2024-06-06

## 2024-06-05 RX ORDER — LACTULOSE 10 G/15ML
10 SOLUTION ORAL 3 TIMES DAILY
Status: DISCONTINUED | OUTPATIENT
Start: 2024-06-05 | End: 2024-06-07 | Stop reason: HOSPADM

## 2024-06-05 RX ADMIN — HYDROCORTISONE: 25 CREAM TOPICAL at 09:06

## 2024-06-05 RX ADMIN — OXYCODONE HYDROCHLORIDE AND ACETAMINOPHEN 1 TABLET: 5; 325 TABLET ORAL at 12:06

## 2024-06-05 RX ADMIN — ALTEPLASE 2 MG: 2.2 INJECTION, POWDER, LYOPHILIZED, FOR SOLUTION INTRAVENOUS at 07:06

## 2024-06-05 RX ADMIN — OXYCODONE 5 MG: 5 TABLET ORAL at 06:06

## 2024-06-05 RX ADMIN — PANTOPRAZOLE SODIUM 20 MG: 20 TABLET, DELAYED RELEASE ORAL at 08:06

## 2024-06-05 RX ADMIN — LACTULOSE 10 G: 20 SOLUTION ORAL at 08:06

## 2024-06-05 RX ADMIN — HYDRALAZINE HYDROCHLORIDE 25 MG: 25 TABLET ORAL at 09:06

## 2024-06-05 RX ADMIN — ALPRAZOLAM 0.25 MG: 0.25 TABLET ORAL at 10:06

## 2024-06-05 RX ADMIN — LACTULOSE 10 G: 20 SOLUTION ORAL at 02:06

## 2024-06-05 RX ADMIN — ISOSORBIDE DINITRATE 10 MG: 10 TABLET ORAL at 09:06

## 2024-06-05 RX ADMIN — LIDOCAINE AND PRILOCAINE: 25; 25 CREAM TOPICAL at 10:06

## 2024-06-05 RX ADMIN — SENNOSIDES AND DOCUSATE SODIUM 1 TABLET: 50; 8.6 TABLET ORAL at 08:06

## 2024-06-05 RX ADMIN — ATORVASTATIN CALCIUM 40 MG: 40 TABLET, FILM COATED ORAL at 08:06

## 2024-06-05 RX ADMIN — ISOSORBIDE DINITRATE 10 MG: 10 TABLET ORAL at 03:06

## 2024-06-05 RX ADMIN — SPIRONOLACTONE 25 MG: 25 TABLET ORAL at 03:06

## 2024-06-05 RX ADMIN — DOBUTAMINE HYDROCHLORIDE 5 MCG/KG/MIN: 400 INJECTION INTRAVENOUS at 03:06

## 2024-06-05 RX ADMIN — HYDRALAZINE HYDROCHLORIDE 25 MG: 25 TABLET ORAL at 05:06

## 2024-06-05 RX ADMIN — POTASSIUM CHLORIDE 40 MEQ: 1500 TABLET, EXTENDED RELEASE ORAL at 08:06

## 2024-06-05 RX ADMIN — OXYCODONE 5 MG: 5 TABLET ORAL at 09:06

## 2024-06-05 RX ADMIN — HEPATITIS B VACCINE (RECOMBINANT) ADJUVANTED 0.5 ML: 20 INJECTION, SOLUTION INTRAMUSCULAR at 06:06

## 2024-06-05 RX ADMIN — POLYETHYLENE GLYCOL 3350 17 G: 17 POWDER, FOR SOLUTION ORAL at 08:06

## 2024-06-05 RX ADMIN — VALSARTAN 20 MG: 40 TABLET, FILM COATED ORAL at 09:06

## 2024-06-05 RX ADMIN — FUROSEMIDE 80 MG: 10 INJECTION, SOLUTION INTRAVENOUS at 10:06

## 2024-06-05 RX ADMIN — ACETAMINOPHEN 650 MG: 325 TABLET ORAL at 06:06

## 2024-06-05 RX ADMIN — FUROSEMIDE 80 MG: 10 INJECTION, SOLUTION INTRAVENOUS at 09:06

## 2024-06-05 RX ADMIN — ISOSORBIDE DINITRATE 10 MG: 10 TABLET ORAL at 08:06

## 2024-06-05 RX ADMIN — HYDROCORTISONE: 25 CREAM TOPICAL at 10:06

## 2024-06-05 RX ADMIN — HYDRALAZINE HYDROCHLORIDE 25 MG: 25 TABLET ORAL at 02:06

## 2024-06-05 RX ADMIN — OXYCODONE 5 MG: 5 TABLET ORAL at 03:06

## 2024-06-05 RX ADMIN — ACETAMINOPHEN 650 MG: 325 TABLET ORAL at 11:06

## 2024-06-05 RX ADMIN — DEXTROSE MONOHYDRATE 250 ML: 100 INJECTION, SOLUTION INTRAVENOUS at 09:06

## 2024-06-05 NOTE — ASSESSMENT & PLAN NOTE
Decompensated HFrEF  NHYA III, ACC D  Given persistent dobutamine dependence and need for epinephrine-supported diuresis this admission, patient is being evaluated for advanced therapies.    -patient to be discussed at transplant meeting on Wednesday  -Pulm consult for evaluation of lung nodule seen on CT chest  Continue  to 5 mcg/mg/kg   Lasix 80 mg IV BID  Resume valsartan 20 BID and spironolactone 25  Continue Bidil

## 2024-06-05 NOTE — TREATMENT PLAN
Pulmonology Plan of Care    Team awaiting pt's wife to bring CXR and CT to further evaluate pulmonary nodule. Pt hadn't gotten to speak with his wife about bringing films yet, but planned to today.

## 2024-06-05 NOTE — HOSPITAL COURSE
The patient was admitted for acute decompensated heart failure exacerbation requiring epinephrine-supported diuresis.  Given several months of heart failure requiring home dobutamine and renal failure requiring epinephrine-supported diuresis in the hospital, the patient was evaluated for consideration of LVAD vs heart transplant (still underway).  The patient was adequately diuresed and put back on guideline directed medical therapy.  He had a right heart catheterization done on 6/3/24 that showed normal biventricular filling pressures.  He is medically ready for discharge with outpatient Heart Failure follow up and repat labs.

## 2024-06-05 NOTE — COMMITTEE REVIEW
Native Organ Dx: Dilated Myopathy: Idiopathic    Approved Pending    Presented Mr. Ayad Edmond at today's Heart Transplant/LVAD Selection Committee for consideration of Heart Transplant listing and LVAD implantation.  Decision for Transplant and VAD is approved pending pulmonary clearance, suitable care giving plan (backup caregiver), and surgical review of RV function.   I spoke with Mr Edmond and advised of above.     Note was written by Polina Nazario RN    ==========================================================

## 2024-06-05 NOTE — PROGRESS NOTES
[facility] - Cardiology Stepdown  Heart Transplant  Progress Note    Patient Name: Ayad Edmond  MRN: [id number]  Admission Date: 5/29/2024  Hospital Length of Stay: 7 days  Attending Physician: Nicole Chua MD  Primary Care Provider: Zehra Davies MD  Principal Problem:Acute on chronic combined systolic and diastolic heart failure    Subjective:   Interval History:     UOP 2 L neg 1 L  JVP 9 cm with neg HJR; SVO 59% CO 5.3L/min CI 2.6  SVR 1294    Subjective:  -had severe hemorrhoid related pain last night, was not able to sleep  -would like to know next steps of medical plan    Continuous Infusions:   DOBUTamine IV infusion (non-titrating)  5 mcg/kg/min Intravenous Continuous 5.8 mL/hr at 06/04/24 2000 5 mcg/kg/min at 06/04/24 2000     Scheduled Meds:   atorvastatin  40 mg Oral Daily    furosemide (LASIX) injection  80 mg Intravenous Q12H    heparin (porcine)  5,000 Units Subcutaneous Q8H    hydrALAZINE  25 mg Oral Q8H    hydrocortisone   Rectal BID    isosorbide dinitrate  10 mg Oral TID    lactulose  10 g Oral TID    pantoprazole  20 mg Oral Daily    polyethylene glycol  17 g Oral BID    senna-docusate 8.6-50 mg  1 tablet Oral BID    spironolactone  25 mg Oral Daily    valsartan  20 mg Oral BID     PRN Meds:  Current Facility-Administered Medications:     acetaminophen, 650 mg, Oral, Q6H PRN    ALPRAZolam, 0.25 mg, Oral, Nightly PRN    hepatitis B, 0.5 mL, Intramuscular, vaccine x 1 dose    ondansetron, 4 mg, Oral, Q6H PRN    ondansetron, 4 mg, Intravenous, Q6H PRN    oxyCODONE, 5 mg, Oral, Q6H PRN    potassium chloride, 40 mEq, Oral, Once PRN    prochlorperazine, 2.5 mg, Intravenous, Q6H PRN    senna-docusate 8.6-50 mg, 1 tablet, Oral, Daily PRN    sodium chloride 0.9%, 10 mL, Intravenous, PRN    Review of patient's allergies indicates:  No Known Allergies  Objective:     Vital Signs (Most Recent):  Temp: 98.6 °F (37 °C) (06/05/24 1132)  Pulse: (!) 117 (06/05/24 1110)  Resp: 18 (06/05/24 1109)  BP: (!)  100/55 (06/05/24 1109)  SpO2: 97 % (06/05/24 1109) Vital Signs (24h Range):  Temp:  [97.6 °F (36.4 °C)-98.6 °F (37 °C)] 98.6 °F (37 °C)  Pulse:  [] 117  Resp:  [16-29] 18  SpO2:  [97 %-100 %] 97 %  BP: (100-130)/(55-88) 100/55     Patient Vitals for the past 72 hrs (Last 3 readings):   Weight   06/05/24 0709 75 kg (165 lb 5.5 oz)   06/04/24 0400 78.5 kg (173 lb 1 oz)   06/03/24 0400 76.3 kg (168 lb 3.4 oz)     Body mass index is 24.42 kg/m².      Intake/Output Summary (Last 24 hours) at 6/5/2024 1415  Last data filed at 6/5/2024 1110  Gross per 24 hour   Intake 771.89 ml   Output 3025 ml   Net -2253.11 ml              Physical Exam  Constitutional:       Appearance: Normal appearance.   Eyes:      Pupils: Pupils are equal, round, and reactive to light.   Neck:      Comments:    Cardiovascular:      Rate and Rhythm: Normal rate and regular rhythm.      Pulses: Normal pulses.   Pulmonary:      Effort: Pulmonary effort is normal.      Breath sounds: Normal breath sounds.   Abdominal:      General: Abdomen is flat.   Musculoskeletal:         General: Normal range of motion.      Right lower leg: No edema.      Left lower leg: No edema.   Skin:     General: Skin is warm.   Neurological:      General: No focal deficit present.      Mental Status: He is alert and oriented to person, place, and time.   Psychiatric:         Mood and Affect: Mood normal.         Behavior: Behavior normal.            Significant Labs:  CBC:  Recent Labs   Lab 06/03/24  0535 06/04/24  0504 06/05/24  0532   WBC 9.25 8.00 8.74   RBC 4.79 4.46* 4.15*   HGB 10.7* 10.0* 9.3*   HCT 35.3* 32.6* 30.3*   * 600* 546*   MCV 74* 73* 73*   MCH 22.3* 22.4* 22.4*   MCHC 30.3* 30.7* 30.7*     BNP:  Recent Labs   Lab 05/29/24  1529   BNP 3,707*     CMP:  Recent Labs   Lab 06/03/24  0535 06/04/24  0504 06/04/24  1805 06/05/24  0532    105 121* 108   CALCIUM 9.9 9.6 9.3 9.7   ALBUMIN 3.2* 3.0*  --  3.1*   PROT 7.7 7.2  --  7.3   * 136  "136 134*   K 3.6 3.7 3.9 4.2   CO2 28 30* 26 26   CL 94* 97 100 100   BUN 27* 20 16 17   CREATININE 1.8* 1.3 1.2 1.2   ALKPHOS 189* 175*  --  164*   ALT 83* 59*  --  47*   AST 78* 46*  --  33   BILITOT 0.6 0.5  --  0.4      Coagulation:   Recent Labs   Lab 05/31/24  1533   INR 1.1   APTT 29.3     LDH:  No results for input(s): "LDH" in the last 72 hours.    Microbiology:  Microbiology Results (last 7 days)       Procedure Component Value Units Date/Time    Blood culture [[id number]] Collected: 05/30/24 0920    Order Status: Completed Specimen: Blood from Peripheral, Antecubital, Right Updated: 06/04/24 1212     Blood Culture, Routine No growth after 5 days.    Blood culture [[id number]] Collected: 05/30/24 0921    Order Status: Completed Specimen: Blood Updated: 06/04/24 1212     Blood Culture, Routine No growth after 5 days.            I have reviewed all pertinent labs within the past 24 hours.    Estimated Creatinine Clearance: 80.2 mL/min (based on SCr of 1.2 mg/dL).    Diagnostic Results:  I have reviewed and interpreted all pertinent imaging results/findings within the past 24 hours.  Assessment and Plan:     44M with history of HFrEF 2/2 NICM, HTN, CKD admitted for ADHF requiring dobutamine and epinephrine supported diuresis.  Given persistent inotropic dependence and lack of expected cardiac recovery, patient being evaluated for advanced therapies.    * Acute on chronic combined systolic and diastolic heart failure  Decompensated HFrEF  NHYA III, ACC D  Given persistent dobutamine dependence and need for epinephrine-supported diuresis this admission, patient is being evaluated for advanced therapies.    -patient to be discussed at transplant meeting on Wednesday  -Pulm consult for evaluation of lung nodule seen on CT chest  Continue  to 5 mcg/mg/kg   Lasix 80 mg IV BID  Resume valsartan 20 BID and spironolactone 25  Continue Bidil      Primary hypertension  HF mgmt as mentioned        Anneliese Melendez, " MD  Heart Transplant  [facility] - Cardiology Stepdown

## 2024-06-05 NOTE — PROGRESS NOTES
Pt and mom AAAO.  Provided mom with a copy of the dressing change guideline.  Pt verbalized he was approved for the heart tx today. I explained we will continue to see him and conduct VAD education just in case he ever needs it. No questions for me at this time.

## 2024-06-05 NOTE — PROGRESS NOTES
Update:    SW spoke with pt's wife this morning regarding being a back up caregiver for transplant. SW discussed caregiver responsibilities.  Pt's wife stated that she is not able to fulfill caregiver duties at this time due to being the only source of income for the family at this time, and she is also caring for her minor child.  Pt's wife stated that pt has as sister or other family members that can maybe assist.  Wife also further stated that she does not have a good relationship with pt's family.     SW discussed with team and provided update on caregiver status in selection committee this morning.    SW also met with pt and his mother to discuss need for secondary caregiver to be identified since his wife is not able to fulfill this commitment at this time.  Pt voiced understanding and agreement. Pt reported that he will discuss further with his family.    SW will follow.

## 2024-06-05 NOTE — PLAN OF CARE
Patient cooperative with routine care and procedures.  Frustrated with pain and bleeding from hemorrhoid and inability to have a bowel movement.  Mineral oil enema given with small BM.  Bleeding from hemorrhoid noted.  Patient ambulating in halls and states hemorrhoid pain lessened with ambulating.  Fall precautions reviewed and patient verbalized understanding.  Will continue to monitor.

## 2024-06-05 NOTE — SUBJECTIVE & OBJECTIVE
Interval History:     UOP 2 L neg 1 L  JVP 9 cm with neg HJR; SVO 59% CO 5.3L/min CI 2.6  SVR 1294    Subjective:  -had severe hemorrhoid related pain last night, was not able to sleep  -would like to know next steps of medical plan    Continuous Infusions:   DOBUTamine IV infusion (non-titrating)  5 mcg/kg/min Intravenous Continuous 5.8 mL/hr at 06/04/24 2000 5 mcg/kg/min at 06/04/24 2000     Scheduled Meds:   atorvastatin  40 mg Oral Daily    furosemide (LASIX) injection  80 mg Intravenous Q12H    heparin (porcine)  5,000 Units Subcutaneous Q8H    hydrALAZINE  25 mg Oral Q8H    hydrocortisone   Rectal BID    isosorbide dinitrate  10 mg Oral TID    lactulose  10 g Oral TID    pantoprazole  20 mg Oral Daily    polyethylene glycol  17 g Oral BID    senna-docusate 8.6-50 mg  1 tablet Oral BID    spironolactone  25 mg Oral Daily    valsartan  20 mg Oral BID     PRN Meds:  Current Facility-Administered Medications:     acetaminophen, 650 mg, Oral, Q6H PRN    ALPRAZolam, 0.25 mg, Oral, Nightly PRN    hepatitis B, 0.5 mL, Intramuscular, vaccine x 1 dose    ondansetron, 4 mg, Oral, Q6H PRN    ondansetron, 4 mg, Intravenous, Q6H PRN    oxyCODONE, 5 mg, Oral, Q6H PRN    potassium chloride, 40 mEq, Oral, Once PRN    prochlorperazine, 2.5 mg, Intravenous, Q6H PRN    senna-docusate 8.6-50 mg, 1 tablet, Oral, Daily PRN    sodium chloride 0.9%, 10 mL, Intravenous, PRN    Review of patient's allergies indicates:  No Known Allergies  Objective:     Vital Signs (Most Recent):  Temp: 98.6 °F (37 °C) (06/05/24 1132)  Pulse: (!) 117 (06/05/24 1110)  Resp: 18 (06/05/24 1109)  BP: (!) 100/55 (06/05/24 1109)  SpO2: 97 % (06/05/24 1109) Vital Signs (24h Range):  Temp:  [97.6 °F (36.4 °C)-98.6 °F (37 °C)] 98.6 °F (37 °C)  Pulse:  [] 117  Resp:  [16-29] 18  SpO2:  [97 %-100 %] 97 %  BP: (100-130)/(55-88) 100/55     Patient Vitals for the past 72 hrs (Last 3 readings):   Weight   06/05/24 0709 75 kg (165 lb 5.5 oz)   06/04/24 0400 78.5  "kg (173 lb 1 oz)   06/03/24 0400 76.3 kg (168 lb 3.4 oz)     Body mass index is 24.42 kg/m².      Intake/Output Summary (Last 24 hours) at 6/5/2024 1415  Last data filed at 6/5/2024 1110  Gross per 24 hour   Intake 771.89 ml   Output 3025 ml   Net -2253.11 ml              Physical Exam  Constitutional:       Appearance: Normal appearance.   Eyes:      Pupils: Pupils are equal, round, and reactive to light.   Neck:      Comments:    Cardiovascular:      Rate and Rhythm: Normal rate and regular rhythm.      Pulses: Normal pulses.   Pulmonary:      Effort: Pulmonary effort is normal.      Breath sounds: Normal breath sounds.   Abdominal:      General: Abdomen is flat.   Musculoskeletal:         General: Normal range of motion.      Right lower leg: No edema.      Left lower leg: No edema.   Skin:     General: Skin is warm.   Neurological:      General: No focal deficit present.      Mental Status: He is alert and oriented to person, place, and time.   Psychiatric:         Mood and Affect: Mood normal.         Behavior: Behavior normal.            Significant Labs:  CBC:  Recent Labs   Lab 06/03/24  0535 06/04/24  0504 06/05/24  0532   WBC 9.25 8.00 8.74   RBC 4.79 4.46* 4.15*   HGB 10.7* 10.0* 9.3*   HCT 35.3* 32.6* 30.3*   * 600* 546*   MCV 74* 73* 73*   MCH 22.3* 22.4* 22.4*   MCHC 30.3* 30.7* 30.7*     BNP:  Recent Labs   Lab 05/29/24  1529   BNP 3,707*     CMP:  Recent Labs   Lab 06/03/24  0535 06/04/24  0504 06/04/24  1805 06/05/24  0532    105 121* 108   CALCIUM 9.9 9.6 9.3 9.7   ALBUMIN 3.2* 3.0*  --  3.1*   PROT 7.7 7.2  --  7.3   * 136 136 134*   K 3.6 3.7 3.9 4.2   CO2 28 30* 26 26   CL 94* 97 100 100   BUN 27* 20 16 17   CREATININE 1.8* 1.3 1.2 1.2   ALKPHOS 189* 175*  --  164*   ALT 83* 59*  --  47*   AST 78* 46*  --  33   BILITOT 0.6 0.5  --  0.4      Coagulation:   Recent Labs   Lab 05/31/24  1533   INR 1.1   APTT 29.3     LDH:  No results for input(s): "LDH" in the last 72 " hours.    Microbiology:  Microbiology Results (last 7 days)       Procedure Component Value Units Date/Time    Blood culture [[id number]] Collected: 05/30/24 0920    Order Status: Completed Specimen: Blood from Peripheral, Antecubital, Right Updated: 06/04/24 1212     Blood Culture, Routine No growth after 5 days.    Blood culture [[id number]] Collected: 05/30/24 0921    Order Status: Completed Specimen: Blood Updated: 06/04/24 1212     Blood Culture, Routine No growth after 5 days.            I have reviewed all pertinent labs within the past 24 hours.    Estimated Creatinine Clearance: 80.2 mL/min (based on SCr of 1.2 mg/dL).    Diagnostic Results:  I have reviewed and interpreted all pertinent imaging results/findings within the past 24 hours.

## 2024-06-05 NOTE — PROGRESS NOTES
PHARM.D. PRE-TRANSPLANT NOTE:    This patient's medication therapy was evaluated as part of his pre-transplant evaluation.      The following pharmacologic concerns were noted: none       The following medications require consideration prior to HCV DAA therapy:   atorvastatin      The following factors were assessed to determine risk of rejection:     Age: 44 y.o.  Gender: male  Race: Black or   Prior Pregnancy: no   cPRA current / peak within one year: 0    Ayad Edmond is considered low risk for rejection, and will not require induction with thymoblobulin. Consideration will also be given to HLA antigen match, presence of DSA, crossmatch and cPRA at the time of transplant.     Current Facility-Administered Medications   Medication Dose Route Frequency Provider Last Rate Last Admin    acetaminophen tablet 650 mg  650 mg Oral Q6H PRN Sandro Ramirez MD   650 mg at 06/05/24 1132    ALPRAZolam tablet 0.25 mg  0.25 mg Oral Nightly PRN Jack Moya MD   0.25 mg at 06/04/24 2131    atorvastatin tablet 40 mg  40 mg Oral Daily Sandro Ramirez MD   40 mg at 06/05/24 0856    DOBUtamine 1000 mg in D5W 250 mL infusion  5 mcg/kg/min Intravenous Continuous Sandro Ramirez MD 5.8 mL/hr at 06/04/24 2000 5 mcg/kg/min at 06/04/24 2000    furosemide injection 80 mg  80 mg Intravenous TID Anneliese Melendez MD   80 mg at 06/05/24 0948    heparin (porcine) injection 5,000 Units  5,000 Units Subcutaneous Q8H Sandro Ramirez MD   5,000 Units at 05/31/24 1323    hepatitis B (HEPLISAV-B) 20 mcg/0.5 mL vaccine 0.5 mL  0.5 mL Intramuscular vaccine x 1 dose Angelita Daley MD        hydrALAZINE tablet 25 mg  25 mg Oral Q8H Jack Moya MD   25 mg at 06/05/24 0547    hydrocortisone 2.5 % rectal cream   Rectal BID Radha Ledbetter MD   Given at 06/05/24 0900    isosorbide dinitrate tablet 10 mg  10 mg Oral TID Jack Moya MD   10 mg at 06/05/24 0855    lactulose 20 gram/30 mL solution Soln  10 g  10 g Oral TID Anneliese Melendez MD   10 g at 06/05/24 0855    ondansetron disintegrating tablet 4 mg  4 mg Oral Q6H PRN Sandro Ramirez MD   4 mg at 05/30/24 1851    ondansetron injection 4 mg  4 mg Intravenous Q6H PRN Elier Gilmore MD   4 mg at 05/30/24 1417    oxyCODONE immediate release tablet 5 mg  5 mg Oral Q6H PRN Radha Ledbetter MD   5 mg at 06/05/24 0913    pantoprazole EC tablet 20 mg  20 mg Oral Daily Sandro Ramirez MD   20 mg at 06/05/24 0855    polyethylene glycol packet 17 g  17 g Oral BID Samuel Celaya MD   17 g at 06/05/24 0856    potassium chloride SA CR tablet 40 mEq  40 mEq Oral BID Anneliese Melendez MD   40 mEq at 06/05/24 0855    potassium chloride SA CR tablet 40 mEq  40 mEq Oral Once PRN Radha Ledbetter MD        prochlorperazine injection Soln 2.5 mg  2.5 mg Intravenous Q6H PRN Jack Moya MD   2.5 mg at 05/30/24 2021    senna-docusate 8.6-50 mg per tablet 1 tablet  1 tablet Oral Daily PRN Sandro Ramirez MD   1 tablet at 06/01/24 1713    senna-docusate 8.6-50 mg per tablet 1 tablet  1 tablet Oral BID Samuel Celaya MD   1 tablet at 06/05/24 0855    sodium chloride 0.9% flush 10 mL  10 mL Intravenous PRN Sandro Ramirez MD           I am available for consultation and can be contacted, as needed by the other members of the team.

## 2024-06-05 NOTE — NURSING TRANSFER
Nursing Transfer Note      6/4/2024   8:15 PM    Nurse giving handoff: RD Long  Nurse receiving handoff: RD Marr    Reason patient is being transferred: Step-down    Transfer To: CSU    Transfer via wheelchair    Transfer with cardiac monitoring    Transported by RN    Transfer Vital Signs:  Blood Pressure:130/80  Heart Rate:100  O2: 100  Temperature: 98.5  Respirations: 22    Order for Tele Monitor? Yes    4eyes on Skin: yes    Patient belongings transferred with patient: Yes    Chart send with patient: Yes    Notified: mother    Upon arrival to floor: cardiac monitor applied, patient oriented to room, call bell in reach, and bed in lowest position

## 2024-06-05 NOTE — NURSING
Patient received via wheelchair accompanied by RN.  Initial assessment completed and care plan updated.  Patient oriented to room and call bell.  Mother at bedside with patient.  Patient ambulatory and noted to have steady gait with no c/o weakness or dizziness.  Patient with c/o constipation and pain from hemorrhoids.  Mineral oil enema ordered and pending.      Nurses Note -- 4 Eyes      6/5/2024   12:50 AM      Skin assessed during: Transfer      [x] No Altered Skin Integrity Present    []Prevention Measures Documented      [] Yes- Altered Skin Integrity Present or Discovered   [] LDA Added if Not in Epic (Describe Wound)   [] New Altered Skin Integrity was Present on Admit and Documented in LDA   [] Wound Image Taken    Wound Care Consulted? No    Attending Nurse:  Justa Shi RN     Second RN/Staff Member:  RD Pool

## 2024-06-06 LAB
ALBUMIN SERPL BCP-MCNC: 3.1 G/DL (ref 3.5–5.2)
ALLENS TEST: ABNORMAL
ALP SERPL-CCNC: 157 U/L (ref 55–135)
ALT SERPL W/O P-5'-P-CCNC: 37 U/L (ref 10–44)
ANION GAP SERPL CALC-SCNC: 9 MMOL/L (ref 8–16)
AST SERPL-CCNC: 26 U/L (ref 10–40)
BASOPHILS # BLD AUTO: 0.05 K/UL (ref 0–0.2)
BASOPHILS # BLD AUTO: 0.06 K/UL (ref 0–0.2)
BASOPHILS NFR BLD: 0.4 % (ref 0–1.9)
BASOPHILS NFR BLD: 0.5 % (ref 0–1.9)
BILIRUB SERPL-MCNC: 0.4 MG/DL (ref 0.1–1)
BILIRUB UR QL STRIP: NEGATIVE
BUN SERPL-MCNC: 13 MG/DL (ref 6–20)
CALCIUM SERPL-MCNC: 9.6 MG/DL (ref 8.7–10.5)
CHLORIDE SERPL-SCNC: 101 MMOL/L (ref 95–110)
CLARITY UR REFRACT.AUTO: CLEAR
CLINICAL BIOCHEMIST REVIEW: NORMAL
CO2 SERPL-SCNC: 25 MMOL/L (ref 23–29)
COLOR UR AUTO: YELLOW
CREAT SERPL-MCNC: 1.3 MG/DL (ref 0.5–1.4)
DIFFERENTIAL METHOD BLD: ABNORMAL
DIFFERENTIAL METHOD BLD: ABNORMAL
DLCO ADJ PRE: 19.87 ML/(MIN*MMHG) (ref 24.93–38.79)
DLCO SINGLE BREATH LLN: 24.93
DLCO SINGLE BREATH PRE REF: 51.4 %
DLCO SINGLE BREATH REF: 31.86
DLCOC SBVA LLN: 3.34
DLCOC SBVA PRE REF: 80.8 %
DLCOC SBVA REF: 4.6
DLCOC SINGLE BREATH LLN: 24.93
DLCOC SINGLE BREATH PRE REF: 62.4 %
DLCOC SINGLE BREATH REF: 31.86
DLCOCSBVAULN: 5.86
DLCOCSINGLEBREATHULN: 38.79
DLCOSINGLEBREATHULN: 38.79
DLCOVA LLN: 3.34
DLCOVA PRE REF: 66.5 %
DLCOVA PRE: 3.06 ML/(MIN*MMHG*L) (ref 3.34–5.86)
DLCOVA REF: 4.6
DLCOVAULN: 5.86
DLVAADJ PRE: 3.72 ML/(MIN*MMHG*L) (ref 3.34–5.86)
EOSINOPHIL # BLD AUTO: 0.1 K/UL (ref 0–0.5)
EOSINOPHIL # BLD AUTO: 0.2 K/UL (ref 0–0.5)
EOSINOPHIL NFR BLD: 0.4 % (ref 0–8)
EOSINOPHIL NFR BLD: 1.3 % (ref 0–8)
ERVN2 LLN: -16448.6
ERVN2 PRE REF: 96.8 %
ERVN2 PRE: 1.35 L (ref -16448.6–16451.4)
ERVN2 REF: 1.4
ERVN2ULN: ABNORMAL
ERYTHROCYTE [DISTWIDTH] IN BLOOD BY AUTOMATED COUNT: 19.3 % (ref 11.5–14.5)
ERYTHROCYTE [DISTWIDTH] IN BLOOD BY AUTOMATED COUNT: 19.7 % (ref 11.5–14.5)
EST. GFR  (NO RACE VARIABLE): >60 ML/MIN/1.73 M^2
FEF 25 75 LLN: 1.74
FEF 25 75 PRE REF: 93.4 %
FEF 25 75 REF: 3.4
FEV05 LLN: 1.89
FEV05 REF: 3.02
FEV1 FVC LLN: 71
FEV1 FVC PRE REF: 98.3 %
FEV1 FVC REF: 81
FEV1 LLN: 2.63
FEV1 PRE REF: 100.7 %
FEV1 REF: 3.44
FRCN2 LLN: 2.4
FRCN2 PRE REF: 79.1 %
FRCN2 REF: 3.39
FRCN2ULN: 4.38
FVC LLN: 3.3
FVC PRE REF: 102.2 %
FVC REF: 4.24
GAMMA INTERFERON BACKGROUND BLD IA-ACNC: 0.03 IU/ML
GLUCOSE SERPL-MCNC: 98 MG/DL (ref 70–110)
GLUCOSE UR QL STRIP: NEGATIVE
HCT VFR BLD AUTO: 30.3 % (ref 40–54)
HCT VFR BLD AUTO: 31.2 % (ref 40–54)
HGB BLD-MCNC: 9.6 G/DL (ref 14–18)
HGB BLD-MCNC: 9.6 G/DL (ref 14–18)
HGB UR QL STRIP: NEGATIVE
ICN2REF: 3.47
IMM GRANULOCYTES # BLD AUTO: 0.04 K/UL (ref 0–0.04)
IMM GRANULOCYTES # BLD AUTO: 0.05 K/UL (ref 0–0.04)
IMM GRANULOCYTES NFR BLD AUTO: 0.3 % (ref 0–0.5)
IMM GRANULOCYTES NFR BLD AUTO: 0.4 % (ref 0–0.5)
IVC PRE: 4.17 L (ref 3.3–5.21)
IVC SINGLE BREATH LLN: 3.3
IVC SINGLE BREATH PRE REF: 98.2 %
IVC SINGLE BREATH REF: 4.24
IVCSINGLEBREATHULN: 5.21
KETONES UR QL STRIP: NEGATIVE
LABORATORY COMMENT REPORT: NORMAL
LABORATORY COMMENT REPORT: NORMAL
LEUKOCYTE ESTERASE UR QL STRIP: NEGATIVE
LLN IC N2: -9999996.53
LYMPHOCYTES # BLD AUTO: 1.3 K/UL (ref 1–4.8)
LYMPHOCYTES # BLD AUTO: 1.8 K/UL (ref 1–4.8)
LYMPHOCYTES NFR BLD: 10.5 % (ref 18–48)
LYMPHOCYTES NFR BLD: 15 % (ref 18–48)
M TB IFN-G CD4+ BCKGRND COR BLD-ACNC: -0.02 IU/ML
M TB IFN-G CD4+ BCKGRND COR BLD-ACNC: 0 IU/ML
MAGNESIUM SERPL-MCNC: 1.9 MG/DL (ref 1.6–2.6)
MCH RBC QN AUTO: 22.3 PG (ref 27–31)
MCH RBC QN AUTO: 22.6 PG (ref 27–31)
MCHC RBC AUTO-ENTMCNC: 30.8 G/DL (ref 32–36)
MCHC RBC AUTO-ENTMCNC: 31.7 G/DL (ref 32–36)
MCV RBC AUTO: 72 FL (ref 82–98)
MCV RBC AUTO: 72 FL (ref 82–98)
MITOGEN IGNF BCKGRD COR BLD-ACNC: 9.97 IU/ML
MONOCYTES # BLD AUTO: 0.7 K/UL (ref 0.3–1)
MONOCYTES # BLD AUTO: 0.8 K/UL (ref 0.3–1)
MONOCYTES NFR BLD: 6.2 % (ref 4–15)
MONOCYTES NFR BLD: 6.5 % (ref 4–15)
MVV LLN: 130
MVV PRE REF: 90.1 %
MVV REF: 153
NEUTROPHILS # BLD AUTO: 10.5 K/UL (ref 1.8–7.7)
NEUTROPHILS # BLD AUTO: 9.2 K/UL (ref 1.8–7.7)
NEUTROPHILS NFR BLD: 76.8 % (ref 38–73)
NEUTROPHILS NFR BLD: 81.7 % (ref 38–73)
NITRITE UR QL STRIP: NEGATIVE
NRBC BLD-RTO: 0 /100 WBC
NRBC BLD-RTO: 0 /100 WBC
PEF LLN: 6.33
PEF PRE REF: 126.2 %
PEF REF: 8.91
PH UR STRIP: 6 [PH] (ref 5–8)
PHOSPHATE SERPL-MCNC: 4.3 MG/DL (ref 2.7–4.5)
PHYSICIAN COMMENT: ABNORMAL
PLATELET # BLD AUTO: 545 K/UL (ref 150–450)
PLATELET # BLD AUTO: 553 K/UL (ref 150–450)
PLPETH BLD-MCNC: NORMAL NG/ML
PMV BLD AUTO: 7.9 FL (ref 9.2–12.9)
PMV BLD AUTO: 8 FL (ref 9.2–12.9)
PO2 BLDA: 33 MMHG (ref 40–60)
POC SATURATED O2: 65 % (ref 95–100)
POPETH BLD-MCNC: <10 NG/ML
POTASSIUM SERPL-SCNC: 3.9 MMOL/L (ref 3.5–5.1)
PRE DLCO: 16.36 ML/(MIN*MMHG) (ref 24.93–38.79)
PRE FEF 25 75: 3.17 L/S (ref 1.74–5.6)
PRE FET 100: 6.79 SEC
PRE FEV05 REF: 90.4 %
PRE FEV1 FVC: 79.77 % (ref 71.02–89.82)
PRE FEV1: 3.46 L (ref 2.63–4.2)
PRE FEV5: 2.73 L (ref 1.89–4.16)
PRE FRC N2: 2.68 L (ref 2.4–4.38)
PRE FVC: 4.34 L (ref 3.3–5.21)
PRE IC N2: 2.98 L (ref -9999996.53–#######.####)
PRE MVV: 137.99 L/MIN (ref 130.16–176.1)
PRE PEF: 11.24 L/S (ref 6.33–11.49)
PRE REF IC N2: 86.1 %
PROCALCITONIN SERPL IA-MCNC: 0.39 NG/ML
PROT SERPL-MCNC: 7.4 G/DL (ref 6–8.4)
PROT UR QL STRIP: NEGATIVE
RBC # BLD AUTO: 4.24 M/UL (ref 4.6–6.2)
RBC # BLD AUTO: 4.31 M/UL (ref 4.6–6.2)
RVN2 LLN: 1.32
RVN2 PRE REF: 66.7 %
RVN2 PRE: 1.33 L (ref 1.32–2.66)
RVN2 REF: 1.99
RVN2TLCN2 LLN: 21.36
RVN2TLCN2 PRE REF: 77.2 %
RVN2TLCN2 PRE: 23.43 % (ref 21.36–39.32)
RVN2TLCN2 REF: 30.34
RVN2TLCN2ULN: 39.32
RVN2ULN: 2.66
SAMPLE: ABNORMAL
SITE: ABNORMAL
SODIUM SERPL-SCNC: 135 MMOL/L (ref 136–145)
SP GR UR STRIP: 1.01 (ref 1–1.03)
SRA 0.1IU/ML UFH SER-ACNC: 7 %
SRA 100IU/ML UFH SER-ACNC: <5 %
SRA INTERP SER-IMP: NORMAL
SRA UFH SER-IMP: NEGATIVE
TB GOLD PLUS: NEGATIVE
TLCN2 LLN: 5.77
TLCN2 PRE REF: 81.8 %
TLCN2 PRE: 5.66 L (ref 5.77–8.07)
TLCN2 REF: 6.92
TLCN2ULN: 8.07
ULN IC N2: ABNORMAL
URN SPEC COLLECT METH UR: NORMAL
VA PRE: 5.34 L (ref 6.77–6.77)
VA SINGLE BREATH LLN: 6.77
VA SINGLE BREATH PRE REF: 78.9 %
VA SINGLE BREATH REF: 6.77
VASINGLEBREATHULN: 6.77
VCMAXN2 LLN: 3.3
VCMAXN2 PRE REF: 102.2 %
VCMAXN2 PRE: 4.34 L (ref 3.3–5.21)
VCMAXN2 REF: 4.24
VCMAXN2ULN: 5.21
WBC # BLD AUTO: 12 K/UL (ref 3.9–12.7)
WBC # BLD AUTO: 12.8 K/UL (ref 3.9–12.7)

## 2024-06-06 PROCEDURE — 84100 ASSAY OF PHOSPHORUS: CPT | Mod: NTX | Performed by: INTERNAL MEDICINE

## 2024-06-06 PROCEDURE — 25000003 PHARM REV CODE 250: Mod: NTX | Performed by: STUDENT IN AN ORGANIZED HEALTH CARE EDUCATION/TRAINING PROGRAM

## 2024-06-06 PROCEDURE — 80053 COMPREHEN METABOLIC PANEL: CPT | Mod: NTX | Performed by: INTERNAL MEDICINE

## 2024-06-06 PROCEDURE — 99233 SBSQ HOSP IP/OBS HIGH 50: CPT | Mod: NTX,,, | Performed by: INTERNAL MEDICINE

## 2024-06-06 PROCEDURE — 87086 URINE CULTURE/COLONY COUNT: CPT | Mod: NTX | Performed by: STUDENT IN AN ORGANIZED HEALTH CARE EDUCATION/TRAINING PROGRAM

## 2024-06-06 PROCEDURE — 36415 COLL VENOUS BLD VENIPUNCTURE: CPT | Mod: NTX | Performed by: INTERNAL MEDICINE

## 2024-06-06 PROCEDURE — 87040 BLOOD CULTURE FOR BACTERIA: CPT | Mod: 59,NTX | Performed by: STUDENT IN AN ORGANIZED HEALTH CARE EDUCATION/TRAINING PROGRAM

## 2024-06-06 PROCEDURE — 25000003 PHARM REV CODE 250: Mod: NTX | Performed by: INTERNAL MEDICINE

## 2024-06-06 PROCEDURE — 94729 DIFFUSING CAPACITY: CPT | Mod: NTX | Performed by: INTERNAL MEDICINE

## 2024-06-06 PROCEDURE — 25000242 PHARM REV CODE 250 ALT 637 W/ HCPCS: Mod: NTX | Performed by: STUDENT IN AN ORGANIZED HEALTH CARE EDUCATION/TRAINING PROGRAM

## 2024-06-06 PROCEDURE — 99232 SBSQ HOSP IP/OBS MODERATE 35: CPT | Mod: NTX,,, | Performed by: INTERNAL MEDICINE

## 2024-06-06 PROCEDURE — 85025 COMPLETE CBC W/AUTO DIFF WBC: CPT | Mod: 91,NTX | Performed by: INTERNAL MEDICINE

## 2024-06-06 PROCEDURE — 81003 URINALYSIS AUTO W/O SCOPE: CPT | Mod: NTX | Performed by: STUDENT IN AN ORGANIZED HEALTH CARE EDUCATION/TRAINING PROGRAM

## 2024-06-06 PROCEDURE — 21400001 HC TELEMETRY ROOM: Mod: NTX

## 2024-06-06 PROCEDURE — 94727 GAS DIL/WSHOT DETER LNG VOL: CPT | Mod: NTX | Performed by: INTERNAL MEDICINE

## 2024-06-06 PROCEDURE — 83735 ASSAY OF MAGNESIUM: CPT | Mod: NTX | Performed by: INTERNAL MEDICINE

## 2024-06-06 PROCEDURE — 84145 PROCALCITONIN (PCT): CPT | Mod: NTX | Performed by: STUDENT IN AN ORGANIZED HEALTH CARE EDUCATION/TRAINING PROGRAM

## 2024-06-06 PROCEDURE — 63600175 PHARM REV CODE 636 W HCPCS: Mod: NTX | Performed by: STUDENT IN AN ORGANIZED HEALTH CARE EDUCATION/TRAINING PROGRAM

## 2024-06-06 PROCEDURE — 87040 BLOOD CULTURE FOR BACTERIA: CPT | Mod: NTX | Performed by: INTERNAL MEDICINE

## 2024-06-06 PROCEDURE — 99900035 HC TECH TIME PER 15 MIN (STAT): Mod: NTX

## 2024-06-06 PROCEDURE — 85025 COMPLETE CBC W/AUTO DIFF WBC: CPT | Mod: NTX | Performed by: STUDENT IN AN ORGANIZED HEALTH CARE EDUCATION/TRAINING PROGRAM

## 2024-06-06 PROCEDURE — 94010 BREATHING CAPACITY TEST: CPT | Mod: NTX | Performed by: INTERNAL MEDICINE

## 2024-06-06 RX ORDER — BUMETANIDE 1 MG/1
2 TABLET ORAL DAILY
Status: DISCONTINUED | OUTPATIENT
Start: 2024-06-06 | End: 2024-06-07 | Stop reason: HOSPADM

## 2024-06-06 RX ORDER — HYDRALAZINE HYDROCHLORIDE 50 MG/1
25 TABLET, FILM COATED ORAL EVERY 8 HOURS
Qty: 45 TABLET | Refills: 11 | Status: SHIPPED | OUTPATIENT
Start: 2024-06-06 | End: 2025-06-06

## 2024-06-06 RX ORDER — ISOSORBIDE DINITRATE 20 MG/1
10 TABLET ORAL 3 TIMES DAILY
Qty: 45 TABLET | Refills: 11 | Status: SHIPPED | OUTPATIENT
Start: 2024-06-06 | End: 2025-06-06

## 2024-06-06 RX ORDER — BUMETANIDE 2 MG/1
2 TABLET ORAL DAILY
Qty: 30 TABLET | Refills: 11 | Status: SHIPPED | OUTPATIENT
Start: 2024-06-06 | End: 2025-06-06

## 2024-06-06 RX ADMIN — SPIRONOLACTONE 25 MG: 25 TABLET ORAL at 09:06

## 2024-06-06 RX ADMIN — HYDRALAZINE HYDROCHLORIDE 25 MG: 25 TABLET ORAL at 05:06

## 2024-06-06 RX ADMIN — ISOSORBIDE DINITRATE 10 MG: 10 TABLET ORAL at 09:06

## 2024-06-06 RX ADMIN — SENNOSIDES AND DOCUSATE SODIUM 1 TABLET: 50; 8.6 TABLET ORAL at 09:06

## 2024-06-06 RX ADMIN — HYDRALAZINE HYDROCHLORIDE 25 MG: 25 TABLET ORAL at 02:06

## 2024-06-06 RX ADMIN — VALSARTAN 20 MG: 40 TABLET, FILM COATED ORAL at 09:06

## 2024-06-06 RX ADMIN — FUROSEMIDE 80 MG: 10 INJECTION, SOLUTION INTRAVENOUS at 09:06

## 2024-06-06 RX ADMIN — HYDROCORTISONE: 25 CREAM TOPICAL at 09:06

## 2024-06-06 RX ADMIN — BUMETANIDE 2 MG: 1 TABLET ORAL at 02:06

## 2024-06-06 RX ADMIN — DOBUTAMINE HYDROCHLORIDE 5 MCG/KG/MIN: 400 INJECTION INTRAVENOUS at 02:06

## 2024-06-06 RX ADMIN — POLYETHYLENE GLYCOL 3350 17 G: 17 POWDER, FOR SOLUTION ORAL at 09:06

## 2024-06-06 RX ADMIN — ATORVASTATIN CALCIUM 40 MG: 40 TABLET, FILM COATED ORAL at 09:06

## 2024-06-06 RX ADMIN — LACTULOSE 10 G: 20 SOLUTION ORAL at 09:06

## 2024-06-06 RX ADMIN — ISOSORBIDE DINITRATE 10 MG: 10 TABLET ORAL at 02:06

## 2024-06-06 RX ADMIN — ALPRAZOLAM 0.25 MG: 0.25 TABLET ORAL at 09:06

## 2024-06-06 RX ADMIN — HYDRALAZINE HYDROCHLORIDE 25 MG: 25 TABLET ORAL at 09:06

## 2024-06-06 RX ADMIN — EMPAGLIFLOZIN 10 MG: 10 TABLET, FILM COATED ORAL at 03:06

## 2024-06-06 RX ADMIN — ACETAMINOPHEN 650 MG: 325 TABLET ORAL at 01:06

## 2024-06-06 RX ADMIN — PANTOPRAZOLE SODIUM 20 MG: 20 TABLET, DELAYED RELEASE ORAL at 09:06

## 2024-06-06 NOTE — PROGRESS NOTES
[facility] - Cardiology Stepdown  Pulmonology  Progress Note    Patient Name: Ayad Edmond  MRN: [id number]  Admission Date: 5/29/2024  Hospital Length of Stay: 8 days  Code Status: Full Code  Attending Provider: Nicole Chua MD  Primary Care Provider: Zehra Davies MD   Principal Problem: Acute on chronic combined systolic and diastolic heart failure    Subjective:     Interval History: Febrile to 101F overnight with new leukocytosis. Septic workup initiated. Pt's wife brought CXR and CT films and radiology read from patient's visit to [place] a year ago (pt had hemoptysis prior to trip to [place] and pt's wife arranged a visit with Pulmonology in [place] during that time).       Objective:     Vital Signs (Most Recent):  Temp: 98.6 °F (37 °C) (06/06/24 1143)  Pulse: 110 (06/06/24 1215)  Resp: 18 (06/06/24 1143)  BP: (!) 111/56 (06/06/24 1143)  SpO2: 98 % (06/06/24 1143) Vital Signs (24h Range):  Temp:  [98 °F (36.7 °C)-101.2 °F (38.4 °C)] 98.6 °F (37 °C)  Pulse:  [] 110  Resp:  [16-18] 18  SpO2:  [96 %-100 %] 98 %  BP: ()/(51-78) 111/56     Weight: 74 kg (163 lb 2.3 oz)  Body mass index is 24.09 kg/m².      Intake/Output Summary (Last 24 hours) at 6/6/2024 1338  Last data filed at 6/6/2024 1039  Gross per 24 hour   Intake 1080 ml   Output 1850 ml   Net -770 ml        Physical Exam  HENT:      Head: Normocephalic and atraumatic.   Cardiovascular:      Rate and Rhythm: Normal rate and regular rhythm.      Pulses: Normal pulses.   Pulmonary:      Effort: Pulmonary effort is normal. No respiratory distress.      Breath sounds: No wheezing or rales.   Abdominal:      Palpations: Abdomen is soft.      Tenderness: There is abdominal tenderness. There is no guarding.   Neurological:      General: No focal deficit present.      Mental Status: He is alert and oriented to person, place, and time.   Psychiatric:         Mood and Affect: Mood normal.           Review of Systems   Constitutional:  Negative for  activity change, appetite change and fever.   HENT: Negative.     Respiratory:  Negative for cough, shortness of breath and wheezing.    Cardiovascular:  Negative for chest pain and leg swelling.   Gastrointestinal:  Positive for abdominal pain. Negative for constipation, diarrhea, nausea and vomiting.   Genitourinary: Negative.    Skin:  Negative for rash.   Neurological:  Negative for headaches.       Vents:  Oxygen Concentration (%): 21 (06/02/24 0708)    Lines/Drains/Airways       Peripherally Inserted Central Catheter Line  Duration             PICC Double Lumen 05/14/24 1445 right brachial 22 days                    Significant Labs:    CBC/Anemia Profile:  Recent Labs   Lab 06/05/24  0532 06/06/24  0142 06/06/24  0509   WBC 8.74 12.80* 12.00   HGB 9.3* 9.6* 9.6*   HCT 30.3* 31.2* 30.3*   * 545* 553*   MCV 73* 72* 72*   RDW 19.3* 19.3* 19.7*        Chemistries:  Recent Labs   Lab 06/05/24  0532 06/05/24  2216 06/06/24  0509   * 136 135*   K 4.2 3.9 3.9    102 101   CO2 26 24 25   BUN 17 14 13   CREATININE 1.2 1.4 1.3   CALCIUM 9.7 9.4 9.6   ALBUMIN 3.1*  --  3.1*   PROT 7.3  --  7.4   BILITOT 0.4  --  0.4   ALKPHOS 164*  --  157*   ALT 47*  --  37   AST 33  --  26   MG 2.1 1.8 1.9   PHOS 3.9  --  4.3       All pertinent labs within the past 24 hours have been reviewed.    Significant Imaging:  I have reviewed all pertinent imaging results/findings within the past 24 hours.  Assessment/Plan:     Pulmonary  Pulmonary nodule  Patient with stable nonspecific opacity in the right lower lobe concerning for a lung nodule in the RLL in the setting of prior cigarette use. Pt reports one episode of hemoptysis last year prompting a CT chest at medical facility when he was visiting [place] with his wife.    - Review of CXR and CT chest from 05/19/2023 demonstrate no nodule. Review of radiology report from [place] on 01/17/23 makes no note of abnormality besides concern for central venous  congestion.  - Advised pt that he will need percutaneous needle biopsy with IR in order to definitively characterize nodule. Discussed typical steps of procedure, possible adverse effects, and expected duration. Patient continues to express feeling overwhelmed with entirety of procedure and LVAD/heart transplant discussion. Advised that decision didn't need to be made at that moment, but that this was an important part of his pre-transplant workup.   - Will continue to follow and discuss need for needle biopsy                   Roddy Murray MD  Pulmonology  [facility] - Cardiology Stepdown

## 2024-06-06 NOTE — PLAN OF CARE
Temp (101.2) and WBC (12.8) elevated overnight. CXR, urine culture, and blood cultures obtained. UA results WNL. Temp came down to 98.0. Able to get blood return from PICC after cathflo. Hemorrhoid pain controlled with sitz bath, scheduled creams, and PRN meds. Plan of care discussed with patient. Patient is free of fall/trauma/injury. Denies CP, SOB, or pain/discomfort. All questions addressed.     Problem: Adult Inpatient Plan of Care  Goal: Plan of Care Review  Outcome: Progressing  Goal: Patient-Specific Goal (Individualized)  Outcome: Progressing  Flowsheets (Taken 6/6/2024 0123)  Individualized Care Needs: GI consult, pain mgmt  Anxieties, Fears or Concerns: hemorrhoid pain     Problem: Acute Kidney Injury/Impairment  Goal: Fluid and Electrolyte Balance  Outcome: Progressing  Goal: Effective Renal Function  Outcome: Progressing     Problem: Infection  Goal: Absence of Infection Signs and Symptoms  Outcome: Progressing     Problem: Heart Failure  Goal: Optimal Coping  Outcome: Progressing  Goal: Optimal Cardiac Output  Outcome: Progressing  Goal: Stable Heart Rate and Rhythm  Outcome: Progressing  Goal: Optimal Functional Ability  Outcome: Progressing  Goal: Fluid and Electrolyte Balance  Outcome: Progressing     Problem: Cardiac Output Decreased  Goal: Effective Cardiac Output  Outcome: Progressing

## 2024-06-06 NOTE — NURSING
Pt c/o 8/10 pain d/t hemorrhoids after receiving PRN Tylenol and Oxy. Pt requesting GI consult and additional pain control. MD notified, stated to try sitz bath and lidocaine cream in addition to scheduled hydrocortisone cream and that GI team would see pt during day tomorrow.

## 2024-06-06 NOTE — PROGRESS NOTES
Interdisciplinary Rounds Report:   Attended interdisciplinary rounds with the [facility]/CTS services including the LVAD Coordinators, social workers, cardiologists, surgeons,  PT/OT/Speech, dietician, and unit charge nurses. Discussed patient status, plan of care, goals of care, including DC date, and post discharge needs. Plan of care will be discussed with the patient and/or family per the physician while rounding on the floor. This is a recurring meeting that is medically and socially necessary to collaborate with the interdisciplinary team to assist patient needs and safe discharge.

## 2024-06-06 NOTE — PROGRESS NOTES
Update:  SW met with pt, his wife, and his mother.  Discussed caregiver needs. Pt's mom is committed to being primary caregiver, but does not drive. Pt plans to use [employer] or [employer].  Pt stated that he will contact his son to discuss being a secondary caregiver since wife is not able to commit at this time due to being sole breadwinner for the family and is caring for a minor child.  Pt advised to have his son call me to set up meeting for next week for solidify his caregiver plans.  Pt voiced understanding and agreement.  SW remains available.

## 2024-06-06 NOTE — PROGRESS NOTES
[facility] - Cardiology Stepdown  Heart Transplant  Progress Note    Patient Name: Ayad Edmond  MRN: [id number]  Admission Date: 5/29/2024  Hospital Length of Stay: 8 days  Attending Physician: Nicole Chua MD  Primary Care Provider: Zehra Davies MD  Principal Problem:Acute on chronic combined systolic and diastolic heart failure    Subjective:   Interval History:     UOP 2.6 L neg 1.6 L  JVP 9 cm with neg HJR; SVO 65% CO 6.2L/min CI 3.2      Subjective:  -doing well, had his wife bring in his lung imaging to Pul this morning    Continuous Infusions:   DOBUTamine IV infusion (non-titrating)  5 mcg/kg/min Intravenous Continuous 5.8 mL/hr at 06/06/24 1429 5 mcg/kg/min at 06/06/24 1429     Scheduled Meds:   atorvastatin  40 mg Oral Daily    bumetanide  2 mg Oral Daily    empagliflozin  10 mg Oral Daily    heparin (porcine)  5,000 Units Subcutaneous Q8H    hydrALAZINE  25 mg Oral Q8H    hydrocortisone   Rectal BID    isosorbide dinitrate  10 mg Oral TID    lactulose  10 g Oral TID    pantoprazole  20 mg Oral Daily    polyethylene glycol  17 g Oral BID    senna-docusate 8.6-50 mg  1 tablet Oral BID    spironolactone  25 mg Oral Daily    valsartan  20 mg Oral BID     PRN Meds:  Current Facility-Administered Medications:     acetaminophen, 650 mg, Oral, Q6H PRN    ALPRAZolam, 0.25 mg, Oral, Nightly PRN    ondansetron, 4 mg, Oral, Q6H PRN    ondansetron, 4 mg, Intravenous, Q6H PRN    oxyCODONE, 5 mg, Oral, Q6H PRN    potassium chloride, 40 mEq, Oral, Once PRN    prochlorperazine, 2.5 mg, Intravenous, Q6H PRN    senna-docusate 8.6-50 mg, 1 tablet, Oral, Daily PRN    sodium chloride 0.9%, 10 mL, Intravenous, PRN    Review of patient's allergies indicates:  No Known Allergies  Objective:     Vital Signs (Most Recent):  Temp: 98.6 °F (37 °C) (06/06/24 1143)  Pulse: 110 (06/06/24 1215)  Resp: 18 (06/06/24 1143)  BP: (!) 111/56 (06/06/24 1143)  SpO2: 98 % (06/06/24 1143) Vital Signs (24h Range):  Temp:  [98 °F  "(36.7 °C)-101.2 °F (38.4 °C)] 98.6 °F (37 °C)  Pulse:  [] 110  Resp:  [16-18] 18  SpO2:  [96 %-100 %] 98 %  BP: ()/(51-78) 111/56     Patient Vitals for the past 72 hrs (Last 3 readings):   Weight   06/06/24 0911 74 kg (163 lb 2.3 oz)   06/05/24 0709 75 kg (165 lb 5.5 oz)   06/04/24 0400 78.5 kg (173 lb 1 oz)     Body mass index is 24.09 kg/m².      Intake/Output Summary (Last 24 hours) at 6/6/2024 1458  Last data filed at 6/6/2024 1039  Gross per 24 hour   Intake 1080 ml   Output 1850 ml   Net -770 ml              Physical Exam  Constitutional:       Appearance: Normal appearance.   Eyes:      Pupils: Pupils are equal, round, and reactive to light.   Neck:      Comments:    Cardiovascular:      Rate and Rhythm: Normal rate and regular rhythm.      Pulses: Normal pulses.   Pulmonary:      Effort: Pulmonary effort is normal.      Breath sounds: Normal breath sounds.   Abdominal:      General: Abdomen is flat.   Musculoskeletal:         General: Normal range of motion.      Right lower leg: No edema.      Left lower leg: No edema.   Skin:     General: Skin is warm.   Neurological:      General: No focal deficit present.      Mental Status: He is alert and oriented to person, place, and time.   Psychiatric:         Mood and Affect: Mood normal.         Behavior: Behavior normal.            Significant Labs:  CBC:  Recent Labs   Lab 06/05/24  0532 06/06/24  0142 06/06/24  0509   WBC 8.74 12.80* 12.00   RBC 4.15* 4.31* 4.24*   HGB 9.3* 9.6* 9.6*   HCT 30.3* 31.2* 30.3*   * 545* 553*   MCV 73* 72* 72*   MCH 22.4* 22.3* 22.6*   MCHC 30.7* 30.8* 31.7*     BNP:  No results for input(s): "BNP" in the last 168 hours.    Invalid input(s): "BNPTRIAGELBLO"    CMP:  Recent Labs   Lab 06/04/24  0504 06/04/24  1805 06/05/24  0532 06/05/24  2216 06/06/24  0509      < > 108 137* 98   CALCIUM 9.6   < > 9.7 9.4 9.6   ALBUMIN 3.0*  --  3.1*  --  3.1*   PROT 7.2  --  7.3  --  7.4      < > 134* 136 135*   K " "3.7   < > 4.2 3.9 3.9   CO2 30*   < > 26 24 25   CL 97   < > 100 102 101   BUN 20   < > 17 14 13   CREATININE 1.3   < > 1.2 1.4 1.3   ALKPHOS 175*  --  164*  --  157*   ALT 59*  --  47*  --  37   AST 46*  --  33  --  26   BILITOT 0.5  --  0.4  --  0.4    < > = values in this interval not displayed.      Coagulation:   Recent Labs   Lab 05/31/24  1533   INR 1.1   APTT 29.3     LDH:  No results for input(s): "LDH" in the last 72 hours.    Microbiology:  Microbiology Results (last 7 days)       Procedure Component Value Units Date/Time    Blood culture [[id number]] Collected: 06/06/24 0521    Order Status: Completed Specimen: Blood from Line, PICC Right Brachial Updated: 06/06/24 1345     Blood Culture, Routine No Growth to date    Narrative:      Collection has been rescheduled by [email or web address] at 06/06/2024 05:12 Reason:   Nurse is drawing second set of MicBlclt   Collection has been rescheduled by [email or web address] at 06/06/2024 05:12 Reason:   Nurse is drawing second set of MicBlclt     Blood culture [[id number]] Collected: 06/06/24 0509    Order Status: Completed Specimen: Blood Updated: 06/06/24 1345     Blood Culture, Routine No Growth to date    Urine Culture High Risk [[id number]] Collected: 06/06/24 0449    Order Status: Sent Specimen: Urine Updated: 06/06/24 0529    Blood culture [[id number]]     Order Status: Canceled Specimen: Blood     Blood culture [[id number]] Collected: 05/30/24 0920    Order Status: Completed Specimen: Blood from Peripheral, Antecubital, Right Updated: 06/04/24 1212     Blood Culture, Routine No growth after 5 days.    Blood culture [[id number]] Collected: 05/30/24 0921    Order Status: Completed Specimen: Blood Updated: 06/04/24 1212     Blood Culture, Routine No growth after 5 days.            I have reviewed all pertinent labs within the past 24 hours.    Estimated Creatinine Clearance: 74 mL/min (based on SCr of 1.3 mg/dL).    Diagnostic Results:  I have reviewed and interpreted all pertinent imaging " results/findings within the past 24 hours.  Assessment and Plan:     44M with history of HFrEF 2/2 NICM, HTN, CKD admitted for ADHF requiring dobutamine and epinephrine supported diuresis.  Given persistent inotropic dependence and lack of expected cardiac recovery, patient being evaluated for advanced therapies.    * Acute on chronic combined systolic and diastolic heart failure  Decompensated HFrEF  NHYA III, ACC D  Given persistent dobutamine dependence and need for epinephrine-supported diuresis this admission, patient is being evaluated for advanced therapies.    -transition IV lasix to bumex 2 PO  -start empagliflozin 10  Continue  to 5 mcg/mg/kg, valsartan 20 BID, spironolactone 25   Continue Bidil  -patient discussing lung biopsy with Pulm as part of advanced therapies workup      Primary hypertension  HF mgmt as mentioned        Anneliese Melendez MD  Heart Transplant  [facility] - Cardiology Stepdown

## 2024-06-06 NOTE — SUBJECTIVE & OBJECTIVE
Interval History: Febrile to 101F overnight with new leukocytosis. Septic workup initiated. Pt's wife brought CXR and CT films and radiology read from patient's visit to [place] a year ago (pt had hemoptysis prior to trip to [place] and pt's wife arranged a visit with Pulmonology in [place] during that time).       Objective:     Vital Signs (Most Recent):  Temp: 98.6 °F (37 °C) (06/06/24 1143)  Pulse: 110 (06/06/24 1215)  Resp: 18 (06/06/24 1143)  BP: (!) 111/56 (06/06/24 1143)  SpO2: 98 % (06/06/24 1143) Vital Signs (24h Range):  Temp:  [98 °F (36.7 °C)-101.2 °F (38.4 °C)] 98.6 °F (37 °C)  Pulse:  [] 110  Resp:  [16-18] 18  SpO2:  [96 %-100 %] 98 %  BP: ()/(51-78) 111/56     Weight: 74 kg (163 lb 2.3 oz)  Body mass index is 24.09 kg/m².      Intake/Output Summary (Last 24 hours) at 6/6/2024 1338  Last data filed at 6/6/2024 1039  Gross per 24 hour   Intake 1080 ml   Output 1850 ml   Net -770 ml        Physical Exam  HENT:      Head: Normocephalic and atraumatic.   Cardiovascular:      Rate and Rhythm: Normal rate and regular rhythm.      Pulses: Normal pulses.   Pulmonary:      Effort: Pulmonary effort is normal. No respiratory distress.      Breath sounds: No wheezing or rales.   Abdominal:      Palpations: Abdomen is soft.      Tenderness: There is abdominal tenderness. There is no guarding.   Neurological:      General: No focal deficit present.      Mental Status: He is alert and oriented to person, place, and time.   Psychiatric:         Mood and Affect: Mood normal.           Review of Systems   Constitutional:  Negative for activity change, appetite change and fever.   HENT: Negative.     Respiratory:  Negative for cough, shortness of breath and wheezing.    Cardiovascular:  Negative for chest pain and leg swelling.   Gastrointestinal:  Positive for abdominal pain. Negative for constipation, diarrhea, nausea and vomiting.   Genitourinary: Negative.    Skin:  Negative for rash.   Neurological:   Negative for headaches.       Vents:  Oxygen Concentration (%): 21 (06/02/24 0708)    Lines/Drains/Airways       Peripherally Inserted Central Catheter Line  Duration             PICC Double Lumen 05/14/24 1445 right brachial 22 days                    Significant Labs:    CBC/Anemia Profile:  Recent Labs   Lab 06/05/24  0532 06/06/24  0142 06/06/24  0509   WBC 8.74 12.80* 12.00   HGB 9.3* 9.6* 9.6*   HCT 30.3* 31.2* 30.3*   * 545* 553*   MCV 73* 72* 72*   RDW 19.3* 19.3* 19.7*        Chemistries:  Recent Labs   Lab 06/05/24  0532 06/05/24  2216 06/06/24  0509   * 136 135*   K 4.2 3.9 3.9    102 101   CO2 26 24 25   BUN 17 14 13   CREATININE 1.2 1.4 1.3   CALCIUM 9.7 9.4 9.6   ALBUMIN 3.1*  --  3.1*   PROT 7.3  --  7.4   BILITOT 0.4  --  0.4   ALKPHOS 164*  --  157*   ALT 47*  --  37   AST 33  --  26   MG 2.1 1.8 1.9   PHOS 3.9  --  4.3       All pertinent labs within the past 24 hours have been reviewed.    Significant Imaging:  I have reviewed all pertinent imaging results/findings within the past 24 hours.

## 2024-06-06 NOTE — ASSESSMENT & PLAN NOTE
Decompensated HFrEF  NHYA III, ACC D  Given persistent dobutamine dependence and need for epinephrine-supported diuresis this admission, patient is being evaluated for advanced therapies.    -transition IV lasix to bumex 2 PO  -start empagliflozin 10  Continue  to 5 mcg/mg/kg, valsartan 20 BID, spironolactone 25   Continue Bidil  -patient discussing lung biopsy with Pulm as part of advanced therapies workup

## 2024-06-06 NOTE — PROGRESS NOTES
0100: MD notified of increase in temp. and decrease in BP. Stated he would review pt chart     06/06/24 0054   Vital Signs   Temp (!) 101.2 °F (38.4 °C)   Temp Source Oral   Pulse (!) 117   Heart Rate Source Monitor   Resp 16   SpO2 98 %   BP (!) 96/51   MAP (mmHg) 67     0130: CBC and procalcitonin ordered and collected    0155: WBC came back at 12.80, previously 8.74. MD Whitaker notified, stated he would come to bedside to see pt and to recheck temp.    0200: Temp 99.4, MD notified. Placed orders to obtain blood and urine cultures and CXR

## 2024-06-06 NOTE — ASSESSMENT & PLAN NOTE
Patient with stable nonspecific opacity in the right lower lobe concerning for a lung nodule in the RLL in the setting of prior cigarette use. Pt reports one episode of hemoptysis last year prompting a CT chest at medical facility when he was visiting [place] with his wife.    - Review of CXR and CT chest from 05/19/2023 demonstrate no nodule. Review of radiology report from [place] on 01/17/23 makes no note of abnormality besides concern for central venous congestion.  - Advised pt that he will need percutaneous needle biopsy with IR in order to definitively characterize nodule. Discussed typical steps of procedure, possible adverse effects, and expected duration. Patient continues to express feeling overwhelmed with entirety of procedure and LVAD/heart transplant discussion. Advised that decision didn't need to be made at that moment, but that this was an important part of his pre-transplant workup.   - Will continue to follow and discuss need for needle biopsy

## 2024-06-06 NOTE — SUBJECTIVE & OBJECTIVE
Interval History:     UOP 2.6 L neg 1.6 L  JVP 9 cm with neg HJR; SVO 65% CO 6.2L/min CI 3.2      Subjective:  -doing well, had his wife bring in his lung imaging to Pulm this morning    Continuous Infusions:   DOBUTamine IV infusion (non-titrating)  5 mcg/kg/min Intravenous Continuous 5.8 mL/hr at 06/06/24 1429 5 mcg/kg/min at 06/06/24 1429     Scheduled Meds:   atorvastatin  40 mg Oral Daily    bumetanide  2 mg Oral Daily    empagliflozin  10 mg Oral Daily    heparin (porcine)  5,000 Units Subcutaneous Q8H    hydrALAZINE  25 mg Oral Q8H    hydrocortisone   Rectal BID    isosorbide dinitrate  10 mg Oral TID    lactulose  10 g Oral TID    pantoprazole  20 mg Oral Daily    polyethylene glycol  17 g Oral BID    senna-docusate 8.6-50 mg  1 tablet Oral BID    spironolactone  25 mg Oral Daily    valsartan  20 mg Oral BID     PRN Meds:  Current Facility-Administered Medications:     acetaminophen, 650 mg, Oral, Q6H PRN    ALPRAZolam, 0.25 mg, Oral, Nightly PRN    ondansetron, 4 mg, Oral, Q6H PRN    ondansetron, 4 mg, Intravenous, Q6H PRN    oxyCODONE, 5 mg, Oral, Q6H PRN    potassium chloride, 40 mEq, Oral, Once PRN    prochlorperazine, 2.5 mg, Intravenous, Q6H PRN    senna-docusate 8.6-50 mg, 1 tablet, Oral, Daily PRN    sodium chloride 0.9%, 10 mL, Intravenous, PRN    Review of patient's allergies indicates:  No Known Allergies  Objective:     Vital Signs (Most Recent):  Temp: 98.6 °F (37 °C) (06/06/24 1143)  Pulse: 110 (06/06/24 1215)  Resp: 18 (06/06/24 1143)  BP: (!) 111/56 (06/06/24 1143)  SpO2: 98 % (06/06/24 1143) Vital Signs (24h Range):  Temp:  [98 °F (36.7 °C)-101.2 °F (38.4 °C)] 98.6 °F (37 °C)  Pulse:  [] 110  Resp:  [16-18] 18  SpO2:  [96 %-100 %] 98 %  BP: ()/(51-78) 111/56     Patient Vitals for the past 72 hrs (Last 3 readings):   Weight   06/06/24 0911 74 kg (163 lb 2.3 oz)   06/05/24 0709 75 kg (165 lb 5.5 oz)   06/04/24 0400 78.5 kg (173 lb 1 oz)     Body mass index is 24.09  "kg/m².      Intake/Output Summary (Last 24 hours) at 6/6/2024 1458  Last data filed at 6/6/2024 1039  Gross per 24 hour   Intake 1080 ml   Output 1850 ml   Net -770 ml              Physical Exam  Constitutional:       Appearance: Normal appearance.   Eyes:      Pupils: Pupils are equal, round, and reactive to light.   Neck:      Comments:    Cardiovascular:      Rate and Rhythm: Normal rate and regular rhythm.      Pulses: Normal pulses.   Pulmonary:      Effort: Pulmonary effort is normal.      Breath sounds: Normal breath sounds.   Abdominal:      General: Abdomen is flat.   Musculoskeletal:         General: Normal range of motion.      Right lower leg: No edema.      Left lower leg: No edema.   Skin:     General: Skin is warm.   Neurological:      General: No focal deficit present.      Mental Status: He is alert and oriented to person, place, and time.   Psychiatric:         Mood and Affect: Mood normal.         Behavior: Behavior normal.            Significant Labs:  CBC:  Recent Labs   Lab 06/05/24  0532 06/06/24  0142 06/06/24  0509   WBC 8.74 12.80* 12.00   RBC 4.15* 4.31* 4.24*   HGB 9.3* 9.6* 9.6*   HCT 30.3* 31.2* 30.3*   * 545* 553*   MCV 73* 72* 72*   MCH 22.4* 22.3* 22.6*   MCHC 30.7* 30.8* 31.7*     BNP:  No results for input(s): "BNP" in the last 168 hours.    Invalid input(s): "BNPTRIAGELBLO"    CMP:  Recent Labs   Lab 06/04/24  0504 06/04/24  1805 06/05/24  0532 06/05/24  2216 06/06/24  0509      < > 108 137* 98   CALCIUM 9.6   < > 9.7 9.4 9.6   ALBUMIN 3.0*  --  3.1*  --  3.1*   PROT 7.2  --  7.3  --  7.4      < > 134* 136 135*   K 3.7   < > 4.2 3.9 3.9   CO2 30*   < > 26 24 25   CL 97   < > 100 102 101   BUN 20   < > 17 14 13   CREATININE 1.3   < > 1.2 1.4 1.3   ALKPHOS 175*  --  164*  --  157*   ALT 59*  --  47*  --  37   AST 46*  --  33  --  26   BILITOT 0.5  --  0.4  --  0.4    < > = values in this interval not displayed.      Coagulation:   Recent Labs   Lab 05/31/24  1533 " "  INR 1.1   APTT 29.3     LDH:  No results for input(s): "LDH" in the last 72 hours.    Microbiology:  Microbiology Results (last 7 days)       Procedure Component Value Units Date/Time    Blood culture [[id number]] Collected: 06/06/24 0521    Order Status: Completed Specimen: Blood from Line, PICC Right Brachial Updated: 06/06/24 1345     Blood Culture, Routine No Growth to date    Narrative:      Collection has been rescheduled by [email or web address] at 06/06/2024 05:12 Reason:   Nurse is drawing second set of MicBlclt   Collection has been rescheduled by [email or web address] at 06/06/2024 05:12 Reason:   Nurse is drawing second set of MicBlclt     Blood culture [[id number]] Collected: 06/06/24 0509    Order Status: Completed Specimen: Blood Updated: 06/06/24 1345     Blood Culture, Routine No Growth to date    Urine Culture High Risk [[id number]] Collected: 06/06/24 0449    Order Status: Sent Specimen: Urine Updated: 06/06/24 0529    Blood culture [[id number]]     Order Status: Canceled Specimen: Blood     Blood culture [[id number]] Collected: 05/30/24 0920    Order Status: Completed Specimen: Blood from Peripheral, Antecubital, Right Updated: 06/04/24 1212     Blood Culture, Routine No growth after 5 days.    Blood culture [[id number]] Collected: 05/30/24 0921    Order Status: Completed Specimen: Blood Updated: 06/04/24 1212     Blood Culture, Routine No growth after 5 days.            I have reviewed all pertinent labs within the past 24 hours.    Estimated Creatinine Clearance: 74 mL/min (based on SCr of 1.3 mg/dL).    Diagnostic Results:  I have reviewed and interpreted all pertinent imaging results/findings within the past 24 hours.  "

## 2024-06-07 VITALS
BODY MASS INDEX: 24.11 KG/M2 | RESPIRATION RATE: 18 BRPM | OXYGEN SATURATION: 98 % | SYSTOLIC BLOOD PRESSURE: 95 MMHG | DIASTOLIC BLOOD PRESSURE: 61 MMHG | HEIGHT: 69 IN | TEMPERATURE: 99 F | WEIGHT: 162.81 LBS | HEART RATE: 100 BPM

## 2024-06-07 LAB
ALBUMIN SERPL BCP-MCNC: 3 G/DL (ref 3.5–5.2)
ALLENS TEST: ABNORMAL
ALP SERPL-CCNC: 151 U/L (ref 55–135)
ALT SERPL W/O P-5'-P-CCNC: 34 U/L (ref 10–44)
ANION GAP SERPL CALC-SCNC: 11 MMOL/L (ref 8–16)
AST SERPL-CCNC: 32 U/L (ref 10–40)
B CELL RESULTS - XM AUTO: NEGATIVE
B MCS AVERAGE - XM AUTO: 4.9
BACTERIA UR CULT: NORMAL
BASOPHILS # BLD AUTO: 0.05 K/UL (ref 0–0.2)
BASOPHILS NFR BLD: 0.7 % (ref 0–1.9)
BILIRUB SERPL-MCNC: 0.4 MG/DL (ref 0.1–1)
BUN SERPL-MCNC: 10 MG/DL (ref 6–20)
CALCIUM SERPL-MCNC: 9.3 MG/DL (ref 8.7–10.5)
CHLORIDE SERPL-SCNC: 99 MMOL/L (ref 95–110)
CO2 SERPL-SCNC: 27 MMOL/L (ref 23–29)
CREAT SERPL-MCNC: 1.2 MG/DL (ref 0.5–1.4)
DIFFERENTIAL METHOD BLD: ABNORMAL
EOSINOPHIL # BLD AUTO: 0.1 K/UL (ref 0–0.5)
EOSINOPHIL NFR BLD: 1.7 % (ref 0–8)
ERYTHROCYTE [DISTWIDTH] IN BLOOD BY AUTOMATED COUNT: 19.8 % (ref 11.5–14.5)
EST. GFR  (NO RACE VARIABLE): >60 ML/MIN/1.73 M^2
FXMAU TESTING DATE: NORMAL
GLUCOSE SERPL-MCNC: 85 MG/DL (ref 70–110)
HCT VFR BLD AUTO: 30.7 % (ref 40–54)
HGB BLD-MCNC: 9.3 G/DL (ref 14–18)
HLATY INTERPRETATION: NORMAL
IMM GRANULOCYTES # BLD AUTO: 0.05 K/UL (ref 0–0.04)
IMM GRANULOCYTES NFR BLD AUTO: 0.7 % (ref 0–0.5)
LYMPHOCYTES # BLD AUTO: 2.1 K/UL (ref 1–4.8)
LYMPHOCYTES NFR BLD: 31 % (ref 18–48)
MAGNESIUM SERPL-MCNC: 1.9 MG/DL (ref 1.6–2.6)
MCH RBC QN AUTO: 22 PG (ref 27–31)
MCHC RBC AUTO-ENTMCNC: 30.3 G/DL (ref 32–36)
MCV RBC AUTO: 73 FL (ref 82–98)
MONOCYTES # BLD AUTO: 1 K/UL (ref 0.3–1)
MONOCYTES NFR BLD: 14.1 % (ref 4–15)
NEUTROPHILS # BLD AUTO: 3.6 K/UL (ref 1.8–7.7)
NEUTROPHILS NFR BLD: 51.8 % (ref 38–73)
NRBC BLD-RTO: 0 /100 WBC
PHOSPHATE SERPL-MCNC: 4.7 MG/DL (ref 2.7–4.5)
PLATELET # BLD AUTO: 535 K/UL (ref 150–450)
PMV BLD AUTO: 8.3 FL (ref 9.2–12.9)
PO2 BLDA: 33 MMHG (ref 40–60)
POC SATURATED O2: 64 % (ref 95–100)
POTASSIUM SERPL-SCNC: 3.5 MMOL/L (ref 3.5–5.1)
PROT SERPL-MCNC: 7.1 G/DL (ref 6–8.4)
RBC # BLD AUTO: 4.23 M/UL (ref 4.6–6.2)
SAMPLE: ABNORMAL
SERUM COLLECTION DT - XM AUTO: NORMAL
SITE: ABNORMAL
SODIUM SERPL-SCNC: 137 MMOL/L (ref 136–145)
T CELL RESULTS - XM AUTO: NEGATIVE
T MCS AVERAGE - XM AUTO: 4.2
WBC # BLD AUTO: 6.9 K/UL (ref 3.9–12.7)

## 2024-06-07 PROCEDURE — 99900035 HC TECH TIME PER 15 MIN (STAT): Mod: NTX

## 2024-06-07 PROCEDURE — 99238 HOSP IP/OBS DSCHRG MGMT 30/<: CPT | Mod: NTX,,, | Performed by: INTERNAL MEDICINE

## 2024-06-07 PROCEDURE — 25000242 PHARM REV CODE 250 ALT 637 W/ HCPCS: Mod: NTX | Performed by: STUDENT IN AN ORGANIZED HEALTH CARE EDUCATION/TRAINING PROGRAM

## 2024-06-07 PROCEDURE — 25000003 PHARM REV CODE 250: Mod: NTX | Performed by: STUDENT IN AN ORGANIZED HEALTH CARE EDUCATION/TRAINING PROGRAM

## 2024-06-07 PROCEDURE — 25000003 PHARM REV CODE 250: Mod: NTX | Performed by: INTERNAL MEDICINE

## 2024-06-07 PROCEDURE — 85025 COMPLETE CBC W/AUTO DIFF WBC: CPT | Mod: NTX | Performed by: INTERNAL MEDICINE

## 2024-06-07 PROCEDURE — 84100 ASSAY OF PHOSPHORUS: CPT | Mod: NTX | Performed by: INTERNAL MEDICINE

## 2024-06-07 PROCEDURE — 83735 ASSAY OF MAGNESIUM: CPT | Mod: NTX | Performed by: INTERNAL MEDICINE

## 2024-06-07 PROCEDURE — 80053 COMPREHEN METABOLIC PANEL: CPT | Mod: NTX | Performed by: INTERNAL MEDICINE

## 2024-06-07 RX ADMIN — PANTOPRAZOLE SODIUM 20 MG: 20 TABLET, DELAYED RELEASE ORAL at 09:06

## 2024-06-07 RX ADMIN — VALSARTAN 20 MG: 40 TABLET, FILM COATED ORAL at 09:06

## 2024-06-07 RX ADMIN — ACETAMINOPHEN 650 MG: 325 TABLET ORAL at 12:06

## 2024-06-07 RX ADMIN — OXYCODONE 5 MG: 5 TABLET ORAL at 09:06

## 2024-06-07 RX ADMIN — ISOSORBIDE DINITRATE 10 MG: 10 TABLET ORAL at 09:06

## 2024-06-07 RX ADMIN — LACTULOSE 10 G: 20 SOLUTION ORAL at 09:06

## 2024-06-07 RX ADMIN — SPIRONOLACTONE 25 MG: 25 TABLET ORAL at 09:06

## 2024-06-07 RX ADMIN — BUMETANIDE 2 MG: 1 TABLET ORAL at 09:06

## 2024-06-07 RX ADMIN — HYDRALAZINE HYDROCHLORIDE 25 MG: 25 TABLET ORAL at 05:06

## 2024-06-07 RX ADMIN — ATORVASTATIN CALCIUM 40 MG: 40 TABLET, FILM COATED ORAL at 09:06

## 2024-06-07 RX ADMIN — EMPAGLIFLOZIN 10 MG: 10 TABLET, FILM COATED ORAL at 09:06

## 2024-06-07 RX ADMIN — SENNOSIDES AND DOCUSATE SODIUM 1 TABLET: 50; 8.6 TABLET ORAL at 09:06

## 2024-06-07 NOTE — PROGRESS NOTES
Discharge Note:    SW contacted [facility] to notify of pt's dc today per team.  [facility] has contacted [facility] about PICC and labs since they coordinated these services at prior admit.     SW met with pt to discuss dc plans for today.  Pt was sitting up in bed, alert and oriented, calm and cooperative. Pt was alone at this time.    Pt updated on dc plans with [facility] coming to delivery meds to bedside for dc home today and coordinating resumption of care with [facility] for PICC care and labs.  Pt reported that he has a ride home today.  Pt voiced understanding and agreement with dc plans.     Pt's son left a voicemail for SW to discuss being a secondary caregiver for pt. SW attempted to call son back, but not able to leave a message.  Pt advised of same, and he will have son continue to reach out to SW.    No other needs reported at this time. SW remains available if additional needs arise.     Son: Ayad EdmondJr. [phone]    [facility] [phone]    Pt was having PICC care and labs done at [facility] in [place].  [facility]   [address] [place] [address]  [phone] (Outpt Hem/Onc Clinic)

## 2024-06-07 NOTE — PLAN OF CARE
[facility]   Heart Transplant Clinic  [address] [place] [address]   [phone] after hours        HOME  HEALTH ORDERS      Admit to Home Health    Diagnosis:   Patient Active Problem List   Diagnosis    Congestive heart failure    Acute on chronic combined systolic and diastolic heart failure    Cardiogenic shock    Primary hypertension    Fatigue    Hyponatremia    Transaminitis    Anemia of chronic disease    Pre-transplant evaluation for heart transplant    Pulmonary nodule       Patient is homebound due to:   Diet: Low Sodium  Acitivities: As Tolerated    Nursing:   SN to complete comprehensive assessment including routine vital signs. Instruct on disease process and s/s of complications to report to MD. Review/verify medication list sent home with the patient at time of discharge  and instruct patient/caregiver as needed. Frequency may be adjusted depending on start of care date.    Notify MD if SBP > 160 or < 90; DBP > 90 or < 50; HR > 120 or < 50; Temp > 101; Weight gain >3lbs in 1 day or 5lbs in 1 week.    LABS:  SN to perform labs: CMP, Mag, BNP weekly     HOME INFUSION THERAPY:   SN to perform Infusion Therapy/Central Line Care.  Review Central Line Care & Central Line Flush with patient.    Administer (drug and dose): dobutamine 5 mcg/kg/min x 77.1 kg intravenous, continuous    **For questions or concerns, please call [phone] and ask for Pre-Heart transplant clinic, M-F 8-5. After hours, weekends, call [phone] and ask for the Heart Transplant Cardiologist on call.**    Central line care:  Scrub the Hub: Prior to accessing the line, always perform a 30 second alcohol scrub  Each lumen of the central line is to be flushed at least daily with 10 mL Normal Saline and 3 mL Heparin flush (100 units/mL)  Skilled Nurse (SN) may draw blood from IV access  Blood Draw Procedure:   - Aspirate at least 5 mL of blood   - Discard   - Obtain  specimen   - Change posiflow cap   - Flush with 20 mL Normal Saline followed by a                 3-5 mL Heparin flush (100 units/mL)  :   - Sterile dressing changes are done weekly and as needed.   - Use chlor-hexadine scrub to cleanse site, apply Biopatch to insertion site,       apply securement device dressing   - Posi-flow caps are changed weekly and after EVERY lab draw.   - If sterile gauze is under dressing to control oozing,                 dressing change must be performed every 24 hours until gauze is not needed.    Physical Therapy to evaluate and treat. Evaluate for home safety and equipment needs; Establish/upgrade home exercise program. Perform / instruct on therapeutic exercises, gait training, transfer training, and Range of Motion.  Occupational Therapy to evaluate and treat. Evaluate home environment for safety and equipment needs. Perform/Instruct on transfers, ADL training, ROM, and therapeutic exercises.  Speech Therapy  to evaluate and treat for:  Language  Swallowing  Cognition                                              to evaluate for community resources/long-range planning.   Aide to provide assistance with personal care, ADLs, and vital signs    Send initial Home Health orders to HTS attending physician on call.  Send follow up questions to [phone] or fax:                     Pre Transplant:   [phone]

## 2024-06-07 NOTE — PLAN OF CARE
Pt given discharge instruction, medication reviewed with the patient, follow up appointments reviewed. All questions and concerns addressed. Pt verbalized understanding. Rx delivered to bedside. IV, telemetry removed. Discharge paperwork given to patient. Pt with belongings in the room waiting for transport.       Problem: Adult Inpatient Plan of Care  Goal: Plan of Care Review  Outcome: Met  Goal: Patient-Specific Goal (Individualized)  Outcome: Met  Goal: Absence of Hospital-Acquired Illness or Injury  Outcome: Met  Goal: Optimal Comfort and Wellbeing  Outcome: Met  Goal: Readiness for Transition of Care  Outcome: Met     Problem: Acute Kidney Injury/Impairment  Goal: Fluid and Electrolyte Balance  Outcome: Met  Goal: Improved Oral Intake  Outcome: Met  Goal: Effective Renal Function  Outcome: Met     Problem: Infection  Goal: Absence of Infection Signs and Symptoms  Outcome: Met     Problem: Heart Failure  Goal: Optimal Coping  Outcome: Met  Goal: Optimal Cardiac Output  Outcome: Met  Goal: Stable Heart Rate and Rhythm  Outcome: Met  Goal: Optimal Functional Ability  Outcome: Met  Goal: Fluid and Electrolyte Balance  Outcome: Met  Goal: Improved Oral Intake  Outcome: Met  Goal: Effective Oxygenation and Ventilation  Outcome: Met  Goal: Effective Breathing Pattern During Sleep  Outcome: Met     Problem: Cardiac Output Decreased  Goal: Effective Cardiac Output  Outcome: Met

## 2024-06-07 NOTE — CARE UPDATE
"RAPID RESPONSE NURSE CHART REVIEW        Chart Reviewed: 06/07/2024, 5:08 AM    MRN: [id number]  Bed: 340/340 A    Dx: Acute on chronic combined systolic and diastolic heart failure    Ayad Edmond has a past medical history of Acute heart failure, AUDELIA (acute kidney injury), Cardiomyopathy, Elevated troponin, HTN (hypertension), Hypokalemia, Hypomagnesemia, and Renal insufficiency.    Last VS: /77 (BP Location: Left arm, Patient Position: Lying)   Pulse 97   Temp 98.7 °F (37.1 °C) (Oral)   Resp (!) 26   Ht 5' 9" (1.753 m)   Wt 74 kg (163 lb 2.3 oz)   SpO2 99%   BMI 24.09 kg/m²     24H Vital Sign Range:  Temp:  [98.1 °F (36.7 °C)-100.8 °F (38.2 °C)]   Pulse:  []   Resp:  [16-26]   BP: ()/(50-78)   SpO2:  [97 %-100 %]     Level of Consciousness (AVPU): alert    Recent Labs     06/05/24  0532 06/06/24  0142 06/06/24  0509   WBC 8.74 12.80* 12.00   HGB 9.3* 9.6* 9.6*   HCT 30.3* 31.2* 30.3*   * 545* 553*       Recent Labs     06/05/24  0532 06/05/24  2216 06/06/24  0509   * 136 135*   K 4.2 3.9 3.9    102 101   CO2 26 24 25   BUN 17 14 13   CREATININE 1.2 1.4 1.3    137* 98   PHOS 3.9  --  4.3   MG 2.1 1.8 1.9        Recent Labs     06/04/24  0722 06/05/24  0529 06/06/24  0521   PO2 34* 30* 33*   POCSATURATED 68 59 65        OXYGEN:     Oxygen Concentration (%): 21       MEWS score: 3    Charge Agustin SULTANA contacted  No concerns verbalized at this time. Instructed to call [phone] for further concerns or assistance.    Ramírez Diaz RN        "

## 2024-06-07 NOTE — PLAN OF CARE
gtt maintained. Plan of care discussed with patient. Patient is free of fall/trauma/injury. Denies CP, SOB, or pain/discomfort. All questions addressed.     Problem: Adult Inpatient Plan of Care  Goal: Plan of Care Review  Outcome: Progressing  Goal: Patient-Specific Goal (Individualized)  Outcome: Progressing  Flowsheets (Taken 6/6/2024 6859)  Individualized Care Needs: 1500 FR  Anxieties, Fears or Concerns: hemorrhoids, FR     Problem: Heart Failure  Goal: Optimal Coping  Outcome: Progressing  Goal: Optimal Cardiac Output  Outcome: Progressing  Goal: Stable Heart Rate and Rhythm  Outcome: Progressing  Goal: Fluid and Electrolyte Balance  Outcome: Progressing     Problem: Cardiac Output Decreased  Goal: Effective Cardiac Output  Outcome: Progressing

## 2024-06-10 ENCOUNTER — SOCIAL WORK (OUTPATIENT)
Dept: TRANSPLANT | Facility: CLINIC | Age: 44
End: 2024-06-10
Payer: MEDICAID

## 2024-06-10 LAB — PROT C AG ACT/NOR PPP IA: 90 % (ref 72–160)

## 2024-06-10 NOTE — PROGRESS NOTES
SAADIA spoke with pt's son/secondary caregiver (Ayad Edmond Jr.) regarding need for caregiver education.  Ayad advised that he is able to come to meet with SAADIA for face to face caregiver education when pt returns to clinic.  SAADIA updated pre-coor (Polina) and requested appt be arranged.

## 2024-06-11 ENCOUNTER — TELEPHONE (OUTPATIENT)
Dept: TRANSPLANT | Facility: CLINIC | Age: 44
End: 2024-06-11
Payer: MEDICAID

## 2024-06-11 DIAGNOSIS — I50.9 CONGESTIVE HEART FAILURE, UNSPECIFIED HF CHRONICITY, UNSPECIFIED HEART FAILURE TYPE: Primary | ICD-10-CM

## 2024-06-11 DIAGNOSIS — J98.4 LUNG DENSITY ON X-RAY: ICD-10-CM

## 2024-06-11 LAB
BACTERIA BLD CULT: NORMAL
BACTERIA BLD CULT: NORMAL

## 2024-06-12 ENCOUNTER — PATIENT MESSAGE (OUTPATIENT)
Dept: TRANSPLANT | Facility: CLINIC | Age: 44
End: 2024-06-12
Payer: MEDICAID

## 2024-06-12 NOTE — TELEPHONE ENCOUNTER
I spoke with Mr Edmond today.  He reports he is doing very well.  Reinforced when to call the team.  Advised that this week, labs and PICC dressing will be completed on Friday, but that the doctors want labs to be completed on Mondays going forward.   Verbalized understanding of instructions.

## 2024-06-12 NOTE — TELEPHONE ENCOUNTER
Reviewed chart with Dr Rhoades- placed order for IR to complete  TTNBx by IR the most direct approach. Would send sample for micro studies and histology as recommended by Dr Alanis.  This was discussed with pt during previous hospital admission

## 2024-06-13 ENCOUNTER — TELEPHONE (OUTPATIENT)
Dept: TRANSPLANT | Facility: CLINIC | Age: 44
End: 2024-06-13
Payer: MEDICAID

## 2024-06-13 LAB
VON WILLEBRAND EVAL PPP-IMP: NORMAL
VWF MULTIMERS PPP QL: NORMAL

## 2024-06-13 NOTE — TELEPHONE ENCOUNTER
I spoke with Mr Edmond today. He is not wearing the Life Vest, and is aware of the risk of fatal arrhythmia, states he is willing to take his chances. We discussed the need for biopsy, will be scheduled with IR likely as an outpatient, but may need to be admitted afterwards due to the severity of his heart disease. Mr Edmond expressed frustration about multiple admissions and length of stay. Educated about HF course, severity of disease, and goals.  Offered support and encouragement. Plan to attempt to consolidate upcoming appts.  Encouraged Mr Edmond to enjoy his weekend.

## 2024-06-14 ENCOUNTER — TELEPHONE (OUTPATIENT)
Dept: TRANSPLANT | Facility: CLINIC | Age: 44
End: 2024-06-14
Payer: MEDICAID

## 2024-06-14 LAB
EXT BUN: 8
EXT CALCIUM: 9.1
EXT CHLORIDE: 107
EXT CREATININE: 1.28 MG/DL
EXT GLUCOSE: 113
EXT HEMATOCRIT: 32.6
EXT HEMOGLOBIN: 9.8
EXT MAGNESIUM: 2
EXT PLATELETS: 621
EXT POTASSIUM: 4.1
EXT SODIUM: 142 MMOL/L
EXT WBC: 5.9

## 2024-06-18 ENCOUNTER — TELEPHONE (OUTPATIENT)
Dept: TRANSPLANT | Facility: CLINIC | Age: 44
End: 2024-06-18
Payer: MEDICAID

## 2024-06-18 DIAGNOSIS — R79.9 ABNORMAL BLOOD CHEMISTRY: ICD-10-CM

## 2024-06-18 DIAGNOSIS — I50.9 CONGESTIVE HEART FAILURE, UNSPECIFIED HF CHRONICITY, UNSPECIFIED HEART FAILURE TYPE: Primary | ICD-10-CM

## 2024-06-18 LAB
EXT ALBUMIN: 3.5 G/DL
EXT ALT: 25 U/L
EXT AST: 29 U/L
EXT BUN: 10 MG/DL
EXT CALCIUM: 8.9 MG/DL
EXT CHLORIDE: 104 MMOL/L
EXT CREATININE: 1.48 MG/DL
EXT GLUCOSE: 156 MG/DL
EXT HEMATOCRIT: 33.5 %
EXT HEMOGLOBIN: 9.9 G/DL
EXT MAGNESIUM: 1.8 MG/DL
EXT PLATELETS: 529 UL
EXT POTASSIUM: 3.3 MMOL/L
EXT PROBNP: 2157 PG/ML
EXT PROTEIN TOTAL: 7.9 G/DL
EXT SODIUM: 140 MMOL/L
EXT WBC: 7.3 UL

## 2024-06-18 NOTE — TELEPHONE ENCOUNTER
----- Message from Araseli Armstrong MA sent at 6/18/2024  1:09 PM CDT -----  The patient would like to talk to Polina about his appointment   please call [phone]. Thank you

## 2024-06-24 ENCOUNTER — SOCIAL WORK (OUTPATIENT)
Dept: TRANSPLANT | Facility: CLINIC | Age: 44
End: 2024-06-24

## 2024-06-24 ENCOUNTER — SOCIAL WORK (OUTPATIENT)
Dept: TRANSPLANT | Facility: CLINIC | Age: 44
End: 2024-06-24
Payer: MEDICAID

## 2024-06-24 ENCOUNTER — LAB VISIT (OUTPATIENT)
Dept: LAB | Facility: HOSPITAL | Age: 44
End: 2024-06-24
Attending: INTERNAL MEDICINE
Payer: MEDICAID

## 2024-06-24 ENCOUNTER — OFFICE VISIT (OUTPATIENT)
Dept: TRANSPLANT | Facility: CLINIC | Age: 44
End: 2024-06-24
Payer: MEDICAID

## 2024-06-24 VITALS
BODY MASS INDEX: 25.47 KG/M2 | HEART RATE: 61 BPM | WEIGHT: 171.94 LBS | SYSTOLIC BLOOD PRESSURE: 114 MMHG | HEIGHT: 69 IN | DIASTOLIC BLOOD PRESSURE: 65 MMHG

## 2024-06-24 DIAGNOSIS — I10 PRIMARY HYPERTENSION: ICD-10-CM

## 2024-06-24 DIAGNOSIS — Z01.818 PRE-TRANSPLANT EVALUATION FOR HEART TRANSPLANT: ICD-10-CM

## 2024-06-24 DIAGNOSIS — I50.9 CONGESTIVE HEART FAILURE, UNSPECIFIED HF CHRONICITY, UNSPECIFIED HEART FAILURE TYPE: ICD-10-CM

## 2024-06-24 DIAGNOSIS — I50.42 CHRONIC COMBINED SYSTOLIC AND DIASTOLIC HEART FAILURE: Primary | ICD-10-CM

## 2024-06-24 DIAGNOSIS — R91.1 PULMONARY NODULE: ICD-10-CM

## 2024-06-24 LAB
ALBUMIN SERPL BCP-MCNC: 4 G/DL (ref 3.5–5.2)
ALP SERPL-CCNC: 134 U/L (ref 55–135)
ALT SERPL W/O P-5'-P-CCNC: 20 U/L (ref 10–44)
ANION GAP SERPL CALC-SCNC: 10 MMOL/L (ref 8–16)
AST SERPL-CCNC: 19 U/L (ref 10–40)
BILIRUB SERPL-MCNC: 0.4 MG/DL (ref 0.1–1)
BNP SERPL-MCNC: 212 PG/ML (ref 0–99)
BUN SERPL-MCNC: 10 MG/DL (ref 6–20)
CALCIUM SERPL-MCNC: 9.8 MG/DL (ref 8.7–10.5)
CHLORIDE SERPL-SCNC: 103 MMOL/L (ref 95–110)
CO2 SERPL-SCNC: 28 MMOL/L (ref 23–29)
CREAT SERPL-MCNC: 1.4 MG/DL (ref 0.5–1.4)
EST. GFR  (NO RACE VARIABLE): >60 ML/MIN/1.73 M^2
GLUCOSE SERPL-MCNC: 88 MG/DL (ref 70–110)
POTASSIUM SERPL-SCNC: 3.7 MMOL/L (ref 3.5–5.1)
PROT SERPL-MCNC: 7.8 G/DL (ref 6–8.4)
SODIUM SERPL-SCNC: 141 MMOL/L (ref 136–145)

## 2024-06-24 PROCEDURE — 99999 PR PBB SHADOW E&M-EST. PATIENT-LVL III: CPT | Mod: PBBFAC,TXP,, | Performed by: INTERNAL MEDICINE

## 2024-06-24 PROCEDURE — 86833 HLA CLASS II HIGH DEFIN QUAL: CPT | Mod: TXP | Performed by: INTERNAL MEDICINE

## 2024-06-24 PROCEDURE — 99213 OFFICE O/P EST LOW 20 MIN: CPT | Mod: PBBFAC,TXP,25 | Performed by: INTERNAL MEDICINE

## 2024-06-24 PROCEDURE — 83880 ASSAY OF NATRIURETIC PEPTIDE: CPT | Mod: TXP | Performed by: INTERNAL MEDICINE

## 2024-06-24 PROCEDURE — 86977 RBC SERUM PRETX INCUBJ/INHIB: CPT | Mod: 59,TXP | Performed by: INTERNAL MEDICINE

## 2024-06-24 PROCEDURE — 81241 F5 GENE: CPT | Mod: TXP | Performed by: INTERNAL MEDICINE

## 2024-06-24 PROCEDURE — 86832 HLA CLASS I HIGH DEFIN QUAL: CPT | Mod: TXP | Performed by: INTERNAL MEDICINE

## 2024-06-24 PROCEDURE — 80053 COMPREHEN METABOLIC PANEL: CPT | Mod: TXP | Performed by: INTERNAL MEDICINE

## 2024-06-24 RX ORDER — ESZOPICLONE 1 MG/1
1 TABLET, FILM COATED ORAL NIGHTLY PRN
COMMUNITY
Start: 2024-05-19

## 2024-06-24 NOTE — LETTER
June 24, 2024        Aba Garcia  [facility] [address]  [facility] [address]  Phone: [phone]  Fax: [phone]             [id number]  [address]  [facility] [address]  Phone: [phone]   Patient: Ayad Edmond   MR Number: [id number]   YOB: 1980   Date of Visit: 6/24/2024       Dear Dr. Aba Garcia    Thank you for referring Ayad Edmond to me for evaluation. Attached you will find relevant portions of my assessment and plan of care.    If you have questions, please do not hesitate to call me. I look forward to following Ayad Edmond along with you.    Sincerely,    Nicole Chua MD    Enclosure    If you would like to receive this communication electronically, please contact [email or web address] or [phone] to request [facility] access.    [facility] is a tool which provides read-only access to select patient information with whom you have a relationship. Its easy to use and provides real time access to review your patients record including encounter summaries, notes, results, and demographic information.    If you feel you have received this communication in error or would no longer like to receive these types of communications, please e-mail [email or web address]

## 2024-06-24 NOTE — PROGRESS NOTES
Subjective:   followup evaluation of heart transplant candidacy.    HPI:  Mr. Edmond is a 44 y.o. year old Black or  male who has presents to be considered for advanced surgical options (LVAD/OHT).      43 YO M w/ NICM, HFrEF, LVEF 10%, HTN, CKD who presents for evaluation.    Sees Dr. Aba Garcia MD locally for his cardiomyopathy. Prior records are available under scanned media and reviewed prior to today's visit.    Initially diagnosed 1/2024 with HFrEF, LVEF 30-35%. During a hospitalization in February, found to have further drop in EF to 10% on LV gram as part of combined LHC/RHC. Was seen in clinic in early May, but 05/29/24 had admission for ADHF after presenting with ADHF needing epi/inotrope supported diuresis. Patient was started on evaluation for OHT/LVAD, which he has since been set up with IR visit regarding biopsy of lung lesion recommended by pulmonary during hospital stay. Additionally had RHC 06/03/24 on dobutamine which showed normal BIV filling pressures. He is doing great currently, states he feels well, volume well controlled. Had issue with bp, they increase his hydralazine to full tablet (25mg) however now he prefers not to change it, since he isabout to drive long distance and feels the med changes affect him. (I was considering changing valsartan to entresto vs increasing valsartan dose).        PMH/FH/SH  Past medical history significant for hypertension and cardiomyopathy as noted above.  No other significant medical issues.    He had a torn tendon in his left foot which required surgery in the past, but no other significant surgeries.    His family history is significant for cardiomyopathy multiple family members on his mother's side.  His mom has congestive heart failure and a defibrillator.  He also mentions that is maternal grandmother, maternal aunt, and multiple cousins on that side of the family also have heart failure, he thinks around 5-6 family members.    Denies  "tobacco use, alcohol use, or recreational drug use.    TTE 1/31/24 (External)  LVEF 30-35%    LHC/RHC 2/27/24 (External)  RA 15, PA 47/23, mean 35, PCWP 28  CI 2.1, CO 4.2  LVEF 10%  Normal coronary arteries    Past Medical History:   Diagnosis Date    Acute heart failure     AUDELIA (acute kidney injury)     Cardiomyopathy     Elevated troponin     HTN (hypertension)     Hypokalemia     Hypomagnesemia     Renal insufficiency      Past Surgical History:   Procedure Laterality Date    broken foot Left     RIGHT HEART CATHETERIZATION Right 5/13/2024    Procedure: INSERTION, CATHETER, RIGHT HEART;  Surgeon: Pradeep Mack MD;  Location: [facility] CATH LAB;  Service: Cardiology;  Laterality: Right;    RIGHT HEART CATHETERIZATION Right 6/4/2024    Procedure: INSERTION, CATHETER, RIGHT HEART;  Surgeon: Elier Gilmore MD;  Location: [facility] CATH LAB;  Service: Cardiology;  Laterality: Right;       Family History   Problem Relation Name Age of Onset    Heart failure Mother      No Known Problems Father         Review of Systems   Constitutional: Positive for decreased appetite, malaise/fatigue and weight gain. Negative for chills and fever.   HENT:  Negative for hearing loss.    Eyes:  Negative for visual disturbance.   Cardiovascular:  Positive for dyspnea on exertion, irregular heartbeat, leg swelling, orthopnea, palpitations, paroxysmal nocturnal dyspnea and syncope. Negative for chest pain.   Respiratory:  Positive for shortness of breath. Negative for cough.    Musculoskeletal:  Negative for muscle weakness.   Gastrointestinal:  Positive for bloating, abdominal pain, anorexia and nausea. Negative for diarrhea and vomiting.   Neurological:  Negative for focal weakness.   Psychiatric/Behavioral:  Negative for memory loss.        Objective:   Blood pressure 114/65, pulse 61, height 5' 9" (1.753 m), weight 78 kg (171 lb 15.3 oz).body mass index is 25.39 kg/m².    Physical Exam  Vitals and nursing note reviewed. "   Constitutional:       General: He is not in acute distress.     Appearance: He is well-developed. He is not diaphoretic.   HENT:      Head: Normocephalic and atraumatic.   Eyes:      General:         Right eye: No discharge.         Left eye: No discharge.      Conjunctiva/sclera: Conjunctivae normal.   Neck:      Vascular: No JVD.   Cardiovascular:      Rate and Rhythm: Regular rhythm. Tachycardia present.      Heart sounds: No murmur heard.     No friction rub. No gallop.   Pulmonary:      Effort: Pulmonary effort is normal.      Breath sounds: Normal breath sounds. No wheezing or rales.   Chest:      Chest wall: No tenderness.   Abdominal:      General: Bowel sounds are normal. There is no distension.      Palpations: Abdomen is soft. There is no mass.      Tenderness: There is no abdominal tenderness. There is no guarding or rebound.   Musculoskeletal:         General: No tenderness. Normal range of motion.      Cervical back: Normal range of motion and neck supple.   Skin:     General: Skin is warm and dry.      Findings: No erythema or rash.   Neurological:      Mental Status: He is alert and oriented to person, place, and time.   Psychiatric:         Behavior: Behavior normal.         Thought Content: Thought content normal.         Judgment: Judgment normal.         Labs:      Chemistry        Component Value Date/Time     06/24/2024 0854    K 3.7 06/24/2024 0854     06/24/2024 0854    CO2 28 06/24/2024 0854    BUN 10 06/24/2024 0854    CREATININE 1.4 06/24/2024 0854    GLU 88 06/24/2024 0854        Component Value Date/Time    CALCIUM 9.8 06/24/2024 0854    ALKPHOS 134 06/24/2024 0854    AST 19 06/24/2024 0854    ALT 20 06/24/2024 0854    BILITOT 0.4 06/24/2024 0854          Magnesium   Date Value Ref Range Status   06/07/2024 1.9 1.6 - 2.6 mg/dL Final     Lab Results   Component Value Date    WBC 6.90 06/07/2024    HGB 9.3 (L) 06/07/2024    HCT 30.7 (L) 06/07/2024    MCV 73 (L) 06/07/2024      (H) 06/07/2024     BNP   Date Value Ref Range Status   06/24/2024 212 (H) 0 - 99 pg/mL Final     Comment:     Values of less than 100 pg/ml are consistent with non-CHF populations.   05/29/2024 3,707 (H) 0 - 99 pg/mL Final     Comment:     Values of less than 100 pg/ml are consistent with non-CHF populations.   05/10/2024 1,991 (H) 0 - 99 pg/mL Final     Comment:     Values of less than 100 pg/ml are consistent with non-CHF populations.     No results found for this or any previous visit.      Labs were reviewed with the patient.    Assessment:      1. Chronic combined systolic and diastolic heart failure    2. Primary hypertension    3. Pre-transplant evaluation for heart transplant    4. Pulmonary nodule        Plan:   Patient seems improved from his last visit when he was not doing well and was subsequently admitted.  At this time, although has room to change valsartan to entresto, will hold off at his request until he returns.  IR biopsy of lung lesion.  Will follow his weekly labs.  Anticipate presentation at selection once biopsy results return. Would like to try to increase/adjust meds to see if we have room to uptitrate oral GDMT, however will see what he is doing and how he is doing on return visit. Discussed with mehreen Wood RN.     [facility] Patient Status  Functional Status: 50% - Requires considerable assistance and frequent medical care  Physical Capacity: No Limitations  Working for Income: No  If no, reason not working: Demands of Treatment    Advance Care Planning     Date: 05/10/2024    Power of   I initiated the process of voluntary advance care planning today and explained the importance of this process to the patient.  I introduced the concept of advance directives to the patient, as well. Then the patient received detailed information about the importance of designating a Health Care Power of  (HCPOA). He was also instructed to communicate with this person about their  wishes for future healthcare, should he become sick and lose decision-making capacity. The patient has not previously appointed a HCPOA. After our discussion, the patient has decided to complete a HCPOA. I spent a total time of 15 minutes discussing this issue with the patient.            Nicole Chua MD

## 2024-06-24 NOTE — PROGRESS NOTES
Left Ventricular Assist Device (LVAD) and Transplant Recipient Adult Psychosocial Assessment-Caregiver Update    Encounter Date: 6/24/2024    Met with pt and spouse in clinic today. Pt's spouse will be primary for LVAD and back up for OHT. Mother remains primary for OHT.    Ayad Edmond  [address] [facility] [address]  [place] [address]    Telephone Information:   Mobile [phone]   Work  There is no work phone number on file.  E-mail  [email or web address]    Sex: male  YOB: 1980  Age: 44 y.o.      PRIMARY CAREGIVER for OHT: Aleida Doty (mother) will be primary caregiver for transplant, phone number [phone].     Primary Caregiver for VAD; Secondary Caregiver for OHT: Sia Meehanvez (spouse) [phone], drives, not working at this time. She is an RN, certified in [place], but not in the  yet.     provided in-depth information to Patient and Caregiver regarding  regarding pre- and post-LVAD and pre- and post-transplant caregiver role.   strongly encourages Patient and Caregiver to have concrete plan regarding post-transplant care giving, including back-up caregiver(s) to ensure care giving needs are met as needed.    Caregiver states understanding all aspects of caregiver role/commitment and is able/willing/committed to being caregiver to the fullest extent necessary. .      Patient and Caregiver verbalizes understanding of the education provided today and caregiver responsibilities.       remains available. Patient and Caregiver agree to contact  in a timely manner if concerns arise.      Able to take time off work without financial concerns:  pt's mother is retired and spouse is not currently working .     Transplant Social Work - Candidacy  Assessment/Plan:     Psychosocial Suitability: Patient presents as a suitable candidate for LVAD or heart transplant at this time. Based on psychosocial risk factors, patient presents as low  risk, due to no reported transportation issues, no reported mental health or substance abuse issues, and adequate caregiver plan.    Final determination on eligibility to be determined by transplant selection committee.     Jillian Bell LCSW

## 2024-06-25 ENCOUNTER — TELEPHONE (OUTPATIENT)
Dept: TRANSPLANT | Facility: CLINIC | Age: 44
End: 2024-06-25
Payer: MEDICAID

## 2024-06-25 LAB — HPRA INTERPRETATION: NORMAL

## 2024-06-25 NOTE — TELEPHONE ENCOUNTER
I met with Mr Edmond and his wife in clinic and subsequently discussed plan with Dr Chua and infusion pharmacy    Arranged for PICC dressing change and assessment at outpatient infusion suite as he missed his usual visit near home  Pt is travelling out of state for work tomorrow, per pts wife this is the second time since discharge he has travelled to [place] since his hosp DC.  Reinforced importance of close communication with Transplant team to FU on HF s/s, medication management. Etc.  Reminded that travel will be restricted if he is listed  Mr Edmond does not have ICD and continues to decline wearing Life Vest.

## 2024-06-26 LAB
CLASS I ANTIBODY COMMENTS - LUMINEX: NORMAL
CLASS II ANTIBODIES - LUMINEX: NEGATIVE
CPRA %: 0
SERUM COLLECTION DT - LUMINEX CLASS I: NORMAL
SERUM COLLECTION DT - LUMINEX CLASS II: NORMAL
SPCL1 TESTING DATE: NORMAL
SPCL2 TESTING DATE: NORMAL
SPLUA TESTING DATE: NORMAL

## 2024-06-28 LAB — F5 P.R506Q BLD/T QL: NEGATIVE

## 2024-07-01 ENCOUNTER — DOCUMENTATION ONLY (OUTPATIENT)
Dept: TRANSPLANT | Facility: CLINIC | Age: 44
End: 2024-07-01
Payer: MEDICAID

## 2024-07-01 ENCOUNTER — TELEPHONE (OUTPATIENT)
Dept: PALLIATIVE MEDICINE | Facility: CLINIC | Age: 44
End: 2024-07-01

## 2024-07-01 ENCOUNTER — PATIENT MESSAGE (OUTPATIENT)
Dept: TRANSPLANT | Facility: CLINIC | Age: 44
End: 2024-07-01
Payer: MEDICAID

## 2024-07-01 NOTE — PROGRESS NOTES
Mr Edmond cancelled his palliative care visit and did not show for his virtual IR visit to discuss biopsy.  He has not called to reschedule and has not responded to calls.  Message sent via [email or web address].  Message sent to scheduling to attempt reschedule for day that he returns to clinic on 7-29.

## 2024-07-03 ENCOUNTER — OFFICE VISIT (OUTPATIENT)
Dept: PALLIATIVE MEDICINE | Facility: CLINIC | Age: 44
End: 2024-07-03
Payer: MEDICAID

## 2024-07-03 DIAGNOSIS — Z51.5 PALLIATIVE CARE ENCOUNTER: ICD-10-CM

## 2024-07-03 DIAGNOSIS — I50.9 CONGESTIVE HEART FAILURE, UNSPECIFIED HF CHRONICITY, UNSPECIFIED HEART FAILURE TYPE: ICD-10-CM

## 2024-07-03 DIAGNOSIS — R91.1 PULMONARY NODULE: Primary | ICD-10-CM

## 2024-07-03 NOTE — PROGRESS NOTES
Established Patient - Audio Only Telehealth Visit     The patient location is: [place]   The chief complaint leading to consultation is: Heart Failure  Visit type: Virtual visit with audio only (telephone)  Total time spent with patient: 32min       The reason for the audio only service rather than synchronous audio and video virtual visit was related to technical difficulties or patient preference/necessity.     Each patient to whom I provide medical services by telemedicine is:  (1) informed of the relationship between the physician and patient and the respective role of any other health care provider with respect to management of the patient; and (2) notified that they may decline to receive medical services by telemedicine and may withdraw from such care at any time. Patient verbally consented to receive this service via voice-only telephone call.         Palliative Medicine Clinic Note        Consult Requested By: Dr. Samm Rhoades      Reason for Consult: ACP and sx management in the setting of heart failure     Chief Complaint: No chief complaint on file.          ASSESSMENT/PLAN:      Plan/Recommendations:    Diagnoses and all orders for this visit:    Congestive heart failure, unspecified HF chronicity, unspecified heart failure type  -     Ambulatory referral/consult to [facility] Palliative Care    -Undergoing evaluation for OHT/LVAD  -Pending  IR visit regarding biopsy of lung lesion recommended by pulmonary during hospital stay.   - Currently on Dobutamine  - Recognized patient's concerns about prolonged hospitalizations impacting ability to work and pay bills.   - Patient should contact office if any changes in symptoms or concerns.   - Follow up in person likely for next visit depending on biopsy timing.   - Will communicate biopsy scheduling details to LVAD coordinators for next 2 weeks when patient is available.       Advance Care Planning   Advance Directives:   Living Will: No    [place]POST: No   "  Do Not Resuscitate Status: No    Medical Power of : Yes    Agent's Name:  Sia Gutierrez   Agent's Contact Number:  [phone]    Decision Making:  Patient answered questions  Goals of Care: The patient endorses that what is most important right now is to focus on extending life as long as possible, even it it means sacrificing quality and curative/life-prolongation (regardless of treatment burdens)    Accordingly, we have decided that the best plan to meet the patient's goals includes continuing with treatment      Date: 07/03/2024  The patient's main goal is to get a heart transplant, but he is also open to getting an LVAD if necessary. He expressed, "I prefer to do a transplant than an LVAD, but either one, as necessary, it is what it is." The patient stated his goal is to "get everything taken care of" in terms of the workup and procedures needed for transplant evaluation. He said, "I would like that second chance at life. So whatever it takes, I will do it... I think I owe that to my family, you know, because they have been there with me and I should be able to put my best foot forward and be proactive and do whatever it takes to get it done." The patient's wife, Miss Stovall, is designated to make medical decisions for the patient if he is unable to do so. The patient expressed trust in his wife's judgment. The patient lives with his wife. He has four children total, with three of them being adults. Healthcare Power of  form was signed recently by the patient. We discussed that it would be a good idea to think about a back up person in case his wife is unavailable and if he does not want his young adult children to make medical decisions for him. We also discussed code status and he confirmed Full Code.               Follow up: in-person coordinate w/ HTS appt     Plan discussed with: referring       SUBJECTIVE:      History of Present Illness / Interval History:  Ayad Edmond is 44 " y.o. male with NICM, HFrEF, LVEF 10%, HTN, CKD.  Presents to Palliative Care Clinic for physical symptoms, advance care planning,, and additional support.. Please see Cardiology note for more details on HF and advanced therapy workup      7/3/24  History obtained from: patient    He started on evaluation for OHT/LVAD, which he has since been set up with IR visit regarding biopsy of lung lesion recommended by pulmonary during hospital stay. Currently on Dobutamine    The patient reports being diagnosed with congestive heart failure on October 27th, when he woke up with shortness of breath and went to the emergency room. Since then, he has been experiencing shortness of breath, difficulty walking, fluid retention, and bloating. Prior to starting dobutamine, the patient felt very symptomatic and unwell, to the point where he had to sleep in his truck at night because he could not breathe well lying flat. The reclined seat and air circulation in his truck helped alleviate symptoms during many sleepless nights. The patient reports significant weight fluctuations due to fluid retention, with his weight dropping from 220 lbs to 162 lbs after having 40 lbs of fluid drained through two separate procedures - 14 lbs removed in [place] and another 26 lbs removed in [place]. He also developed painful hemorrhoids in May, which he attributes to the hardening of his stools following the aggressive fluid removal. Despite these challenges, the patient expresses feeling much better on dobutamine, having regained his energy and ability to work and carry out daily activities. He emphasizes the need to be reminded of his illness, as the improvement in symptoms can make it easy to forget the severity of his condition. At the time of the conversation, the patient denies having any pain, shortness of breath, anxiety, depression, nausea, constipation, or diarrhea.      ROS:  Review of Systems    Review of Symptoms      Symptom  Assessment (ESAS 0-10 Scale)  Pain:  0  Dyspnea:  0  Anxiety:  0  Nausea:  0  Depression:  0  Anorexia:  0  Fatigue:  0  Insomnia:  0  Restlessness:  0  Agitation:  0     CAM / Delirium:  Negative  Constipation:  Negative  Diarrhea:  Negative      Performance Status:  80    Living Arrangements:  Lives with spouse    Psychosocial/Cultural:   See Palliative Psychosocial Note: No  , has 4 children, 3 adult children. 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  **Primary  to Follow**  Palliative Care  Consult: Yes        Medications:    Current Outpatient Medications:     atorvastatin (LIPITOR) 40 MG tablet, Take 40 mg by mouth once daily., Disp: , Rfl:     bumetanide (BUMEX) 2 MG tablet, Take 1 tablet (2 mg total) by mouth once daily., Disp: 30 tablet, Rfl: 11    dapagliflozin propanediol (FARXIGA) 10 mg tablet, Take 1 tablet (10 mg total) by mouth once daily., Disp: 90 tablet, Rfl: 3    DOBUTamine (DOBUTREX) 1,000 mg/250 mL (4,000 mcg/mL) infusion, Inject 380 mcg/min into the vein continuous., Disp: , Rfl:     eszopiclone (LUNESTA) 1 MG Tab, Take 1 mg by mouth nightly as needed., Disp: , Rfl:     hydrALAZINE (APRESOLINE) 50 MG tablet, Take 0.5 tablets (25 mg total) by mouth every 8 (eight) hours. (Patient taking differently: Take 50 mg by mouth every 8 (eight) hours.), Disp: 45 tablet, Rfl: 11    isosorbide dinitrate (ISORDIL) 20 MG tablet, Take 0.5 tablets (10 mg total) by mouth 3 (three) times daily., Disp: 45 tablet, Rfl: 11    pantoprazole (PROTONIX) 20 MG tablet, Take 1 tablet (20 mg total) by mouth once daily., Disp: 90 tablet, Rfl: 3    spironolactone (ALDACTONE) 25 MG tablet, Take 25 mg by mouth once daily., Disp: , Rfl:     valsartan (DIOVAN) 40 MG tablet, Take 0.5 tablets (20 mg total) by mouth 2 (two) times daily., Disp: 90 tablet, Rfl: 3      External  database queried on 07/03/2024  by Vivi KRUGER :  05/19/2024  04/16/2024 2 Eszopiclone 1 Mg Tablet 30.00 30 Co Fos [id number] [facility] ([id number]) 1 0.33 [facility] [place]   04/17/2024 04/16/2024 2 Eszopiclone 1 Mg Tablet 30.00 30 Co Fos [id number] [facility] ([id number]) 0 0.33 [facility] [place]   04/04/2024 04/03/2024 2 Tramadol Hcl 50 Mg Tablet 20.00 5 Me Mar [id number] [facility] ([id number]) 0 40.00 [facility] [place]   04/02/2024 04/02/2024 2 Hydrocodone-Acetamin 5-325 Mg 10.00 3 Da Aicha [id number] [facility] ([id number]) 0 16.67 [facility] [place]   03/14/2024 03/14/2024 1 Alprazolam 0.25 Mg Tablet 30.00 15 Am Julian [id number] [facility] ([id number]) 0 1.00 [facility] [place]       Review of patient's allergies indicates:  No Known Allergies        OBJECTIVE:         Physical Exam: Audio Only  Vitals:      Physical Exam  No dyspnea         21min spent on phone eval     Additional 17 min time spent on a voluntary advance care planning and /or goals of care discussion, providing emotional support, formulating and communicating prognosis and exploring burden/benefit of various approaches of treatment.       Vivi England MD                    This service was not originating from a related E/M service provided within the previous 7 days nor will  to an E/M service or procedure within the next 24 hours or my soonest available appointment.  Prevailing standard of care was able to be met in this audio-only visit.

## 2024-07-05 ENCOUNTER — TELEPHONE (OUTPATIENT)
Dept: TRANSPLANT | Facility: CLINIC | Age: 44
End: 2024-07-05
Payer: MEDICAID

## 2024-07-05 NOTE — TELEPHONE ENCOUNTER
"I called pt because I have not seen labs yet from this week, he says that he got them done "at the same place he always does"; [facility]- they do not have labs yet either and will try to track down results.  Additionally I gave pt the number to reschedule his virtual phone call with Interventional Radiology so he can schedule his biopsy.  "

## 2024-07-10 ENCOUNTER — TELEPHONE (OUTPATIENT)
Dept: TRANSPLANT | Facility: CLINIC | Age: 44
End: 2024-07-10
Payer: MEDICAID

## 2024-07-10 LAB
EXT HEMATOCRIT: 34.1
EXT HEMOGLOBIN: 10.6
EXT PLATELETS: 378
EXT RBC: 4.64
EXT WBC: 6.4
EXT: ABNORMAL
EXT: ABNORMAL

## 2024-07-15 ENCOUNTER — TELEPHONE (OUTPATIENT)
Dept: INTERVENTIONAL RADIOLOGY/VASCULAR | Facility: CLINIC | Age: 44
End: 2024-07-15
Payer: MEDICAID

## 2024-07-15 ENCOUNTER — TELEPHONE (OUTPATIENT)
Dept: TRANSPLANT | Facility: CLINIC | Age: 44
End: 2024-07-15
Payer: MEDICAID

## 2024-07-15 DIAGNOSIS — I50.9 CONGESTIVE HEART FAILURE, UNSPECIFIED HF CHRONICITY, UNSPECIFIED HEART FAILURE TYPE: Primary | ICD-10-CM

## 2024-07-15 RX ORDER — ATORVASTATIN CALCIUM 40 MG/1
40 TABLET, FILM COATED ORAL DAILY
Qty: 90 TABLET | Refills: 3 | Status: SHIPPED | OUTPATIENT
Start: 2024-07-15

## 2024-07-15 RX ORDER — ATORVASTATIN CALCIUM 40 MG/1
40 TABLET, FILM COATED ORAL DAILY
Qty: 90 TABLET | Refills: 3 | Status: SHIPPED | OUTPATIENT
Start: 2024-07-15 | End: 2024-07-15 | Stop reason: SDUPTHER

## 2024-07-15 RX ORDER — SPIRONOLACTONE 25 MG/1
25 TABLET ORAL DAILY
Qty: 90 TABLET | Refills: 3 | Status: SHIPPED | OUTPATIENT
Start: 2024-07-15

## 2024-07-15 RX ORDER — SPIRONOLACTONE 25 MG/1
25 TABLET ORAL DAILY
Qty: 90 TABLET | Refills: 3 | Status: SHIPPED | OUTPATIENT
Start: 2024-07-15 | End: 2024-07-15 | Stop reason: SDUPTHER

## 2024-07-15 NOTE — TELEPHONE ENCOUNTER
I spoke with Mr Edmond today about his travel/work schedule and his complex medical regimen. HTS team has concern for pt as he has not yet completed his lung biopsy, declines wearing life vest or ICD placement, and frequent out of state travel.   Pharmacy states that he is traveling to [facility] x 2-3 weeks for work.  Pt declines this, says that he is leaving tomorrow for work.  Advised that he will need to have weekly labs and dressing changes in [facility], we will need to make a plan for this.   He then states that he is leaving next Sunday for work.  Reminded that he needs weekly labs and dressing changes for his / Line care.  Pt states that now he is leaving to go to [facility] next Monday after he gets his labs and dressing changes at the usual spot in [place].    Discussed the need for IR appt to complete lung biopsy as previously discussed.  Mr Edmond said that he tried to get in touch with the interventional team to reschedule his missed appts without success.  I let him know that we will work on rescheduling this for when he comes to clinic on 7-29  I called his local lab and requested they fax today's results once completed

## 2024-07-15 NOTE — TELEPHONE ENCOUNTER
----- Message from Marianne Alcala sent at 7/15/2024 11:20 AM CDT -----  Regarding: RX refill  Contact: Ayad  .Type:  RX Refill Request    Who Called:  Ayad  Refill or New Rx: refill   RX Name and Strength: atorvastatin (LIPITOR) 40 MG tablet  How is the patient currently taking it? (ex. 1XDay):  Is this a 30 day or 90 day RX:    Preferred Pharmacy with phone number:   [facility] [address] [address]  Phone: [phone] Fax: [phone]     Local or Mail Order: local   Ordering Provider: NANCY Davies   Would the patient rather a call back or a response via [facility]? call  Best Call Back Number: [phone] (home)    Additional Information:

## 2024-07-15 NOTE — TELEPHONE ENCOUNTER
----- Message from Marianne Alcala sent at 7/15/2024 11:08 AM CDT -----  Regarding: RX Refill  Contact: Ayad  .Type:  RX Refill Request    Who Called: Ayad  Refill or New Rx: refill   RX Name and Strength:   How is the patient currently taking it? (ex. 1XDay):  Is this a 30 day or 90 day RX:    Preferred Pharmacy with phone number:   [facility] [address] [address]  Phone: [phone] Fax: [phone]     Local or Mail Order: local   Ordering Provider: [facility]   Would the patient rather a call back or a response via [facility]? call  Best Call Back Number: [phone] (home)    Additional Information: Ayad is requesting an oral medication to treat hemroid

## 2024-07-15 NOTE — TELEPHONE ENCOUNTER
----- Message from Marianne Alcala sent at 7/15/2024 11:22 AM CDT -----  Regarding: rx refill  Contact: Ayad  .Type:  RX Refill Request    Who Called: Ayad  Refill or New Rx:refill   RX Name and Strength:spironolactone (ALDACTONE) 25 MG tablet  How is the patient currently taking it? (ex. 1XDay): Take 25 mg by mouth once daily. - Oral  Is this a 30 day or 90 day RX:   Preferred Pharmacy with phone number:   [facility] [address] [address]  Phone: [phone] Fax: [phone]     Local or Mail Order:  Ordering Provider:Would the patient rather a call back or a response via [facility]? Call  Best Call Back Number:  Additional Information:

## 2024-07-16 ENCOUNTER — TELEPHONE (OUTPATIENT)
Dept: TRANSPLANT | Facility: CLINIC | Age: 44
End: 2024-07-16
Payer: MEDICAID

## 2024-07-16 LAB
EXT ALBUMIN: 3.8
EXT ALKALINE PHOSPHATASE: 105
EXT ALT: 18
EXT AST: 25
EXT BUN: 6
EXT CALCIUM: 8.5
EXT CHLORIDE: 106
EXT CREATININE: 1.67 MG/DL
EXT GLUCOSE: 119
EXT HEMATOCRIT: 31.7
EXT HEMOGLOBIN: 9.7
EXT PLATELETS: 430
EXT POTASSIUM: 3.2
EXT PROBNP: 1056
EXT PROTEIN TOTAL: 7.1
EXT SODIUM: 142 MMOL/L
EXT WBC: 6.4

## 2024-07-16 NOTE — TELEPHONE ENCOUNTER
"Reviewed outside labs, K 3.1.  I spoke with pt who reported he was out of spironolactone for"about three or four days" but is now back on it.  Is taking   Valsartan and bumex as ordered.  Reports BP as:125/80, weight: 178.   Goal weight - pt does not know his goal weight  Said he left the hospital at 162 pounds   Denies SOB, nausea, edema, or other signs of worsening HF.    Discussed with Dr Rhoades, advised to take KCL 40 meq x 1 dose tonight and repeat labs.  Verbalized understanding of instructions.     "

## 2024-07-18 ENCOUNTER — OFFICE VISIT (OUTPATIENT)
Dept: URGENT CARE | Facility: CLINIC | Age: 44
End: 2024-07-18
Payer: MEDICAID

## 2024-07-18 VITALS
HEART RATE: 54 BPM | HEIGHT: 69 IN | DIASTOLIC BLOOD PRESSURE: 98 MMHG | TEMPERATURE: 98 F | OXYGEN SATURATION: 96 % | WEIGHT: 171 LBS | RESPIRATION RATE: 16 BRPM | SYSTOLIC BLOOD PRESSURE: 149 MMHG | BODY MASS INDEX: 25.33 KG/M2

## 2024-07-18 DIAGNOSIS — K64.8 INTERNAL HEMORRHOIDS: ICD-10-CM

## 2024-07-18 DIAGNOSIS — K64.8 PROLAPSED INTERNAL HEMORRHOIDS: Primary | ICD-10-CM

## 2024-07-18 PROCEDURE — 99213 OFFICE O/P EST LOW 20 MIN: CPT | Mod: S$GLB,TXP,, | Performed by: STUDENT IN AN ORGANIZED HEALTH CARE EDUCATION/TRAINING PROGRAM

## 2024-07-18 RX ORDER — LIDOCAINE HYDROCHLORIDE AND HYDROCORTISONE ACETATE 30; 5 MG/G; MG/G
CREAM RECTAL
Qty: 7 G | Refills: 0 | Status: SHIPPED | OUTPATIENT
Start: 2024-07-18

## 2024-07-18 RX ORDER — ISOSORBIDE DINITRATE 20 MG/1
10 TABLET ORAL 3 TIMES DAILY
Qty: 90 TABLET | Refills: 0 | Status: SHIPPED | OUTPATIENT
Start: 2024-07-18 | End: 2025-07-18

## 2024-07-18 RX ORDER — DOCUSATE SODIUM 100 MG/1
100 CAPSULE, LIQUID FILLED ORAL 2 TIMES DAILY PRN
Qty: 30 CAPSULE | Refills: 0 | Status: SHIPPED | OUTPATIENT
Start: 2024-07-18

## 2024-07-18 NOTE — PROGRESS NOTES
"Subjective:      Patient ID: Ayad Edmond is a 44 y.o. male.    Vitals:  height is 5' 9" (1.753 m) and weight is 77.6 kg (171 lb). His temperature is 98 °F (36.7 °C). His blood pressure is 149/98 (abnormal) and his pulse is 54 (abnormal). His respiration is 16 and oxygen saturation is 96%.     Chief Complaint: Hemorrhoids    Pt has internal hemorrhoids since May, he is in a lot of pain. He has not had anything done with it yet as far as surgery. Pt also says that he needs his isosorbide refilled but it is supposed to be a full tablet tid not a half tab. Tried otc creams and used the suppositories and has been using sits baths as well.   Went to ER on Tuesday and was prescribed some creams.                    Gastrointestinal:  Positive for rectal bleeding and hemorrhoids.      Objective:     Physical Exam   Genitourinary: Rectum:      Tenderness and internal hemorrhoid present.           Comments: Two prolapsed hemorrhoids seen  Tender to palpation, no bleeding seen   chaperone present         Assessment:     1. External hemorrhoid    2. Prolapsed external hemorrhoids        Plan:       External hemorrhoid  -     docusate sodium (COLACE) 100 MG capsule; Take 1 capsule (100 mg total) by mouth 2 (two) times daily as needed for Constipation.  Dispense: 30 capsule; Refill: 0  -     LIDOcaine HCl-hydrocortison ac 3-0.5 % Crea; Apply as needed up to two times per day  Dispense: 7 g; Refill: 0  -     Ambulatory referral/consult to General Surgery    Prolapsed external hemorrhoids  -     docusate sodium (COLACE) 100 MG capsule; Take 1 capsule (100 mg total) by mouth 2 (two) times daily as needed for Constipation.  Dispense: 30 capsule; Refill: 0  -     LIDOcaine HCl-hydrocortison ac 3-0.5 % Crea; Apply as needed up to two times per day  Dispense: 7 g; Refill: 0  -     Ambulatory referral/consult to General Surgery      Please follow up with your primary care provider within 2-5 days if your signs and symptoms have not " resolved or worsen.     If your condition worsens or fails to improve we recommend that you receive another evaluation at the emergency room immediately or contact your primary medical clinic to discuss your concerns.   You must understand that you have received an Urgent Care treatment only and that you may be released before all of your medical problems are known or treated. You, the patient, will arrange for follow up care as instructed.        Please go the office and schedule an appointment for ASAP. If you have any issues with getting an appointment then please call [phone].   You cannot use the cream for no more than 7 days. Please use the stool softener as directed and continue to do sitz baths.    Samuel Acosta MD   [address]   [facility] [address]   Phone: [phone]   Fax: [phone]       Medical Decision Making:   Differential Diagnosis:   Prolapsed internal hemorrhoids  Urgent Care Management:  Pt has internal hemorrhoids since May, he is in a lot of pain. He has not had anything done with it yet as far as surgery. Pt also says that he needs his isosorbide refilled but it is supposed to be a full tablet tid not a half tab. Tried otc creams and used the suppositories and has been using sits baths as well.   Went to ER on Tuesday and was prescribed some creams.   Physical Exam   Genitourinary: Rectum:      Tenderness and internal hemorrhoid present.       Comments: Two prolapsed hemorrhoids seen  Tender to palpation, no bleeding seen   chaperone present   I reached out to GI who stated that I should probably refer to surgery since the patient would likely need the hemorrhoids removed.  I referred the patient to surgery and the patient was sent home with a topical cream to help with the discomfort.  Patient can continue tramadol that he was given from the ER as needed for any pain. Patient was also given Colace to take as needed for constipation.  ED and return  precautions were given.  The patient vocalized understanding.

## 2024-07-18 NOTE — PATIENT INSTRUCTIONS
Please follow up with your primary care provider within 2-5 days if your signs and symptoms have not resolved or worsen.     If your condition worsens or fails to improve we recommend that you receive another evaluation at the emergency room immediately or contact your primary medical clinic to discuss your concerns.   You must understand that you have received an Urgent Care treatment only and that you may be released before all of your medical problems are known or treated. You, the patient, will arrange for follow up care as instructed.        Please go the office and schedule an appointment for ASAP. If you have any issues with getting an appointment then please call [phone].   You cannot use the cream for no more than 7 days. Please use the stool softener as directed and continue to do sitz baths.    Samuel Acosta MD   [address] , [address]   [facility] [address]   Phone: [phone]   Fax: [phone]

## 2024-07-19 ENCOUNTER — OFFICE VISIT (OUTPATIENT)
Dept: SURGERY | Facility: CLINIC | Age: 44
End: 2024-07-19
Payer: MEDICAID

## 2024-07-19 ENCOUNTER — PATIENT MESSAGE (OUTPATIENT)
Dept: TRANSPLANT | Facility: CLINIC | Age: 44
End: 2024-07-19
Payer: MEDICAID

## 2024-07-19 DIAGNOSIS — K60.0 ACUTE ANAL FISSURE: Primary | ICD-10-CM

## 2024-07-19 RX ORDER — TRAMADOL HYDROCHLORIDE 50 MG/1
50 TABLET ORAL EVERY 6 HOURS PRN
COMMUNITY
Start: 2024-07-16

## 2024-07-19 NOTE — PROGRESS NOTES
History & Physical    SUBJECTIVE:     History of Present Illness:    44-year-old male presents with complaints of anal rectal pain and hemorrhoidal pain for several weeks.  He shows me a photograph of what appears to be prolapsing internal hemorrhoid.  He says he has exquisite pain and occasional bleeding after bowel movement.  Patient is on dobutamine drip for cardiomyopathy and is on cardiac pre transplant list.    Chief Complaint   Patient presents with    Hemorrhoids         Review of patient's allergies indicates:  Review of patient's allergies indicates:  No Known Allergies    Current Outpatient Medications on File Prior to Visit   Medication Sig Dispense Refill    traMADoL (ULTRAM) 50 mg tablet Take 50 mg by mouth every 6 (six) hours as needed.      atorvastatin (LIPITOR) 40 MG tablet Take 1 tablet (40 mg total) by mouth once daily. 90 tablet 3    bumetanide (BUMEX) 2 MG tablet Take 1 tablet (2 mg total) by mouth once daily. 30 tablet 11    dapagliflozin propanediol (FARXIGA) 10 mg tablet Take 1 tablet (10 mg total) by mouth once daily. 90 tablet 3    DOBUTamine (DOBUTREX) 1,000 mg/250 mL (4,000 mcg/mL) infusion Inject 380 mcg/min into the vein continuous.      docusate sodium (COLACE) 100 MG capsule Take 1 capsule (100 mg total) by mouth 2 (two) times daily as needed for Constipation. 30 capsule 0    hydrALAZINE (APRESOLINE) 50 MG tablet Take 0.5 tablets (25 mg total) by mouth every 8 (eight) hours. (Patient taking differently: Take 50 mg by mouth every 8 (eight) hours.) 45 tablet 11    isosorbide dinitrate (ISORDIL) 20 MG tablet Take 0.5 tablets (10 mg total) by mouth 3 (three) times daily. 90 tablet 0    LIDOcaine HCl-hydrocortison ac 3-0.5 % Crea Apply as needed up to two times per day 7 g 0    pantoprazole (PROTONIX) 20 MG tablet Take 1 tablet (20 mg total) by mouth once daily. 90 tablet 3    spironolactone (ALDACTONE) 25 MG tablet Take 1 tablet (25 mg total) by mouth once daily. 90 tablet 3    valsartan  "(DIOVAN) 40 MG tablet Take 0.5 tablets (20 mg total) by mouth 2 (two) times daily. 90 tablet 3    [DISCONTINUED] eszopiclone (LUNESTA) 1 MG Tab Take 1 mg by mouth nightly as needed.       No current facility-administered medications on file prior to visit.       Past Medical History:   Diagnosis Date    Acute heart failure     AUDELIA (acute kidney injury)     Cardiomyopathy     Elevated troponin     HTN (hypertension)     Hypokalemia     Hypomagnesemia     Renal insufficiency      Past Surgical History:   Procedure Laterality Date    broken foot Left     RIGHT HEART CATHETERIZATION Right 2024    Procedure: INSERTION, CATHETER, RIGHT HEART;  Surgeon: Pradeep Mack MD;  Location: [facility] CATH LAB;  Service: Cardiology;  Laterality: Right;    RIGHT HEART CATHETERIZATION Right 2024    Procedure: INSERTION, CATHETER, RIGHT HEART;  Surgeon: Elier Gilmore MD;  Location: [facility] CATH LAB;  Service: Cardiology;  Laterality: Right;     Family History   Problem Relation Name Age of Onset    Heart failure Mother      No Known Problems Father         Social History     Socioeconomic History    Marital status:    Tobacco Use    Smoking status: Former     Current packs/day: 0.00     Average packs/day: 1 pack/day for 19.0 years (19.0 ttl pk-yrs)     Types: Cigarettes     Start date:      Quit date:      Years since quittin.5     Passive exposure: Past    Smokeless tobacco: Never   Substance and Sexual Activity    Alcohol use: Yes     Comment: "very seldom."    Drug use: Never     Social Determinants of Health     Financial Resource Strain: High Risk (7/3/2024)    Overall Financial Resource Strain (CARDIA)     Difficulty of Paying Living Expenses: Very hard   Food Insecurity: Food Insecurity Present (7/3/2024)    Hunger Vital Sign     Worried About Running Out of Food in the Last Year: Often true     Ran Out of Food in the Last Year: Sometimes true   Stress: Stress Concern Present (7/3/2024)    [employer] " [place] [employer] - Occupational Stress Questionnaire     Feeling of Stress : Rather much   Housing Stability: High Risk (7/3/2024)    Housing Stability Vital Sign     Unable to Pay for Housing in the Last Year: Yes          Review of Systems   Constitutional:  Positive for weight loss.   Respiratory:  Positive for shortness of breath.    Cardiovascular:  Positive for orthopnea.   Gastrointestinal: Negative.    Genitourinary: Negative.    Musculoskeletal: Negative.    Neurological: Negative.    Psychiatric/Behavioral: Negative.         OBJECTIVE:     There were no vitals filed for this visit.              Physical Exam:  Physical Exam  Constitutional:       Appearance: Normal appearance.   HENT:      Head: Normocephalic.   Cardiovascular:      Rate and Rhythm: Normal rate.   Pulmonary:      Effort: Pulmonary effort is normal.   Abdominal:      General: Abdomen is flat.      Palpations: Abdomen is soft.   Genitourinary:         Comments: External examination shows no significant external hemorrhoids.  There is exquisite tenderness on the left side with the patient in the left lateral decubitus position with what appears to be a tear in the anal derm consistent with acute anal fissure.  Rectal examination is exquisitely tender and I do not feel any masses or thrombosed hemorrhoids.  Musculoskeletal:         General: Normal range of motion.   Skin:     General: Skin is warm and dry.   Neurological:      General: No focal deficit present.      Mental Status: He is alert and oriented to person, place, and time.   Psychiatric:         Mood and Affect: Mood normal.         Behavior: Behavior normal.             ASSESSMENT/PLAN:   1. Probable acute anal fissure 2. Prolapsing internal hemorrhoids 3.  End-stage cardiomyopathy on pre transplant list    PLAN:  Patient very poor surgical candidate due to his significant cardiac disease.  We will try nifedipine/lidocaine ointment for his acute anal fissure which I  think is what is bothering him the most.  Patient started to continue to use the hemorrhoidal cream as needed.

## 2024-07-24 ENCOUNTER — TELEPHONE (OUTPATIENT)
Dept: TRANSPLANT | Facility: CLINIC | Age: 44
End: 2024-07-24
Payer: MEDICAID

## 2024-07-24 DIAGNOSIS — I50.42 CHRONIC COMBINED SYSTOLIC AND DIASTOLIC HEART FAILURE: Primary | ICD-10-CM

## 2024-07-24 NOTE — TELEPHONE ENCOUNTER
"Pt was scheduled to repeat labs last Wednesday, and again this past Monday.  I have not received lab results and I have not heard back from Mr Edmond.    I called Mr Edmond to see how he is feeling and whether he has started checking his weight.  He says he saw his primary care for his hemmorhoids and repeated his labs, current weight is 174-175 pounds. "Nobody told me my goal weight"  I called the [facility] at [facility] to have results faxed over, but the number is unreachable at this time.    [phone]  I was able to reach the [facility], who fagreed to send over labs from last Thursday  "

## 2024-07-29 ENCOUNTER — HOSPITAL ENCOUNTER (OUTPATIENT)
Dept: CARDIOLOGY | Facility: HOSPITAL | Age: 44
Discharge: HOME | End: 2024-07-29
Attending: INTERNAL MEDICINE
Payer: MEDICAID

## 2024-07-29 ENCOUNTER — TELEPHONE (OUTPATIENT)
Dept: TRANSPLANT | Facility: CLINIC | Age: 44
End: 2024-07-29
Payer: MEDICAID

## 2024-07-29 ENCOUNTER — OFFICE VISIT (OUTPATIENT)
Dept: TRANSPLANT | Facility: CLINIC | Age: 44
End: 2024-07-29
Payer: MEDICAID

## 2024-07-29 ENCOUNTER — OFFICE VISIT (OUTPATIENT)
Dept: INTERVENTIONAL RADIOLOGY/VASCULAR | Facility: CLINIC | Age: 44
End: 2024-07-29
Payer: MEDICAID

## 2024-07-29 VITALS
HEIGHT: 69 IN | WEIGHT: 187.38 LBS | BODY MASS INDEX: 27.75 KG/M2 | SYSTOLIC BLOOD PRESSURE: 123 MMHG | HEART RATE: 60 BPM | DIASTOLIC BLOOD PRESSURE: 65 MMHG

## 2024-07-29 VITALS
HEART RATE: 54 BPM | BODY MASS INDEX: 28.05 KG/M2 | HEIGHT: 69 IN | SYSTOLIC BLOOD PRESSURE: 127 MMHG | DIASTOLIC BLOOD PRESSURE: 65 MMHG | WEIGHT: 189.38 LBS

## 2024-07-29 VITALS
SYSTOLIC BLOOD PRESSURE: 123 MMHG | WEIGHT: 189.38 LBS | BODY MASS INDEX: 28.05 KG/M2 | HEIGHT: 69 IN | DIASTOLIC BLOOD PRESSURE: 65 MMHG | HEART RATE: 51 BPM

## 2024-07-29 DIAGNOSIS — I50.9 CONGESTIVE HEART FAILURE, UNSPECIFIED HF CHRONICITY, UNSPECIFIED HEART FAILURE TYPE: ICD-10-CM

## 2024-07-29 DIAGNOSIS — R91.1 PULMONARY NODULE: Primary | ICD-10-CM

## 2024-07-29 DIAGNOSIS — I10 PRIMARY HYPERTENSION: ICD-10-CM

## 2024-07-29 DIAGNOSIS — I50.42 CHRONIC COMBINED SYSTOLIC AND DIASTOLIC HEART FAILURE: Primary | ICD-10-CM

## 2024-07-29 PROBLEM — R57.0 CARDIOGENIC SHOCK: Status: RESOLVED | Noted: 2024-05-10 | Resolved: 2024-07-29

## 2024-07-29 LAB
ASCENDING AORTA: 3.17 CM
AV INDEX (PROSTH): 0.6
AV MEAN GRADIENT: 9 MMHG
AV PEAK GRADIENT: 13 MMHG
AV VALVE AREA BY VELOCITY RATIO: 2.87 CM²
AV VALVE AREA: 3.31 CM²
AV VELOCITY RATIO: 0.52
BSA FOR ECHO PROCEDURE: 2.03 M2
CV ECHO LV RWT: 0.29 CM
DOP CALC AO PEAK VEL: 1.8 M/S
DOP CALC AO VTI: 29.59 CM
DOP CALC LVOT AREA: 5.6 CM2
DOP CALC LVOT DIAMETER: 2.66 CM
DOP CALC LVOT PEAK VEL: 0.93 M/S
DOP CALC LVOT STROKE VOLUME: 97.81 CM3
DOP CALCLVOT PEAK VEL VTI: 17.61 CM
E WAVE DECELERATION TIME: 236 MSEC
E/A RATIO: 0.7
E/E' RATIO: 17.33 M/S
ECHO LV POSTERIOR WALL: 1.11 CM (ref 0.6–1.1)
EXT HEMATOCRIT: 32
EXT HEMOGLOBIN: 9.7
EXT PLATELETS: 425
EXT WBC: 6.7
FRACTIONAL SHORTENING: 16 % (ref 28–44)
INTERVENTRICULAR SEPTUM: 0.82 CM (ref 0.6–1.1)
IVRT: 129.4 MSEC
LA MAJOR: 5.91 CM
LA MINOR: 5.74 CM
LA WIDTH: 4.6 CM
LEFT ATRIUM SIZE: 5.21 CM
LEFT ATRIUM VOLUME INDEX MOD: 39.3 ML/M2
LEFT ATRIUM VOLUME INDEX: 59 ML/M2
LEFT ATRIUM VOLUME MOD: 79.04 CM3
LEFT ATRIUM VOLUME: 118.64 CM3
LEFT INTERNAL DIMENSION IN SYSTOLE: 6.34 CM (ref 2.1–4)
LEFT VENTRICLE DIASTOLIC VOLUME INDEX: 151.42 ML/M2
LEFT VENTRICLE DIASTOLIC VOLUME: 304.36 ML
LEFT VENTRICLE MASS INDEX: 176 G/M2
LEFT VENTRICLE SYSTOLIC VOLUME INDEX: 101.7 ML/M2
LEFT VENTRICLE SYSTOLIC VOLUME: 204.34 ML
LEFT VENTRICULAR INTERNAL DIMENSION IN DIASTOLE: 7.57 CM (ref 3.5–6)
LEFT VENTRICULAR MASS: 353.02 G
LV LATERAL E/E' RATIO: 17.33 M/S
LV SEPTAL E/E' RATIO: 17.33 M/S
MV PEAK A VEL: 0.74 M/S
MV PEAK E VEL: 0.52 M/S
MV STENOSIS PRESSURE HALF TIME: 68.44 MS
MV VALVE AREA P 1/2 METHOD: 3.21 CM2
PISA MRMAX VEL: 0.05 M/S
PISA TR MAX VEL: 3.08 M/S
RA MAJOR: 5.47 CM
RA PRESSURE ESTIMATED: 3 MMHG
RA WIDTH: 3.98 CM
RIGHT VENTRICLE DIASTOLIC BASEL DIMENSION: 3.8 CM
RV TB RVSP: 6 MMHG
SINUS: 3.52 CM
STJ: 2.95 CM
TDI LATERAL: 0.03 M/S
TDI SEPTAL: 0.03 M/S
TDI: 0.03 M/S
TR MAX PG: 38 MMHG
TRICUSPID ANNULAR PLANE SYSTOLIC EXCURSION: 1.95 CM
TV REST PULMONARY ARTERY PRESSURE: 41 MMHG
Z-SCORE OF LEFT VENTRICULAR DIMENSION IN END DIASTOLE: 2.51
Z-SCORE OF LEFT VENTRICULAR DIMENSION IN END SYSTOLE: 4.44

## 2024-07-29 PROCEDURE — 4010F ACE/ARB THERAPY RXD/TAKEN: CPT | Mod: TXP,,, | Performed by: INTERNAL MEDICINE

## 2024-07-29 PROCEDURE — 99999 PR PBB SHADOW E&M-EST. PATIENT-LVL II: CPT | Mod: PBBFAC,TXP,,

## 2024-07-29 PROCEDURE — 99212 OFFICE O/P EST SF 10 MIN: CPT | Mod: PBBFAC,TXP

## 2024-07-29 PROCEDURE — 99214 OFFICE O/P EST MOD 30 MIN: CPT | Mod: S$PBB,TXP,, | Performed by: INTERNAL MEDICINE

## 2024-07-29 PROCEDURE — 99999 PR PBB SHADOW E&M-EST. PATIENT-LVL III: CPT | Mod: PBBFAC,TXP,, | Performed by: INTERNAL MEDICINE

## 2024-07-29 PROCEDURE — 99204 OFFICE O/P NEW MOD 45 MIN: CPT | Mod: S$PBB,TXP,,

## 2024-07-29 PROCEDURE — 93306 TTE W/DOPPLER COMPLETE: CPT | Mod: TXP

## 2024-07-29 PROCEDURE — 93306 TTE W/DOPPLER COMPLETE: CPT | Mod: 26,TXP,, | Performed by: INTERNAL MEDICINE

## 2024-07-29 PROCEDURE — 99213 OFFICE O/P EST LOW 20 MIN: CPT | Mod: PBBFAC,25,27,TXP | Performed by: INTERNAL MEDICINE

## 2024-07-29 PROCEDURE — 4010F ACE/ARB THERAPY RXD/TAKEN: CPT | Mod: TXP,,,

## 2024-07-29 RX ORDER — ISOSORBIDE DINITRATE 20 MG/1
20 TABLET ORAL 3 TIMES DAILY
Qty: 270 TABLET | Refills: 1 | Status: SHIPPED | OUTPATIENT
Start: 2024-07-29 | End: 2025-07-29

## 2024-07-29 RX ORDER — HYDRALAZINE HYDROCHLORIDE 50 MG/1
50 TABLET, FILM COATED ORAL EVERY 8 HOURS
Qty: 270 TABLET | Refills: 3 | Status: SHIPPED | OUTPATIENT
Start: 2024-07-29 | End: 2025-07-29

## 2024-07-29 NOTE — LETTER
July 29, 2024        Aba Garcia  [facility] [address]  [facility] [address]  Phone: [phone]  Fax: [phone]             [id number]  [address]  [facility] [address]  Phone: [phone]   Patient: Ayad Edmond   MR Number: [id number]   YOB: 1980   Date of Visit: 7/29/2024       Dear Dr. Aba Garcia    Thank you for referring Ayad Edmond to me for evaluation. Attached you will find relevant portions of my assessment and plan of care.    If you have questions, please do not hesitate to call me. I look forward to following Ayad Edmond along with you.    Sincerely,    Elier Norris MD    Enclosure    If you would like to receive this communication electronically, please contact [email or web address] or [phone] to request [employer] access.    [employer] is a tool which provides read-only access to select patient information with whom you have a relationship. Its easy to use and provides real time access to review your patients record including encounter summaries, notes, results, and demographic information.    If you feel you have received this communication in error or would no longer like to receive these types of communications, please e-mail [email or web address]

## 2024-07-29 NOTE — PROGRESS NOTES
Patient Education     Conjunctivitis, Bacterial  You have an infection in the membranes covering the white part of the eye. This part of the eye is called the conjunctiva. The infection is called conjunctivitis. The most common symptoms of conjunctivitis include a thick, pus-like discharge from the eye, swollen eyelids, redness, eyelids sticking together upon awakening, and a gritty or scratchy feeling in the eye. Your infection was caused by bacteria. It may be treated with medicine. With treatment, the infection takes about 7 to 10 days to resolve.    Home care  · Use prescribed antibiotic eye drops or ointment as directed to treat the infection.  · Apply a warm compress (towel soaked in warm water) to the affected eye 3 to 4 times a day. Do this just before applying medicine to the eye.  · Use a warm, wet cloth to wipe away crusting of the eyelids in the morning. This is caused by mucus drainage during the night. You may also use saline irrigating solution or artificial tears to rinse away mucus in the eye. Do not put a patch over the eye.  · Wash your hands before and after touching the infected eye. This is to prevent spreading the infection to the other eye, and to other people. Do not share your towels or washcloths with others.  · You may use acetaminophen or ibuprofen to control pain, unless another medicine was prescribed. (Note: If you have chronic liver or kidney disease or have ever had a stomach ulcer or gastrointestinal bleeding, talk with your doctor before using these medicines.)  · Do not wear contact lenses until your eyes have healed and all symptoms are gone.  Follow-up care  Follow up with your healthcare provider, or as advised.  When to seek medical advice  Call your healthcare provider right away if any of these occur:  · Worsening vision  · Increasing pain in the eye  · Increasing swelling or redness of the eyelid  · Redness spreading around the eye  © 0595-8047 [employer].  Subjective     Patient ID: Ayad Edmond is a 44 y.o. male.    Chief Complaint: No chief complaint on file.    44 y.o. year old male who with NICM, HFrEF, LVEF 10%, HTN, CKD who presents with a right lung nodule requiring biopsy. He is being worked up for a heart transplant. Patient reports that he was diagnosed in 1/2024 during a hospitalization for SOB. Today he reports felling well.     PMH and medications reviewed.   Not taking any blood thinners.  Not taking any GLP-1 Agonists.   Will schedule with anes per approval.   Cariology clinic note reviewed.     Review of Systems   Constitutional:  Positive for unexpected weight change. Negative for appetite change and fatigue.   Respiratory:  Negative for cough and shortness of breath.    Cardiovascular:  Negative for chest pain.   Gastrointestinal:  Negative for abdominal pain.   Neurological:  Negative for facial asymmetry and speech difficulty.   Psychiatric/Behavioral:  Negative for behavioral problems and confusion.         Objective     Physical Exam  Vitals reviewed.   Constitutional:       Appearance: Normal appearance.   HENT:      Head: Normocephalic and atraumatic.      Right Ear: External ear normal.      Left Ear: External ear normal.      Nose: Nose normal.   Eyes:      General:         Right eye: No discharge.         Left eye: No discharge.      Conjunctiva/sclera: Conjunctivae normal.   Pulmonary:      Effort: Pulmonary effort is normal.   Abdominal:      General: Abdomen is flat.   Musculoskeletal:      Right lower leg: No edema.      Left lower leg: No edema.   Skin:     General: Skin is warm and dry.      Coloration: Skin is not jaundiced.   Neurological:      General: No focal deficit present.      Mental Status: He is alert and oriented to person, place, and time.   Psychiatric:         Mood and Affect: Mood normal.         Behavior: Behavior normal.            Assessment and Plan     1. Pulmonary nodule  -     Protime-INR; Future; Expected  [address] [place] [id number]. All rights reserved. This information is not intended as a substitute for professional medical care. Always follow your healthcare professional's instructions.            date: 07/29/2024  -     IR Biopsy Lung w/ guidance; Future; Expected date: 07/29/2024    2. Congestive heart failure, unspecified HF chronicity, unspecified heart failure type  -     Protime-INR; Future; Expected date: 07/29/2024  -     IR Biopsy Lung w/ guidance; Future; Expected date: 07/29/2024    - Approved by Dr. Santiago.   - Discussed patient may be admitted after. He would really prefer not to be admitted; however, discussed that that was a possibility. He verbally understood.   - Discussed how the procedure will be performed, risks (including, but not limited to, pain, bleeding, infection, damage to nearby structures, PNEUMOTHORAX with CHEST TUBE PLACEMENT AND ADMISSION, and the need for additional procedures), benefits, possible complications, pre-post procedure expectations, and alternatives. The patient voices understanding and all questions have been answered.  The patient agrees to proceed as planned. Patient scheduled for 8/29/2024. Pre-procedure   handout with clinic phone number provided.  - Offered an early date; however, patient declined.     Jenny Portillo PA-C  Interventional Radiology  [phone]

## 2024-07-29 NOTE — PATIENT INSTRUCTIONS
You have just the right amount of fluid on you.  Please adhere to a low sodium diet (no more than 1.5 grams of sodium in 24h).  3.   Follow fluid restriction of  1. no more than 2 liters in 24 hours..   4.  Stop valsartan.  5. Start low dose entresto twice a day.  6. F/u blood test Monday am.  7. Call us every 2 weeks for next upgrade in your medication.  8. F/u in 2 months with labs.

## 2024-07-29 NOTE — LETTER
July 29, 2024    Ayad Edmond  [address] [facility] [address]  [place] [address]       [facility]  [address]  [facility] [address]  Phone: [phone] PRE-PROCEDURE INSTRUCTIONS    Your procedure with Interventional Radiology is scheduled for   Please arrive by     **Do not eat or drink anything between midnight and the time of your procedure. This includes gum, mints, and candy lemon drops.    **Do not smoke or drink alcoholic beverages 24 hours prior to your procedure.    **If you wear contact lenses, dentures, hearing aids, or glasses, bring a container to put them in during the procedure and give them to a family member for safekeeping.    **If you have been diagnosed with sleep apnea please bring your CPAP machine.    **If your doctor has scheduled you for an overnight stay, bring a small overnight bag with any personal items that you may need.    **Make arrangements in advance for transportation home by a responsible adult. It is not safe to drive a vehicle during the 24 hours following the procedure.    **All [facility] facilities and properties are tobacco free. Smoking is NOT allowed.    PLEASE NOTE: The procedure schedule has many variables which affect the time of your procedure. Family members should be available if your surgery time changes.    If you have any questions about these instructions call Interventional Radiology at [phone] Monday - Friday between 8:00am and 4:00pm or [phone] (ask for interventional radiology resident) for after hours.

## 2024-07-29 NOTE — PROGRESS NOTES
"                                                                                       [facility] Failure [facility] Follow up.      Attending Physician: Elier Norris MD.  The patient's last visit with me was on Visit date not found.         HPI.  43 YO M w/ NICM, HFrEF, LVEF 10%, HTN, CKD who presents for evaluation.     Sees Dr. Aba Garcia MD locally for his cardiomyopathy. Prior records are available under scanned media and reviewed prior to today's visit.     Initially diagnosed 1/2024 with HFrEF, LVEF 30-35%. During a hospitalization in February, found to have further drop in EF to 10% on LV gram as part of combined LHC/RHC. Was seen in clinic in early May, but 05/29/24 had admission for ADHF after presenting with ADHF needing epi/inotrope supported diuresis. Patient was started on evaluation for OHT/LVAD, which he has since been set up with IR visit regarding biopsy of lung lesion recommended by pulmonary during hospital stay. Additionally had RHC 06/03/24 on dobutamine which showed normal BIV filling pressures. He is doing great currently, states he feels well, volume well controlled. Had issue with bp, they increase his hydralazine to full tablet (25mg) however now he prefers not to change it, since he isabout to drive long distance and feels the med changes affect him. (I was considering changing valsartan to entresto vs increasing valsartan dose).       July 29, 2024:  No CV symptoms. His major issue is hemorrhoids and "anal fissure".      Review of Systems   Constitutional:  Negative for activity change, appetite change, chills, diaphoresis and fever.   HENT:  Negative for nasal congestion, rhinorrhea and sore throat.    Eyes:  Negative for visual disturbance.   Respiratory:  Negative for cough, choking, chest tightness and shortness of breath.    Cardiovascular:  Negative for chest pain.   Gastrointestinal:  Positive for rectal pain. Negative for abdominal pain, diarrhea, " nausea and vomiting.   Genitourinary:  Negative for difficulty urinating, dysuria and hematuria.   Integumentary:  Negative for rash.   Neurological:  Negative for seizures, syncope and light-headedness.   Psychiatric/Behavioral:  Negative for agitation and hallucinations.         Past Medical History:   Diagnosis Date    Acute heart failure     AUDELIA (acute kidney injury)     Cardiomyopathy     Elevated troponin     HTN (hypertension)     Hypokalemia     Hypomagnesemia     Renal insufficiency         Past Surgical History:   Procedure Laterality Date    broken foot Left     RIGHT HEART CATHETERIZATION Right 5/13/2024    Procedure: INSERTION, CATHETER, RIGHT HEART;  Surgeon: Pradeep Mack MD;  Location: [facility] CATH LAB;  Service: Cardiology;  Laterality: Right;    RIGHT HEART CATHETERIZATION Right 6/4/2024    Procedure: INSERTION, CATHETER, RIGHT HEART;  Surgeon: Elier Gilmore MD;  Location: [facility] CATH LAB;  Service: Cardiology;  Laterality: Right;        Family History   Problem Relation Name Age of Onset    Heart failure Mother      No Known Problems Father          Review of patient's allergies indicates:  No Known Allergies     Current Outpatient Medications   Medication Instructions    atorvastatin (LIPITOR) 40 mg, Oral, Daily    bumetanide (BUMEX) 2 mg, Oral, Daily    dapagliflozin propanediol (FARXIGA) 10 mg, Oral, Daily    DOBUTamine (DOBUTREX) 1,000 mg/250 mL (4,000 mcg/mL) infusion 5 mcg/kg/min, Intravenous, Continuous    docusate sodium (COLACE) 100 mg, Oral, 2 times daily PRN    hydrALAZINE (APRESOLINE) 25 mg, Oral, Every 8 hours    isosorbide dinitrate (ISORDIL) 10 mg, Oral, 3 times daily    LIDOcaine HCl-hydrocortison ac 3-0.5 % Crea Apply as needed up to two times per day    pantoprazole (PROTONIX) 20 mg, Oral, Daily    spironolactone (ALDACTONE) 25 mg, Oral, Daily    traMADoL (ULTRAM) 50 mg, Oral, Every 6 hours PRN    valsartan (DIOVAN) 20 mg, Oral, 2 times daily        Vitals:    07/29/24  "1428   BP: 127/65   Pulse: (!) 54        Wt Readings from Last 3 Encounters:   07/29/24 85.9 kg (189 lb 6 oz)   07/29/24 85 kg (187 lb 6.3 oz)   07/29/24 85.9 kg (189 lb 6 oz)     Temp Readings from Last 3 Encounters:   07/18/24 98 °F (36.7 °C)   06/07/24 98.5 °F (36.9 °C) (Oral)   05/18/24 98.5 °F (36.9 °C) (Oral)     BP Readings from Last 3 Encounters:   07/29/24 127/65   07/29/24 123/65   07/29/24 123/65     Pulse Readings from Last 3 Encounters:   07/29/24 (!) 54   07/29/24 60   07/29/24 (!) 51        Body mass index is 27.97 kg/m². Estimated body surface area is 2.04 meters squared as calculated from the following:    Height as of this encounter: 5' 9" (1.753 m).    Weight as of this encounter: 85.9 kg (189 lb 6 oz).     Physical Exam  Constitutional:       Appearance: He is well-developed.   HENT:      Head: Normocephalic and atraumatic.      Right Ear: External ear normal.      Left Ear: External ear normal.   Eyes:      Conjunctiva/sclera: Conjunctivae normal.      Pupils: Pupils are equal, round, and reactive to light.   Neck:      Vascular: No hepatojugular reflux or JVD.   Cardiovascular:      Rate and Rhythm: Normal rate and regular rhythm.      Pulses: Intact distal pulses.      Heart sounds: S1 normal and S2 normal. No murmur heard.     No friction rub. No gallop.   Pulmonary:      Effort: Pulmonary effort is normal.      Breath sounds: Normal breath sounds.   Abdominal:      General: Bowel sounds are normal. There is no distension.      Palpations: Abdomen is soft.      Tenderness: There is no abdominal tenderness. There is no guarding or rebound.   Musculoskeletal:      Cervical back: Normal range of motion and neck supple.      Right lower leg: No edema.      Left lower leg: No edema.   Neurological:      Mental Status: He is alert and oriented to person, place, and time.          Lab Results   Component Value Date     (H) 07/29/2024     07/29/2024    K 3.5 07/29/2024    MG 2.1 " 06/24/2024     07/29/2024    CO2 20 (L) 07/29/2024    BUN 12 07/29/2024    CREATININE 1.6 (H) 07/29/2024    GLU 84 07/29/2024    HGBA1C 6.0 (H) 05/31/2024    AST 32 07/29/2024    ALT 27 07/29/2024    ALBUMIN 3.8 07/29/2024    PROT 7.4 07/29/2024    BILITOT 0.1 07/29/2024    WBC 5.57 06/24/2024    HGB 10.3 (L) 06/24/2024    HCT 34.2 (L) 06/24/2024    HCT 29 (L) 05/30/2024     06/24/2024    INR 1.0 07/29/2024     (H) 05/31/2024    TSH 0.809 05/31/2024    CHOL 123 05/31/2024    HDL 31 (L) 05/31/2024    LDLCALC 77.8 05/31/2024    TRIG 71 05/31/2024           Results for orders placed during the hospital encounter of 05/29/24    Echo    Interpretation Summary    Left Ventricle: The left ventricle is severely dilated. Normal wall thickness. There is eccentric hypertrophy. Severe global hypokinesis present. There is severely reduced systolic function with a visually estimated ejection fraction of 10 -15%. Biplane (2D) method of discs ejection fraction is 14%. There is diastolic dysfunction.    Right Ventricle: Mild right ventricular enlargement. Systolic function is moderately reduced.    Severe biatrial enlargement    Mitral Valve: There is mild regurgitation.    Tricuspid Valve: There is moderate to severe regurgitation with a centrally directed jet.    Pulmonary Artery: There is mild pulmonary hypertension. The estimated pulmonary artery systolic pressure is 40-45 mmHg.    IVC/SVC: Elevated venous pressure at 15-20 mmHg.  The IVC is severely dilated at 35mm in diameter.        Results for orders placed during the hospital encounter of 05/29/24    Cardiac catheterization    Conclusion    The estimated blood loss was none.    The filling pressures on the right and left were normal. Pulmonary hypertension was mild.    RA 6  PA 36/17 (23)  PCWP 11    CO  4.7 l/min  CI  2.4 l/min/m2    SVR 1486 dynes/cm5.s    PVR 2.5 ROCKWELL    Condition: no MCS or devices. Dobutamine 5 mcg/kg/min.    The procedure log was  documented by Documenter: [place] Marina and verified by Elier Norris MD.    Date: 6/4/2024  Time: 1:10 PM         Assessment and Plan:  Chronic combined systolic and diastolic heart failure  -     Basic Metabolic Panel; Future; Expected date: 08/05/2024  -     NT-Pro Natriuretic Peptide; Future; Expected date: 08/05/2024  -     CBC Auto Differential; Future; Expected date: 08/29/2024  -     Comprehensive Metabolic Panel; Future; Expected date: 08/29/2024  -     NT-Pro Natriuretic Peptide; Future; Expected date: 08/29/2024  -     Ambulatory referral/consult to Colorectal Surgery; Future; Expected date: 08/05/2024    Primary hypertension    Other orders  -     hydrALAZINE (APRESOLINE) 50 MG tablet; Take 1 tablet (50 mg total) by mouth every 8 (eight) hours.  Dispense: 270 tablet; Refill: 3  -     isosorbide dinitrate (ISORDIL) 20 MG tablet; Take 1 tablet (20 mg total) by mouth 3 (three) times daily.  Dispense: 270 tablet; Refill: 1  -     sacubitriL-valsartan (ENTRESTO) 24-26 mg per tablet; Take 1 tablet by mouth 2 (two) times daily.  Dispense: 60 tablet; Refill: 5          Chronic systolic HF, NYHA class II, stage C. On Dobutamine 5 mcg/kg/min.  Etiology: NICM. New Diagnosis January 2024, not fully treated with medications but now undergoing uptitration.  Device: no.  Medications: low dose valsartan, spironolactone and jardiance.  Hemodynamic status: warm, and euvolemic.  Plan:  -Stop valsartan.  -Start low dose entresto twice a day.  -F/u blood test Monday am.  -patient was asked to call us every 2 weeks for next upgrade in his medication.  -F/u in 2 months with labs.

## 2024-07-30 ENCOUNTER — TELEPHONE (OUTPATIENT)
Dept: TRANSPLANT | Facility: CLINIC | Age: 44
End: 2024-07-30
Payer: MEDICAID

## 2024-07-30 DIAGNOSIS — I50.42 CHRONIC COMBINED SYSTOLIC AND DIASTOLIC HEART FAILURE: Primary | ICD-10-CM

## 2024-07-30 DIAGNOSIS — R79.9 ABNORMAL BLOOD CHEMISTRY: ICD-10-CM

## 2024-07-30 NOTE — TELEPHONE ENCOUNTER
Discussed FU plan from yesterdays clinic:  Labs q week while on , will check while uptitrating Entresto  Consult CRS  Rtc 8-29 on day of IR procedure.  Msg to scheduling.  Updated external orders sent to [facility] as we have had difficulty getting weekly labs sent over.

## 2024-08-07 ENCOUNTER — TELEPHONE (OUTPATIENT)
Dept: TRANSPLANT | Facility: CLINIC | Age: 44
End: 2024-08-07
Payer: MEDICAID

## 2024-08-09 ENCOUNTER — TELEPHONE (OUTPATIENT)
Dept: SURGERY | Facility: CLINIC | Age: 44
End: 2024-08-09
Payer: MEDICAID

## 2024-08-12 ENCOUNTER — TELEPHONE (OUTPATIENT)
Dept: TRANSPLANT | Facility: CLINIC | Age: 44
End: 2024-08-12
Payer: MEDICAID

## 2024-08-12 ENCOUNTER — TELEPHONE (OUTPATIENT)
Dept: SURGERY | Facility: CLINIC | Age: 44
End: 2024-08-12
Payer: MEDICAID

## 2024-08-12 NOTE — TELEPHONE ENCOUNTER
Pt has been approved by committee for listing however he has lost his Medicaid.   is verifying Benefits.

## 2024-08-12 NOTE — TELEPHONE ENCOUNTER
Spoke with pt regarding appt with Dr. Augustin on 8/19. Informed that appt can be moved to 8/30 with a possible same day surgery if needed. Pt verbally confirmed switching to 8/30 to plan for same day surgery. No further questions at this time.

## 2024-08-14 ENCOUNTER — COMMITTEE REVIEW (OUTPATIENT)
Dept: TRANSPLANT | Facility: CLINIC | Age: 44
End: 2024-08-14
Payer: MEDICAID

## 2024-08-14 NOTE — COMMITTEE REVIEW
Native Organ Dx: Dilated Myopathy: Idiopathic    Deferred    Represented Castro Ayad Edmond at today's Heart Transplant/LVAD Selection Committee for consideration of Heart Transplant listing and LVAD implantation.  Decision is deferred pending results of lung biopsy      Note was written by Polina Nazario RN.    ==========================================================    I agree and attest to the decision of the committee.

## 2024-08-15 ENCOUNTER — TELEPHONE (OUTPATIENT)
Dept: TRANSPLANT | Facility: CLINIC | Age: 44
End: 2024-08-15
Payer: MEDICAID

## 2024-08-15 NOTE — TELEPHONE ENCOUNTER
I spoke with Mr Edmond- he went to the ER last weekend they gave him Oxycodone for his anal pain. Advised that we cannot call in pain medication for this problem.  I asked him about his upcoming appt with CRS on Aug 19th- [t stated that they were not able to see him the 19th, they were able to see him on the 29 or 30th, I asked how he is ding on his Entresto 24-26 bid and whether he has gotten labs completed this week or last.  He says he is doing well on the medication, but has not gotten labs completed because the nurse was not there at the draw station when he arrived and there was not any other staff member there who could accommodate him.  (That was on August 5 per communications with the lab staff- see below) and pt said he has not gotten labs completed this week either.      We had a long discussion regarding uptitration of medication and the necessity of frequent lab draw in order to monitor levels and side effects. Additionally discussed deferral of OHT/VAD eval so that he can complete his lung biopsy as ordered in June.  Pt is very interested in discontinuing his  - discussed this process as well but pt was hesitant to hear more as it may involve being admitted for observation for development of arrhythmia.  Encouraged to get labs as scheduled and FU with team as currently scheduled.  Verbalized understanding of instructions.       I wanted to make you both aware that Ayad Edmond showed up earlier than expected for his labs and PICC dressing change.  He is fully aware that the time we have an available RN  is around 4pm, but if given advanced notice we will do our best to accommodate his request.  At the time of his arrival my RN was out of the office as we weren't expecting him until later this afternoon.  When he was advised that we could do his lab draw, but would need to return later this afternoon for his dressing change, he left without getting labs drawn and said that's not going to happen.   We are unsure if he will return this afternoon for lab draw or PICC dressing change.  We will continue to try to reach out to him to see if he intends on returning today and keep you apprised.    Respectfully,     Cara Herrera  Clinic  Cancer Service Line and Pulmonology  Certified [facility] Chair     [email or web address]  [address] [place] [id number]  Office [phone]   Cell      [phone]

## 2024-08-23 ENCOUNTER — PATIENT MESSAGE (OUTPATIENT)
Dept: INTERVENTIONAL RADIOLOGY/VASCULAR | Facility: HOSPITAL | Age: 44
End: 2024-08-23
Payer: MEDICAID

## 2024-08-27 ENCOUNTER — DOCUMENTATION ONLY (OUTPATIENT)
Dept: PREADMISSION TESTING | Facility: HOSPITAL | Age: 44
End: 2024-08-27
Payer: MEDICAID

## 2024-08-27 ENCOUNTER — TELEPHONE (OUTPATIENT)
Dept: SURGERY | Facility: CLINIC | Age: 44
End: 2024-08-27
Payer: MEDICAID

## 2024-08-27 NOTE — PRE-PROCEDURE INSTRUCTIONS
PRE-OP INSTRUCTIONS:  Instructed patient to have no food,milk or milk products after midnight 8/28/2024  It is ok to take AM medications with a few sips of water   Medication instructions for pm prior to and am of surgery reviewed.  Instructed patient to avoid taking vitamins,supplements,aspirin or ibuprofen the am of surgery.  Shower instructions provided    Patient encouraged to take the Hydralazine and the Isosorbide the am of surgery but said he normally doesn't take them until 8am-9am and would prefer to hold them all    Patient denies any side effects or issues with anesthesia or sedation.

## 2024-08-28 ENCOUNTER — ANESTHESIA EVENT (OUTPATIENT)
Dept: INTERVENTIONAL RADIOLOGY/VASCULAR | Facility: HOSPITAL | Age: 44
End: 2024-08-28
Payer: MEDICAID

## 2024-08-29 ENCOUNTER — HOSPITAL ENCOUNTER (OUTPATIENT)
Dept: INTERVENTIONAL RADIOLOGY/VASCULAR | Facility: HOSPITAL | Age: 44
Discharge: HOME | End: 2024-08-29
Payer: MEDICAID

## 2024-08-29 ENCOUNTER — ANESTHESIA (OUTPATIENT)
Dept: INTERVENTIONAL RADIOLOGY/VASCULAR | Facility: HOSPITAL | Age: 44
End: 2024-08-29
Payer: MEDICAID

## 2024-08-29 ENCOUNTER — HOSPITAL ENCOUNTER (OUTPATIENT)
Dept: RADIOLOGY | Facility: HOSPITAL | Age: 44
Discharge: HOME | End: 2024-08-29
Attending: RADIOLOGY
Payer: MEDICAID

## 2024-08-29 ENCOUNTER — DOCUMENTATION ONLY (OUTPATIENT)
Dept: TRANSPLANT | Facility: CLINIC | Age: 44
End: 2024-08-29
Payer: MEDICAID

## 2024-08-29 VITALS
HEIGHT: 69 IN | BODY MASS INDEX: 26.66 KG/M2 | RESPIRATION RATE: 12 BRPM | WEIGHT: 180 LBS | DIASTOLIC BLOOD PRESSURE: 90 MMHG | HEART RATE: 83 BPM | TEMPERATURE: 97 F | SYSTOLIC BLOOD PRESSURE: 142 MMHG | OXYGEN SATURATION: 99 %

## 2024-08-29 DIAGNOSIS — I50.9 CONGESTIVE HEART FAILURE, UNSPECIFIED HF CHRONICITY, UNSPECIFIED HEART FAILURE TYPE: ICD-10-CM

## 2024-08-29 DIAGNOSIS — K60.2 ANAL FISSURE: Primary | ICD-10-CM

## 2024-08-29 DIAGNOSIS — R91.1 PULMONARY NODULE: ICD-10-CM

## 2024-08-29 PROCEDURE — 37000008 HC ANESTHESIA 1ST 15 MINUTES: Mod: TXP

## 2024-08-29 PROCEDURE — 87206 SMEAR FLUORESCENT/ACID STAI: CPT | Mod: TXP | Performed by: INTERNAL MEDICINE

## 2024-08-29 PROCEDURE — 87070 CULTURE OTHR SPECIMN AEROBIC: CPT | Mod: TXP | Performed by: INTERNAL MEDICINE

## 2024-08-29 PROCEDURE — 87116 MYCOBACTERIA CULTURE: CPT | Mod: TXP | Performed by: INTERNAL MEDICINE

## 2024-08-29 PROCEDURE — 63600175 PHARM REV CODE 636 W HCPCS: Mod: TXP | Performed by: NURSE ANESTHETIST, CERTIFIED REGISTERED

## 2024-08-29 PROCEDURE — 71045 X-RAY EXAM CHEST 1 VIEW: CPT | Mod: TC,TXP

## 2024-08-29 PROCEDURE — 25000003 PHARM REV CODE 250: Mod: TXP | Performed by: NURSE ANESTHETIST, CERTIFIED REGISTERED

## 2024-08-29 PROCEDURE — 87102 FUNGUS ISOLATION CULTURE: CPT | Mod: TXP | Performed by: INTERNAL MEDICINE

## 2024-08-29 PROCEDURE — 37000009 HC ANESTHESIA EA ADD 15 MINS: Mod: TXP

## 2024-08-29 PROCEDURE — 87075 CULTR BACTERIA EXCEPT BLOOD: CPT | Mod: TXP | Performed by: INTERNAL MEDICINE

## 2024-08-29 RX ORDER — FENTANYL CITRATE 50 UG/ML
INJECTION, SOLUTION INTRAMUSCULAR; INTRAVENOUS
Status: DISCONTINUED | OUTPATIENT
Start: 2024-08-29 | End: 2024-08-29

## 2024-08-29 RX ORDER — ONDANSETRON HYDROCHLORIDE 2 MG/ML
4 INJECTION, SOLUTION INTRAVENOUS DAILY PRN
Status: DISCONTINUED | OUTPATIENT
Start: 2024-08-29 | End: 2024-08-30 | Stop reason: HOSPADM

## 2024-08-29 RX ORDER — SODIUM CHLORIDE 9 MG/ML
INJECTION, SOLUTION INTRAVENOUS CONTINUOUS
Status: DISCONTINUED | OUTPATIENT
Start: 2024-08-29 | End: 2024-08-30 | Stop reason: HOSPADM

## 2024-08-29 RX ORDER — MIDAZOLAM HYDROCHLORIDE 1 MG/ML
INJECTION, SOLUTION INTRAMUSCULAR; INTRAVENOUS
Status: DISCONTINUED | OUTPATIENT
Start: 2024-08-29 | End: 2024-08-29

## 2024-08-29 RX ORDER — PROPOFOL 10 MG/ML
VIAL (ML) INTRAVENOUS
Status: DISCONTINUED | OUTPATIENT
Start: 2024-08-29 | End: 2024-08-29

## 2024-08-29 RX ORDER — ETOMIDATE 2 MG/ML
INJECTION INTRAVENOUS
Status: DISCONTINUED | OUTPATIENT
Start: 2024-08-29 | End: 2024-08-29

## 2024-08-29 RX ORDER — DEXAMETHASONE SODIUM PHOSPHATE 4 MG/ML
INJECTION, SOLUTION INTRA-ARTICULAR; INTRALESIONAL; INTRAMUSCULAR; INTRAVENOUS; SOFT TISSUE
Status: DISCONTINUED | OUTPATIENT
Start: 2024-08-29 | End: 2024-08-29

## 2024-08-29 RX ORDER — ONDANSETRON HYDROCHLORIDE 2 MG/ML
INJECTION, SOLUTION INTRAVENOUS
Status: DISCONTINUED | OUTPATIENT
Start: 2024-08-29 | End: 2024-08-29

## 2024-08-29 RX ORDER — ONDANSETRON HYDROCHLORIDE 2 MG/ML
4 INJECTION, SOLUTION INTRAVENOUS ONCE AS NEEDED
Status: DISCONTINUED | OUTPATIENT
Start: 2024-08-29 | End: 2024-08-30 | Stop reason: HOSPADM

## 2024-08-29 RX ORDER — LIDOCAINE HYDROCHLORIDE 10 MG/ML
1 INJECTION, SOLUTION EPIDURAL; INFILTRATION; INTRACAUDAL; PERINEURAL ONCE
Status: DISCONTINUED | OUTPATIENT
Start: 2024-08-29 | End: 2024-08-30 | Stop reason: HOSPADM

## 2024-08-29 RX ORDER — HYDROMORPHONE HYDROCHLORIDE 1 MG/ML
0.2 INJECTION, SOLUTION INTRAMUSCULAR; INTRAVENOUS; SUBCUTANEOUS EVERY 5 MIN PRN
Status: DISCONTINUED | OUTPATIENT
Start: 2024-08-29 | End: 2024-08-30 | Stop reason: HOSPADM

## 2024-08-29 RX ORDER — POLYETHYLENE GLYCOL 3350 17 G/17G
17 POWDER, FOR SOLUTION ORAL 2 TIMES DAILY PRN
COMMUNITY

## 2024-08-29 RX ORDER — ROCURONIUM BROMIDE 10 MG/ML
INJECTION, SOLUTION INTRAVENOUS
Status: DISCONTINUED | OUTPATIENT
Start: 2024-08-29 | End: 2024-08-29

## 2024-08-29 RX ORDER — LIDOCAINE HYDROCHLORIDE 20 MG/ML
INJECTION, SOLUTION EPIDURAL; INFILTRATION; INTRACAUDAL; PERINEURAL
Status: DISCONTINUED | OUTPATIENT
Start: 2024-08-29 | End: 2024-08-29

## 2024-08-29 RX ADMIN — SUGAMMADEX 200 MG: 100 INJECTION, SOLUTION INTRAVENOUS at 10:08

## 2024-08-29 RX ADMIN — ROCURONIUM BROMIDE 50 MG: 10 INJECTION INTRAVENOUS at 09:08

## 2024-08-29 RX ADMIN — DEXAMETHASONE SODIUM PHOSPHATE 4 MG: 4 INJECTION, SOLUTION INTRAMUSCULAR; INTRAVENOUS at 09:08

## 2024-08-29 RX ADMIN — FENTANYL CITRATE 100 MCG: 50 INJECTION, SOLUTION INTRAMUSCULAR; INTRAVENOUS at 09:08

## 2024-08-29 RX ADMIN — ETOMIDATE 14 MG: 2 INJECTION INTRAVENOUS at 09:08

## 2024-08-29 RX ADMIN — ONDANSETRON 4 MG: 2 INJECTION INTRAMUSCULAR; INTRAVENOUS at 10:08

## 2024-08-29 RX ADMIN — PROPOFOL 70 MG: 10 INJECTION, EMULSION INTRAVENOUS at 09:08

## 2024-08-29 RX ADMIN — SODIUM CHLORIDE: 0.9 INJECTION, SOLUTION INTRAVENOUS at 09:08

## 2024-08-29 RX ADMIN — LIDOCAINE HYDROCHLORIDE 100 MG: 20 INJECTION, SOLUTION EPIDURAL; INFILTRATION; INTRACAUDAL; PERINEURAL at 09:08

## 2024-08-29 RX ADMIN — MIDAZOLAM HYDROCHLORIDE 2 MG: 2 INJECTION, SOLUTION INTRAMUSCULAR; INTRAVENOUS at 09:08

## 2024-08-29 NOTE — PLAN OF CARE
Pt arrived to CT for Lung Biopsy. Pt oriented to unit and staff. Plan of care reviewed with patient, patient verbalizes understanding. Comfort measures utilized. Pt safely transferred from stretcher to procedural table. Fall risk reviewed with patient, fall risk interventions maintained. Positioner pillows utilized to minimize pressure points. Blankets applied. Pt prepped and draped utilizing standard sterile technique. Patient placed on continuous monitoring, as required by sedation policy. Anesthesia on the case, please see anesthesia flow sheets for VS, airway and cardiac monitoring as well as sedation/medication administration, and updates.  Timeouts to be completed utilizing standard universal time-out, per department and facility policy. RN to remain at bedside.

## 2024-08-29 NOTE — DISCHARGE INSTRUCTIONS
Sponge bath only today.  You may shower tomorrow with dressing covered.     Remove dressing after 48 hours and leave open to air. You may wash the site with soap and water at this time. Do not soak/submerge the site until completely healed. No swimming/hot tube or tub bath until site is healed     Rest and take it easy today. Do not make any major decisions or drive for the next 24 hours. Gradually resume your normal activity.    At home,continue with liquids and if there is no nausea, you may resume your normal diet.    If you have any discomfort, take what you normally take for pain. If it is not effective, call us.    WHEN TO CALL THE DOCTOR:    Obvious bleeding (dried blood over the incision is normal)    Redness or swelling in the affected area    Fever over 101 F (38.4C)    Drainage or pus from the wound    Pain not relieved by normal pain medication    If you have any problems or questions call us:  For questions or concerns call: ERENDIRA [facility]-F[facility] 8 AM- 5PM [phone].        24/7= Call the  at [phone] and ask for the Radiology Resident on call

## 2024-08-29 NOTE — CARE UPDATE
Discharge instructions verbally and physically given to patient and patient's wife. Patient requested pain medication in the event that he experiences pain at home. RN explained he should be okay with Tylenol at home as per the discharge instructions, but if he is having pain that Tylenol is not alleviating, he should call his doctor because this is not a normal finding. RN called MD to confirm that they would not write for a pain prescription to go home with and that they advise him to take Tylenol for pain. All belongings at bedside with patient. Waiting for bedside chest XR at this time.

## 2024-08-29 NOTE — TRANSFER OF CARE
"Anesthesia Transfer of Care Note    Patient: Ayad Edmond    Procedure(s) Performed: * No procedures listed *    Patient location: [facility]    Anesthesia Type: general    Transport from OR: Transported from OR on room air with adequate spontaneous ventilation    Post pain: adequate analgesia    Post assessment: no apparent anesthetic complications    Post vital signs: stable    Level of consciousness: awake and alert    Nausea/Vomiting: no nausea/vomiting    Complications: none    Transfer of care protocol was followed      Last vitals: Visit Vitals  /88   Pulse 102   Temp 36.6 °C (97.8 °F) (Skin)   Resp 12   Ht 5' 9" (1.753 m)   Wt 81.6 kg (180 lb)   SpO2 100%   BMI 26.58 kg/m²     "

## 2024-08-29 NOTE — PLAN OF CARE
Patient arrived to room. PIV placed. Admit assessment completed. Plan of care discussed with patient. Anesthesia and IR Md's at bedside to obtain consents. Anesthesia MD aware pt on dobutamine gtt at 5.8ml/hr, bag is 1200mg/300mL to right upper arm PICC line. Pt reports rectal fissure, noted in assessment.

## 2024-08-29 NOTE — H&P
Radiology History & Physical      SUBJECTIVE:     Chief Complaint: no complaints     History of Present Illness:  Ayad Edmond is a 44 y.o. male who presents for IR guided lung biopsy.       Past Medical History:   Diagnosis Date    Acute heart failure     AUDELIA (acute kidney injury)     Cardiomyopathy     Elevated troponin     HTN (hypertension)     Hypokalemia     Hypomagnesemia     Renal insufficiency      Past Surgical History:   Procedure Laterality Date    broken foot Left     RIGHT HEART CATHETERIZATION Right 5/13/2024    Procedure: INSERTION, CATHETER, RIGHT HEART;  Surgeon: Pradeep Mack MD;  Location: [facility] CATH LAB;  Service: Cardiology;  Laterality: Right;    RIGHT HEART CATHETERIZATION Right 6/4/2024    Procedure: INSERTION, CATHETER, RIGHT HEART;  Surgeon: Elier Gilmore MD;  Location: [facility] CATH LAB;  Service: Cardiology;  Laterality: Right;       Home Meds:   Prior to Admission medications    Medication Sig Start Date End Date Taking? Authorizing Provider   atorvastatin (LIPITOR) 40 MG tablet Take 1 tablet (40 mg total) by mouth once daily. 7/15/24  Yes Zehra Davies MD   bumetanide (BUMEX) 2 MG tablet Take 1 tablet (2 mg total) by mouth once daily. 6/6/24 6/6/25 Yes Anneliese Melendez MD   dapagliflozin propanediol (FARXIGA) 10 mg tablet Take 1 tablet (10 mg total) by mouth once daily. 5/18/24  Yes Joseph [place] NP   DOBUTamine (DOBUTREX) 1,000 mg/250 mL (4,000 mcg/mL) infusion Inject 380 mcg/min into the vein continuous. 5/15/24  Yes Joseph [place] NP   hydrALAZINE (APRESOLINE) 50 MG tablet Take 1 tablet (50 mg total) by mouth every 8 (eight) hours. 7/29/24 7/29/25 Yes Elier Gilmore MD   isosorbide dinitrate (ISORDIL) 20 MG tablet Take 1 tablet (20 mg total) by mouth 3 (three) times daily. 7/29/24 7/29/25 Yes Elier Gilmore MD   pantoprazole (PROTONIX) 20 MG tablet Take 1 tablet (20 mg total) by mouth once daily. 4/11/24 4/11/25 Yes Zehra Davies MD    polyethylene glycol (GLYCOLAX) 17 gram PwPk Take 17 g by mouth 2 (two) times daily as needed.   Yes Provider, Historical   sacubitriL-valsartan (ENTRESTO) 24-26 mg per tablet Take 1 tablet by mouth 2 (two) times daily. 7/29/24  Yes Elier Gilmore MD   spironolactone (ALDACTONE) 25 MG tablet Take 1 tablet (25 mg total) by mouth once daily. 7/15/24  Yes Zehra Davies MD   traMADoL (ULTRAM) 50 mg tablet Take 50 mg by mouth every 6 (six) hours as needed. 7/16/24  Yes Provider, Historical   docusate sodium (COLACE) 100 MG capsule Take 1 capsule (100 mg total) by mouth 2 (two) times daily as needed for Constipation.  Patient not taking: Reported on 7/29/2024 7/18/24   Omaira Snyder MD   LIDOcaine HCl-hydrocortison ac 3-0.5 % Crea Apply as needed up to two times per day  Patient not taking: Reported on 7/29/2024 7/18/24   Omaira Snyder MD     Anticoagulants/Antiplatelets: no anticoagulation    Allergies: Review of patient's allergies indicates:  No Known Allergies  Sedation History:  no adverse reactions    Review of Systems:   Hematological: no known coagulopathies  Respiratory: no shortness of breath  Cardiovascular: no chest pain  Gastrointestinal: no abdominal pain  Genito-Urinary: no dysuria  Musculoskeletal: negative  Neurological: no TIA or stroke symptoms         OBJECTIVE:     Vital Signs (Most Recent)       Physical Exam:  ASA: 2  Mallampati: per anesthesia    General: no acute distress  Mental Status: alert and oriented to person, place and time  HEENT: normocephalic, atraumatic  Chest: unlabored breathing  Heart: regular heart rate  Abdomen: nondistended  Extremity: moves all extremities    Laboratory  Lab Results   Component Value Date    INR 1.0 07/29/2024       Lab Results   Component Value Date    WBC 5.46 07/29/2024    HGB 9.2 (L) 07/29/2024    HCT 31.1 (L) 07/29/2024    MCV 76 (L) 07/29/2024     07/29/2024      Lab Results   Component Value Date     (H) 07/29/2024    NA  140 07/29/2024    K 3.7 07/29/2024     (H) 07/29/2024    CO2 22 (L) 07/29/2024    BUN 12 07/29/2024    CREATININE 1.9 (H) 07/29/2024    CALCIUM 8.3 (L) 07/29/2024    MG 2.3 07/29/2024    ALT 26 07/29/2024    AST 31 07/29/2024    ALBUMIN 3.7 07/29/2024    BILITOT 0.1 07/29/2024    BILIDIR 0.4 (H) 05/31/2024       ASSESSMENT/PLAN:     Sedation Plan: per anesthesia  Patient will undergo IR lung biopsy.    Oscar Anthony MD  Radiology PGY-2

## 2024-08-29 NOTE — PLAN OF CARE
Lung Biopsy procedure completed. Anesthesia remains on the case, please see anesthesia record for VS, airway, cardiac monitoring, as well as sedation/medication administration.  Right lateral chest procedure site clean, dry, and intact; no bleeding or hematoma noted. Pt will get CXR 2 hour post procedure. Patient to be transferred to [facility] 2 for 2 hour post-procedural recovery per MD. Report to be given at bedside to RN.

## 2024-08-29 NOTE — ANESTHESIA PROCEDURE NOTES
Intubation    Date/Time: 8/29/2024 9:35 AM    Performed by: Siri Reid CRNA  Authorized by: Ced Arango MD    Intubation:     Induction:  Intravenous    Intubated:  Postinduction    Mask Ventilation:  Easy mask    Attempts:  1    Attempted By:  CRNA    Method of Intubation:  Video laryngoscopy    Blade:  Simpson 3    Laryngeal View Grade: Grade IIA - cords partially seen      Difficult Airway Encountered?: No      Complications:  None    Airway Device:  Oral endotracheal tube    Airway Device Size:  7.5    Style/Cuff Inflation:  Cuffed (inflated to minimal occlusive pressure)    Tube secured:  23    Secured at:  The lips    Placement Verified By:  Capnometry    Complicating Factors:  None    Findings Post-Intubation:  BS equal bilateral and atraumatic/condition of teeth unchanged

## 2024-08-29 NOTE — PROGRESS NOTES
Mr Edmond completed his IR biopsy today, He was unable to make his HTS appt today as it was booked at the same time as his CT guided biopsy.  Lab review completed. Pt is scheduled to see CRS tomorrow for anal fissure.

## 2024-08-29 NOTE — ANESTHESIA PREPROCEDURE EVALUATION
"PAPER CONSENT                                                                                                                         08/29/2024  Ayad Edmond is a 44 y.o., male.      Pre-op Assessment          Review of Systems  Anesthesia Hx:  No problems with previous Anesthesia                Cardiovascular:  Exercise tolerance: good    Denies Hypertension.    Denies CAD.       CHF              "Hereditary" heart failure per patient. EF was 18%, now 32% and he can climb two flights stairs without CP or VORA.                               Pulmonary:     Denies Asthma.     Denies Sleep Apnea.                Renal/:   Denies Chronic Renal Disease.                Hepatic/GI:   Denies PUD.   Denies GERD. Denies Liver Disease.            Neurological:    Denies CVA.    Denies Seizures.                                Endocrine:  Denies Diabetes. Denies Hypothyroidism.              Physical Exam  General: Alert    Airway:  Mallampati: I   Mouth Opening: Normal  TM Distance: Normal  Tongue: Normal  Neck ROM: Normal ROM    Dental:  Dentures        Anesthesia Plan  Type of Anesthesia, risks & benefits discussed:    Anesthesia Type: Gen ETT  Intra-op Monitoring Plan: Standard ASA Monitors  Post Op Pain Control Plan: multimodal analgesia and IV/PO Opioids PRN  Induction:  IV  Airway Plan: Direct  Informed Consent: Informed consent signed with the Patient and all parties understand the risks and agree with anesthesia plan.  All questions answered.   ASA Score: 4    Ready For Surgery From Anesthesia Perspective.     .      "

## 2024-08-30 ENCOUNTER — HOSPITAL ENCOUNTER (OUTPATIENT)
Facility: OTHER | Age: 44
Discharge: HOME | End: 2024-08-30
Attending: SURGERY | Admitting: SURGERY
Payer: MEDICAID

## 2024-08-30 ENCOUNTER — OFFICE VISIT (OUTPATIENT)
Dept: SURGERY | Facility: CLINIC | Age: 44
End: 2024-08-30
Payer: MEDICAID

## 2024-08-30 VITALS
SYSTOLIC BLOOD PRESSURE: 128 MMHG | BODY MASS INDEX: 28.35 KG/M2 | WEIGHT: 191.38 LBS | DIASTOLIC BLOOD PRESSURE: 75 MMHG | HEART RATE: 69 BPM | OXYGEN SATURATION: 100 % | RESPIRATION RATE: 16 BRPM | HEIGHT: 69 IN | TEMPERATURE: 98 F

## 2024-08-30 VITALS
SYSTOLIC BLOOD PRESSURE: 140 MMHG | WEIGHT: 191.56 LBS | RESPIRATION RATE: 19 BRPM | HEIGHT: 69 IN | BODY MASS INDEX: 28.37 KG/M2 | DIASTOLIC BLOOD PRESSURE: 89 MMHG

## 2024-08-30 DIAGNOSIS — K60.2 ANAL FISSURE: Primary | ICD-10-CM

## 2024-08-30 LAB
ACID FAST MOD KINY STN SPEC: NORMAL
BACTERIA SPEC AEROBE CULT: NO GROWTH
MYCOBACTERIUM SPEC QL CULT: NORMAL

## 2024-08-30 PROCEDURE — 46505 CHEMODENERVATION ANAL MUSC: CPT | Performed by: SURGERY

## 2024-08-30 PROCEDURE — 99999 PR PBB SHADOW E&M-EST. PATIENT-LVL III: CPT | Mod: PBBFAC,TXP,, | Performed by: SURGERY

## 2024-08-30 PROCEDURE — 63600175 PHARM REV CODE 636 W HCPCS: Mod: JZ,JG,TXP,NTX | Performed by: SURGERY

## 2024-08-30 PROCEDURE — 46505 CHEMODENERVATION ANAL MUSC: CPT | Mod: ,,, | Performed by: SURGERY

## 2024-08-30 PROCEDURE — 99213 OFFICE O/P EST LOW 20 MIN: CPT | Mod: PBBFAC,TXP | Performed by: SURGERY

## 2024-08-30 RX ADMIN — ONABOTULINUMTOXINA 100 UNITS: 100 INJECTION, POWDER, LYOPHILIZED, FOR SOLUTION INTRADERMAL; INTRAMUSCULAR at 10:08

## 2024-08-30 NOTE — PLAN OF CARE
Patient requests his wife be present at the time of discharge and for transportation to the home setting.

## 2024-08-30 NOTE — PROGRESS NOTES
CRS Office Visit History and Physical    Referring Md:   Self, Aaareferral  No address on file    SUBJECTIVE:     Chief Complaint: anal pain     History of Present Illness:  The patient is a new patient to this practice.   Course is as follows:  Ayad Edmond is a 44 y.o. male presents for ED follow up for anal pain.  He has a medical history significant for cardiomyopathy with EF 10%.    Patient developed anal pain in July after significant diuresis for heart failure.  Presented to urgent care and had follow up with Dr. Acosta in general surgery in [place].  Diagnosed with an anal fissure.  Started on nifedipine and lidocaine.  Was not able to tolerate due to burning.  Reports that pain is slightly improved but remains significant after bowel movements.  Taking miralax BID.  Has been management by advanced heart failure team .  Current on dobutamine infusion with improvement in EF.      Last Colonoscopy: NA    Review of patient's allergies indicates:  No Known Allergies    Past Medical History:   Diagnosis Date    Acute heart failure     AUDELIA (acute kidney injury)     Cardiomyopathy     Elevated troponin     HTN (hypertension)     Hypokalemia     Hypomagnesemia     Renal insufficiency      Past Surgical History:   Procedure Laterality Date    broken foot Left     RIGHT HEART CATHETERIZATION Right 5/13/2024    Procedure: INSERTION, CATHETER, RIGHT HEART;  Surgeon: Pradeep Mack MD;  Location: [facility] CATH LAB;  Service: Cardiology;  Laterality: Right;    RIGHT HEART CATHETERIZATION Right 6/4/2024    Procedure: INSERTION, CATHETER, RIGHT HEART;  Surgeon: Elier Gilmore MD;  Location: [facility] CATH LAB;  Service: Cardiology;  Laterality: Right;     Family History   Problem Relation Name Age of Onset    Heart failure Mother      No Known Problems Father       Social History     Tobacco Use    Smoking status: Former     Current packs/day: 0.00     Average packs/day: 1 pack/day for 19.0 years (19.0 ttl pk-yrs)      "Types: Cigarettes     Start date:      Quit date:      Years since quittin.6     Passive exposure: Past    Smokeless tobacco: Never   Substance Use Topics    Alcohol use: Not Currently    Drug use: Never        Review of Systems:  Review of Systems   All other systems reviewed and are negative.      OBJECTIVE:     Vital Signs (Most Recent)  BP (!) 140/89 (BP Location: Left arm, Patient Position: Sitting)   Resp 19   Ht 5' 9.02" (1.753 m)   Wt 86.9 kg (191 lb 9.3 oz)   BMI 28.28 kg/m²     Physical Exam:  General: 44 y.o. male in no distress   Neuro: alert and oriented x 4.  Moves all extremities.     HEENT: normocephalic, atraumatic, PERRL, EOMI   Respiratory: respirations are even and unlabored  Cardiac: regular rate and rhythm  Abdomen: soft, NTND  Extremities: Warm dry and intact  Skin: no rashes  Anorectal: anal fissure to the left of posterior midline, + hemorrhoids at the anal verge    Labs: NA    Imaging: NA      ASSESSMENT/PLAN:     Diagnoses and all orders for this visit:    Anal fissure  -     Case Request Endoscopy: anoscopy, botox injection        44 y.o. male with anal fissure     - patient has failed topical therapy due to intolerance of medication  - He has had significant improvement in his EF, now 30%.  However, remains high risk for elective surgery.    - discussed that I would recommend botox as next step vs. Sphincterotomy. Unable to get botox approved for outpatient clinic administration.  Will proceed to endoscopy for anoscopy and botox injection.     Kristel Augustin MD  Staff Surgeon  Colon & Rectal Surgery    " Anesthesia Volume In Cc: 12

## 2024-08-30 NOTE — DISCHARGE INSTRUCTIONS
Continue bowel regimen to avoid constipation or straining.  Tylenol as needed for pain, warm soaks for comfort.  Follow up in 4 weeks via telephone.

## 2024-08-30 NOTE — INTERVAL H&P NOTE
The patient has been examined and the H&P has been reviewed:    I concur with the findings and no changes have occurred since H&P was written.    Procedure risks, benefits and alternative options discussed and understood by patient/family.          Active Hospital Problems    Diagnosis  POA    *Anal fissure [K60.2]  Yes      Resolved Hospital Problems   No resolved problems to display.

## 2024-08-30 NOTE — BRIEF OP NOTE
[facility] - Endoscopy  Brief Operative Note    Surgery Date: 8/30/2024     Surgeons and Role:     * Kristel Augustin MD - Primary    Assisting Surgeon: None    Pre-op Diagnosis:  Anal fissure [K60.2]    Post-op Diagnosis:  Post-Op Diagnosis Codes:     * Anal fissure [K60.2]    Procedure(s) (LRB):  anoscopy, botox injection (N/A)    Anesthesia: N/A    Operative Findings: anal fissure to the left of posterior midline    Estimated Blood Loss: 1 cc         Specimens:   Specimen (24h ago, onward)      None              Discharge Note    OUTCOME: Patient tolerated treatment/procedure well without complication and is now ready for discharge.    DISPOSITION: Home or Self Care    FINAL DIAGNOSIS:  anal fissure    FOLLOWUP: In clinic    DISCHARGE INSTRUCTIONS:    Continue bowel regimen to avoid constipation or straining  Tylenol as needed for pain, warm soaks for comfort   Follow up in 4 weeks via telephone     Kristel Augustin MD  Staff Surgeon   Colon & Rectal Surgery

## 2024-08-30 NOTE — PLAN OF CARE
Ayad Edmond Sr. has met all discharge criteria from Phase II. Vital Signs are stable, ambulating  without difficulty. Discharge instructions given, patient verbalized understanding. Discharged from facility ambulatory in stable condition.

## 2024-08-30 NOTE — H&P (VIEW-ONLY)
CRS Office Visit History and Physical    Referring Md:   Self, Aaareferral  No address on file    SUBJECTIVE:     Chief Complaint: anal pain     History of Present Illness:  The patient is a new patient to this practice.   Course is as follows:  Ayad Edmond is a 44 y.o. male presents for ED follow up for anal pain.  He has a medical history significant for cardiomyopathy with EF 10%.    Patient developed anal pain in July after significant diuresis for heart failure.  Presented to urgent care and had follow up with Dr. Acosta in general surgery in [place].  Diagnosed with an anal fissure.  Started on nifedipine and lidocaine.  Was not able to tolerate due to burning.  Reports that pain is slightly improved but remains significant after bowel movements.  Taking miralax BID.  Has been management by advanced heart failure team .  Current on dobutamine infusion with improvement in EF.      Last Colonoscopy: NA    Review of patient's allergies indicates:  No Known Allergies    Past Medical History:   Diagnosis Date    Acute heart failure     AUDELIA (acute kidney injury)     Cardiomyopathy     Elevated troponin     HTN (hypertension)     Hypokalemia     Hypomagnesemia     Renal insufficiency      Past Surgical History:   Procedure Laterality Date    broken foot Left     RIGHT HEART CATHETERIZATION Right 5/13/2024    Procedure: INSERTION, CATHETER, RIGHT HEART;  Surgeon: Pradeep Mack MD;  Location: [facility] CATH LAB;  Service: Cardiology;  Laterality: Right;    RIGHT HEART CATHETERIZATION Right 6/4/2024    Procedure: INSERTION, CATHETER, RIGHT HEART;  Surgeon: Elier Gilmore MD;  Location: [facility] CATH LAB;  Service: Cardiology;  Laterality: Right;     Family History   Problem Relation Name Age of Onset    Heart failure Mother      No Known Problems Father       Social History     Tobacco Use    Smoking status: Former     Current packs/day: 0.00     Average packs/day: 1 pack/day for 19.0 years (19.0 ttl pk-yrs)      "Types: Cigarettes     Start date:      Quit date:      Years since quittin.6     Passive exposure: Past    Smokeless tobacco: Never   Substance Use Topics    Alcohol use: Not Currently    Drug use: Never        Review of Systems:  Review of Systems   All other systems reviewed and are negative.      OBJECTIVE:     Vital Signs (Most Recent)  BP (!) 140/89 (BP Location: Left arm, Patient Position: Sitting)   Resp 19   Ht 5' 9.02" (1.753 m)   Wt 86.9 kg (191 lb 9.3 oz)   BMI 28.28 kg/m²     Physical Exam:  General: 44 y.o. male in no distress   Neuro: alert and oriented x 4.  Moves all extremities.     HEENT: normocephalic, atraumatic, PERRL, EOMI   Respiratory: respirations are even and unlabored  Cardiac: regular rate and rhythm  Abdomen: soft, NTND  Extremities: Warm dry and intact  Skin: no rashes  Anorectal: anal fissure to the left of posterior midline, + hemorrhoids at the anal verge    Labs: NA    Imaging: NA      ASSESSMENT/PLAN:     Diagnoses and all orders for this visit:    Anal fissure  -     Case Request Endoscopy: anoscopy, botox injection        44 y.o. male with anal fissure     - patient has failed topical therapy due to intolerance of medication  - He has had significant improvement in his EF, now 30%.  However, remains high risk for elective surgery.    - discussed that I would recommend botox as next step vs. Sphincterotomy. Unable to get botox approved for outpatient clinic administration.  Will proceed to endoscopy for anoscopy and botox injection.     Kristel Augustin MD  Staff Surgeon  Colon & Rectal Surgery    "

## 2024-08-31 LAB — BACTERIA SPEC ANAEROBE CULT: NORMAL

## 2024-08-31 NOTE — ANESTHESIA POSTPROCEDURE EVALUATION
Anesthesia Post Evaluation    Patient: Ayad Stephensu Macdonald    Procedure(s) Performed: * No procedures listed *    Final Anesthesia Type: general      Patient location during evaluation: PACU  Patient participation: Yes- Able to Participate  Level of consciousness: awake  Post-procedure vital signs: reviewed and stable  Pain management: adequate  Airway patency: patent    PONV status at discharge: No PONV  Anesthetic complications: no      Cardiovascular status: blood pressure returned to baseline  Respiratory status: unassisted  Hydration status: euvolemic  Follow-up not needed.              Vitals Value Taken Time   /90 08/29/24 1226   Temp 36.1 °C (97 °F) 08/29/24 1226   Pulse 83 08/29/24 1226   Resp 12 08/29/24 1226   SpO2 99 % 08/29/24 1226         No case tracking events are documented in the log.      Pain/Kirk Score: Pain Rating Post Med Admin: 0 (8/30/2024 10:25 AM)  Kirk Score: 10 (8/30/2024 10:25 AM)

## 2024-09-03 ENCOUNTER — DOCUMENTATION ONLY (OUTPATIENT)
Dept: TRANSFUSION MEDICINE | Facility: HOSPITAL | Age: 44
End: 2024-09-03
Payer: MEDICAID

## 2024-09-03 NOTE — PROGRESS NOTES
[facility] TRANSFUSION MEDICINE  Section of Transfusion Medicine and Histocompatibility  HLA Note    Case Details   Diagnosis:  No primary diagnosis found.  Blood Type: O POS  HLA Type:   Class I:  Lab Results   Component Value Date    WEYZ6CA 26 05/31/2024    VYFW7FL 33 05/31/2024    BUEU2DT 53 05/31/2024    XBOI5MZ 57 05/31/2024    WEYWW8FC 4 05/31/2024    FEBOM8OB XX 05/31/2024    QBKFY0YH 4 05/31/2024    CZWHQ4BB 18 05/31/2024     Class II:  Lab Results   Component Value Date    QJABPB83OD 8 05/31/2024    ECNAPP32NV 13 05/31/2024    YEFYHI499TQ 52 05/31/2024    AXDBKK2906 XX 05/31/2024    QMMSM4KM 7 05/31/2024    BVFLN9NT 5 05/31/2024     Recent Antibody Screen/ID Results:   Lab Results   Component Value Date    CIABCLM WEAK--B75(1338) 08/29/2024    CIIAB Negative 08/29/2024     Auto T Cell Crossmatch Results:  Lab Results   Component Value Date    XMTCELLRES Negative 07/29/2024     Auto B Cell Crossmatch Results:  Lab Results   Component Value Date    BCELLRES Negative 07/29/2024     Assessment     Interpretation: There are no HLA antibodies detected with MFI values over 5000. A virtual crossmatch is recommended.    Strongly Recommended Unacceptable Antigens: None    Optional Unacceptable Antigens: None    Crossmatch Expectations: (Given strongly recommended unacceptable antigens) A retrospective or prospective flow cytometric crossmatch is expected to be negative.    Please call the HLA Lab [phone] with any concerns or questions.      NÉSTOR Shi MD, ALBERTINA  Section of Transfusion Medicine & Histocompatibility  Department of Pathology and Laboratory Medicine  [facility]  09/03/2024

## 2024-09-04 ENCOUNTER — COMMITTEE REVIEW (OUTPATIENT)
Dept: TRANSPLANT | Facility: CLINIC | Age: 44
End: 2024-09-04
Payer: MEDICAID

## 2024-09-04 ENCOUNTER — DOCUMENTATION ONLY (OUTPATIENT)
Dept: TRANSPLANT | Facility: CLINIC | Age: 44
End: 2024-09-04
Payer: MEDICAID

## 2024-09-04 DIAGNOSIS — I50.42 CHRONIC COMBINED SYSTOLIC AND DIASTOLIC HEART FAILURE: Primary | ICD-10-CM

## 2024-09-04 NOTE — COMMITTEE REVIEW
Native Organ Dx: Dilated Myopathy: Idiopathic    Denied  Mr Edmond' case was discussed at Selection Committee today for consideration for LVAD placement and Heart transplant listing. Decision to decline Heart transplant listing and LVAD at this time due to documented inability to follow treatment recommendations and to complete evaluation within recommended time frame.   Note was written by Polina Nazario RN    ==========================================================    I agree and attest to the decision of the committee.

## 2024-09-04 NOTE — TELEPHONE ENCOUNTER
I spoke with Mr Edmond this afternoon and advised of Committee decision.  Verbalized understanding of instructions.   Instructed to increase Entresto to next dose based on last week's labs per plan with Dr Hans Norris.  Rx sent to [facility] per pt request.    Pt has not gotten labs this week as instructed; reminded to get labs and dressing change as scheduled next Monday  Pt is in [place]  Pt again expressed desire to get off .  Discussed the process of same- requiring hosp stay for observation.  I instructed pt on upcoming dr oliva scheduling in October

## 2024-09-04 NOTE — TELEPHONE ENCOUNTER
Care of patient is being transferred to CHF section from Preht section, per Dr. Rhoades.    Dx:Dilated  Reason: Transferred to medical management; unable to complete eval in a timely manner;   Pt is home inotrope therapy.    5  [facility] [phone]  [facility] q MONDAY CBC CMP    [email or web address] [address] [place] [address]  Office [phone]   Cell      [phone]    Outstanding orders scheduled:    Future Appointments   Date Time Provider Department Center   9/10/2024  1:00 PM Vivi England MD [facility] PAL MED [facility]   9/27/2024 11:00 AM Kristel Augustin MD [facility] COLREC [facility]   10/30/2024  1:30 PM LAB, APPOINTMENT [facility] LAB [facility]   10/30/2024  2:00 PM [facility]adeep Mack [facility]

## 2024-09-05 RX ORDER — SACUBITRIL AND VALSARTAN 49; 51 MG/1; MG/1
1 TABLET, FILM COATED ORAL 2 TIMES DAILY
Qty: 60 TABLET | Refills: 4 | Status: SHIPPED | OUTPATIENT
Start: 2024-09-05

## 2024-09-09 ENCOUNTER — TELEPHONE (OUTPATIENT)
Dept: PALLIATIVE MEDICINE | Facility: CLINIC | Age: 44
End: 2024-09-09
Payer: MEDICAID

## 2024-09-09 ENCOUNTER — OFFICE VISIT (OUTPATIENT)
Dept: PALLIATIVE MEDICINE | Facility: CLINIC | Age: 44
End: 2024-09-09
Payer: MEDICAID

## 2024-09-09 DIAGNOSIS — I50.9 CONGESTIVE HEART FAILURE, UNSPECIFIED HF CHRONICITY, UNSPECIFIED HEART FAILURE TYPE: Primary | ICD-10-CM

## 2024-09-09 DIAGNOSIS — Z51.5 PALLIATIVE CARE ENCOUNTER: ICD-10-CM

## 2024-09-09 PROCEDURE — 99442 PR PHYSICIAN TELEPHONE EVALUATION 11-20 MIN: CPT | Mod: 95,,, | Performed by: STUDENT IN AN ORGANIZED HEALTH CARE EDUCATION/TRAINING PROGRAM

## 2024-09-09 PROCEDURE — 3044F HG A1C LEVEL LT 7.0%: CPT | Mod: CPTII,95,, | Performed by: STUDENT IN AN ORGANIZED HEALTH CARE EDUCATION/TRAINING PROGRAM

## 2024-09-09 PROCEDURE — 4010F ACE/ARB THERAPY RXD/TAKEN: CPT | Mod: CPTII,95,, | Performed by: STUDENT IN AN ORGANIZED HEALTH CARE EDUCATION/TRAINING PROGRAM

## 2024-09-09 NOTE — PROGRESS NOTES
Established Patient - Audio Only Telehealth Visit     The patient location is: [place]   The chief complaint leading to consultation is: ACP  Visit type: Virtual visit with audio only (telephone)  Total time spent with patient: 15min       The reason for the audio only service rather than synchronous audio and video virtual visit was related to technical difficulties or patient preference/necessity.     Each patient to whom I provide medical services by telemedicine is:  (1) informed of the relationship between the physician and patient and the respective role of any other health care provider with respect to management of the patient; and (2) notified that they may decline to receive medical services by telemedicine and may withdraw from such care at any time. Patient verbally consented to receive this service via voice-only telephone call.       HPI:   He reports feeling much better after the hospitalization  Denies pain, sob, anorexia, nausea  Able to sleep flat  Managing his medications    Advance Care Planning   Date: 09/09/2024  I engaged the patient in a voluntary conversation about advance care planning and we specifically addressed what the goals of care would be moving forward.  We did not specifically address the patient's likely prognosis.  We explored the patient's values and preferences for future care.  The patient endorses that what is most important right now is to focus on  improvement in condition.  He shared that he is thankful that he is no longer on the transplant list, he believes he is off the list because of improvements in EF. His main goal at this time is to continue to improve and to get off of Dobutamine. He is hoping that he can maintain his health and that he can continue to improve.   Accordingly, we have decided that the best plan to meet the patient's goals includes             Assessment and plan:    -Discussed the importance of medication compliance  -Discussed the  importance fo diet and fluid management  -He reported needing new rx for Entresto and Farxiga - contacted RN navigators regarding this   -Encouraged him to ask about prognosis in his cardiology follow up appt given his hope to get off             F/u in 6m       This service was not originating from a related E/M service provided within the previous 7 days nor will  to an E/M service or procedure within the next 24 hours or my soonest available appointment.  Prevailing standard of care was able to be met in this audio-only visit.

## 2024-09-10 ENCOUNTER — TELEPHONE (OUTPATIENT)
Dept: TRANSPLANT | Facility: CLINIC | Age: 44
End: 2024-09-10
Payer: MEDICAID

## 2024-09-10 LAB
EXT BUN: 11
EXT CALCIUM: 9.1
EXT CHLORIDE: 104
EXT CREATININE: 1.55 MG/DL
EXT GLUCOSE: 75
EXT HEMATOCRIT: 35.6
EXT HEMOGLOBIN: 10.7
EXT NT PROBNP: 982
EXT PLATELETS: 524
EXT POTASSIUM: 4.4
EXT SODIUM: 140 MMOL/L
EXT WBC: 6.9

## 2024-09-10 NOTE — TELEPHONE ENCOUNTER
Received transfer of care below from Preht coordinator via [facility] this am:    Care of patient is being transferred to CHF section from Preht section, per Dr. Rhoades.     Dx:Dilated  Reason: Transferred to medical management; unable to complete eval in a timely manner;   Pt is home inotrope therapy.    5  [facility] [phone]  [facility] q MONDAY CBC CMP    [email or web address] [address] [place] [address]  Office [phone]   Cell      [phone]     Outstanding orders scheduled:            Future Appointments   Date Time Provider Department Center   9/10/2024  1:00 PM Vivi England MD [facility] PAL MED [facility]   9/27/2024 11:00 AM Kristel Augustin MD [facility] COLREC [facility]   10/30/2024  1:30 PM LAB, APPOINTMENT [facility] LAB [facility]   10/30/2024  2:00 PM Pradeep Mack MD [facility] HEARTTX [facility]

## 2024-09-13 ENCOUNTER — DOCUMENTATION ONLY (OUTPATIENT)
Dept: TRANSPLANT | Facility: CLINIC | Age: 44
End: 2024-09-13
Payer: MEDICAID

## 2024-09-13 NOTE — PROGRESS NOTES
Pt is a transfer from the preht section on home inotrpic therapy  In report, told by gets weekly outside labs: cmp and cbc on Mondays and is due Monday 9/16    This afternoon I received cbc, cmp and ntprobnp from [facility] labs from collection date today, 9/13/2024     Na/k:  142/3.5   Cl/Co2:  105/24    Bun/Cr:  10/1.63  T. Bili:  0.2    WBC:  7.2   H&H:  10.9 / 36.6   platelets:  538    ntproBNP 1067

## 2024-09-16 ENCOUNTER — TELEPHONE (OUTPATIENT)
Dept: SURGERY | Facility: CLINIC | Age: 44
End: 2024-09-16
Payer: MEDICAID

## 2024-09-16 NOTE — TELEPHONE ENCOUNTER
"Spoke with pt regarding PO appt on 9/27 with Dr. Augustin. Informed that AV appt will need to be rescheduled due to an add on surgery. Pt states, "he is available at anytime since appt will be AV." Pt agreeable to 9/25 at 1 PM. Pt denies further questions or concerns at this time.   "

## 2024-09-25 ENCOUNTER — TELEPHONE (OUTPATIENT)
Dept: TRANSPLANT | Facility: CLINIC | Age: 44
End: 2024-09-25
Payer: MEDICAID

## 2024-09-25 NOTE — TELEPHONE ENCOUNTER
Iesha Randhawa, RN  Barb Mendoza MA  Caller: Unspecified (Today, 11:54 AM)  Please call below and see exactly what they need    Only the order for the central line dressing changes?  Or  Other things such as demographics etc?    Let me know    And let pt know we have his message and are working on it        Josey          Previous Messages       ----- Message -----  From: An Ruiz  See my previous NN  While awaiting [facility]  And not sure how long that process may take ( may be a day or could be longer) Will pursue below for now        Sent: 9/25/2024  11:59 AM CDT  To: [facility] Heart Failure Clinical    Pls call pt at [phone].  He found a facility in [place] that will change is line and they will need an order to do so.    [facility]  [address] [address]  [phone]    Thank you

## 2024-09-25 NOTE — TELEPHONE ENCOUNTER
"1107 am: had asked  Rody MARCOS to follow up as to when pt is going to have central line dressing changed this week and labs done   Pt was transferred to  from [facility]10[facility]24  pt had labs last 9/13/2024  Last week I was told by Rody pt was out of town last week and going to have labs and line changed this week  She informed me pt is on the phone w/ her at this time and stating he is out of town, nowhere to go there to get his iv cleaned and labs drawn and that his wife has been mailing him his IV Dobutamine from home    I asked to speak directly w/ pt, she transferred the call to me  We had a 15 minute conversation Introduced myself, explained he remains under the care of the same doctors in this department, but is now with a different nurse group  Told him we had received his labs last on 9/13/20-24 and told he would be out of town last week going this week for iv site care and labs  Pt said "ill tell you the truth, where I was going for my IV cleaning , the nurse is no longer there, so my wife who is a surgical nurse for 22 years and a pediatric nurse has been taking care of my iv for a long time) Pt said I can have my lab work done anywhere    I told pt , yes he can have lab work arranged somewhere he is, but concern no if site care and educated pt on central line, dressing changes, why, potential serious infections etc  Pt understood    Asked pt where he is and he told me [place], [place]  He is there working  Asked how long, he said at least a month and a half   asked type of work if outdoors in the heat, manual labor etc.  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   I told pt possibly arrangement can be made for him to receive his IV Dobutamine from a local company where he is currently located. He is " interested in this  Asked pt if he has discussed  this w/ his [facility] company and he told me he had but at that time did not know this could be arranged  Told pt since he is in agreement with this, I will reach out to them today[facility] and see if this can be done    However: regarding his picc ( central line ) dressing change: we need to find a local company who can do this and his lab work Told pt since he is in a large city , this should be able to be arranged   Suggested to pt to look for areas that could do this giving him advice: [facility] ([facility]) is who he wants to reAch out to aNd tell them: you are on home dobutamine, have a picc-central line-need site care once a week and labs drawn  See if they can do this aNd if so provide that company name and contact phone number as well as whom he spoke w/ and we can send orders  Suggested to pt a company that we work with that may be national is [facility]  Pt has my office phone number so will await his call    1120 am: Called [facility], spoke w/ pts pharmacist, Sandro  He is very familiar w/ this pt  I told him of conversation I had w/ pt as outlined above.  He already knew wife has been shipping the Dobutamine to pt for some time and pt did not want transfer of care previously, but since pt now interested he will work on this  Said he may reach out to [facility] since a national compaNy   I asked Sandro to please let me know if they are able to arrange this aNd if not and if [facility], I can arrange weekly dressing changes and labs    Provided Sandro w/ my name and office contact phone number.

## 2024-09-25 NOTE — TELEPHONE ENCOUNTER
2:25 pm: Returned call to pt   Pt asked if I received his message regarding a local infusion company and if I reached out to them  Informed pt that I did receive it, our medical assistant did call and we were informed they have a website where information is entered    I asked pt if he has heard from [facility] today and said he has not  I told him of conversation I had with his pharmacist, Sandro this am and that he was going to work on transfer of infusion care/orders to a company that is more national and also has location in [facility], [facility]  I told pt since his dobutamine may be provided through them, I waS waiting to hear the outcome of this in that they also have AIC's and would be nice if could hAve dobutamine and nursing care/labs via same company, so I therefore did not contact the company he provided me information with just yet    I told pt this process can take a couple of days  He understood     Pt also said he is looking forward to getting off of the dobutmine and that he was told they may increase entresto further to 97/103 once he has another set of labs  Asked pt and told me he is taking Entresto 97/103 one tablet twice daily since 9/9  Pt had labs 9/13 he said because he knew he would be out of town 9/16 for his 2 week lab check  I told pt 2 weeks would have been 9/23    Pt then stated if his insurance will not allow him to get his iv medicine from [place] he will continue as is home delivery and his wife will send there and he will pay out of pocket for his lab work  I asked why they would not aNd he then told me he has [place] I was unaware of this and told pt this in that I thought working and he had commercial insurance  I told him [facility] did not mention this as a potential problem, but yes it could be  I told pt paying for his labs out of pocket is one thing, but could get very expensive, but \does not solve the problem of central line  dressing changes by a professional    Will await to see what [facility], is able to determine          ----- Message from An Ruiz sent at 9/25/2024  2:14 PM CDT -----  Regarding: return call  Pt is returning a call and can be reached at [phone].    Thank you

## 2024-09-27 ENCOUNTER — TELEPHONE (OUTPATIENT)
Dept: TRANSPLANT | Facility: CLINIC | Age: 44
End: 2024-09-27
Payer: MEDICAID

## 2024-09-27 NOTE — TELEPHONE ENCOUNTER
9/27/24 - Follow up on message from DIONTE Orantes RN.  See NN 9/25.    1045 - Returned call to Sandro with [facility].  Sandro stated they were still working on it and he would have Radha contact me back.      1634 - Received return call back from Sandro and Sandro stated [facility] is unable to take patient and they will try [facility] Monday and get back with us, once they talk to them.  Sandro stated patient will continue to receive his Dobutamine until something is figured out.  I verbalized understanding.

## 2024-10-04 ENCOUNTER — TELEPHONE (OUTPATIENT)
Dept: TRANSPLANT | Facility: CLINIC | Age: 44
End: 2024-10-04
Payer: MEDICAID

## 2024-10-04 RX ORDER — SACUBITRIL AND VALSARTAN 97; 103 MG/1; MG/1
1 TABLET, FILM COATED ORAL 2 TIMES DAILY
Qty: 60 TABLET | Refills: 5 | Status: SHIPPED | OUTPATIENT
Start: 2024-10-04
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
## 2024-10-04 NOTE — TELEPHONE ENCOUNTER
"1000 am: Have received cbc and cmp lab results this am via fax from yesterdays collection : from [facility]    Na/K:  137 /3.6   Cl/Co2:  101/28   Bun/Cr:  10/1.46   T. Bili:  0.2  Ast/alt normal   alk phos 141  Ca 8.8  glucose 74    WBC:  6.1  H&H:  10.5/33.4  Platelets: 426    These results are consistent w/ pts norms.  \    Will provide these results to Dr. Maxwell to determine if he wants to increase pts entresto dose   Change Bumex dose or make any other changes    Called pt at this time and informed we have received these results which are good and  normal and at this time awaiting Dr. Hawkins response   Additionally, note pt no showed his August appt , last seen July and has appt 10/30  Advised pt necessity of attending this appt.     Pt has ever intention of going to this appt and is very hopeful to soon be able to "get off of this Dobutamine"   I explained this will be determined moving forward by his in person examinations, symptoms, lab work and testing  pt understood    Re educated pt on following a 2000 mg/24 hour sodium restricted diet and a 50 ounce a day liquids  Offered suggestions to help pt w/ thirst  Pt very involved w/ this conversation and his health    Pt in agreement to have lab work done same location next Wednesday 10/9 ( will send message to  Ma asking she send these order per Dr. Hans vega as last provider to see pt in hts .     Pt said he wife is fying in to where he is at this time and will be bringing supplies and performing his central line care this weekend.    Pt offered HF runs in his family and he has adult ages children  I advised he should consider / they should consider genetic testing  Pt agreed and said he has discussed with them and will do so more soon.                   "

## 2024-10-07 ENCOUNTER — TELEPHONE (OUTPATIENT)
Dept: TRANSPLANT | Facility: CLINIC | Age: 44
End: 2024-10-07
Payer: MEDICAID

## 2024-10-07 RX ORDER — SACUBITRIL AND VALSARTAN 97; 103 MG/1; MG/1
1 TABLET, FILM COATED ORAL 2 TIMES DAILY
Qty: 60 TABLET | Refills: 5 | Status: SHIPPED | OUTPATIENT
Start: 2024-10-07

## 2024-10-07 NOTE — TELEPHONE ENCOUNTER
1220 pm:  Noted message below from Dr. Maxwell:    Hans Norris, MD Sydnee Griffin, Iesha ARANDA RN  Caller: Unspecified (3 days ago,  5:15 PM)  I saw your previous message.  Yes, we can increase his entresto from medium to high dose.  I called him.  I sent prescription to his pharmacy.  Thx    Called pt at this time:   Pt said he received a call from Dr. Maxwell: Friday evening  And Dr. Maxwell did not tell him to increase his entresto to 97/103, but did tell him to :  Continue the Entresto 49/51 and to take (1) tablet in the morning and (2) tablets at night and see what his labs show this Wednesday    Pt said he started this dosage on Saturday 10/5 and did so 10/5 , yesterday and plans to continue this dosage   I told pt I was not aware of this dosing and also that Dr. Maxwell did send in a prescription for 97/103 one tablet twice daily to [place] as per his profile pharmacy  Pt plans to continue taking Entresto as above  I will add this to his reminder    I will also forward this to Dr. Maxwell to be certain this is what he want pt to continue to take:

## 2024-10-07 NOTE — TELEPHONE ENCOUNTER
1:00 pm:  Received the message below in response to assessment /conversation I had w/ pt this am:    Elier Gilmore MD Labella-Walker, Katherine Y RN  Caller: Unspecified (Today, 12:44 PM)  Mac Julian,  I read your note. I just did not have a way to reply or forward the note to answer back. I think he got my message wrong. I told him he could increase the current dose to 1 tablet in am and 2 tablets in pm for a couple of days and then to make it 2 tablets BID starting Monday (today). SO, starting today he should be in full high dose entresto.  I hope that clarifies the plan.  Thank you for finding out the discrepancy and gap between the intended plan and what the patient understood.    1:05 pm: Called and told pt starting today: Dr. Maxwell wants him to take (2) Entresto 49/51 in the am and (2) in the pm until he gets the 97/103 strength at which time he will take (1) tablet in the am and (1) tablet in the pm  Pt voiced understanding and agreement Pt aware of importance of lab this Wednesday 10/9 as scheduled

## 2024-10-09 ENCOUNTER — TELEPHONE (OUTPATIENT)
Dept: TRANSPLANT | Facility: CLINIC | Age: 44
End: 2024-10-09
Payer: MEDICAID

## 2024-10-09 ENCOUNTER — DOCUMENTATION ONLY (OUTPATIENT)
Dept: TRANSPLANT | Facility: CLINIC | Age: 44
End: 2024-10-09
Payer: MEDICAID

## 2024-10-09 NOTE — PROGRESS NOTES
3:30 pm :F/U on todays weekly inotrope lab reminder   This afternoon received lab results from [facility] collection from [facility]  Na/K:  140/3.7  Cl/Co2:  102/25.8   Bun/Cr:  8/1.45    T.Bili:  0.3   Glucose: 100      WBC:  7.0  H&H:  11.3/36.2  Platelets:  482

## 2024-10-09 NOTE — TELEPHONE ENCOUNTER
Called patient and informed him of lab results. Instructed patient to go next Wednesday(10/16) and repeat labs. Patient states he is waiting for his new prescription of entresto 97/103, so he has been taking 2 of the entresto 49-51 at 10 am and 10 pm. States he feels good. All questions answered.

## 2024-10-17 ENCOUNTER — DOCUMENTATION ONLY (OUTPATIENT)
Dept: TRANSPLANT | Facility: CLINIC | Age: 44
End: 2024-10-17
Payer: MEDICAID

## 2024-10-17 NOTE — PROGRESS NOTES
1:10 pm:  F/U on yesterdays weekly inotrope lab reminder  Pt had cbc and cmp drawn at [facility] again w/ collection date of 10/16/2024    Results as follow:   Na/K:  412/4.0   cl/co2:  106/28.5     Bun/Cr:  8/1.55  T.Bili: 0.2    WBC:  7.3   H&H:  4.95/11.3   Platelets:  468    Also see my previous NN w/ notation of Entresto dose increase to 97/103 bid on 10/7.      These results consistant w/ previous resulst.

## 2024-10-30 ENCOUNTER — OFFICE VISIT (OUTPATIENT)
Dept: TRANSPLANT | Facility: CLINIC | Age: 44
End: 2024-10-30
Payer: MEDICAID

## 2024-10-30 VITALS
SYSTOLIC BLOOD PRESSURE: 117 MMHG | DIASTOLIC BLOOD PRESSURE: 57 MMHG | HEIGHT: 69 IN | WEIGHT: 205.5 LBS | BODY MASS INDEX: 30.44 KG/M2 | HEART RATE: 56 BPM

## 2024-10-30 DIAGNOSIS — I50.42 CHRONIC COMBINED SYSTOLIC AND DIASTOLIC HEART FAILURE: Primary | ICD-10-CM

## 2024-10-30 DIAGNOSIS — I10 PRIMARY HYPERTENSION: ICD-10-CM

## 2024-10-30 DIAGNOSIS — Z01.818 PRE-TRANSPLANT EVALUATION FOR HEART TRANSPLANT: ICD-10-CM

## 2024-10-30 DIAGNOSIS — I50.9 CONGESTIVE HEART FAILURE, UNSPECIFIED HF CHRONICITY, UNSPECIFIED HEART FAILURE TYPE: ICD-10-CM

## 2024-10-30 DIAGNOSIS — Z79.899 RECEIVING INOTROPIC MEDICATION: ICD-10-CM

## 2024-10-30 PROCEDURE — 99999 PR PBB SHADOW E&M-EST. PATIENT-LVL III: CPT | Mod: PBBFAC,,, | Performed by: INTERNAL MEDICINE

## 2024-10-30 PROCEDURE — 99213 OFFICE O/P EST LOW 20 MIN: CPT | Mod: PBBFAC | Performed by: INTERNAL MEDICINE

## 2024-11-08 ENCOUNTER — TELEPHONE (OUTPATIENT)
Dept: TRANSPLANT | Facility: CLINIC | Age: 44
End: 2024-11-08
Payer: MEDICAID

## 2024-11-08 NOTE — TELEPHONE ENCOUNTER
1215 pm:  Following up on pts weekly inotrope lab reminder    Pt had labs while in town last week    11/7: told by HF JASON Fine, she spoke w/ pt, he is now working in [place] and he told her he will find a hospital close to where he is where he can have lab work drawn and give us that information    11/8   11:00 am: we have not heard back from pt  Informed by Rody GUIDRY MA she placed a call to pt this am for the above information , NA, and she will keep trying    11/8  12:20 pm: I placed call to pt at this time:  NA, went straight to voice mail I LVM stating my name, day, date time and reason for call : to determine where he can go to haVe his now overdue weekly lab work done and also wondering how he will have central line dressing changes done: will he be coming in periodically and his wife going there or ?  Asked he call me back today  Left my name and office contact phone number.

## 2024-11-11 ENCOUNTER — TELEPHONE (OUTPATIENT)
Dept: TRANSPLANT | Facility: CLINIC | Age: 44
End: 2024-11-11
Payer: MEDICAID

## 2024-11-12 ENCOUNTER — TELEPHONE (OUTPATIENT)
Dept: TRANSPLANT | Facility: CLINIC | Age: 44
End: 2024-11-12
Payer: MEDICAID

## 2024-11-12 NOTE — TELEPHONE ENCOUNTER
"1000 am: provided w/ medical records by PRITI Fine w/ H[facility] from [facility] dated 11/11/2024  Noted documentation by medical provider included pt showing to ED   "Receiving Dobutamine 5mcg/kg/min continuous IV infusion via CADD dang pump thru PICC line to his r arm. Complaining that his medication is running out-has 2 hours left   Needs to get changed Q 48 hours.  States he is from [place] , here for a contracted work in [place].  Wife who is an RN will bring the next solution Tuesday and the original replacement was frozen.  Pt denies any chest pain , sob, n/v or light headedness.    Pt had [facility] x[facility], EKG, labs:   CMP: na/K;  137/4.6    cl/Co2:  105/23    Bun/cr:  10/15.   T.Bili:  0.9    Troponin:  0.028    WBC:  6.3   H&H:  10.6/34.3  Platelets:  480    1245 pm: Placed call to pt at this time:  NA, LVM for pt stating my name, office w/ ,day, date , time and this office phone number w/ message as follows:  We have obtained his medical records from [facility] ED visit 11/11/24 with MD notes and lab results  Asked pt to return my call to let me know what day of the week he wants to have his weekly Dobutamine labs drawn and we will send lab orders to [facility] for that date and await result  Also LVM stating per MD documentation at the ED, the doctor has in his note that his wife was going there today w/ more Dobutamine and I am asking if she will be doing his picc dressing change and when was the last change done    Will await pts return call.     "

## 2024-11-18 ENCOUNTER — TELEPHONE (OUTPATIENT)
Dept: TRANSPLANT | Facility: CLINIC | Age: 44
End: 2024-11-18
Payer: MEDICAID

## 2024-11-18 NOTE — TELEPHONE ENCOUNTER
1:30 pm  Noted IM via epic from HF JASON Fine stating pt will have his weekly inotrope labs drawn this Wednesday and that pt also said he  has been trying to get in touch with me about Entresto  I have not received any messages, or missed messages from this pt and looked in epic messaging where I did not see any recorded to me or to our medical assistants who address rx refills,, etc    Of note: see my documentation where I have reached out to pt in attempts to discuss his central line dressing changes and labs as has our medical assistant. Rody    With this said placed call to pt at this time  Pt told me he was told by the [facility] pharmacy in [place] that they needed something from the doctor office regarding filling his entresto rx, but did not know exactly what they needed. I told pt our office has not received anything pertaining to this , but I would reach out and call them    Told pt I actually have been trying to reach him about his lab work and dressing changes since last week  Pt told me his wife was in town in [place] and did the central line dressing change yesterday    Pt also had a question about is isosorbide and hydralazine prescriptions and told me how he is taking each, which coincides with the current rx in his chart : however his insurance company is not wanting to fill it because he is running out too soon  Looking a the RX, for each : pt is still filling on an old rx sent to [place] and not the new rx at [facility]  I told pt I would transfer the call to [facility] so he can ask they run this through his insurance and get the correct dose filled    1:45 pm: I called [facility] in [place], spoke w/ rod regarding the entresto RX and all they needed was an ICD 10 code  I provided I50.22, she ran it through while I was on the phone and it went through  I told her our office had not received this request and asked where it was sent  she said [phone]  I told her the  area code is 504  she then said it was 504 not 50i    1:50 pm Called pt back and told him the entresto should be ready soon and all they needed was a diagnosis code which I provided   Pt said he was not able to speak w/ anyone in the [facility] pharmacy and he would call back  Offered to give pt the phone number and said he had it .

## 2024-11-29 ENCOUNTER — TELEPHONE (OUTPATIENT)
Dept: TRANSPLANT | Facility: CLINIC | Age: 44
End: 2024-11-29
Payer: MEDICAID

## 2024-11-29 ENCOUNTER — DOCUMENTATION ONLY (OUTPATIENT)
Dept: TRANSPLANT | Facility: CLINIC | Age: 44
End: 2024-11-29
Payer: MEDICAID

## 2024-11-29 NOTE — TELEPHONE ENCOUNTER
Non Compliance w/ treatment plan:  Re: Dobutamine and weekly labs     There have been numerous attempts to reach pt, obtain lab results from out of state lab facility where pt said he has had lab drawn and now even locally    Pt has weekly lab orders as part of his Inotropic , Dobutamine , medication management    See my previous notes:  Pt has been reportedly working out of town   Was in [place] and now most recently , pt said in [place]  Pt found a local hospital , he told us [facility] in [place], where he would have weekly labs drawn  ( our staff, Rody would fax lab orders) and pt was having his wife change his dressing ( as he reported to us she is a nurse) either him coming in town , or her going to him   He also said due to his insurance, the infusion company cannot ship his medication to him, so his wife was doing so   pt had denied ever not having his medicine  I did have our staff obtain medical records from [facility] a couple of weeks ago that indicated he was out of dobutamine due to some problems when his wife shipped it to him , so he went to the ER     Below is some recent  time line of the trouble we have had w/ reaching pt and obtaining his lab results:   See notes also by HF MA< Rody who has also been having trouble w /reaching pt and obtaining lab results     11/26/2024   3:08 pm:  placed call to pt , NA< LVM we are having difficulties in obtaining his lab results form last weeks drawthat he reported having done at [facility] in [place] and they are now due again for this week Asked pt that he call back to discuss this asap , and that it may be necessary he go to another facility .  As with all calls, I left my name, day, date, time and this office call back phone number.    I then called [facility] in [place] HIM number,  rang > 15 times on 2 different occasions  NA and No voice message system.   I was able to reach someone in another department  who confirmed I had the correct phone and fax number, but now after 4:00 eastern time and that dept is closed.    SONDRACastroMendoza [facility]/ [facility]  I just faxed it again as well     11/29/24  am: I asked LANRE Mendoza to attempt to reach the pt again this am  She called and was able to reach him  Pt is in town and will go to a local hospital [facility]   She sent me the following message in teams at 9:34 am today  [facility] labs is getting drawn pt said he is on his way [facility] [phone],  11/29/24   1;10 pm I called the above number to obtain the results , spoke w/ Hali in the lab who told me pt did not have labs drawn today, last time was early October 11/29/24  1;12 pm I then called pts number in hopes to speak w/ pt   Again, NA, I lvm stating my name, we are looking for the lab results from this am collections, and was  told by staff at [facility] he has not had labs drawn there,today  and per our staff here at [facility], he said he was on his way to go there at 9;35 this am  LVM for  pt of concern we have here w/ his lack of compliance for his treatment w/ dobutamine and that this is very important  Also of the difficulties we are having w/ reaching him and he not returning our phone calls  LVM for pt he needs to call this office as soon as possible and today in order to discuss this situation and this  needed treatment and that this is his best interest and for his safety and well being    At this time I am routing this note to our MD team so they too are aware of this situation and pts non compliance.     Pt last seen by Dr. BLANCA Mack, so will route to his attention

## 2024-11-29 NOTE — PROGRESS NOTES
Called pt multiple time this week didn't get an answer was trying to get pt to help me get his labs from [facility]. I was getting the run around from the labs I sent over a release forms twice because they said they  didn't receive the first one. Called to make sure that they had the release form I was told that they did receive the form. Still didn't get pt labs from last week from them . Spoke to pt this morning he is going get labs drawn today at [facility].

## 2024-12-02 ENCOUNTER — TELEPHONE (OUTPATIENT)
Dept: TRANSPLANT | Facility: CLINIC | Age: 44
End: 2024-12-02
Payer: MEDICAID

## 2024-12-02 NOTE — TELEPHONE ENCOUNTER
"12/2/2024   1145 am: Informed by Rody, HF MA she called pt to reschedule lab: Liliam, she LVM asking he return her call    12/2/2024  2:10 pm: pt has not returned call to Rody  I called pt at this time, LILIAM LVSEBASTIAN w/ my name, office w/ , day, date time and message I called for his lab results from [facility] 11/29 pm and was told he did not have labs drawn there  Asked pt to return my call to clarify this and to hopefully set up another appt time    12/2/2024:2:12 pm: Since it has now been 2 weeks since we have had labs for pt and we are not getting return calls, or lab results, I placed call to other number in pts chart: Ms. Gutierrez listed as spouse in hopes she can shed some light on us reaching Mr Kennedy and the lab situation   She answered and I identified myself to her and office I woirk with  Told her reason for call is that we have not had lab results from him now for 2 weeks and explained the importance of this  Told her of numerous attempts to reach him and messages left and that we did reach him this past Friday he said he was going to a local lab, [facility], but when we called for the results , were told he did not go there  She said she knows for sure he did go this past Friday morning , "I'm his wife, I would know"  I told her I would call back again to see about getting the results,,and that I did call 11/29 1:10 pm, spoke w/ alonso Lal in the lab and told they did not have results for him, nor record he was there for lab work  She offered he went to [facility], not the clinic across the street, I told her that is where I called and also asked what city and she told me [place]  She took our phone number and said she was going to have him call me back  She also said , he definetlely went to the lab because he is coming for his test this week  I noted in chart he is scheduled for a RHC and I told her the lab studies I am looking for have nothing to do with the cath lab orders and " he will still have to have those done then  I told her the lab work I am speaking of is due to his medication through his IV to check his kidney function and electrolytes and we are very concerned he has not had these lab studies done  She said she knew     24 2:15 pm I called [facility] back and spoke w/ Deanne of the lab  She confirmed this is [facility] and is in [place] and is the hospital  She looked for a while, confirmed his name and  and could not find any indication he ahd labs drawn   She also confirmed the fax number of where I told her the orders were sent successfully was correct   She took my phone number and said she would call me back. Once she finds the lab orders and had another personnel helping her locate the orders.

## 2024-12-02 NOTE — TELEPHONE ENCOUNTER
4:15 pm: Noted the cosign below from Dr. Mack in response to the note I entered and routed to him pertaining to pts not returnign phone calls, nor showing for lab visits     Pradeep Mack MD Labella-Walker, Iesha ARANDA RN  Thanks for the note. I had a chat with him last visit about compliance etc. He denied having issues but clearly it didn't sink in.

## 2024-12-04 ENCOUNTER — HOSPITAL ENCOUNTER (OUTPATIENT)
Facility: HOSPITAL | Age: 44
Discharge: HOME | End: 2024-12-05
Attending: INTERNAL MEDICINE | Admitting: INTERNAL MEDICINE
Payer: MEDICAID

## 2024-12-04 ENCOUNTER — TELEPHONE (OUTPATIENT)
Dept: PRIMARY CARE CLINIC | Facility: CLINIC | Age: 44
End: 2024-12-04
Payer: MEDICAID

## 2024-12-04 ENCOUNTER — HOSPITAL ENCOUNTER (OUTPATIENT)
Dept: CARDIOLOGY | Facility: HOSPITAL | Age: 44
Discharge: HOME | End: 2024-12-04
Attending: INTERNAL MEDICINE | Admitting: INTERNAL MEDICINE
Payer: MEDICAID

## 2024-12-04 ENCOUNTER — DOCUMENTATION ONLY (OUTPATIENT)
Dept: TRANSPLANT | Facility: CLINIC | Age: 44
End: 2024-12-04
Payer: MEDICAID

## 2024-12-04 VITALS
HEIGHT: 69 IN | HEART RATE: 60 BPM | WEIGHT: 205 LBS | DIASTOLIC BLOOD PRESSURE: 57 MMHG | SYSTOLIC BLOOD PRESSURE: 117 MMHG | BODY MASS INDEX: 30.36 KG/M2

## 2024-12-04 DIAGNOSIS — I50.9 CHF (CONGESTIVE HEART FAILURE): ICD-10-CM

## 2024-12-04 DIAGNOSIS — I50.22 CHRONIC SYSTOLIC CONGESTIVE HEART FAILURE: ICD-10-CM

## 2024-12-04 DIAGNOSIS — I50.42 CHRONIC COMBINED SYSTOLIC AND DIASTOLIC HEART FAILURE: ICD-10-CM

## 2024-12-04 DIAGNOSIS — I50.9 CONGESTIVE HEART FAILURE, UNSPECIFIED HF CHRONICITY, UNSPECIFIED HEART FAILURE TYPE: ICD-10-CM

## 2024-12-04 LAB
ALBUMIN SERPL BCP-MCNC: 3.9 G/DL (ref 3.5–5.2)
ALP SERPL-CCNC: 123 U/L (ref 40–150)
ALT SERPL W/O P-5'-P-CCNC: 12 U/L (ref 10–44)
ANION GAP SERPL CALC-SCNC: 8 MMOL/L (ref 8–16)
ASCENDING AORTA: 3.49 CM
AST SERPL-CCNC: 18 U/L (ref 10–40)
AV AREA BY CONTINUOUS VTI: 2.5 CM2
AV INDEX (PROSTH): 0.46
AV LVOT MEAN GRADIENT: 2 MMHG
AV LVOT PEAK GRADIENT: 3 MMHG
AV MEAN GRADIENT: 6.5 MMHG
AV PEAK GRADIENT: 13 MMHG
AV VALVE AREA BY VELOCITY RATIO: 1.8 CM²
AV VALVE AREA: 1.9 CM²
AV VELOCITY RATIO: 0.44
BASOPHILS # BLD AUTO: 0.04 K/UL (ref 0–0.2)
BASOPHILS NFR BLD: 0.6 % (ref 0–1.9)
BILIRUB SERPL-MCNC: 0.2 MG/DL (ref 0.1–1)
BSA FOR ECHO PROCEDURE: 2.13 M2
BUN SERPL-MCNC: 10 MG/DL (ref 6–20)
CALCIUM SERPL-MCNC: 8.9 MG/DL (ref 8.7–10.5)
CHLORIDE SERPL-SCNC: 110 MMOL/L (ref 95–110)
CO2 SERPL-SCNC: 23 MMOL/L (ref 23–29)
CREAT SERPL-MCNC: 1.4 MG/DL (ref 0.5–1.4)
CV ECHO LV RWT: 0.29 CM
DIFFERENTIAL METHOD BLD: ABNORMAL
DOP CALC AO PEAK VEL: 1.8 M/S
DOP CALC AO VTI: 33.4 CM
DOP CALC LVOT AREA: 4.2 CM2
DOP CALC LVOT DIAMETER: 2.3 CM
DOP CALC LVOT PEAK VEL: 0.8 M/S
DOP CALC LVOT STROKE VOLUME: 63.1 CM3
DOP CALCLVOT PEAK VEL VTI: 15.2 CM
E WAVE DECELERATION TIME: 210.02 MS
E/A RATIO: 1.16
E/E' RATIO: 10.29 M/S
ECHO EF ESTIMATED: 44 %
ECHO LV POSTERIOR WALL: 1 CM (ref 0.6–1.1)
EOSINOPHIL # BLD AUTO: 0.1 K/UL (ref 0–0.5)
EOSINOPHIL NFR BLD: 2 % (ref 0–8)
ERYTHROCYTE [DISTWIDTH] IN BLOOD BY AUTOMATED COUNT: 18.2 % (ref 11.5–14.5)
EST. GFR  (NO RACE VARIABLE): >60 ML/MIN/1.73 M^2
FRACTIONAL SHORTENING: 21.7 % (ref 28–44)
GLOBAL LONGITUIDAL STRAIN: 11 %
GLUCOSE SERPL-MCNC: 96 MG/DL (ref 70–110)
HCT VFR BLD AUTO: 36.1 % (ref 40–54)
HGB BLD-MCNC: 10.8 G/DL (ref 14–18)
IMM GRANULOCYTES # BLD AUTO: 0.06 K/UL (ref 0–0.04)
IMM GRANULOCYTES NFR BLD AUTO: 0.8 % (ref 0–0.5)
INTERVENTRICULAR SEPTUM: 0.8 CM (ref 0.6–1.1)
IVC DIAMETER: 1.2 CM
IVRT: 91.34 MS
LA MAJOR: 5.15 CM
LA MINOR: 4.92 CM
LA WIDTH: 4.77 CM
LEFT ATRIUM SIZE: 4.03 CM
LEFT ATRIUM VOLUME INDEX MOD: 34.6 ML/M2
LEFT ATRIUM VOLUME INDEX: 39.3 ML/M2
LEFT ATRIUM VOLUME MOD: 72.35 ML
LEFT ATRIUM VOLUME: 82.23 CM3
LEFT INTERNAL DIMENSION IN SYSTOLE: 5.4 CM (ref 2.1–4)
LEFT VENTRICLE DIASTOLIC VOLUME INDEX: 119.02 ML/M2
LEFT VENTRICLE DIASTOLIC VOLUME: 248.75 ML
LEFT VENTRICLE MASS INDEX: 131.7 G/M2
LEFT VENTRICLE SYSTOLIC VOLUME INDEX: 67.2 ML/M2
LEFT VENTRICLE SYSTOLIC VOLUME: 140.42 ML
LEFT VENTRICULAR INTERNAL DIMENSION IN DIASTOLE: 6.9 CM (ref 3.5–6)
LEFT VENTRICULAR MASS: 275.2 G
LV LATERAL E/E' RATIO: 9 M/S
LV SEPTAL E/E' RATIO: 12 M/S
LYMPHOCYTES # BLD AUTO: 2.3 K/UL (ref 1–4.8)
LYMPHOCYTES NFR BLD: 32.4 % (ref 18–48)
MAGNESIUM SERPL-MCNC: 2.1 MG/DL (ref 1.6–2.6)
MCH RBC QN AUTO: 22.1 PG (ref 27–31)
MCHC RBC AUTO-ENTMCNC: 29.9 G/DL (ref 32–36)
MCV RBC AUTO: 74 FL (ref 82–98)
MONOCYTES # BLD AUTO: 0.7 K/UL (ref 0.3–1)
MONOCYTES NFR BLD: 9.2 % (ref 4–15)
MV PEAK A VEL: 0.62 M/S
MV PEAK E VEL: 0.72 M/S
NEUTROPHILS # BLD AUTO: 3.9 K/UL (ref 1.8–7.7)
NEUTROPHILS NFR BLD: 55 % (ref 38–73)
NRBC BLD-RTO: 0 /100 WBC
OHS CV RV/LV RATIO: 0.54 CM
OHS LV EJECTION FRACTION SIMPSONS BIPLANE MOD: 34 %
PHOSPHATE SERPL-MCNC: 3.3 MG/DL (ref 2.7–4.5)
PISA TR MAX VEL: 2.91 M/S
PLATELET # BLD AUTO: 357 K/UL (ref 150–450)
PMV BLD AUTO: 8.4 FL (ref 9.2–12.9)
POTASSIUM SERPL-SCNC: 3.9 MMOL/L (ref 3.5–5.1)
PROT SERPL-MCNC: 7.3 G/DL (ref 6–8.4)
RA MAJOR: 5.3 CM
RA PRESSURE ESTIMATED: 3 MMHG
RA WIDTH: 4.46 CM
RBC # BLD AUTO: 4.88 M/UL (ref 4.6–6.2)
RIGHT ATRIAL AREA: 18 CM2
RIGHT VENTRICLE DIASTOLIC BASEL DIMENSION: 3.7 CM
RV TB RVSP: 6 MMHG
RV TISSUE DOPPLER FREE WALL SYSTOLIC VELOCITY 1 (APICAL 4 CHAMBER VIEW): 16.81 CM/S
SINUS: 3.52 CM
SODIUM SERPL-SCNC: 141 MMOL/L (ref 136–145)
STJ: 3.04 CM
TDI LATERAL: 0.08 M/S
TDI SEPTAL: 0.06 M/S
TDI: 0.07 M/S
TR MAX PG: 34 MMHG
TRICUSPID ANNULAR PLANE SYSTOLIC EXCURSION: 1.95 CM
TV PEAK GRADIENT: 34 MMHG
TV REST PULMONARY ARTERY PRESSURE: 37 MMHG
WBC # BLD AUTO: 7.1 K/UL (ref 3.9–12.7)
Z-SCORE OF LEFT VENTRICULAR DIMENSION IN END DIASTOLE: 0.68
Z-SCORE OF LEFT VENTRICULAR DIMENSION IN END SYSTOLE: 2.49

## 2024-12-04 PROCEDURE — G0378 HOSPITAL OBSERVATION PER HR: HCPCS

## 2024-12-04 PROCEDURE — 80053 COMPREHEN METABOLIC PANEL: CPT | Mod: 91 | Performed by: PHYSICIAN ASSISTANT

## 2024-12-04 PROCEDURE — 36415 COLL VENOUS BLD VENIPUNCTURE: CPT | Performed by: PHYSICIAN ASSISTANT

## 2024-12-04 PROCEDURE — 63600175 PHARM REV CODE 636 W HCPCS: Mod: JZ,JG | Performed by: STUDENT IN AN ORGANIZED HEALTH CARE EDUCATION/TRAINING PROGRAM

## 2024-12-04 PROCEDURE — 63600175 PHARM REV CODE 636 W HCPCS: Performed by: PHYSICIAN ASSISTANT

## 2024-12-04 PROCEDURE — 83735 ASSAY OF MAGNESIUM: CPT | Performed by: PHYSICIAN ASSISTANT

## 2024-12-04 PROCEDURE — 93356 MYOCRD STRAIN IMG SPCKL TRCK: CPT | Mod: ,,, | Performed by: INTERNAL MEDICINE

## 2024-12-04 PROCEDURE — 84100 ASSAY OF PHOSPHORUS: CPT | Performed by: PHYSICIAN ASSISTANT

## 2024-12-04 PROCEDURE — 85025 COMPLETE CBC W/AUTO DIFF WBC: CPT | Mod: 91 | Performed by: PHYSICIAN ASSISTANT

## 2024-12-04 PROCEDURE — 25000003 PHARM REV CODE 250: Performed by: PHYSICIAN ASSISTANT

## 2024-12-04 PROCEDURE — 63600175 PHARM REV CODE 636 W HCPCS: Mod: JZ,JG

## 2024-12-04 PROCEDURE — G0379 DIRECT REFER HOSPITAL OBSERV: HCPCS

## 2024-12-04 PROCEDURE — 93306 TTE W/DOPPLER COMPLETE: CPT | Mod: 26,,, | Performed by: INTERNAL MEDICINE

## 2024-12-04 PROCEDURE — C8929 TTE W OR WO FOL WCON,DOPPLER: HCPCS

## 2024-12-04 RX ORDER — SUCRALFATE 1 G/10ML
1 SUSPENSION ORAL EVERY 6 HOURS
Status: DISCONTINUED | OUTPATIENT
Start: 2024-12-04 | End: 2024-12-05 | Stop reason: HOSPADM

## 2024-12-04 RX ORDER — POLYETHYLENE GLYCOL 3350 17 G/17G
17 POWDER, FOR SOLUTION ORAL 2 TIMES DAILY PRN
Status: DISCONTINUED | OUTPATIENT
Start: 2024-12-04 | End: 2024-12-05 | Stop reason: HOSPADM

## 2024-12-04 RX ORDER — ISOSORBIDE DINITRATE 20 MG/1
20 TABLET ORAL 3 TIMES DAILY
Status: DISCONTINUED | OUTPATIENT
Start: 2024-12-04 | End: 2024-12-05 | Stop reason: HOSPADM

## 2024-12-04 RX ORDER — ACETAMINOPHEN 325 MG/1
650 TABLET ORAL EVERY 4 HOURS PRN
Status: DISCONTINUED | OUTPATIENT
Start: 2024-12-04 | End: 2024-12-05 | Stop reason: HOSPADM

## 2024-12-04 RX ORDER — SODIUM CHLORIDE 0.9 % (FLUSH) 0.9 %
10 SYRINGE (ML) INJECTION
Status: DISCONTINUED | OUTPATIENT
Start: 2024-12-04 | End: 2024-12-05 | Stop reason: HOSPADM

## 2024-12-04 RX ORDER — ALUMINUM HYDROXIDE, MAGNESIUM HYDROXIDE, AND SIMETHICONE 1200; 120; 1200 MG/30ML; MG/30ML; MG/30ML
30 SUSPENSION ORAL
Status: DISCONTINUED | OUTPATIENT
Start: 2024-12-04 | End: 2024-12-05 | Stop reason: HOSPADM

## 2024-12-04 RX ORDER — DOBUTAMINE HYDROCHLORIDE 400 MG/100ML
2.5 INJECTION INTRAVENOUS CONTINUOUS
Status: DISCONTINUED | OUTPATIENT
Start: 2024-12-04 | End: 2024-12-05

## 2024-12-04 RX ORDER — PANTOPRAZOLE SODIUM 20 MG/1
20 TABLET, DELAYED RELEASE ORAL DAILY
Status: DISCONTINUED | OUTPATIENT
Start: 2024-12-05 | End: 2024-12-05 | Stop reason: HOSPADM

## 2024-12-04 RX ORDER — BUMETANIDE 1 MG/1
2 TABLET ORAL DAILY
Status: DISCONTINUED | OUTPATIENT
Start: 2024-12-05 | End: 2024-12-05 | Stop reason: HOSPADM

## 2024-12-04 RX ORDER — DAPAGLIFLOZIN 10 MG/1
10 TABLET, FILM COATED ORAL DAILY
Status: DISCONTINUED | OUTPATIENT
Start: 2024-12-05 | End: 2024-12-05 | Stop reason: HOSPADM

## 2024-12-04 RX ORDER — HYDRALAZINE HYDROCHLORIDE 50 MG/1
50 TABLET, FILM COATED ORAL EVERY 8 HOURS
Status: DISCONTINUED | OUTPATIENT
Start: 2024-12-04 | End: 2024-12-05 | Stop reason: HOSPADM

## 2024-12-04 RX ORDER — SPIRONOLACTONE 25 MG/1
25 TABLET ORAL DAILY
Status: DISCONTINUED | OUTPATIENT
Start: 2024-12-05 | End: 2024-12-05 | Stop reason: HOSPADM

## 2024-12-04 RX ORDER — ATORVASTATIN CALCIUM 20 MG/1
40 TABLET, FILM COATED ORAL DAILY
Status: DISCONTINUED | OUTPATIENT
Start: 2024-12-05 | End: 2024-12-05 | Stop reason: HOSPADM

## 2024-12-04 RX ADMIN — SACUBITRIL AND VALSARTAN 1 TABLET: 97; 103 TABLET, FILM COATED ORAL at 08:12

## 2024-12-04 RX ADMIN — ISOSORBIDE DINITRATE 20 MG: 20 TABLET ORAL at 09:12

## 2024-12-04 RX ADMIN — DOBUTAMINE HYDROCHLORIDE 2.5 MCG/KG/MIN: 400 INJECTION INTRAVENOUS at 04:12

## 2024-12-04 RX ADMIN — HYDRALAZINE HYDROCHLORIDE 50 MG: 50 TABLET ORAL at 10:12

## 2024-12-04 RX ADMIN — ALTEPLASE 2 MG: 2.2 INJECTION, POWDER, LYOPHILIZED, FOR SOLUTION INTRAVENOUS at 08:12

## 2024-12-04 RX ADMIN — ACETAMINOPHEN 650 MG: 325 TABLET ORAL at 11:12

## 2024-12-04 RX ADMIN — ALTEPLASE 2 MG: 2.2 INJECTION, POWDER, LYOPHILIZED, FOR SOLUTION INTRAVENOUS at 11:12

## 2024-12-04 RX ADMIN — POLYETHYLENE GLYCOL 3350 17 G: 17 POWDER, FOR SOLUTION ORAL at 04:12

## 2024-12-04 NOTE — UM SECONDARY REVIEW
CASE/Utilization Management Admission note    OBS admission  for planned IV  wean  -Decreasing home  to 2.5 and then will stop it. Will get CVP and SVO2 in am     Appropriate OBS admission     Will have PICC d/c at discharge if OFF inotrope    Patient current with [facility] if resumption of  needed    No dispo needs noted at this time

## 2024-12-04 NOTE — TELEPHONE ENCOUNTER
Pt called for a refill on his pantoprazole --tried completing a PA but ins states he has used his 180 days out of the 365 day year--pt would need to pay out of pocket --spoke to pt and informed a 30 day suppy is $83.75 but could use a coupon and for a 39 day would be under $20.00--PT STATES HE WOULD GET COUPION

## 2024-12-04 NOTE — PROGRESS NOTES
[facility]    [facility] Quick Note:     Patient noted to be readmitted     Patient is a current Dobutamine patient of [facility]; prior to admit, patient's home order was I Dobutamine 5 mcg/kg/min.    Patient is a current home IV ATB/ID OPAT patient of [facility].    Patient to be followed by [facility] staff while admitted for any needs. Please do not discharge patient without confirming patient's home IV ATB has been set up (if still needed at time of discharge).           Please do not hesitate to reach out for any additional needs.    [facility]  Elena Woods RN, Clinical Educator  Cell [phone]  Office [phone]  Fax [phone]

## 2024-12-04 NOTE — SUBJECTIVE & OBJECTIVE
Past Medical History:   Diagnosis Date    Acute heart failure     AUDELIA (acute kidney injury)     Cardiomyopathy     Elevated troponin     HTN (hypertension)     Hypokalemia     Hypomagnesemia     Renal insufficiency        Past Surgical History:   Procedure Laterality Date    broken foot Left     FLEXIBLE SIGMOIDOSCOPY N/A 8/30/2024    Procedure: anoscopy, botox injection;  Surgeon: Kristel Augustin MD;  Location: [facility];  Service: Colon and Rectal;  Laterality: N/A;  no anesthesia    RIGHT HEART CATHETERIZATION Right 5/13/2024    Procedure: INSERTION, CATHETER, RIGHT HEART;  Surgeon: Pradeep Mack MD;  Location: [facility] CATH LAB;  Service: Cardiology;  Laterality: Right;    RIGHT HEART CATHETERIZATION Right 6/4/2024    Procedure: INSERTION, CATHETER, RIGHT HEART;  Surgeon: Elier Gilmore MD;  Location: [facility] CATH LAB;  Service: Cardiology;  Laterality: Right;       Review of patient's allergies indicates:  No Known Allergies    Current Facility-Administered Medications   Medication    acetaminophen tablet 650 mg    aluminum-magnesium hydroxide-simethicone 200-200-20 mg/5 mL suspension 30 mL    [START ON 12/5/2024] atorvastatin tablet 40 mg    [START ON 12/5/2024] bumetanide tablet 2 mg    [START ON 12/5/2024] dapagliflozin propanediol (Farxiga) tablet 10 mg    DOBUtamine 1000 mg in D5W 250 mL infusion    hydrALAZINE tablet 50 mg    isosorbide dinitrate tablet 20 mg    [START ON 12/5/2024] pantoprazole EC tablet 20 mg    polyethylene glycol packet 17 g    sacubitriL-valsartan  mg per tablet 1 tablet    sodium chloride 0.9% flush 10 mL    [START ON 12/5/2024] spironolactone tablet 25 mg    sucralfate 100 mg/mL suspension 1 g     Family History       Problem Relation (Age of Onset)    Heart failure Mother    No Known Problems Father          Tobacco Use    Smoking status: Former     Current packs/day: 0.00     Average packs/day: 1 pack/day for 19.0 years (19.0 ttl pk-yrs)     Types: Cigarettes     Start  date:      Quit date:      Years since quittin.9     Passive exposure: Past    Smokeless tobacco: Never   Substance and Sexual Activity    Alcohol use: Not Currently    Drug use: Never    Sexual activity: Not on file     Review of Systems  Constitutional: Negative for chills and fever.   HENT:  Negative for hearing loss.    Eyes:  Negative for visual disturbance.   Cardiovascular:  Negative for chest pain, dyspnea on exertion, irregular heartbeat, leg swelling, orthopnea, palpitations, paroxysmal nocturnal dyspnea and syncope.   Respiratory:  Negative for cough and shortness of breath.    Musculoskeletal:  Negative for muscle weakness.   Gastrointestinal:  Negative for diarrhea, nausea and vomiting.   Neurological:  Negative for focal weakness.   Psychiatric/Behavioral:  Negative for memory loss.      Objective:     Vital Signs (Most Recent):  Temp: 98.8 °F (37.1 °C) (24)  Pulse: 88 (24)  Resp: 15 (24)  BP: 126/64 (24)  SpO2: 99 % (24) Vital Signs (24h Range):  Temp:  [97.2 °F (36.2 °C)-98.8 °F (37.1 °C)] 98.8 °F (37.1 °C)  Pulse:  [60-88] 88  Resp:  [15-18] 15  SpO2:  [98 %-99 %] 99 %  BP: (117-126)/(57-82) 126/64     Patient Vitals for the past 72 hrs (Last 3 readings):   Weight   24 92.5 kg (203 lb 14.8 oz)     Body mass index is 30.11 kg/m².    No intake or output data in the 24 hours ending 24       Physical Exam     Constitutional:       General: He is not in acute distress.  HENT:      Head: Atraumatic.   Eyes:      Extraocular Movements: Extraocular movements intact.   Cardiovascular:      Rate and Rhythm: Normal rate and regular rhythm.      Heart sounds: Normal heart sounds.      Comments: JVP not visible at 45°.  Pulmonary:      Breath sounds: Normal breath sounds.   Abdominal:      Palpations: Abdomen is soft.      Tenderness: There is no abdominal tenderness.   Musculoskeletal:         General: Normal range of  "motion.      Right lower leg: No edema.      Left lower leg: No edema.   Neurological:      General: No focal deficit present.      Mental Status: He is alert and oriented to person, place, and time.   Significant Labs:  CBC:  Recent Labs   Lab 12/04/24  0809   WBC 6.63   RBC 4.93   HGB 10.8*   HCT 36.7*      MCV 74*   MCH 21.9*   MCHC 29.4*     BNP:  No results for input(s): "BNP" in the last 168 hours.    Invalid input(s): "BNPTRIAGELBLO"  CMP:  Recent Labs   Lab 12/04/24  0809   GLU 81   CALCIUM 9.1   ALBUMIN 3.9   PROT 7.3      K 4.2   CO2 26      BUN 9   CREATININE 1.6*   ALKPHOS 120   ALT 8*   AST 16   BILITOT 0.2      Coagulation:   Recent Labs   Lab 12/04/24  0809   INR 1.0     LDH:  No results for input(s): "LDH" in the last 72 hours.  Microbiology:  Microbiology Results (last 7 days)       ** No results found for the last 168 hours. **            I have reviewed all pertinent labs within the past 24 hours.    Diagnostic Results:  I have reviewed all pertinent imaging results/findings within the past 24 hours.    "

## 2024-12-04 NOTE — ASSESSMENT & PLAN NOTE
-NICM on Home   -Last 2D Echo  12/4/24 : LVEF  30-35 , LVEDD  6.9 cm  -Euvolemic on examination today  -Current diuretic regimen:  Home dose of Bumex  .  -Decreasing home  to 2.5 and then will stop it.  Will get CVP and SVO2 in am   -Presented at selection 9/4/24 Decision to decline Heart transplant listing and LVAD at this time due to documented inability to follow treatment recommendations and to complete evaluation within recommended time frame.   -2g Na dietary restriction, 1500 mL fluid restriction, strict I/Os

## 2024-12-04 NOTE — PROGRESS NOTES
Notified by SEBASTIAN Yang of the admit dept @ 1:00 pm pt was there for admission and reservation not entered  I communicated w/ Dr. Maxwell, then Dr Maxwell and Dr. Rhoades to determine if someone was working on the reservation   I also reviewed [facility] results and noted Co and Ci improved    Awaiting confirmation from Dr. Maxwell and/or Dr. Rhoades reason for admission was to wean off Dobutamine and remove central line  Communicated w/ both by secure chat and phone messaging    Dr. Rhoades just confirmed above is the reason for the admission    1:15 pm: Called in HTS obsplacement to Eli in the admit dept and reason for :   Also communicated in secure chat to Dr. Maxwell/[place] [facility] placed and cms questions answered as well as pt is currently in the admit office awaiting bed placement  Sent this information to  As above via phone messaging as well.

## 2024-12-04 NOTE — H&P
[facility] Card[facility] Stepdown  Heart Transplant  H&P    Patient Name: Ayad Edmond Sr.  MRN: [id number]  Admission Date: 12/4/2024  Attending Physician: Samm Rhoades MD  Primary Care Provider: Zehra Davies MD  Principal Problem:Chronic combined systolic and diastolic heart failure    Subjective:     History of Present Illness:  43 YO M w/ NICM, HFrEF, LVEF 10-15%, somewhat improved to 25-30% on home IV dobutamine, HTN, CKD who is being admitted from [facility] for  wean.  He had RHC today on home dose of  RA: 7, PW: 30/9, PWP: 9, CO: 7.3, CI: 3.5.  On admit; will decrease  to 2.5 then stop it at 1800 if vitals stable.  Will continue home regimen of GDMT and will get CVP and SVO2 in am off  then remove line.       Initially diagnosed 1/2024 with HFrEF, LVEF 30-35%. During a hospitalization in February, found to have further drop in EF to 10% on LV gram as part of combined LHC/RHC. He was seen by HTS initially in 5/2024 and at that time had class 4 symptoms and was severely volume overloaded. He was sent to the ED and admitted to the hospital. He was started on dobutmaine and advanced therapies evaluation was initiated however had had multiple issues along the way including refusal to wear lifevest/consider ICD. Ultimately presented to selection committee on 9/4/24 and declined for OHT/LVAD due to inability to follow treatment recommendations and to complete evaluation within recommended time frame. Of note he has had some improvement in cardiac function with LVEF being 25-30% on most recent echo.  Repeat echo done today       Past Medical History:   Diagnosis Date    Acute heart failure     AUDELIA (acute kidney injury)     Cardiomyopathy     Elevated troponin     HTN (hypertension)     Hypokalemia     Hypomagnesemia     Renal insufficiency        Past Surgical History:   Procedure Laterality Date    broken foot Left     FLEXIBLE SIGMOIDOSCOPY N/A 8/30/2024    Procedure: anoscopy, botox injection;   Surgeon: Kristel Augustin MD;  Location: [facility];  Service: Colon and Rectal;  Laterality: N/A;  no anesthesia    RIGHT HEART CATHETERIZATION Right 2024    Procedure: INSERTION, CATHETER, RIGHT HEART;  Surgeon: Pradeep Mack MD;  Location: [facility] CATH LAB;  Service: Cardiology;  Laterality: Right;    RIGHT HEART CATHETERIZATION Right 2024    Procedure: INSERTION, CATHETER, RIGHT HEART;  Surgeon: Elier Gilmore MD;  Location: [facility] CATH LAB;  Service: Cardiology;  Laterality: Right;       Review of patient's allergies indicates:  No Known Allergies    Current Facility-Administered Medications   Medication    acetaminophen tablet 650 mg    aluminum-magnesium hydroxide-simethicone 200-200-20 mg/5 mL suspension 30 mL    [START ON 2024] atorvastatin tablet 40 mg    [START ON 2024] bumetanide tablet 2 mg    [START ON 2024] dapagliflozin propanediol (Farxiga) tablet 10 mg    DOBUtamine 1000 mg in D5W 250 mL infusion    hydrALAZINE tablet 50 mg    isosorbide dinitrate tablet 20 mg    [START ON 2024] pantoprazole EC tablet 20 mg    polyethylene glycol packet 17 g    sacubitriL-valsartan  mg per tablet 1 tablet    sodium chloride 0.9% flush 10 mL    [START ON 2024] spironolactone tablet 25 mg    sucralfate 100 mg/mL suspension 1 g     Family History       Problem Relation (Age of Onset)    Heart failure Mother    No Known Problems Father          Tobacco Use    Smoking status: Former     Current packs/day: 0.00     Average packs/day: 1 pack/day for 19.0 years (19.0 ttl pk-yrs)     Types: Cigarettes     Start date:      Quit date:      Years since quittin.9     Passive exposure: Past    Smokeless tobacco: Never   Substance and Sexual Activity    Alcohol use: Not Currently    Drug use: Never    Sexual activity: Not on file     Review of Systems  Constitutional: Negative for chills and fever.   HENT:  Negative for hearing loss.    Eyes:  Negative for visual disturbance.    Cardiovascular:  Negative for chest pain, dyspnea on exertion, irregular heartbeat, leg swelling, orthopnea, palpitations, paroxysmal nocturnal dyspnea and syncope.   Respiratory:  Negative for cough and shortness of breath.    Musculoskeletal:  Negative for muscle weakness.   Gastrointestinal:  Negative for diarrhea, nausea and vomiting.   Neurological:  Negative for focal weakness.   Psychiatric/Behavioral:  Negative for memory loss.      Objective:     Vital Signs (Most Recent):  Temp: 98.8 °F (37.1 °C) (12/04/24 1517)  Pulse: 88 (12/04/24 1517)  Resp: 15 (12/04/24 1517)  BP: 126/64 (12/04/24 1517)  SpO2: 99 % (12/04/24 1512) Vital Signs (24h Range):  Temp:  [97.2 °F (36.2 °C)-98.8 °F (37.1 °C)] 98.8 °F (37.1 °C)  Pulse:  [60-88] 88  Resp:  [15-18] 15  SpO2:  [98 %-99 %] 99 %  BP: (117-126)/(57-82) 126/64     Patient Vitals for the past 72 hrs (Last 3 readings):   Weight   12/04/24 1517 92.5 kg (203 lb 14.8 oz)     Body mass index is 30.11 kg/m².    No intake or output data in the 24 hours ending 12/04/24 1537       Physical Exam     Constitutional:       General: He is not in acute distress.  HENT:      Head: Atraumatic.   Eyes:      Extraocular Movements: Extraocular movements intact.   Cardiovascular:      Rate and Rhythm: Normal rate and regular rhythm.      Heart sounds: Normal heart sounds.      Comments: JVP not visible at 45°.  Pulmonary:      Breath sounds: Normal breath sounds.   Abdominal:      Palpations: Abdomen is soft.      Tenderness: There is no abdominal tenderness.   Musculoskeletal:         General: Normal range of motion.      Right lower leg: No edema.      Left lower leg: No edema.   Neurological:      General: No focal deficit present.      Mental Status: He is alert and oriented to person, place, and time.   Significant Labs:  CBC:  Recent Labs   Lab 12/04/24  0809   WBC 6.63   RBC 4.93   HGB 10.8*   HCT 36.7*      MCV 74*   MCH 21.9*   MCHC 29.4*     BNP:  No results for  "input(s): "BNP" in the last 168 hours.    Invalid input(s): "BNPTRIAGELBLO"  CMP:  Recent Labs   Lab 12/04/24  0809   GLU 81   CALCIUM 9.1   ALBUMIN 3.9   PROT 7.3      K 4.2   CO2 26      BUN 9   CREATININE 1.6*   ALKPHOS 120   ALT 8*   AST 16   BILITOT 0.2      Coagulation:   Recent Labs   Lab 12/04/24  0809   INR 1.0     LDH:  No results for input(s): "LDH" in the last 72 hours.  Microbiology:  Microbiology Results (last 7 days)       ** No results found for the last 168 hours. **            I have reviewed all pertinent labs within the past 24 hours.    Diagnostic Results:  I have reviewed all pertinent imaging results/findings within the past 24 hours.    Assessment/Plan:     * Chronic combined systolic and diastolic heart failure  -NICM on Home   -Last 2D Echo  12/4/24 : LVEF  30-35 , LVEDD  6.9 cm  -Euvolemic on examination today  -Current diuretic regimen:  Home dose of Bumex  .  -Decreasing home  to 2.5 and then will stop it.  Will get CVP and SVO2 in am   -Presented at selection 9/4/24 Decision to decline Heart transplant listing and LVAD at this time due to documented inability to follow treatment recommendations and to complete evaluation within recommended time frame.   -2g Na dietary restriction, 1500 mL fluid restriction, strict I/Os      Anemia of chronic disease  -H/H stable     Primary hypertension  -continue home regimen         JOHN Nair  Heart Transplant  [facility]  "

## 2024-12-04 NOTE — HPI
43 YO M w/ NICM, HFrEF, LVEF 10-15%, somewhat improved to 25-30% on home IV dobutamine, HTN, CKD who is being admitted from [facility] for  wean.  He had RHC today on home dose of  RA: 7, PW: 30/9, PWP: 9, CO: 7.3, CI: 3.5.  On admit; will decrease  to 2.5 then stop it at 1800 if vitals stable.  Will continue home regimen of GDMT and will get CVP and SVO2 in am off  then remove line.       Initially diagnosed 1/2024 with HFrEF, LVEF 30-35%. During a hospitalization in February, found to have further drop in EF to 10% on LV gram as part of combined [facility]/RHC. He was seen by HTS initially in 5/2024 and at that time had class 4 symptoms and was severely volume overloaded. He was sent to the ED and admitted to the hospital. He was started on dobutmaine and advanced therapies evaluation was initiated however had had multiple issues along the way including refusal to wear lifevest/consider ICD. Ultimately presented to selection committee on 9/4/24 and declined for OHT/LVAD due to inability to follow treatment recommendations and to complete evaluation within recommended time frame. Of note he has had some improvement in cardiac function with LVEF being 25-30% on most recent echo.

## 2024-12-05 VITALS
BODY MASS INDEX: 28.96 KG/M2 | HEART RATE: 50 BPM | SYSTOLIC BLOOD PRESSURE: 116 MMHG | RESPIRATION RATE: 18 BRPM | OXYGEN SATURATION: 100 % | HEIGHT: 69 IN | TEMPERATURE: 98 F | WEIGHT: 195.56 LBS | DIASTOLIC BLOOD PRESSURE: 61 MMHG

## 2024-12-05 LAB
ALLENS TEST: NORMAL
ANION GAP SERPL CALC-SCNC: 11 MMOL/L (ref 8–16)
BUN SERPL-MCNC: 10 MG/DL (ref 6–20)
CALCIUM SERPL-MCNC: 8.7 MG/DL (ref 8.7–10.5)
CHLORIDE SERPL-SCNC: 108 MMOL/L (ref 95–110)
CO2 SERPL-SCNC: 22 MMOL/L (ref 23–29)
CREAT SERPL-MCNC: 1.4 MG/DL (ref 0.5–1.4)
EST. GFR  (NO RACE VARIABLE): >60 ML/MIN/1.73 M^2
GLUCOSE SERPL-MCNC: 97 MG/DL (ref 70–110)
PO2 BLDA: 40 MMHG (ref 40–60)
POC SATURATED O2: 73 % (ref 95–100)
POTASSIUM SERPL-SCNC: 3.5 MMOL/L (ref 3.5–5.1)
SAMPLE: NORMAL
SITE: NORMAL
SODIUM SERPL-SCNC: 141 MMOL/L (ref 136–145)

## 2024-12-05 PROCEDURE — 99900035 HC TECH TIME PER 15 MIN (STAT)

## 2024-12-05 PROCEDURE — 36415 COLL VENOUS BLD VENIPUNCTURE: CPT | Performed by: PHYSICIAN ASSISTANT

## 2024-12-05 PROCEDURE — 80048 BASIC METABOLIC PNL TOTAL CA: CPT | Performed by: PHYSICIAN ASSISTANT

## 2024-12-05 PROCEDURE — 94799 UNLISTED PULMONARY SVC/PX: CPT

## 2024-12-05 PROCEDURE — G0378 HOSPITAL OBSERVATION PER HR: HCPCS

## 2024-12-05 PROCEDURE — 25000003 PHARM REV CODE 250: Performed by: PHYSICIAN ASSISTANT

## 2024-12-05 PROCEDURE — 94761 N-INVAS EAR/PLS OXIMETRY MLT: CPT

## 2024-12-05 RX ORDER — SACUBITRIL AND VALSARTAN 97; 103 MG/1; MG/1
1 TABLET, FILM COATED ORAL 2 TIMES DAILY
Qty: 180 TABLET | Refills: 3 | Status: SHIPPED | OUTPATIENT
Start: 2024-12-05

## 2024-12-05 RX ORDER — BUMETANIDE 2 MG/1
2 TABLET ORAL DAILY
Qty: 90 TABLET | Refills: 3 | Status: SHIPPED | OUTPATIENT
Start: 2024-12-05 | End: 2025-12-05

## 2024-12-05 RX ORDER — DAPAGLIFLOZIN 10 MG/1
10 TABLET, FILM COATED ORAL DAILY
Qty: 90 TABLET | Refills: 3 | Status: SHIPPED | OUTPATIENT
Start: 2024-12-05

## 2024-12-05 RX ORDER — HYDRALAZINE HYDROCHLORIDE 50 MG/1
50 TABLET, FILM COATED ORAL EVERY 8 HOURS
Qty: 270 TABLET | Refills: 3 | Status: SHIPPED | OUTPATIENT
Start: 2024-12-05 | End: 2025-12-05

## 2024-12-05 RX ORDER — ISOSORBIDE DINITRATE 20 MG/1
20 TABLET ORAL 3 TIMES DAILY
Qty: 270 TABLET | Refills: 3 | Status: SHIPPED | OUTPATIENT
Start: 2024-12-05 | End: 2025-12-05

## 2024-12-05 RX ORDER — SPIRONOLACTONE 25 MG/1
25 TABLET ORAL DAILY
Qty: 90 TABLET | Refills: 3 | Status: SHIPPED | OUTPATIENT
Start: 2024-12-05

## 2024-12-05 RX ORDER — SACUBITRIL AND VALSARTAN 97; 103 MG/1; MG/1
1 TABLET, FILM COATED ORAL 2 TIMES DAILY
Qty: 60 TABLET | Refills: 5 | OUTPATIENT
Start: 2024-12-05

## 2024-12-05 RX ADMIN — SPIRONOLACTONE 25 MG: 25 TABLET ORAL at 09:12

## 2024-12-05 RX ADMIN — SACUBITRIL AND VALSARTAN 1 TABLET: 97; 103 TABLET, FILM COATED ORAL at 09:12

## 2024-12-05 RX ADMIN — POLYETHYLENE GLYCOL 3350 17 G: 17 POWDER, FOR SOLUTION ORAL at 09:12

## 2024-12-05 RX ADMIN — DAPAGLIFLOZIN 10 MG: 10 TABLET, FILM COATED ORAL at 09:12

## 2024-12-05 RX ADMIN — PANTOPRAZOLE SODIUM 20 MG: 20 TABLET, DELAYED RELEASE ORAL at 09:12

## 2024-12-05 RX ADMIN — BUMETANIDE 2 MG: 1 TABLET ORAL at 09:12

## 2024-12-05 RX ADMIN — ISOSORBIDE DINITRATE 20 MG: 20 TABLET ORAL at 09:12

## 2024-12-05 RX ADMIN — HYDRALAZINE HYDROCHLORIDE 50 MG: 50 TABLET ORAL at 05:12

## 2024-12-05 NOTE — NURSING
Dr. Eugene made aware that the Picc line flushes but doesn't draw blood. Order to do a cathflow. Per Dr. Eugene to do the cvp and svo2 first and will decide if his dobutamine will be discontinued.

## 2024-12-05 NOTE — NURSING
"Patient refused aluminum-magnesium hydroxide-simethicone 200-200-20 mg/5 mL suspension 30 mL . Patient states, "It makes me sh*t my brains out. I don't need that stuff."  Patient also refused sucralfate 100 mg/mL suspension 1 g .  "

## 2024-12-05 NOTE — PROGRESS NOTES
Patient discharge in good condition with discharge instructions provided to him. Picc line discontinued without any difficulty.

## 2024-12-05 NOTE — PLAN OF CARE
Problem: Infection  Goal: Absence of Infection Signs and Symptoms  Outcome: Progressing     Problem: Wound  Goal: Optimal Coping  Outcome: Progressing  Goal: Optimal Functional Ability  Outcome: Progressing  Goal: Absence of Infection Signs and Symptoms  Outcome: Progressing  Goal: Improved Oral Intake  Outcome: Progressing  Goal: Optimal Pain Control and Function  Outcome: Progressing  Goal: Skin Health and Integrity  Outcome: Progressing  Goal: Optimal Wound Healing  Outcome: Progressing     Problem: Adult Inpatient Plan of Care  Goal: Plan of Care Review  Outcome: Progressing  Goal: Patient-Specific Goal (Individualized)  Outcome: Progressing  Goal: Absence of Hospital-Acquired Illness or Injury  Outcome: Progressing  Goal: Optimal Comfort and Wellbeing  Outcome: Progressing  Goal: Readiness for Transition of Care  Outcome: Progressing

## 2024-12-05 NOTE — NURSING
Two hours post cath savita administration, PICC still difficult to draw pack. Had second RN, pietro Shaffer, with no success. Notified selina HER. New order for second dose of cath savita given and administered.

## 2024-12-05 NOTE — PROGRESS NOTES
Update    Pt presents AAOx4 pleasant affect. Pt reports that he has a vehicle on site and will be driving himself home to [place].  Pt denies additional needs or concerns at this time.  SW remains available.

## 2024-12-05 NOTE — PLAN OF CARE
Plan of care discussed with patient. Patient ambulating independently, fall precautions in place. Patient has no complaints of pain. Discussed medications and care. Patient has no questions at this time. Call light and personal belongings within reach.

## 2024-12-06 ENCOUNTER — TELEPHONE (OUTPATIENT)
Dept: TRANSPLANT | Facility: CLINIC | Age: 44
End: 2024-12-06
Payer: MEDICAID

## 2024-12-06 NOTE — TELEPHONE ENCOUNTER
12/6  0940 am  Was out of the office yesterday   Reviewed hospital chart at this time  Pt was successfully weaned off of Dobutamine and picc removed  Pt d/chelsea from hospital yesterday 12/5  Will remove from HF inotrope list and weekly lab reminders.

## 2024-12-06 NOTE — DISCHARGE SUMMARY
[facility] - Cardiology Stepdown  Heart Transplant  Discharge Summary  Patient Name: Ayad Edmond Sr.  MRN: [id number]  Admission Date: 12/4/2024  Hospital Length of Stay: 0 days  Discharge Date and Time: 12/05/2024   Attending Physician: No att. providers found   Discharging Provider: Joseph Montgomery NP  Primary Care Provider: Zehra Davies MD     HPI/Hospital Course: 45 YO M w/ NICM, HFrEF, LVEF 10-15%, somewhat improved to 25-30% on home IV dobutamine, HTN, CKD who is being admitted from [facility] for  wean.  He had RHC today on home dose of  RA: 7, PW: 30/9, PWP: 9, CO: 7.3, CI: 3.5.  On admit; will decrease  to 2.5 then stop it at 1800 if vitals stable.  Will continue home regimen of GDMT and will get CVP and SVO2 in am off  then remove line.       Initially diagnosed 1/2024 with HFrEF, LVEF 30-35%. During a hospitalization in February, found to have further drop in EF to 10% on LV gram as part of combined [facility]/RHC. He was seen by HTS initially in 5/2024 and at that time had class 4 symptoms and was severely volume overloaded. He was sent to the ED and admitted to the hospital. He was started on dobutmaine and advanced therapies evaluation was initiated however had had multiple issues along the way including refusal to wear lifevest/consider ICD. Ultimately presented to selection committee on 9/4/24 and declined for OHT/LVAD due to inability to follow treatment recommendations and to complete evaluation within recommended time frame. Of note he has had some improvement in cardiac function with LVEF being 25-30% on most recent echo. Pt admitted and  cut in half, then stopped which he tolerated well. PICC line was removed and he was discharged home in good condition to f/u in clinic in 1-2 weeks with labs prior. Educated to call with any issues/symptoms once home.       Pending Diagnostic Studies:       Procedure Component Value Units Date/Time    EKG 12-LEAD [[id number]]     Order Status: Sent  Lab Status: No result           Final Active Diagnoses:    Diagnosis Date Noted POA    PRINCIPAL PROBLEM:  Chronic combined systolic and diastolic heart failure [I50.42] 05/10/2024 Yes    Anemia of chronic disease [D63.8] 05/30/2024 Yes    Primary hypertension [I10] 05/10/2024 Yes      Problems Resolved During this Admission:      Discharged Condition: good    Disposition: Home or Self Care    Patient Instructions:   No discharge procedures on file.  Medications:  Reconciled Home Medications:      Medication List        CONTINUE taking these medications      atorvastatin 40 MG tablet  Commonly known as: LIPITOR  Take 1 tablet (40 mg total) by mouth once daily.     bumetanide 2 MG tablet  Commonly known as: BUMEX  Take 1 tablet (2 mg total) by mouth once daily.     dapagliflozin propanediol 10 mg tablet  Commonly known as: Farxiga  Take 1 tablet (10 mg total) by mouth once daily.     ENTRESTO  mg per tablet  Generic drug: sacubitriL-valsartan  Take 1 tablet by mouth 2 (two) times daily.     hydrALAZINE 50 MG tablet  Commonly known as: APRESOLINE  Take 1 tablet (50 mg total) by mouth every 8 (eight) hours.     isosorbide dinitrate 20 MG tablet  Commonly known as: ISORDIL  Take 1 tablet (20 mg total) by mouth 3 (three) times daily.     pantoprazole 20 MG tablet  Commonly known as: PROTONIX  Take 1 tablet (20 mg total) by mouth once daily.     polyethylene glycol 17 gram Pwpk  Commonly known as: GLYCOLAX  Take 17 g by mouth 2 (two) times daily as needed.     spironolactone 25 MG tablet  Commonly known as: ALDACTONE  Take 1 tablet (25 mg total) by mouth once daily.            STOP taking these medications      DOBUTamine 1,000 mg/250 mL (4,000 mcg/mL) infusion  Commonly known as: DOBUTREX            Joseph Montgomery NP  Heart Transplant  [facility] - Cardiology Stepdown

## 2025-03-10 ENCOUNTER — OFFICE VISIT (OUTPATIENT)
Dept: PALLIATIVE MEDICINE | Facility: CLINIC | Age: 45
End: 2025-03-10
Payer: MEDICAID

## 2025-03-10 DIAGNOSIS — I50.9 CONGESTIVE HEART FAILURE, UNSPECIFIED HF CHRONICITY, UNSPECIFIED HEART FAILURE TYPE: Primary | ICD-10-CM

## 2025-03-10 DIAGNOSIS — Z51.5 PALLIATIVE CARE ENCOUNTER: ICD-10-CM

## 2025-03-10 RX ORDER — GUAIFENESIN 100 MG/5ML
200 LIQUID ORAL 3 TIMES DAILY PRN
Qty: 118 ML | Refills: 0 | Status: SHIPPED | OUTPATIENT
Start: 2025-03-10 | End: 2025-03-20

## 2025-03-10 NOTE — PROGRESS NOTES
"The patient location is: [place]  The chief complaint leading to consultation is: HF    Visit type: audiovisual    Face to Face time with patient: 20min  35 minutes of total time spent on the encounter, which includes face to face time and non-face to face time preparing to see the patient (eg, review of tests), Obtaining and/or reviewing separately obtained history, Documenting clinical information in the electronic or other health record, Independently interpreting results (not separately reported) and communicating results to the patient/family/caregiver, or Care coordination (not separately reported).       Each patient provided with medical services by telemedicine is:  (1) informed of the relationship between the physician and patient and the respective role of any other health care provider with respect to management of the patient; and (2) notified that he or she may decline to receive medical services by telemedicine and may withdraw from such care at any time.              Palliative Medicine Clinic Note        Consult Requested By: No ref. provider found      Reason for Consult: ACP and sx management in the setting of heart failure     Chief Complaint:   Chief Complaint   Patient presents with    Follow-up           ASSESSMENT/PLAN:      Plan/Recommendations:    Ayad Patrick "Ayad" was seen today for follow-up.    Diagnoses and all orders for this visit:    Congestive heart failure, unspecified HF chronicity, unspecified heart failure type  - Patient's heart function improved from 10% to 30-35% due to medication adherence and lifestyle changes.  - Discussed importance of maintaining current medication regimen despite feeling better to sustain cardiac improvement.  - Discussed fluid balance management in relation to exercise and diuretic use.  - Patient to continue daily weight monitoring and self-assessment for fluid retention.    ED  - Considering options to address erectile dysfunction, pending " "consultation with cardiology to ensure compatibility with current heart failure management.      Lingering cough after URI  -     guaiFENesin 100 mg/5 ml (ROBITUSSIN) 100 mg/5 mL syrup; Take 10 mLs (200 mg total) by mouth 3 (three) times daily as needed for Cough.      Palliative care encounter  Advance Care Planning   Advance Directives:   Living Will: No    LaPOST: No    Do Not Resuscitate Status: No    Medical Power of : Yes    Agent's Name:  Sia Gutierrez   Agent's Contact Number:  [phone]    Decision Making:  Patient answered questions  Goals of Care: Advance Care Planning    Date: 03/10/2025  I engaged the patient in a voluntary conversation about advance care planning and we specifically addressed what the goals of care would be moving forward.  We explored the patient's values and preferences for future care.  The patient endorses that what is most important right now is to focus on improvement in condition. Patient's main goal is to maintain his current level of health and function, stating "I'm pretty much at where I'm at." He expressed satisfaction with being able to exercise at the gym and avoid fluid retention issues that previously caused swelling and nausea. Patient demonstrates good understanding of his condition and actively participates in self-monitoring and medication compliance. He lives independently and manages his own care. Accordingly, we have decided that the best plan to meet the patient's goals includes continuing with treatment      Date: 07/03/2024  The patient's main goal is to get a heart transplant, but he is also open to getting an LVAD if necessary. He expressed, "I prefer to do a transplant than an LVAD, but either one, as necessary, it is what it is." The patient stated his goal is to "get everything taken care of" in terms of the workup and procedures needed for transplant evaluation. He said, "I would like that second chance at life. So whatever it takes, I will " "do it... I think I owe that to my family, you know, because they have been there with me and I should be able to put my best foot forward and be proactive and do whatever it takes to get it done." The patient's wife, Miss Stovall, is designated to make medical decisions for the patient if he is unable to do so. The patient expressed trust in his wife's judgment. The patient lives with his wife. He has four children total, with three of them being adults. Healthcare Power of  form was signed recently by the patient. We discussed that it would be a good idea to think about a back up person in case his wife is unavailable and if he does not want his young adult children to make medical decisions for him. We also discussed code status and he confirmed Full Code. The patient endorses that what is most important right now is to focus on extending life as long as possible, even it it means sacrificing quality and curative/life-prolongation (regardless of treatment burdens)  Accordingly, we have decided that the best plan to meet the patient's goals includes continuing with treatment             Follow up: in-person coordinate w/ HTS appt  - Follow up in 6 months (September).  - Contact the office if any changes occur before the next appointment.     SUBJECTIVE:      History of Present Illness / Interval History:  Ayad Patrick Mayito Hoffmann. is 44 y.o. male with NICM, HFrEF, LVEF 10%, HTN, CKD.  Presents to Palliative Care Clinic for physical symptoms, advance care planning,, and additional support.. Please see Cardiology note for more details on HF and advanced therapy workup      3/10/25  History obtained from: patient    Patient reports having a cold approximately a week ago, with cough and shortness of breath. He experienced a slight fever the previous weekend, prompting an ER visit due to uncertainty about OTC medications. A lingering cough remains. He denies current fluid retention in his legs or abdomen, which " had previously affected his sleep.     His last echocardiogram in December showed heart function improvement from 10% to 30-35%. He is currently able to exercise at the gym and has regained weight, going from 162 lbs after hospitalization to 196 lbs. He checks his weight daily and monitors for swelling in his feet or legs.     Patient notes experiencing slight dizziness for about 15 minutes after taking his morning medications, particularly hydralazine and isosorbide. He also mentions changes in his erections since starting his current medications. His blood pressure was recently measured at 128/75, with the lowest recorded being around 115/75. He denies any current pain, fluid retention in legs or abdomen, or significant blood pressure drops when feeling dizzy.    Please refer to prior visits encounters for more details: 7/3/2024; 9/9/2024    ROS:  Review of Systems    Review of Symptoms      Symptom Assessment (ESAS 0-10 Scale)  Pain:  0  Dyspnea:  0  Anxiety:  0  Nausea:  0  Depression:  0  Anorexia:  0  Fatigue:  0  Insomnia:  0  Restlessness:  0  Agitation:  0     CAM / Delirium:  Negative  Constipation:  Negative  Diarrhea:  Negative      Performance Status:  80    Living Arrangements:  Lives with spouse    Psychosocial/Cultural:   See Palliative Psychosocial Note: No  , has 4 children, 3 adult children. 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  **Primary  to Follow**  Palliative Care  Consult: Yes        Medications:    Current Outpatient Medications:     atorvastatin (LIPITOR) 40 MG tablet, Take 1 tablet (40 mg total) by mouth once daily., Disp: 90 tablet, Rfl: 3    bumetanide (BUMEX) 2 MG tablet, Take 1 tablet (2 mg total) by mouth once daily., Disp: 90 tablet, Rfl: 3    dapagliflozin propanediol (FARXIGA) 10 mg tablet, Take 1 tablet (10 mg total) by mouth once daily., Disp: 90 tablet, Rfl: 3    hydrALAZINE (APRESOLINE) 50 MG tablet,  Take 1 tablet (50 mg total) by mouth every 8 (eight) hours., Disp: 270 tablet, Rfl: 3    isosorbide dinitrate (ISORDIL) 20 MG tablet, Take 1 tablet (20 mg total) by mouth 3 (three) times daily., Disp: 270 tablet, Rfl: 3    pantoprazole (PROTONIX) 20 MG tablet, Take 1 tablet (20 mg total) by mouth once daily., Disp: 90 tablet, Rfl: 3    polyethylene glycol (GLYCOLAX) 17 gram PwPk, Take 17 g by mouth 2 (two) times daily as needed., Disp: , Rfl:     sacubitriL-valsartan (ENTRESTO)  mg per tablet, Take 1 tablet by mouth 2 (two) times daily., Disp: 180 tablet, Rfl: 3    spironolactone (ALDACTONE) 25 MG tablet, Take 1 tablet (25 mg total) by mouth once daily., Disp: 90 tablet, Rfl: 3    guaiFENesin 100 mg/5 ml (ROBITUSSIN) 100 mg/5 mL syrup, Take 10 mLs (200 mg total) by mouth 3 (three) times daily as needed for Cough., Disp: 118 mL, Rfl: 0      External  database queried on 03/14/2025  by Vivi KRUGER :  03/01/2025 03/01/2025 2 Promethazine-Dm 6.25-15 Mg/5ml 120.00 6 Ej [facility] [id number] [facility] ([id number]) 0  Private Pay [place]   12/20/2024 12/20/2024 2 Acetaminophen-Cod #3 Tablet 12.00 2 Ju [facility] [id number] [facility] ([id number]) 0 27.00 MME Medicaid [place]   07/30/2024 07/28/2024 3 Tramadol Hcl 50 Mg Tablet 15.00 3 Ka Shefali [id number] [facility] ([id number]) 0 50.00 MME Medicaid [place]   07/16/2024 07/16/2024 2 Tramadol Hcl 50 Mg Tablet 14.00 4 Da Aicha [id number] [facility] ([id number]) 0 35.00 MME [place]   05/19/2024 04/16/2024 2 Eszopiclone 1 Mg Tablet 30.00 30 Co Fos [id number] [facility] ([id number]) 1 0.33 LME Private Pay [place]       Review of patient's allergies indicates:  No Known Allergies        OBJECTIVE:         Physical Exam:   Vitals:      Physical Exam  Constitutional:       General: He is not in acute distress.  HENT:      Head: Normocephalic and atraumatic.   Eyes:      General: No scleral icterus.  Pulmonary:      Effort: Pulmonary effort is normal. No respiratory distress.   Musculoskeletal:      Cervical back: Neck supple.   Neurological:      Mental Status: He is  alert and oriented to person, place, and time.   Psychiatric:         Mood and Affect: Mood and affect normal.       I spent a total of 35 minutes on the day of the visit. This includes face to face time in discussion of goals of care, symptom assessment, coordination of care and emotional support.  This also includes non-face to face time preparing to see the patient (eg, review of tests/imaging), obtaining and/or reviewing separately obtained history, documenting clinical information in the electronic or other health record, independently interpreting results and communicating results to the patient/family/caregiver, or care coordinator.       Vivi England MD

## 2025-03-17 ENCOUNTER — TELEPHONE (OUTPATIENT)
Dept: TRANSPLANT | Facility: CLINIC | Age: 45
End: 2025-03-17
Payer: MEDICAID

## 2025-03-17 DIAGNOSIS — I50.42 CHRONIC COMBINED SYSTOLIC AND DIASTOLIC HEART FAILURE: Primary | ICD-10-CM

## 2025-03-18 ENCOUNTER — TELEPHONE (OUTPATIENT)
Dept: TRANSPLANT | Facility: CLINIC | Age: 45
End: 2025-03-18
Payer: MEDICAID

## 2025-03-18 NOTE — TELEPHONE ENCOUNTER
3/18/2025  1030 am:      Noted the following message from Dr. Mack in response to pts request toPalliative Medicine for ED medication that was shared by that team to the HTS team:    Pradeep Mack MD Labella-Walker, Iesha ARANDA, RN  Well the only thing I would add is he can get meds for ED from his PCP. So long as he is not on any nitrates it would be OK to take meds like viagra or cialis but it would be his PCP that prescribes it.    And this message as well:    Pradeep Mack MD Labella-Walker, Iesha ARANDA, RN  Following up on prior message, if his PCP wants to prescribed viagra or cialis, he would need to stop his isosorbide which I am ok with.     Called pt at this time: 1030 am'  And reviewed the above messages in detail w/ pt  Pt voiced understanding and agreement  Made it clear to pt he is to continue all other medications as prescribed and if he does stop the isosorbide, he would not start and stop it: he would stop it as per Dr. Mack Pt voiced understanding and agreement    Also reviewed pts upcoming appt date and time   : April 25th 8 am labs and 9 am appt  Also reiterated to pt the importance of attending these appts in that it is in his best interest for his heart health and in order for his Cardiologist to continue medication prescription  Pt stated he knew all of this and stays up on his health

## 2025-04-15 DIAGNOSIS — R14.0 BLOATING: ICD-10-CM

## 2025-04-16 RX ORDER — PANTOPRAZOLE SODIUM 20 MG/1
20 TABLET, DELAYED RELEASE ORAL
Qty: 30 TABLET | Refills: 0 | Status: SHIPPED | OUTPATIENT
Start: 2025-04-16

## 2025-04-21 ENCOUNTER — TELEPHONE (OUTPATIENT)
Dept: TRANSPLANT | Facility: CLINIC | Age: 45
End: 2025-04-21
Payer: MEDICAID

## 2025-04-21 RX ORDER — BUMETANIDE 2 MG/1
2 TABLET ORAL
Qty: 90 TABLET | Refills: 0 | OUTPATIENT
Start: 2025-04-21

## 2025-04-21 NOTE — TELEPHONE ENCOUNTER
Called the pharmacy in [place] they transferred the rx but not sure if they can fill it since he got 90 days in march but he is going to try and override it if there is a issue.

## 2025-04-21 NOTE — TELEPHONE ENCOUNTER
----- Message from Eli Christie sent at 4/21/2025  8:29 AM CDT -----  Hi,Please see message below.  Patient would like to reschedule his appt.Thanks,Eli  ----- Message -----  From: Radha Conner  Sent: 4/21/2025   8:19 AM CDT  To: Kyle GUSMAN Staff    Type : Patient CallWho Called : Patient Does the patient know what this is regarding?: Patient is needing to reschedule his appt scheduled on 4/24; Pt says his flight has been canceled and he's unable to make appt.Would the patient rather a call back or a response via My [facility]? Call Best Call Back Number: [phone] Information:

## 2025-07-01 ENCOUNTER — LAB VISIT (OUTPATIENT)
Dept: LAB | Facility: HOSPITAL | Age: 45
End: 2025-07-01
Attending: INTERNAL MEDICINE
Payer: MEDICAID

## 2025-07-01 ENCOUNTER — OFFICE VISIT (OUTPATIENT)
Dept: TRANSPLANT | Facility: CLINIC | Age: 45
End: 2025-07-01
Attending: INTERNAL MEDICINE
Payer: MEDICAID

## 2025-07-01 VITALS
HEIGHT: 69 IN | HEART RATE: 70 BPM | BODY MASS INDEX: 29.52 KG/M2 | WEIGHT: 199.31 LBS | SYSTOLIC BLOOD PRESSURE: 106 MMHG | OXYGEN SATURATION: 99 % | DIASTOLIC BLOOD PRESSURE: 71 MMHG

## 2025-07-01 DIAGNOSIS — I50.42 CHRONIC COMBINED SYSTOLIC AND DIASTOLIC HEART FAILURE: Primary | ICD-10-CM

## 2025-07-01 DIAGNOSIS — R14.0 BLOATING: ICD-10-CM

## 2025-07-01 DIAGNOSIS — I50.42 CHRONIC COMBINED SYSTOLIC AND DIASTOLIC HEART FAILURE: ICD-10-CM

## 2025-07-01 LAB
ALBUMIN SERPL BCP-MCNC: 4.3 G/DL (ref 3.5–5.2)
ALP SERPL-CCNC: 139 UNIT/L (ref 40–150)
ALT SERPL W/O P-5'-P-CCNC: 28 UNIT/L (ref 10–44)
ANION GAP (OHS): 9 MMOL/L (ref 8–16)
AST SERPL-CCNC: 34 UNIT/L (ref 11–45)
BILIRUB SERPL-MCNC: 0.2 MG/DL (ref 0.1–1)
BNP SERPL-MCNC: <10 PG/ML (ref 0–99)
BUN SERPL-MCNC: 14 MG/DL (ref 6–20)
CALCIUM SERPL-MCNC: 9 MG/DL (ref 8.7–10.5)
CHLORIDE SERPL-SCNC: 102 MMOL/L (ref 95–110)
CO2 SERPL-SCNC: 27 MMOL/L (ref 23–29)
CREAT SERPL-MCNC: 1.7 MG/DL (ref 0.5–1.4)
GFR SERPLBLD CREATININE-BSD FMLA CKD-EPI: 51 ML/MIN/1.73/M2
GLUCOSE SERPL-MCNC: 72 MG/DL (ref 70–110)
POTASSIUM SERPL-SCNC: 4.4 MMOL/L (ref 3.5–5.1)
PROT SERPL-MCNC: 7.8 GM/DL (ref 6–8.4)
SODIUM SERPL-SCNC: 138 MMOL/L (ref 136–145)

## 2025-07-01 PROCEDURE — 36415 COLL VENOUS BLD VENIPUNCTURE: CPT

## 2025-07-01 PROCEDURE — 1159F MED LIST DOCD IN RCRD: CPT | Mod: CPTII,,, | Performed by: INTERNAL MEDICINE

## 2025-07-01 PROCEDURE — 3078F DIAST BP <80 MM HG: CPT | Mod: CPTII,,, | Performed by: INTERNAL MEDICINE

## 2025-07-01 PROCEDURE — 3074F SYST BP LT 130 MM HG: CPT | Mod: CPTII,,, | Performed by: INTERNAL MEDICINE

## 2025-07-01 PROCEDURE — 80053 COMPREHEN METABOLIC PANEL: CPT

## 2025-07-01 PROCEDURE — 1160F RVW MEDS BY RX/DR IN RCRD: CPT | Mod: CPTII,,, | Performed by: INTERNAL MEDICINE

## 2025-07-01 PROCEDURE — 99214 OFFICE O/P EST MOD 30 MIN: CPT | Mod: PBBFAC | Performed by: INTERNAL MEDICINE

## 2025-07-01 PROCEDURE — 99999 PR PBB SHADOW E&M-EST. PATIENT-LVL IV: CPT | Mod: PBBFAC,,, | Performed by: INTERNAL MEDICINE

## 2025-07-01 PROCEDURE — 99214 OFFICE O/P EST MOD 30 MIN: CPT | Mod: S$PBB,,, | Performed by: INTERNAL MEDICINE

## 2025-07-01 PROCEDURE — 3008F BODY MASS INDEX DOCD: CPT | Mod: CPTII,,, | Performed by: INTERNAL MEDICINE

## 2025-07-01 PROCEDURE — 4010F ACE/ARB THERAPY RXD/TAKEN: CPT | Mod: CPTII,,, | Performed by: INTERNAL MEDICINE

## 2025-07-01 PROCEDURE — 83880 ASSAY OF NATRIURETIC PEPTIDE: CPT

## 2025-07-01 RX ORDER — ONDANSETRON 8 MG/1
8 TABLET, ORALLY DISINTEGRATING ORAL 2 TIMES DAILY
COMMUNITY

## 2025-07-01 RX ORDER — ONDANSETRON 8 MG/1
8 TABLET, ORALLY DISINTEGRATING ORAL 2 TIMES DAILY
Qty: 60 TABLET | Refills: 2 | Status: CANCELLED | OUTPATIENT
Start: 2025-07-01

## 2025-07-01 RX ORDER — PANTOPRAZOLE SODIUM 20 MG/1
20 TABLET, DELAYED RELEASE ORAL DAILY
Qty: 30 TABLET | Refills: 5 | Status: SHIPPED | OUTPATIENT
Start: 2025-07-01

## 2025-07-01 RX ORDER — BUMETANIDE 2 MG/1
2 TABLET ORAL DAILY
Start: 2025-07-01 | End: 2026-07-01

## 2025-07-01 RX ORDER — BISOPROLOL FUMARATE 5 MG/1
TABLET, FILM COATED ORAL
Qty: 30 TABLET | Refills: 5 | Status: SHIPPED | OUTPATIENT
Start: 2025-07-01

## 2025-07-01 NOTE — LETTER
July 31, 2025        Aba Garcia  [facility] [address]  [facility] [address]  Phone: [phone]  Fax: [phone]             [facility]  [address]  [facility] [address]  Phone: [phone]   Patient: Ayad Edmond Sr.   MR Number: [id number]   YOB: 1980   Date of Visit: 7/1/2025       Dear Dr. Aba Garcia    Thank you for referring Ayad Edmond to me for evaluation. Attached you will find relevant portions of my assessment and plan of care.    If you have questions, please do not hesitate to call me. I look forward to following Ayad Edmond along with you.    Sincerely,    Avila Wright Jr, MD    Enclosure    If you would like to receive this communication electronically, please contact [email or web address] or [phone] to request [employer] access.    [employer] is a tool which provides read-only access to select patient information with whom you have a relationship. Its easy to use and provides real time access to review your patients record including encounter summaries, notes, results, and demographic information.    If you feel you have received this communication in error or would no longer like to receive these types of communications, please e-mail [email or web address]

## 2025-07-31 NOTE — PROGRESS NOTES
Subjective:     Patient ID:  Ayad Edmond Sr. is a 45 y.o. male who presents for follow-up of Congestive Heart Failure    HPI: 45-year-old black male patient of Dr. Mack has known nonischemic cardiomyopathy and chronic heart failure. He was admitted in December, 2024 and weaned off dobutamine. He was sent home on:           Medication List          CONTINUE taking these medications       atorvastatin 40 MG tablet  Commonly known as: LIPITOR  Take 1 tablet (40 mg total) by mouth once daily.      bumetanide 2 MG tablet  Commonly known as: BUMEX  Take 1 tablet (2 mg total) by mouth once daily.      dapagliflozin propanediol 10 mg tablet  Commonly known as: Farxiga  Take 1 tablet (10 mg total) by mouth once daily.      ENTRESTO  mg per tablet  Generic drug: sacubitriL-valsartan  Take 1 tablet by mouth 2 (two) times daily.      hydrALAZINE 50 MG tablet  Commonly known as: APRESOLINE  Take 1 tablet (50 mg total) by mouth every 8 (eight) hours. BUT HE ONLY TAKES TWICE DAILY      isosorbide dinitrate 20 MG tablet  Commonly known as: ISORDIL  Take 1 tablet (20 mg total) by mouth 3 (three) times daily.   BUT HE ONLY TAKES TWICE DAILY      pantoprazole 20 MG tablet  Commonly known as: PROTONIX  Take 1 tablet (20 mg total) by mouth once daily.      polyethylene glycol 17 gram Pwpk  Commonly known as: GLYCOLAX  Take 17 g by mouth 2 (two) times daily as needed.      spironolactone 25 MG tablet  Commonly known as: ALDACTONE  Take 1 tablet (25 mg total) by mouth once daily.           He did not come for follow up so never was started on a beta-blocker. He is only taking isosorbide and hydralazine twice daily instead of 3 times daily as directed. This is his 1st visit with me.    He denies any cardiovascular symptoms and does everything he wants to do. Sometimes he has a bit of abdominal bloating.     Objective:   Physical Exam  Constitutional:       General: He is not in acute distress.     Appearance: He is  "well-developed. He is not ill-appearing, toxic-appearing or diaphoretic.      Comments: /71 (BP Location: Left arm, Patient Position: Sitting)   Pulse 70   Ht 5' 9" (1.753 m)   Wt 90.4 kg (199 lb 4.7 oz)   SpO2 99%   BMI 29.43 kg/m²     Friendly black male in no acute distress   HENT:      Head: Normocephalic and atraumatic.   Eyes:      General: No scleral icterus.        Right eye: No discharge.         Left eye: No discharge.      Conjunctiva/sclera: Conjunctivae normal.   Neck:      Thyroid: No thyromegaly.      Vascular: No JVD.      Trachea: No tracheal deviation.   Cardiovascular:      Rate and Rhythm: Normal rate and regular rhythm.      Heart sounds: Normal heart sounds. No murmur heard.     No gallop.   Pulmonary:      Effort: Pulmonary effort is normal.      Breath sounds: Normal breath sounds.   Abdominal:      General: Bowel sounds are normal. There is no distension.      Palpations: Abdomen is soft. There is no mass.      Tenderness: There is no abdominal tenderness. There is no guarding or rebound.   Musculoskeletal:         General: No tenderness.      Right lower leg: No edema.      Left lower leg: No edema.   Skin:     General: Skin is warm and dry.   Neurological:      General: No focal deficit present.      Mental Status: He is alert and oriented to person, place, and time. Mental status is at baseline.   Psychiatric:         Mood and Affect: Mood normal.         Behavior: Behavior normal.         Thought Content: Thought content normal.         Judgment: Judgment normal.      Labs reviewed as follows:  Lab Results   Component Value Date    BNP <10 07/01/2025    BNP 44 10/30/2024     (H) 07/29/2024     Lab Results   Component Value Date     07/01/2025     12/05/2024    K 4.4 07/01/2025    K 3.5 12/05/2024     07/01/2025     12/05/2024    CO2 27 07/01/2025    CO2 22 (L) 12/05/2024    GLU 72 07/01/2025    GLU 97 12/05/2024    BUN 14 07/01/2025    BUN 10 " 12/05/2024    CREATININE 1.7 (H) 07/01/2025    CREATININE 1.4 12/05/2024    CALCIUM 9.0 07/01/2025    CALCIUM 8.7 12/05/2024    ANIONGAP 9 07/01/2025    ANIONGAP 11 12/05/2024     Lab Results   Component Value Date    BNP <10 07/01/2025     07/01/2025     12/05/2024    K 4.4 07/01/2025    K 3.5 12/05/2024    MG 2.1 12/04/2024     07/01/2025     12/05/2024    CO2 27 07/01/2025    CO2 22 (L) 12/05/2024    PHOS 3.3 12/04/2024    BUN 14 07/01/2025    CREATININE 1.7 (H) 07/01/2025    GLU 72 07/01/2025    GLU 97 12/05/2024    HGBA1C 6.0 (H) 05/31/2024    AST 34 07/01/2025    AST 18 12/04/2024    ALT 28 07/01/2025    ALT 12 12/04/2024    ALBUMIN 4.3 07/01/2025    ALBUMIN 3.9 12/04/2024    PROT 7.8 07/01/2025    PROT 7.3 12/04/2024    BILITOT 0.2 07/01/2025    BILITOT 0.2 12/04/2024    WBC 7.10 12/04/2024    HGB 10.8 (L) 12/04/2024    HCT 36.1 (L) 12/04/2024    HCT 29 (L) 05/30/2024     12/04/2024    INR 1.0 12/04/2024     (H) 05/31/2024    TSH 0.809 05/31/2024    CHOL 123 05/31/2024    HDL 31 (L) 05/31/2024    LDLCALC 77.8 05/31/2024    TRIG 71 05/31/2024    PREALBUMIN 18 (L) 06/03/2024    TROPONINI 0.610 (H) 05/29/2024    NTPROBNP 745 (H) 08/29/2024    NTPROBNP 745 (H) 08/29/2024       Assessment:     1. Chronic combined systolic and diastolic heart failure    2. Bloating      Plan:    Long discussion with him regarding the importance of taking his medications as prescribed and following up as prescribed.  He seems to understand that he has a potentially life-threatening medical problem that requires long-term care in order to give him the best opportunity to live the longest and fullest life.     The bloating he describes does not appear to be related to decompensated heart failure. It is intermittent and he should see his primary care physician if this becomes more bothersome to him.      He will start taking the hydralazine and isosorbide 3 times daily     Try to eliminate 1-2  doses a week of bumetanide    Take half of a bisoprolol 5 mg tablet once a day and in 2 weeks resting heart rate above 70 then go to full tablet    ECHO, BMP and BNP with visit in 3 months

## (undated) DEVICE — TRAY CATH LAB OMC

## (undated) DEVICE — TRANSDUCER ADULT DISP

## (undated) DEVICE — SHEATH INTRODUCER 7FR 11CM

## (undated) DEVICE — CATH SWAN GANZ STND 7FR

## (undated) DEVICE — COVER PROBE US 5.5X58L NON LTX

## (undated) DEVICE — TUBING HPCIL ROT M/F ADPT 10IN

## (undated) DEVICE — SET MICROPUNCTURE 5FR 501NT

## (undated) DEVICE — KIT MICROINTRODUCE MINI 5X10CM